# Patient Record
Sex: FEMALE | Race: BLACK OR AFRICAN AMERICAN | Employment: OTHER | URBAN - METROPOLITAN AREA
[De-identification: names, ages, dates, MRNs, and addresses within clinical notes are randomized per-mention and may not be internally consistent; named-entity substitution may affect disease eponyms.]

---

## 2017-08-12 ENCOUNTER — HOSPITAL ENCOUNTER (EMERGENCY)
Age: 57
Discharge: HOME | End: 2017-08-12
Attending: EMERGENCY MEDICINE | Admitting: EMERGENCY MEDICINE
Payer: MEDICARE

## 2017-08-12 VITALS
WEIGHT: 186.1 LBS | BODY MASS INDEX: 34.24 KG/M2 | HEART RATE: 56 BPM | OXYGEN SATURATION: 98 % | TEMPERATURE: 98 F | DIASTOLIC BLOOD PRESSURE: 93 MMHG | HEIGHT: 62 IN | SYSTOLIC BLOOD PRESSURE: 162 MMHG | RESPIRATION RATE: 16 BRPM

## 2017-08-12 DIAGNOSIS — T82.838A BLEEDING FROM DIALYSIS SHUNT, INITIAL ENCOUNTER (HCC): Primary | ICD-10-CM

## 2017-08-12 DIAGNOSIS — R03.0 ELEVATED BLOOD PRESSURE READING: ICD-10-CM

## 2017-08-12 PROCEDURE — 99283 EMERGENCY DEPT VISIT LOW MDM: CPT

## 2017-08-12 PROCEDURE — 77030032276

## 2017-08-12 PROCEDURE — 75810000293 HC SIMP/SUPERF WND  RPR

## 2017-08-12 RX ORDER — CLONIDINE HYDROCHLORIDE 0.3 MG/1
0.3 TABLET ORAL 3 TIMES DAILY
COMMUNITY
End: 2022-09-27

## 2017-08-12 RX ORDER — LIDOCAINE HYDROCHLORIDE AND EPINEPHRINE 10; 10 MG/ML; UG/ML
10 INJECTION, SOLUTION INFILTRATION; PERINEURAL ONCE
Status: DISCONTINUED | OUTPATIENT
Start: 2017-08-12 | End: 2017-08-12 | Stop reason: HOSPADM

## 2017-08-12 RX ORDER — LISINOPRIL 40 MG/1
40 TABLET ORAL DAILY
COMMUNITY
End: 2020-05-23

## 2017-08-12 NOTE — ED PROVIDER NOTES
HPI Comments: 12:15 PM Manan Keller is a 55 y.o. female with h/o HTN who presents to ED complaining of bleeding from dialysis catheter site. EMS states that the patient finished dialysis and was still bleeding two hours after. Patient states that this happened the last time she had dialysis but it didn't last this long. She does not get heparinized during dialysis anymore and is not on anticoagulants. She arrived in the ED with a clamp on her arm. Pt denies light-headedness, shortness of breath, CP or any other symptoms at this time. PCP: Juan Hernandez MD      The history is provided by the patient. No  was used. Past Medical History:   Diagnosis Date    Heart failure ([address]     Hypertension        Past Surgical History:   Procedure Laterality Date    HX GI      polyp removed 10/2016         History reviewed. No pertinent family history. Social History     Social History    Marital status:      Spouse name: N/A    Number of children: N/A    Years of education: N/A     Occupational History    Not on file. Social History Main Topics    Smoking status: Former Smoker    Smokeless tobacco: Never Used    Alcohol use No    Drug use: No    Sexual activity: Yes     Partners: Male     Other Topics Concern    Not on file     Social History Narrative         ALLERGIES: Review of patient's allergies indicates no known allergies. Review of Systems   Constitutional: Negative. Negative for appetite change, chills and fever. HENT: Negative. Negative for congestion, sore throat and trouble swallowing. Eyes: Negative. Negative for visual disturbance. Respiratory: Negative. Negative for cough, shortness of breath and wheezing. Cardiovascular: Negative. Negative for chest pain, palpitations and leg swelling. Gastrointestinal: Negative. Negative for abdominal pain, blood in stool, diarrhea and vomiting. Genitourinary: Negative.   Negative for difficulty urinating, dysuria, frequency and vaginal discharge. Musculoskeletal: Negative. Negative for back pain and myalgias. Skin: Positive for wound. Negative for rash. +bleeding from dialysis catheter on left arm. Neurological: Negative. Negative for dizziness, syncope, speech difficulty, weakness, light-headedness, numbness and headaches. Psychiatric/Behavioral: Negative. Negative for hallucinations, self-injury and suicidal ideas. All other systems reviewed and are negative. Vitals:    08/12/17 1227 08/12/17 1300   BP: (!) 169/101 (!) 162/93   Pulse: 62 (!) 56   Resp: 16    Temp: 98 °F (36.7 °C)    SpO2: 98%    Weight: 84.4 kg (186 lb 1.6 oz)    Height: 5' 2\" (1.575 m)             Physical Exam   Constitutional: She is oriented to person, place, and time. She appears well-developed and well-nourished. No distress. HENT:   Head: Normocephalic and atraumatic. Mouth/Throat: Oropharynx is clear and moist.   Eyes: EOM are normal. Pupils are equal, round, and reactive to light. Neck: Trachea normal and normal range of motion. Neck supple. Cardiovascular: Normal rate, regular rhythm, S1 normal and S2 normal.  Exam reveals no gallop and no friction rub. No murmur heard. Pulmonary/Chest: Effort normal and breath sounds normal. No accessory muscle usage. No respiratory distress. Abdominal: Soft. Normal appearance. She exhibits no distension. There is no tenderness. There is no rigidity, no rebound and no guarding. Musculoskeletal: Normal range of motion. She exhibits no edema or tenderness. AV fistula to the LUE with some dark red oozing from the venous puncture site. Neurological: She is alert and oriented to person, place, and time. She has normal strength. No cranial nerve deficit or sensory deficit. Coordination normal.   Skin: Skin is warm and intact. No rash noted. Psychiatric: She has a normal mood and affect. Her speech is normal and behavior is normal.   Vitals reviewed. MDM  Number of Diagnoses or Management Options  Bleeding from dialysis shunt, initial encounter [facility]):   Elevated blood pressure reading:   Diagnosis management comments: Charlene Nguyễn is a 55 y.o. female coming in with a bleeding AV fistula. Bleeding stopped with topical thrombin and subQ lido with epi. Had full dialysis run and no other complaints. No need for additional w/u at this time. Recommended follow up with vascular surgeon at [facility]. ED Course       Procedures    Vitals:  Patient Vitals for the past 12 hrs:   Temp Pulse Resp BP SpO2   08/12/17 1300 - (!) 56 - (!) 162/93 -   08/12/17 1227 98 °F (36.7 °C) 62 16 (!) 169/101 98 %       Medications ordered:   Medications   thrombin (recombinant) (RECOTHROM) 5,000 unit topical solution (not administered)   gelatin adsorbable (GELFOAM) 12-7 mm sponge 1 Each (not administered)   lidocaine-EPINEPHrine (XYLOCAINE) 1 %-1:100,000 injection 100 mg (not administered)   gelatin absorbable (GELFOAM) 100 sponge (not administered)         Progress notes, Consult notes, and Reevaluation of patient:   1:07 PM Injected subcutaneous lidocaine with epinephrine; patient had gel foam and topical thrombin placed and bleeding has stopped. Patient was observed and bandages replaced. Disposition:  Diagnosis:   1. Bleeding from dialysis shunt, initial encounter ([facility], [address]    2. Elevated blood pressure reading        Disposition: Discharged in stable condition      Follow-up Information     Follow up With Details Comments Contact Info    [facility] EMERGENCY DEPT Go to If symptoms worsen [address] [place] [id number]  [phone]           Patient's Medications   Start Taking    No medications on file   Continue Taking    ACETAMINOPHEN-CODEINE (TYLENOL #3) 300-30 MG PER TABLET    Take 1-2 Tabs by mouth every four (4) hours as needed for Pain. CLONIDINE HCL (CATAPRES) 0.3 MG TABLET    Take 0.3 mg by mouth three (3) times daily.  Indications: hypertension    FEXOFENADINE (ALLEGRA) 60 MG TABLET    Take 1 Tab by mouth daily. LISINOPRIL (PRINIVIL, ZESTRIL) 40 MG TABLET    Take 40 mg by mouth daily. Indications: Chronic Heart Failure   These Medications have changed    No medications on file   Stop Taking    No medications on file     Scribe Attestation  Jeaneth Casey acting as a scribe for and in the presence of Desmond Cho MD  August 12, 2017 at 12:14 PM       Provider Attestation:  I personally performed the services described in the documentation, reviewed the documentation, as recorded by the scribe in my presence, and it accurately and completely records my words and actions.   Desmond Cho MD      Signed by: Jeaneth Beltran, August 12, 2017 at 12:14 PM

## 2017-08-12 NOTE — DISCHARGE INSTRUCTIONS
Elevated Blood Pressure: Care Instructions  Your Care Instructions    Blood pressure is a measure of how hard the blood pushes against the walls of your arteries. It's normal for blood pressure to go up and down throughout the day. But if it stays up over time, you have high blood pressure. Two numbers tell you your blood pressure. The first number is the systolic pressure. It shows how hard the blood pushes when your heart is pumping. The second number is the diastolic pressure. It shows how hard the blood pushes between heartbeats, when your heart is relaxed and filling with blood. An ideal blood pressure in adults is less than 120/80 (say \"120 over 80\"). High blood pressure is 140/90 or higher. You have high blood pressure if your top number is 140 or higher or your bottom number is 90 or higher, or both. The main test for high blood pressure is simple, fast, and painless. To diagnose high blood pressure, your doctor will test your blood pressure at different times. After testing your blood pressure, your doctor may ask you to test it again when you are home. If you are diagnosed with high blood pressure, you can work with your doctor to make a long-term plan to manage it. Follow-up care is a key part of your treatment and safety. Be sure to make and go to all appointments, and call your doctor if you are having problems. It's also a good idea to know your test results and keep a list of the medicines you take. How can you care for yourself at home? · Do not smoke. Smoking increases your risk for heart attack and stroke. If you need help quitting, talk to your doctor about stop-smoking programs and medicines. These can increase your chances of quitting for good. · Stay at a healthy weight. · Try to limit how much sodium you eat to less than 2,300 milligrams (mg) a day. Your doctor may ask you to try to eat less than 1,500 mg a day. · Be physically active.  Get at least 30 minutes of exercise on most days of the week. Walking is a good choice. You also may want to do other activities, such as running, swimming, cycling, or playing tennis or team sports. · Avoid or limit alcohol. Talk to your doctor about whether you can drink any alcohol. · Eat plenty of fruits, vegetables, and low-fat dairy products. Eat less saturated and total fats. · Learn how to check your blood pressure at home. When should you call for help? Call your doctor now or seek immediate medical care if:  · Your blood pressure is much higher than normal (such as 180/110 or higher). · You think high blood pressure is causing symptoms such as:  ¨ Severe headache. ¨ Blurry vision. Watch closely for changes in your health, and be sure to contact your doctor if:  · You do not get better as expected. Where can you learn more? Go to http://jaylenClickingHousealley.info/. Enter Q034 in the search box to learn more about \"Elevated Blood Pressure: Care Instructions. \"  Current as of: April 3, 2017  Content Version: 11.3  © 5908-8042 [employer] (powering [employer]). Care instructions adapted under license by [employer] (which disclaims liability or warranty for this information). If you have questions about a medical condition or this instruction, always ask your healthcare professional. [address] any warranty or liability for your use of this information. Peritonitis: Care Instructions  Your Care Instructions    Peritonitis is an infection of the lining of the belly (peritoneum). It causes pain and swelling inside the belly. It may also cause a fever. The infection can be serious if it is not treated. Peritonitis can be caused by:  · Bacteria escaping into the lining of the belly from a hole in the intestine, such as a burst appendix. · Too much fluid in the belly due to scarring of the liver (cirrhosis). Bacteria can grow easily in this fluid. · Peritoneal dialysis (PD).  PD uses a dialysis fluid and the lining of the belly to filter toxins from the blood. The fluid enters and leaves the belly through a soft tube, or catheter. The place where the catheter comes out of your body is the dialysis access. You may get peritonitis if the catheter is not sterile. You can also get it if the area around the access is not clean. Your doctor will give you antibiotics to treat the infection. If the infection is from a hole in the intestine, you may have surgery to remove pus and infected tissue from the area. If you use PD and the infection is minor, the antibiotics may be added to the dialysis fluid. The doctor may give you a new access while the old one heals. Or you may start using hemodialysis instead of PD. Hemodialysis uses a machine instead of the lining of the belly to filter waste from the blood. Follow-up care is a key part of your treatment and safety. Be sure to make and go to all appointments, and call your doctor if you are having problems. It's also a good idea to know your test results and keep a list of the medicines you take. How can you care for yourself at home? · Take your antibiotics as directed. Do not stop taking them just because you feel better. You need to take the full course of antibiotics. · Drink plenty of fluids. If you have kidney, heart, or liver disease and have to limit fluids, talk with your doctor before you increase the amount of fluids you drink. · If you have a PD catheter, take steps to help prevent infections. ¨ Keep your access clean and dry. Check it every day for signs of infection. ¨ Keep the end of your catheter covered when it is not in use. ¨ Always wash your hands before you touch your catheter. ¨ Avoid swimming and bathing unless your dialysis team has told you it is okay. Always clean and dry your catheter and access right away after you get wet. ¨ Follow your doctor's instructions for showering. When should you call for help?   Call your doctor now or seek immediate medical care if:  · You have nausea and vomiting. · You have belly pain that gets worse when you move or cough. · Your belly is bloated or swollen. · You have symptoms of a new infection or one that's getting worse, such as:  ¨ Increased pain, swelling, warmth, or redness. ¨ Red streaks leading from the access site. ¨ Pus draining from the access site. ¨ A fever. Watch closely for changes in your health, and be sure to contact your doctor if:  · You do not get better as expected. · You have a PD catheter and:  ¨ The dialysis fluid looks cloudy or is a different color. ¨ Fluid does not flow through the catheter. Where can you learn more? Go to http://jaylen-alley.info/. Enter Z221 in the search box to learn more about \"Peritonitis: Care Instructions. \"  Current as of: March 20, 2017  Content Version: 11.3  © 0489-6448 [employer]. Care instructions adapted under license by [employer] (which disclaims liability or warranty for this information). If you have questions about a medical condition or this instruction, always ask your healthcare professional. [place] [address] any warranty or liability for your use of this information.

## 2017-08-12 NOTE — ED TRIAGE NOTES
Pt brought in by EMS for av fistula oozing after dialysis run, pt reported slight chest pain after dialysis, now pt denies CP respirations even and unlabored, pt aao*4, denies any trauma or anticoagulants, pt connected to cardiac monitor continue to monitor pt

## 2017-09-22 ENCOUNTER — HOSPITAL ENCOUNTER (OUTPATIENT)
Dept: PHYSICAL THERAPY | Age: 57
Discharge: HOME | End: 2017-09-22
Payer: MEDICARE

## 2017-09-22 PROCEDURE — 97162 PT EVAL MOD COMPLEX 30 MIN: CPT

## 2017-09-22 PROCEDURE — G8982 BODY POS GOAL STATUS: HCPCS

## 2017-09-22 PROCEDURE — G8981 BODY POS CURRENT STATUS: HCPCS

## 2017-09-22 PROCEDURE — 97110 THERAPEUTIC EXERCISES: CPT
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
## 2017-10-05 ENCOUNTER — HOSPITAL ENCOUNTER (OUTPATIENT)
Dept: PHYSICAL THERAPY | Age: 57
Discharge: HOME | End: 2017-10-05

## 2017-10-09 ENCOUNTER — HOSPITAL ENCOUNTER (OUTPATIENT)
Dept: PHYSICAL THERAPY | Age: 57
End: 2017-10-09

## 2017-10-11 ENCOUNTER — APPOINTMENT (OUTPATIENT)
Dept: PHYSICAL THERAPY | Age: 57
End: 2017-10-11

## 2017-10-12 ENCOUNTER — APPOINTMENT (OUTPATIENT)
Dept: GENERAL RADIOLOGY | Age: 57
End: 2017-10-12
Attending: NURSE PRACTITIONER
Payer: MEDICARE

## 2017-10-12 ENCOUNTER — HOSPITAL ENCOUNTER (EMERGENCY)
Age: 57
Discharge: HOME | End: 2017-10-13
Attending: EMERGENCY MEDICINE
Payer: MEDICARE

## 2017-10-12 DIAGNOSIS — I10 ESSENTIAL HYPERTENSION: ICD-10-CM

## 2017-10-12 DIAGNOSIS — S53.409A ELBOW SPRAIN, INITIAL ENCOUNTER: ICD-10-CM
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
## 2017-10-12 PROCEDURE — 74011250637 HC RX REV CODE- 250/637: Performed by: NURSE PRACTITIONER

## 2017-10-12 PROCEDURE — 99283 EMERGENCY DEPT VISIT LOW MDM: CPT

## 2017-10-12 PROCEDURE — L3670 SO ACRO/CLAV CAN WEB PRE OTS: HCPCS

## 2017-10-12 PROCEDURE — 73080 X-RAY EXAM OF ELBOW: CPT

## 2017-10-12 RX ORDER — CLONIDINE HYDROCHLORIDE 0.1 MG/1
0.3 TABLET ORAL
Status: COMPLETED | OUTPATIENT
Start: 2017-10-12 | End: 2017-10-12

## 2017-10-12 RX ORDER — OXYCODONE AND ACETAMINOPHEN 5; 325 MG/1; MG/1
2 TABLET ORAL
Status: COMPLETED | OUTPATIENT
Start: 2017-10-12 | End: 2017-10-12

## 2017-10-12 RX ADMIN — CLONIDINE HYDROCHLORIDE 0.3 MG: 0.1 TABLET ORAL at 23:48

## 2017-10-12 RX ADMIN — OXYCODONE HYDROCHLORIDE AND ACETAMINOPHEN 2 TABLET: 5; 325 TABLET ORAL at 23:48

## 2017-10-13 ENCOUNTER — HOSPITAL ENCOUNTER (OUTPATIENT)
Dept: PHYSICAL THERAPY | Age: 57
End: 2017-10-13

## 2017-10-13 VITALS
RESPIRATION RATE: 20 BRPM | SYSTOLIC BLOOD PRESSURE: 160 MMHG | OXYGEN SATURATION: 99 % | WEIGHT: 175 LBS | BODY MASS INDEX: 30.04 KG/M2 | TEMPERATURE: 99.2 F | HEART RATE: 76 BPM | DIASTOLIC BLOOD PRESSURE: 94 MMHG

## 2017-10-13 RX ORDER — OXYCODONE AND ACETAMINOPHEN 5; 325 MG/1; MG/1
1 TABLET ORAL
Qty: 15 TAB | Refills: 0 | Status: SHIPPED | OUTPATIENT
Start: 2017-10-13 | End: 2018-03-27

## 2017-10-13 RX ORDER — NAPROXEN 500 MG/1
500 TABLET ORAL 2 TIMES DAILY WITH MEALS
Qty: 20 TAB | Refills: 0 | Status: SHIPPED | OUTPATIENT
Start: 2017-10-13 | End: 2017-10-23
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
## 2017-10-13 NOTE — ED NOTES
I have reviewed discharge instructions with the patient. The patient verbalized understanding. Discharge medications reviewed with patient and appropriate educational materials and side effects teaching were provided. Discussed with the patient and all questioned fully answered.   Patient armband removed and shredded
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
## 2017-10-14 NOTE — PROGRESS NOTES
Called and spoke with pt, confirmed last 4 SSN and . Informed of over-read. Pt does not have any pain in elbow or ROM issues. Already established pt of Dr. Ana Griggs. Advised to call office on Monday to make f/u appointment.

## 2017-10-18 ENCOUNTER — APPOINTMENT (OUTPATIENT)
Dept: PHYSICAL THERAPY | Age: 57
End: 2017-10-18

## 2017-10-23 ENCOUNTER — APPOINTMENT (OUTPATIENT)
Dept: PHYSICAL THERAPY | Age: 57
End: 2017-10-23

## 2017-10-25 ENCOUNTER — APPOINTMENT (OUTPATIENT)
Dept: PHYSICAL THERAPY | Age: 57
End: 2017-10-25

## 2017-10-30 ENCOUNTER — APPOINTMENT (OUTPATIENT)
Dept: PHYSICAL THERAPY | Age: 57
End: 2017-10-30

## 2017-11-01 ENCOUNTER — APPOINTMENT (OUTPATIENT)
Dept: PHYSICAL THERAPY | Age: 57
End: 2017-11-01

## 2017-11-08 ENCOUNTER — HOSPITAL ENCOUNTER (OUTPATIENT)
Dept: PHYSICAL THERAPY | Age: 57
End: 2017-11-08

## 2017-11-17 ENCOUNTER — HOSPITAL ENCOUNTER (OUTPATIENT)
Dept: PHYSICAL THERAPY | Age: 57
Discharge: HOME | End: 2017-11-17
Payer: MEDICARE

## 2017-11-17 PROCEDURE — G8984 CARRY CURRENT STATUS: HCPCS

## 2017-11-17 PROCEDURE — G8985 CARRY GOAL STATUS: HCPCS

## 2017-11-17 PROCEDURE — 97110 THERAPEUTIC EXERCISES: CPT

## 2017-11-17 PROCEDURE — 97161 PT EVAL LOW COMPLEX 20 MIN: CPT

## 2017-11-17 PROCEDURE — 97140 MANUAL THERAPY 1/> REGIONS: CPT
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
## 2017-11-22 ENCOUNTER — HOSPITAL ENCOUNTER (OUTPATIENT)
Dept: PHYSICAL THERAPY | Age: 57
Discharge: HOME | End: 2017-11-22
Payer: MEDICARE

## 2017-11-22 PROCEDURE — 97140 MANUAL THERAPY 1/> REGIONS: CPT

## 2017-11-22 PROCEDURE — 97110 THERAPEUTIC EXERCISES: CPT

## 2017-11-22 NOTE — PROGRESS NOTES
PT DAILY TREATMENT NOTE     Patient Name: Walter Contreras  Date:2017  : 1960  [x]  Patient  Verified  Payor: Juanito BlakelyBillboard Jungle / Plan: [address] / Product Type: LEYLA /    In time: 4:36  Out time: 5:16  Total Treatment Time (min): 40  Visit #: 2 of 20    Treatment Area: Cervicalgia [M54.2]    SUBJECTIVE  Pain Level (0-10 scale): 7.5-8/10 c/s; 6/10 l/s  Any medication changes, allergies to medications, adverse drug reactions, diagnosis change, or new procedure performed?: [x] No    [] Yes (see summary sheet for update)  Subjective functional status/changes:   [] No changes reported  Pt reports increased tension and tightness causing pain in her neck region. She notes that she has difficulty turning her head to look back and forth when driving. She has been having discomfort when sleeping as well and is sleeping on the couch currently.      OBJECTIVE    Modality rationale: decrease pain and increase tissue extensibility to improve the patients ability to improve ease of sleeping   Min Type Additional Details    [] Estim:  []Unatt       []IFC  []Premod                        []Other:  []w/ice   []w/heat  Position:  Location:    [] Estim: []Att    []TENS instruct  []NMES                    []Other:  []w/US   []w/ice   []w/heat  Position:  Location:    []  Traction: [] Cervical       []Lumbar                       [] Prone          []Supine                       []Intermittent   []Continuous Lbs:  [] before manual  [] after manual    []  Ultrasound: []Continuous   [] Pulsed                           []1MHz   []3MHz W/cm2:  Location:    []  Iontophoresis with dexamethasone         Location: [] Take home patch   [] In clinic   10 []  Ice     [x]  heat  []  Ice massage  []  Laser   []  Anodyne Position:seated  Location: l/s and B UTs    []  Laser with stim  []  Other:  Position:  Location:    []  Vasopneumatic Device Pressure:       [] lo [] med [] hi   Temperature: [] lo [] med [] hi   [x] Skin assessment post-treatment:  [x]intact []redness- no adverse reaction    []redness - adverse reaction:       10 min Therapeutic Exercise:  [x] See flow sheet :   Rationale: increase ROM and increase strength to improve the patients ability to improve ease of sleeping and performing household chores    20 min Manual Therapy:  DTM to B UTs; TPR to B c/s paraspinals along C7   Rationale: decrease pain, increase ROM and increase tissue extensibility to improve c/s mobility for driving and decrease pain with sleeping            With   [] TE   [] TA   [] neuro   [] other: Patient Education: [x] Review HEP    [] Progressed/Changed HEP based on:   [] positioning   [] body mechanics   [] transfers   [] heat/ice application    [] other:      Other Objective/Functional Measures: B swelling along superior clavicles  Large knots in B UTs L >R  TTP along c/s paraspinals but improved mobility post manual  Educated on heat and stretching over the weekend     Pain Level (0-10 scale) post treatment: 6/10    ASSESSMENT/Changes in Function: Initiated exercise program per POC with compliance of initial HEP. Pt continues to be guarded by pain with decreased ability to sleep. She has hypertonicity of B UTs L>R. Will work on decreased muscle guarding to improve ease of turning her head for driving and performing household  Chores. Patient will continue to benefit from skilled PT services to modify and progress therapeutic interventions, address functional mobility deficits, address ROM deficits, address strength deficits, analyze and address soft tissue restrictions, analyze and cue movement patterns, analyze and modify body mechanics/ergonomics and instruct in home and community integration to attain remaining goals. Progress towards goals / Updated goals:  Short term goals: To be accomplished within 1 week  1. Pt will be independent with HEP to maintain progression. Long term goals: To be accomplished within 20 visit  1. Pt will improve FOTO score by 18 points to 55/100 to show improvement with functional mobility performance. 2. Pt will report no more than 6/10 pain to improve her QOL. 3. Pt will have full and pain free AROM of c/s and B UEs for ADLs performance. 4. Pt will have B UEs strength at least 4/5 so she can perform housechores with ease.     PLAN  [x]  Upgrade activities as tolerated     [x]  Continue plan of care  []  Update interventions per flow sheet       []  Discharge due to:_  []  Other:_      Oswald Stock PTA 11/22/2017  4:36 PM    Future Appointments  Date Time Provider Jessee Arthur   11/27/2017 11:30 AM Oswald Stock PTA [id number]   12/1/2017 11:30 AM Kellen Nichols PT [id number]

## 2017-12-01 ENCOUNTER — HOSPITAL ENCOUNTER (OUTPATIENT)
Dept: PHYSICAL THERAPY | Age: 57
Discharge: HOME | End: 2017-12-01
Payer: MEDICARE

## 2017-12-01 PROCEDURE — 97140 MANUAL THERAPY 1/> REGIONS: CPT

## 2017-12-01 PROCEDURE — 97110 THERAPEUTIC EXERCISES: CPT

## 2017-12-01 NOTE — PROGRESS NOTES
PT DAILY TREATMENT NOTE - [facility]     Patient Name: France Medina  Date:2017  : 1960  [x]  Patient  Verified  Payor[place] / Plan: [place] MED[place] PART [place] & B / Product Type: Medicare /    In time:11:32  Out time:12:00  Total Treatment Time (min): 28'  Total Timed Codes (min): 28'  1:1 Treatment Time ([address] only): 28'  Visit #: 3 of 20    Treatment Area: Cervicalgia [M54.2]    SUBJECTIVE  Pain Level (0-10 scale): 7.5/10  Any medication changes, allergies to medications, adverse drug reactions, diagnosis change, or new procedure performed?: [x] No    [] Yes (see summary sheet for update)  Subjective functional status/changes:   [] No changes reported  Pt reports pain increases during reaching and opening doors  Pt repots complete compliance with HEP   Pt asked about obtaining [employer] samples  Pt discribes manual therapy as \"good hurt\"       OBJECTIVE      8' min Therapeutic Exercise:  [x] See flow sheet :   Rationale: increase ROM to improve the patients ability to perform ADL's       20' min Manual Therapy:  DTM to B UTs; TPR to B c/s paraspinals along C7 and paraspinal strumming will elbow   Rationale: decrease pain to improve c/s mobility for driving and decrease pain with sleeping              With   [x] TE   [] TA   [] neuro   [] other: Patient Education: [x] Review HEP    [] Progressed/Changed HEP based on:   [] positioning   [] body mechanics   [] transfers   [] heat/ice application    [] other:      Other Objective/Functional Measures:   Pt required tactile cueing to properly execute rhomboid stretch   Pt had increased tone in R UT compared to left but had palpaple Trigger points in both. Pt was educated in use of tennis ball and pillow case for self massage. Pt muscle tone was improved following manual therapy and she verbalized her appreciation.       Pain Level (0-10 scale) post treatment: 7.5/10    ASSESSMENT/Changes in Function: Pt is progressing toward achievement of rehab potential as evidenced by decreased tone post therapy, but continues to present with stiff guard posture. Will continue with manual techniques to manage pain and stretches to increase AROM in order to improve ability to do house hold activities and driving. Patient will continue to benefit from skilled PT services to modify and progress therapeutic interventions, address functional mobility deficits, address ROM deficits, address strength deficits and assess and modify postural abnormalities to attain remaining goals. [x]  See Plan of Care  []  See progress note/recertification  []  See Discharge Summary         Progress towards goals / Updated goals:  Short term goals: To be accomplished within 1 week  1. Pt will be independent with HEP to maintain progression. met    Long term goals: To be accomplished within 20 visit  1. Pt will improve FOTO score by 18 points to 55/100 to show improvement with functional mobility performance. 2. Pt will report no more than 6/10 pain to improve her QOL. 3. Pt will have full and pain free AROM of c/s and B UEs for ADLs performance. 4. Pt will have B UEs strength at least 4/5 so she can perform housechores with ease.       PLAN  []  Upgrade activities as tolerated     [x]  Continue plan of care  []  Update interventions per flow sheet       []  Discharge due to:_  []  Other:_      Shawna Carroll 12/1/2017  11:30 AM    No future appointments.

## 2017-12-06 ENCOUNTER — APPOINTMENT (OUTPATIENT)
Dept: PHYSICAL THERAPY | Age: 57
End: 2017-12-06
Payer: MEDICARE

## 2017-12-08 ENCOUNTER — HOSPITAL ENCOUNTER (OUTPATIENT)
Dept: PHYSICAL THERAPY | Age: 57
Discharge: HOME | End: 2017-12-08
Payer: MEDICARE

## 2017-12-08 PROCEDURE — 97140 MANUAL THERAPY 1/> REGIONS: CPT

## 2017-12-08 PROCEDURE — 97110 THERAPEUTIC EXERCISES: CPT
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
## 2017-12-13 ENCOUNTER — APPOINTMENT (OUTPATIENT)
Dept: PHYSICAL THERAPY | Age: 57
End: 2017-12-13
Payer: MEDICARE

## 2017-12-14 ENCOUNTER — HOSPITAL ENCOUNTER (OUTPATIENT)
Dept: PHYSICAL THERAPY | Age: 57
Discharge: HOME | End: 2017-12-14
Payer: MEDICARE

## 2017-12-14 PROCEDURE — 97140 MANUAL THERAPY 1/> REGIONS: CPT

## 2017-12-14 PROCEDURE — 97110 THERAPEUTIC EXERCISES: CPT

## 2017-12-14 NOTE — PROGRESS NOTES
PT DAILY TREATMENT NOTE - [facility]     Patient Name: Mani Julio  Date:2017  : 1960  [x]  Patient  Verified  Payor: Breanne Ice / Plan: [place] MED[place] & B / Product Type: Medicare /    In time: 5:33  Out time: 6:15  Total Treatment Time (min): 42  Total Timed Codes (min): 32  1:1 Treatment Time ( only): 32   Visit #: 5 of 20    Treatment Area: Cervicalgia [M54.2]    SUBJECTIVE  Pain Level (0-10 scale): 6.5/10  Any medication changes, allergies to medications, adverse drug reactions, diagnosis change, or new procedure performed?: [x] No    [] Yes (see summary sheet for update)  Subjective functional status/changes:   [] No changes reported   pt. Reports it's still there but getting better.      OBJECTIVE    Modality rationale: decrease pain and increase tissue extensibility to improve the patients ability to increase ease of ADLs   Min Type Additional Details    [] Estim:  []Unatt       []IFC  []Premod                        []Other:  []w/ice   []w/heat  Position:  Location:    [] Estim: []Att    []TENS instruct  []NMES                    []Other:  []w/US   []w/ice   []w/heat  Position:  Location:    []  Traction: [] Cervical       []Lumbar                       [] Prone          []Supine                       []Intermittent   []Continuous Lbs:  [] before manual  [] after manual    []  Ultrasound: []Continuous   [] Pulsed                           []1MHz   []3MHz W/cm2:  Location:    []  Iontophoresis with dexamethasone         Location: [] Take home patch   [] In clinic   10 []  Ice     [x]  heat  []  Ice massage  []  Laser   []  Anodyne Position: seated  Location: B UT and low back    []  Laser with stim  []  Other:  Position:  Location:    []  Vasopneumatic Device Pressure:       [] lo [] med [] hi   Temperature: [] lo [] med [] hi   [x] Skin assessment post-treatment:  [x]intact []redness- no adverse reaction    []redness - adverse reaction:     22 min Therapeutic Exercise:  [x] See flow sheet :   Rationale: increase ROM and increase strength to improve the patients ability to increase ease of ADLs    10 min Manual Therapy:  Trigger point release to B UT   Rationale: decrease pain, increase ROM and increase tissue extensibility to increase ease of ADLs          With   [x] TE   [] TA   [] neuro   [] other: Patient Education: [x] Review HEP    [] Progressed/Changed HEP based on:   [] positioning   [] body mechanics   [] transfers   [] heat/ice application    [] other:      Other Objective/Functional Measures:   Cervical AROM rotation R: 60 L: 60 degrees  Pt. Was educated on band rows/extension for HEP  She was educated on supine nods for HEP  She tolerated PT well with no reports of increased pain or increased radicular symptoms. Pain Level (0-10 scale) post treatment: 5.5/10    ASSESSMENT/Changes in Function:  Pt. Is progressing slowly towards goals. She demonstrates improving cervical mobility and has some decrease in pain overall. She does continue to have intermittent radicular symptoms. Patient will continue to benefit from skilled PT services to modify and progress therapeutic interventions, address functional mobility deficits, address ROM deficits, address strength deficits, analyze and address soft tissue restrictions, analyze and cue movement patterns, analyze and modify body mechanics/ergonomics and assess and modify postural abnormalities to attain remaining goals. Progress towards goals / Updated goals:  Short term goals: To be accomplished within 1 week  1. Pt will be independent with HEP to maintain progression. met   Long term goals: To be accomplished within 20 visit  1. Pt will improve FOTO score by 18 points to 55/100 to show improvement with functional mobility performance. 2. Pt will report no more than 6/10 pain to improve her QOL. Continues at 7.5/10 (12/8/17)  3. Pt will have full and pain free AROM of c/s and B UEs for ADLs performance. Progressing: pt. Demonstrates improving cervical rotation AROM (12/14/17)  4. Pt will have B UEs strength at least 4/5 so she can perform housechores with ease.     PLAN  []  Upgrade activities as tolerated     [x]  Continue plan of care  []  Update interventions per flow sheet       []  Discharge due to:_  []  Other:_      Basilio Juan Carloson, PT 12/14/2017  5:37 PM    Future Appointments  Date Time Provider Jessee Arthur   12/19/2017 5:00 PM Arvel Bound, PTA [facility]   12/21/2017 5:00 PM Arvel Bound, PTA [facility]

## 2017-12-15 ENCOUNTER — APPOINTMENT (OUTPATIENT)
Dept: PHYSICAL THERAPY | Age: 57
End: 2017-12-15
Payer: MEDICARE

## 2017-12-20 ENCOUNTER — APPOINTMENT (OUTPATIENT)
Dept: PHYSICAL THERAPY | Age: 57
End: 2017-12-20
Payer: MEDICARE

## 2017-12-21 ENCOUNTER — HOSPITAL ENCOUNTER (OUTPATIENT)
Dept: PHYSICAL THERAPY | Age: 57
Discharge: HOME | End: 2017-12-21
Payer: MEDICARE

## 2017-12-21 PROCEDURE — 97140 MANUAL THERAPY 1/> REGIONS: CPT

## 2017-12-21 PROCEDURE — 97110 THERAPEUTIC EXERCISES: CPT

## 2017-12-21 NOTE — PROGRESS NOTES
PT DAILY TREATMENT NOTE - [facility]     Patient Name: Ruben Perry  Date:2017  : 1960  [x]  Patient  Verified  Payor: Concetta Cindy / Plan: [place] MEDICARE PART A & B / Product Type: Medicare /    In time:5:15  Out time: 6:00  Total Treatment Time (min): 45  Total Timed Codes (min): 35  1:1 Treatment Time ( only): 30  Visit #: 6 of 20    Treatment Area: Cervicalgia [M54.2]    SUBJECTIVE  Pain Level (0-10 scale): 7/10  Any medication changes, allergies to medications, adverse drug reactions, diagnosis change, or new procedure performed?: [x] No    [] Yes (see summary sheet for update)  Subjective functional status/changes:   [] No changes reported  Pt reports a lot of tension in her neck today and feels she needs a massage. She thought her appt was at 5:30.      OBJECTIVE    Modality rationale: decrease pain and increase tissue extensibility to improve the patients ability to improve ease of performing ADLs    Min Type Additional Details    [] Estim:  []Unatt       []IFC  []Premod                        []Other:  []w/ice   []w/heat  Position:  Location:    [] Estim: []Att    []TENS instruct  []NMES                    []Other:  []w/US   []w/ice   []w/heat  Position:  Location:    []  Traction: [] Cervical       []Lumbar                       [] Prone          []Supine                       []Intermittent   []Continuous Lbs:  [] before manual  [] after manual    []  Ultrasound: []Continuous   [] Pulsed                           []1MHz   []3MHz W/cm2:  Location:    []  Iontophoresis with dexamethasone         Location: [] Take home patch   [] In clinic   10 []  Ice     [x]  heat  []  Ice massage  []  Laser   []  Anodyne Position:seated  Location: B UTs    []  Laser with stim  []  Other:  Position:  Location:    []  Vasopneumatic Device Pressure:       [] lo [] med [] hi   Temperature: [] lo [] med [] hi   [x] Skin assessment post-treatment:  [x]intact []redness- no adverse reaction    []redness - adverse reaction:     25 min Therapeutic Exercise:  [x] See flow sheet :   Rationale: increase ROM and increase strength to improve the patients ability to improve ease of performing ADLs    10 min Manual Therapy:  DTM B UTs   Rationale: decrease pain, increase ROM and increase tissue extensibility to improve ease of performing ADLs          With   [] TE   [] TA   [] neuro   [] other: Patient Education: [x] Review HEP    [] Progressed/Changed HEP based on:   [] positioning   [] body mechanics   [] transfers   [] heat/ice application    [] other:      Other Objective/Functional Measures:   Continues with hypertonicity R UT more than L UT  No increased pain with stretches or exercises  Educated on use of cane at home for knots and heat to help relieve tension/tightness    Pain Level (0-10 scale) post treatment: 5/10    ASSESSMENT/Changes in Function: Pt making slow, steady progress towards goals with continued hypertonicity of R>L UTs. She has been doing exercises at home but remains tight likely due to overcompensation for weak shoulder musculature. Will continue working to decrease pain/tightness to return to PLOF. Patient will continue to benefit from skilled PT services to modify and progress therapeutic interventions, address functional mobility deficits, address ROM deficits, address strength deficits, analyze and address soft tissue restrictions, analyze and cue movement patterns, analyze and modify body mechanics/ergonomics, assess and modify postural abnormalities, address imbalance/dizziness and instruct in home and community integration to attain remaining goals. Progress towards goals / Updated goals:  Short term goals: To be accomplished within 1 week  1. Pt will be independent with HEP to maintain progression. met   Long term goals: To be accomplished within 20 visit  1. Pt will improve FOTO score by 18 points to 55/100 to show improvement with functional mobility performance.    2. Pt will report no more than 6/10 pain to improve her QOL. Continues at 7.5/10 (12/8/17)  3. Pt will have full and pain free AROM of c/s and B UEs for ADLs performance. Progressing: pt. Demonstrates improving cervical rotation AROM (12/14/17)  4. Pt will have B UEs strength at least 4/5 so she can perform housechores with ease.     PLAN  [x]  Upgrade activities as tolerated     [x]  Continue plan of care  []  Update interventions per flow sheet       []  Discharge due to:_  []  Other:_      Sisto Sham, PTA 12/21/2017  6:03 PM    Future Appointments  Date Time Provider Jessee Sandhya   12/28/2017 5:30 PM Sisto Sham, PTA [facility]   12/29/2017 5:30 PM Sisto Sham, PTA [facility]   1/4/2018 5:00 PM Sisto Sham, PTA [facility]   1/5/2018 5:30 PM Sisto Sham, PTA [facility]   1/11/2018 5:00 PM Kellen Hasten, PT [facility]   1/12/2018 5:00 PM [facility]asten, [facility]   1/18/2018 5:00 PM Kellen Hasten, PT [facility]   1/19/2018 5:00 PM Kellen Magdalena, PT [facility]

## 2017-12-22 ENCOUNTER — APPOINTMENT (OUTPATIENT)
Dept: PHYSICAL THERAPY | Age: 57
End: 2017-12-22
Payer: MEDICARE

## 2017-12-26 ENCOUNTER — APPOINTMENT (OUTPATIENT)
Dept: PHYSICAL THERAPY | Age: 57
End: 2017-12-26
Payer: MEDICARE

## 2017-12-27 ENCOUNTER — APPOINTMENT (OUTPATIENT)
Dept: PHYSICAL THERAPY | Age: 57
End: 2017-12-27
Payer: MEDICARE

## 2017-12-28 ENCOUNTER — HOSPITAL ENCOUNTER (OUTPATIENT)
Dept: PHYSICAL THERAPY | Age: 57
Discharge: HOME | End: 2017-12-28
Payer: MEDICARE

## 2017-12-28 PROCEDURE — 97110 THERAPEUTIC EXERCISES: CPT

## 2017-12-28 PROCEDURE — 97140 MANUAL THERAPY 1/> REGIONS: CPT
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
## 2017-12-29 ENCOUNTER — APPOINTMENT (OUTPATIENT)
Dept: PHYSICAL THERAPY | Age: 57
End: 2017-12-29
Payer: MEDICARE

## 2018-01-04 ENCOUNTER — APPOINTMENT (OUTPATIENT)
Dept: PHYSICAL THERAPY | Age: 58
End: 2018-01-04
Payer: MEDICARE

## 2018-01-05 ENCOUNTER — APPOINTMENT (OUTPATIENT)
Dept: PHYSICAL THERAPY | Age: 58
End: 2018-01-05
Payer: MEDICARE

## 2018-01-11 ENCOUNTER — HOSPITAL ENCOUNTER (OUTPATIENT)
Dept: PHYSICAL THERAPY | Age: 58
Discharge: HOME | End: 2018-01-11
Payer: MEDICARE

## 2018-01-11 PROCEDURE — G8984 CARRY CURRENT STATUS: HCPCS

## 2018-01-11 PROCEDURE — G8985 CARRY GOAL STATUS: HCPCS

## 2018-01-11 PROCEDURE — 97110 THERAPEUTIC EXERCISES: CPT

## 2018-01-11 NOTE — PROGRESS NOTES
[facility] [employer] [facility]  [address] [place] [address]  [phone] fax    Continued Plan of Care/ Re-certification for Physical Therapy Services    Patient name: Steve Denney Start of Care: 17   Referral source: Dr. Bk Parker : 1960   Medical/Treatment Diagnosis: Cervicalgia [M54.2] Onset Date: 17     Prior Hospitalization: see medical history Provider#: [id number]   Medications: Verified on Patient Summary List    Comorbidities:  HTN  Prior Level of Function: Ind with all mobility  Visits from Start of Care: 8    Missed Visits: 2    The Plan of Care and following information is based on the patient's current status:  Goal: Pt will improve FOTO score by 18 points to 55/100 to show improvement with functional mobility performance. Status at last note/certification:37  Current Status: not met    Goal: Pt will report no more than 6/10 pain to improve her QOL. Status at last note/certification:   Current Status: not met    Goal: Pt will have full and pain free AROM of c/s and B UEs for ADLs performance. Status at last note/certification: painful. Cervical ext: 5 degrees. Rotation R: 25 L: 25 degrees   Current Status: not met    Goal: Pt will have B UEs strength at least 4/5 so she can perform housechores with ease. Status at last note/certification: decreased strength   Current Status: met    Key functional changes: pt.  Is having some decrease in pain and demonstrates improving cervical AROM      Problems/ barriers to goal attainment: none     Problem List: pain affecting function, decrease ROM, decrease strength, impaired gait/ balance, decrease ADL/ functional abilitiies, decrease activity tolerance, decrease flexibility/ joint mobility and decrease transfer abilities    Treatment Plan: Therapeutic exercise, Therapeutic activities, Neuromuscular re-education, Physical agent/modality, Gait/balance training, Manual therapy, Patient education, Self Care training and Functional mobility training     Patient Goal (s) has been updated and includes: to have less pain     Goals for this certification period to be accomplished in 6 weeks:  1. Patient will improve FOTO score by 18 points in order to demonstrate a significant improvement in function. 2. Patient will report pain at 4/10 or less during ADLs in order to improve quality of life. 3. Patient will improve B cervical side bend AROM to 30 degrees in order to increase ease of ADLs. Frequency / Duration: Patient to be seen 2 times per week for 6 weeks:    Assessment / Recommendations:pt. Is progressing slowly towards goals. She demonstrates improving cervical AROM but continues to have pain at end ranges. She also continues to complain of moderate pain at 6.5/10. B shoulder strength has also improved with decreased pain during manual muscle testing. Pt. Continues to have decreased cervical strength. Skilled PT is medically necessary in order to continue to improve strength and mobility for decreased pain and improved quality of life. G-Codes (GP)  Carry   Current  CL= 60-79%   [id number] Goal  CK= 40-59%    The severity rating is based on clinical judgment and the FOTO score. Certification Period: 1/11/18-3/9/18    Nathan Hinson, PT 1/11/2018 5:13 PM    ________________________________________________________________________  I certify that the above Therapy Services are being furnished while the patient is under my care. I agree with the treatment plan and certify that this therapy is necessary. [] I have read the above and request that my patient continue as recommended.   [] I have read the above report and request that my patient continue therapy with the following changes/special instructions: _______________________________________  [] I have read the above report and request that my patient be discharged from therapy    Physician's Signature:________________________________Date:___________Time:__________    Please sign and return to [facility] - [facility]  [address] [place] [address]  [phone] fax

## 2018-01-11 NOTE — PROGRESS NOTES
PT DAILY TREATMENT NOTE - [facility]     Patient Name: Lila Hernández  Date:2018  : 1960  [x]  Patient  Verified  Payor: Yuliet Blend / Plan: [place] MED[place] & B / Product Type: Medicare /    In time: 5:05  Out time: 5:45  Total Treatment Time (min): 40  Total Timed Codes (min): 30  1:1 Treatment Time ( [address] only): 25   Visit #: 8 of 20    Treatment Area: Cervicalgia [M54.2]    SUBJECTIVE  Pain Level (0-10 scale): 6.5/10  Any medication changes, allergies to medications, adverse drug reactions, diagnosis change, or new procedure performed?: [x] No    [] Yes (see summary sheet for update)  Subjective functional status/changes:   [] No changes reported  Pt.  Reports she has been getting a little better    OBJECTIVE    Modality rationale: decrease pain and increase tissue extensibility to improve the patients ability to increase ease of ADLs   Min Type Additional Details    [] Estim:  []Unatt       []IFC  []Premod                        []Other:  []w/ice   []w/heat  Position:  Location:    [] Estim: []Att    []TENS instruct  []NMES                    []Other:  []w/US   []w/ice   []w/heat  Position:  Location:    []  Traction: [] Cervical       []Lumbar                       [] Prone          []Supine                       []Intermittent   []Continuous Lbs:  [] before manual  [] after manual    []  Ultrasound: []Continuous   [] Pulsed                           []1MHz   []3MHz W/cm2:  Location:    []  Iontophoresis with dexamethasone         Location: [] Take home patch   [] In clinic   10 []  Ice     [x]  heat  []  Ice massage  []  Laser   []  Anodyne Position: seated  Location: back and B shoulders    []  Laser with stim  []  Other:  Position:  Location:    []  Vasopneumatic Device Pressure:       [] lo [] med [] hi   Temperature: [] lo [] med [] hi   [x] Skin assessment post-treatment:  [x]intact []redness- no adverse reaction    []redness - adverse reaction:     30 min Therapeutic Exercise:  [x] See flow sheet :   Rationale: increase ROM and increase strength to improve the patients ability to increase ease of ADLs          With   [x] TE   [] TA   [] neuro   [] other: Patient Education: [x] Review HEP    [] Progressed/Changed HEP based on:   [] positioning   [] body mechanics   [] transfers   [] heat/ice application    [] other:      Other Objective/Functional Measures:   Cervical AROM flex: 40 degrees ext: 30 degrees rot R: 60 L: 65  MMT shoulder flex: R: 4/5 L: 4/5 abd R: 4/5 L: 4/5   Pt.  Continues to have relief with heat  She had poor form with yeni bar push out    Pain Level (0-10 scale) post treatment: 3/10    ASSESSMENT/Changes in Function:      []  See Plan of Care  [x]  See progress note/recertification  []  See Discharge Summary         Progress towards goals / Updated goals:  See progress note    PLAN  []  Upgrade activities as tolerated     [x]  Continue plan of care  []  Update interventions per flow sheet       []  Discharge due to:_  []  Other:_      Lamont Orozco PT 1/11/2018  5:02 PM    Future Appointments  Date Time Provider Jessee Arthur   1/12/2018 5:00 PM Lamont Orozco PT [facility]   1/18/2018 5:00 PM Lamont Orozco PT [facility]   1/19/2018 5:00 PM Lamont Orozco PT [facility]

## 2018-01-12 ENCOUNTER — HOSPITAL ENCOUNTER (OUTPATIENT)
Dept: PHYSICAL THERAPY | Age: 58
Discharge: HOME | End: 2018-01-12
Payer: MEDICARE

## 2018-01-12 PROCEDURE — 97110 THERAPEUTIC EXERCISES: CPT

## 2018-01-12 NOTE — PROGRESS NOTES
PT DAILY TREATMENT NOTE - [facility]     Patient Name: Nereida Lehman  Date:2018  : 1960  [x]  Patient  Verified  Payor: Pippa Nair / Plan: [place] MEDICARE PART A & B / Product Type: Medicare /    In time: 5:00  Out time: 5:45  Total Treatment Time (min): 45  Total Timed Codes (min): 35  1:1 Treatment Time ( [address] only): 30   Visit #: 9 of 20    Treatment Area: Cervicalgia [M54.2]    SUBJECTIVE  Pain Level (0-10 scale): 6/10  Any medication changes, allergies to medications, adverse drug reactions, diagnosis change, or new procedure performed?: [x] No    [] Yes (see summary sheet for update)  Subjective functional status/changes:   [] No changes reported  Pt. Reports she is a little sore from yesterday but doing good.      OBJECTIVE    Modality rationale: decrease pain and increase tissue extensibility to improve the patients ability to increase ease of ADLs   Min Type Additional Details    [] Estim:  []Unatt       []IFC  []Premod                        []Other:  []w/ice   []w/heat  Position:  Location:    [] Estim: []Att    []TENS instruct  []NMES                    []Other:  []w/US   []w/ice   []w/heat  Position:  Location:    []  Traction: [] Cervical       []Lumbar                       [] Prone          []Supine                       []Intermittent   []Continuous Lbs:  [] before manual  [] after manual    []  Ultrasound: []Continuous   [] Pulsed                           []1MHz   []3MHz W/cm2:  Location:    []  Iontophoresis with dexamethasone         Location: [] Take home patch   [] In clinic   10 []  Ice     [x]  heat  []  Ice massage  []  Laser   []  Anodyne Position: seated  Location: back and R neck    []  Laser with stim  []  Other:  Position:  Location:    []  Vasopneumatic Device Pressure:       [] lo [] med [] hi   Temperature: [] lo [] med [] hi   [x] Skin assessment post-treatment:  [x]intact []redness- no adverse reaction    []redness - adverse reaction:     30 min Therapeutic Exercise:  [x] See flow sheet :   Rationale: increase ROM and increase strength to improve the patients ability to increase ease of ADls    5 min Manual Therapy:  Trigger point release to R UT   Rationale: decrease pain, increase ROM and increase tissue extensibility to increase ease of ADls          With   [x] TE   [] TA   [] neuro   [] other: Patient Education: [x] Review HEP    [] Progressed/Changed HEP based on:   [] positioning   [] body mechanics   [] transfers   [] heat/ice application    [] other:      Other Objective/Functional Measures:   Pt. Tolerated PT well with no reports of increased pain  She continues to be challenged with cervical retractions   She continues to require cues for counting reps    Pain Level (0-10 scale) post treatment: 6-6.5/10    ASSESSMENT/Changes in Function:  Pt. Is progressing slowly towards goals. She continues to have moderate pain and pain with end ranges of motion. She continues to have trigger points along R UT. Patient will continue to benefit from skilled PT services to modify and progress therapeutic interventions, address functional mobility deficits, address ROM deficits, address strength deficits, analyze and address soft tissue restrictions, analyze and cue movement patterns, analyze and modify body mechanics/ergonomics and assess and modify postural abnormalities to attain remaining goals. Progress towards goals / Updated goals:  1. Patient will improve FOTO score by 18 points in order to demonstrate a significant improvement in function. 2. Patient will report pain at 4/10 or less during ADLs in order to improve quality of life. Not met: 6.5/10 (1/12/18)  3. Patient will improve B cervical side bend AROM to 30 degrees in order to increase ease of ADLs.         PLAN  []  Upgrade activities as tolerated     [x]  Continue plan of care  []  Update interventions per flow sheet       []  Discharge due to:_  []  Other:_      Zaira Garcia, PT 1/12/2018  5:04 PM    Future Appointments  Date Time Provider Jessee Sandhya   1/18/2018 5:00 PM Aaliyah Liz, PT [facility]   1/19/2018 5:00 PM Aaliyah Liz PT [facility]

## 2018-01-19 ENCOUNTER — HOSPITAL ENCOUNTER (OUTPATIENT)
Dept: PHYSICAL THERAPY | Age: 58
Discharge: HOME | End: 2018-01-19
Payer: MEDICARE

## 2018-01-19 PROCEDURE — G8986 CARRY D/C STATUS: HCPCS

## 2018-01-19 PROCEDURE — G8985 CARRY GOAL STATUS: HCPCS

## 2018-01-19 PROCEDURE — 97110 THERAPEUTIC EXERCISES: CPT

## 2018-01-19 NOTE — PROGRESS NOTES
PT DAILY TREATMENT NOTE - [facility]     Patient Name: Eve Oar  Date:2018  : 1960  [x]  Patient  Verified  Payor: Ela Artis / Plan: [place] MEDICARE PART A & B / Product Type: Medicare /    In time: 5:04  Out time:5:49  Total Treatment Time (min): 45  Total Timed Codes (min): 30  1:1 Treatment Time ( only): 30   Visit #: 10 of 20    Treatment Area: Cervicalgia [M54.2]    SUBJECTIVE  Pain Level (0-10 scale): 7/10 c/s and 6.5/10 l/s  Any medication changes, allergies to medications, adverse drug reactions, diagnosis change, or new procedure performed?: [x] No    [] Yes (see summary sheet for update)  Subjective functional status/changes:   [] No changes reported  Pt reports increased pain but isn't sure if it is from the cold or stress. She notes she has been under a lot of stress having to find transportation. She hasn't made a follow up appt with the MD yet. She is helping her daughter with a catering for a birthday party this weekend but doesn't have to do any heavy lifting.      OBJECTIVE    Modality rationale: decrease pain and increase tissue extensibility to improve the patients ability to improve ease of performing ADLs   Min Type Additional Details    [] Estim:  []Unatt       []IFC  []Premod                        []Other:  []w/ice   []w/heat  Position:  Location:    [] Estim: []Att    []TENS instruct  []NMES                    []Other:  []w/US   []w/ice   []w/heat  Position:  Location:    []  Traction: [] Cervical       []Lumbar                       [] Prone          []Supine                       []Intermittent   []Continuous Lbs:  [] before manual  [] after manual    []  Ultrasound: []Continuous   [] Pulsed                           []1MHz   []3MHz W/cm2:  Location:    []  Iontophoresis with dexamethasone         Location: [] Take home patch   [] In clinic   15 []  Ice     [x]  heat  []  Ice massage  []  Laser   []  Anodyne Position:seated  Location: l/s and B UTs    []  Laser with stim  []  Other:  Position:  Location:    []  Vasopneumatic Device Pressure:       [] lo [] med [] hi   Temperature: [] lo [] med [] hi   [x] Skin assessment post-treatment:  [x]intact []redness- no adverse reaction    []redness - adverse reaction:     30 min Therapeutic Exercise:  [x] See flow sheet :   Rationale: increase ROM to improve the patients ability to improve ease of performing ADLs with less pain          With   [] TE   [] TA   [] neuro   [] other: Patient Education: [x] Review HEP    [] Progressed/Changed HEP based on:   [] positioning   [] body mechanics   [] transfers   [] heat/ice application    [] other:      Other Objective/Functional Measures:   Improving mobility with exercises with less guarded movements  Educated we were D/C with manual due to no carryover of pain relief  Pt to work on [employer] activities and no shrugging with ADLs     Pain Level (0-10 scale) post treatment: 5/10    ASSESSMENT/Changes in Function: Pt is making slow progress towards goals. While she overall has improvement in mobility with less guarding she continues to have elevated pain levels in the l/s and c/s. She has tightness of B UTs mostly due to stress and habitual hiking with ADLs. Will continue for a few more sessions to work on independent management of pain and likely refer back to MD if no further progress is made. Patient will continue to benefit from skilled PT services to modify and progress therapeutic interventions, address functional mobility deficits, address ROM deficits, address strength deficits, analyze and address soft tissue restrictions, analyze and cue movement patterns, analyze and modify body mechanics/ergonomics, assess and modify postural abnormalities, address imbalance/dizziness and instruct in home and community integration to attain remaining goals. Progress towards goals / Updated goals:  1.  Patient will improve FOTO score by 18 points in order to demonstrate a significant improvement in function. 2. Patient will report pain at 4/10 or less during ADLs in order to improve quality of life. Not met: 6.5/10 (1/12/18)  3. Patient will improve B cervical side bend AROM to 30 degrees in order to increase ease of ADLs.      PLAN  [x]  Upgrade activities as tolerated     [x]  Continue plan of care  []  Update interventions per flow sheet       []  Discharge due to:_  []  Other:_      Stephen Joy, HOWIE 1/19/2018  6:20 PM    No future appointments.

## 2018-02-26 ENCOUNTER — HOSPITAL ENCOUNTER (OUTPATIENT)
Dept: PHYSICAL THERAPY | Age: 58
End: 2018-02-26

## 2018-03-02 ENCOUNTER — HOSPITAL ENCOUNTER (OUTPATIENT)
Dept: PHYSICAL THERAPY | Age: 58
Discharge: HOME | End: 2018-03-02
Payer: MEDICARE

## 2018-03-02 PROCEDURE — 97110 THERAPEUTIC EXERCISES: CPT

## 2018-03-02 PROCEDURE — G8985 CARRY GOAL STATUS: HCPCS

## 2018-03-02 PROCEDURE — 97162 PT EVAL MOD COMPLEX 30 MIN: CPT

## 2018-03-02 PROCEDURE — G8984 CARRY CURRENT STATUS: HCPCS
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
## 2018-03-07 ENCOUNTER — HOSPITAL ENCOUNTER (OUTPATIENT)
Dept: PHYSICAL THERAPY | Age: 58
Discharge: HOME | End: 2018-03-07
Payer: MEDICARE

## 2018-03-07 PROCEDURE — 97110 THERAPEUTIC EXERCISES: CPT
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
## 2018-03-09 ENCOUNTER — HOSPITAL ENCOUNTER (OUTPATIENT)
Dept: PHYSICAL THERAPY | Age: 58
Discharge: HOME | End: 2018-03-09
Payer: MEDICARE

## 2018-03-09 PROCEDURE — 97110 THERAPEUTIC EXERCISES: CPT

## 2018-03-09 PROCEDURE — 97140 MANUAL THERAPY 1/> REGIONS: CPT

## 2018-03-09 NOTE — PROGRESS NOTES
PT DAILY TREATMENT NOTE - [facility]     Patient Name: Anastasiya Pruitt  Date:3/9/2018  : 1960  [x]  Patient  Verified  Payor: Jose Antonio Funez / Plan: [place] MEDICARE PART A & B / Product Type: Medicare /    In time: 8:30  Out time: 9:10  Total Treatment Time (min): 40  Total Timed Codes (min): 30  1:1 Treatment Time ( [address] only): 30   Visit #: 3 of 6    Treatment Area: Neck pain [M54.2]  Neck sprain [S13. 9XXA]    SUBJECTIVE  Pain Level (0-10 scale):  5.5/10  Any medication changes, allergies to medications, adverse drug reactions, diagnosis change, or new procedure performed?: [x] No    [] Yes (see summary sheet for update)  Subjective functional status/changes:   [] No changes reported  Pt. Reports she continues to have pain. She reports not sure if KT was helpful or not.      OBJECTIVE    Modality rationale: decrease pain and increase tissue extensibility to improve the patients ability to increase ease of ADLs   Min Type Additional Details    [] Estim:  []Unatt       []IFC  []Premod                        []Other:  []w/ice   []w/heat  Position:  Location:    [] Estim: []Att    []TENS instruct  []NMES                    []Other:  []w/US   []w/ice   []w/heat  Position:  Location:    []  Traction: [] Cervical       []Lumbar                       [] Prone          []Supine                       []Intermittent   []Continuous Lbs:  [] before manual  [] after manual    []  Ultrasound: []Continuous   [] Pulsed                           []1MHz   []3MHz W/cm2:  Location:    []  Iontophoresis with dexamethasone         Location: [] Take home patch   [] In clinic   10 []  Ice     [x]  heat  []  Ice massage  []  Laser   []  Anodyne Position: seated  Location: R UT    []  Laser with stim  []  Other:  Position:  Location:    []  Vasopneumatic Device Pressure:       [] lo [] med [] hi   Temperature: [] lo [] med [] hi   [x] Skin assessment post-treatment:  [x]intact []redness- no adverse reaction    []redness - adverse reaction: 20 min Therapeutic Exercise:  [x] See flow sheet :   Rationale: increase ROM and increase strength to improve the patients ability to increase ease of ADLs    10 min Manual Therapy:   right 1st rib MET, Right UT trigger point release   Rationale: decrease pain, increase ROM and increase tissue extensibility to increase ease of ADLs          With   [x] TE   [] TA   [] neuro   [] other: Patient Education: [x] Review HEP    [] Progressed/Changed HEP based on:   [] positioning   [] body mechanics   [] transfers   [] heat/ice application    [] other:      Other Objective/Functional Measures:   Pt. Has significant difficulty with taking tape off so this took increased time  She continues to have right 1st rib hypomobility   Pt. Continues to have pain with cervical rotation to right    Pain Level (0-10 scale) post treatment:  5/10    ASSESSMENT/Changes in Function:  Pt. Is making limited progress towards goals. She continues to have moderate pain and trigger points along right UT. She continues to have pain with cervical rotation. Patient will continue to benefit from skilled PT services to modify and progress therapeutic interventions, address functional mobility deficits, address ROM deficits, address strength deficits, analyze and address soft tissue restrictions, analyze and cue movement patterns, analyze and modify body mechanics/ergonomics and assess and modify postural abnormalities to attain remaining goals. Progress towards goals / Updated goals:  Short Term Goals: To be accomplished in 1 weeks:  1. Patient will demonstrate compliance with HEP in order to improve cervical mobility  Met (3/7/18)    Long Term Goals: To be accomplished in 3 weeks:  1. Patient will improve FOTO score by 15 points in order to demonstrate a significant improvement in function. 2. Patient will improve cervical rotation AROM to right to 60 degrees in order to increase ease of ADLs. Not met (3/9/18)  3.  Patient will improve deep cervical flexion endurance to 20 seconds in order to increase ease of vacuuming.      PLAN  []  Upgrade activities as tolerated     [x]  Continue plan of care  []  Update interventions per flow sheet       []  Discharge due to:_  []  Other:_      Nathan Hinson, PT 3/9/2018  8:35 AM    Future Appointments  Date Time Provider Jessee Sandhya   3/14/2018 12:30 PM Arnoldo Mahajan PTA [facility]   3/16/2018 11:30 AM Arnoldo Mahajan PTA [facility]   3/21/2018 10:00 AM Nathan Hinson, PT [id number]   3/23/2018 10:30 AM Nathan Hinson PT [id number]

## 2018-03-14 ENCOUNTER — HOSPITAL ENCOUNTER (OUTPATIENT)
Dept: PHYSICAL THERAPY | Age: 58
Discharge: HOME | End: 2018-03-14
Payer: MEDICARE

## 2018-03-14 PROCEDURE — 97110 THERAPEUTIC EXERCISES: CPT

## 2018-03-14 PROCEDURE — 97032 APPL MODALITY 1+ESTIM EA 15: CPT

## 2018-03-14 NOTE — PROGRESS NOTES
PT DAILY TREATMENT NOTE - [facility]     Patient Name: Jose Rafael Pickens  Date:3/14/2018  : 1960  [x]  Patient  Verified  Payor: Bishop Lal / Plan: [place] MEDICARE PART A & B / Product Type: Medicare /    In time:12:30  Out time:1:19  Total Treatment Time (min): 49  Total Timed Codes (min): 39  1:1 Treatment Time ( [address] only): 30   Visit #: 4 of 6    Treatment Area: Neck pain [M54.2]  Neck sprain [S13. 9XXA]    SUBJECTIVE  Pain Level (0-10 scale): 5/10  Any medication changes, allergies to medications, adverse drug reactions, diagnosis change, or new procedure performed?: [x] No    [] Yes (see summary sheet for update)  Subjective functional status/changes:   [] No changes reported  Pt reports her pain is calming down and she feels like she is moving better. She notes still some tightness in the R side of her neck.      OBJECTIVE    Modality rationale: decrease pain and increase tissue extensibility to improve the patients ability to improve ease of driving and sleeping   Min Type Additional Details    [] Estim:  []Unatt       []IFC  []Premod                        []Other:  []w/ice   []w/heat  Position:  Location:   8 [x] Estim: [x]Att    []TENS instruct  []NMES                    [x]Other: combo [x]w/US   []w/ice   []w/heat  Position:seated  Location: R UT/levator    []  Traction: [] Cervical       []Lumbar                       [] Prone          []Supine                       []Intermittent   []Continuous Lbs:  [] before manual  [] after manual    []  Ultrasound: []Continuous   [] Pulsed                           []1MHz   []3MHz W/cm2:  Location:    []  Iontophoresis with dexamethasone         Location: [] Take home patch   [] In clinic   10 []  Ice     [x]  heat  []  Ice massage  []  Laser   []  Anodyne Position:seated  Location: B UTs    []  Laser with stim  []  Other:  Position:  Location:    []  Vasopneumatic Device Pressure:       [] lo [] med [] hi   Temperature: [] lo [] med [] hi   [x] Skin assessment post-treatment:  [x]intact []redness- no adverse reaction    []redness - adverse reaction:     31 min Therapeutic Exercise:  [x] See flow sheet :   Rationale: increase ROM and increase strength to improve the patients ability to improve ease of turning her head to drive and sleeping          With   [] TE   [] TA   [] neuro   [] other: Patient Education: [x] Review HEP    [] Progressed/Changed HEP based on:   [] positioning   [] body mechanics   [] transfers   [] heat/ice application    [] other:      Other Objective/Functional Measures:   TTP levator scap at insertion along border of scapula  Educated on levator stretching for home and use of tennis ball or cane for TPR  Improved mobility after SNAGs and combo with decreased pain     Pain Level (0-10 scale) post treatment: 4/10    ASSESSMENT/Changes in Function: pt making slow, steady progress with decrease in pain since starting therapy and improving c/s mobility. She continues to have tightness along the R>L UT and levator scap. Will continue working to decrease muscle guarding for return to PLOF with ease of driving and sleeping. Patient will continue to benefit from skilled PT services to modify and progress therapeutic interventions, address functional mobility deficits, address ROM deficits, address strength deficits, analyze and address soft tissue restrictions, analyze and cue movement patterns, analyze and modify body mechanics/ergonomics, assess and modify postural abnormalities, address imbalance/dizziness and instruct in home and community integration to attain remaining goals. Progress towards goals / Updated goals:  Short Term Goals: To be accomplished in 1 weeks:  1. Patient will demonstrate compliance with HEP in order to improve cervical mobility  Met (3/7/18)   Long Term Goals: To be accomplished in 3 weeks:  1. Patient will improve FOTO score by 15 points in order to demonstrate a significant improvement in function.    2. Patient will improve cervical rotation AROM to right to 60 degrees in order to increase ease of ADLs. Not met (3/9/18)  3. Patient will improve deep cervical flexion endurance to 20 seconds in order to increase ease of vacuuming.     PLAN  [x]  Upgrade activities as tolerated     [x]  Continue plan of care  []  Update interventions per flow sheet       []  Discharge due to:_  []  Other:_      Stephen Joy PTA 3/14/2018  12:03 PM    Future Appointments  Date Time Provider Jessee Arthur   3/14/2018 12:30 PM Stephen Joy PTA [facility]   3/16/2018 11:30 AM Stephen Joy PTA [facility]   3/21/2018 10:00 AM Izabela Heaton, PT [facility]   3/23/2018 10:30 AM Izabela Heaton, PT [id number] [facility]

## 2018-03-16 ENCOUNTER — HOSPITAL ENCOUNTER (OUTPATIENT)
Dept: PHYSICAL THERAPY | Age: 58
Discharge: HOME | End: 2018-03-16
Payer: MEDICARE

## 2018-03-16 PROCEDURE — 97110 THERAPEUTIC EXERCISES: CPT

## 2018-03-16 PROCEDURE — 97140 MANUAL THERAPY 1/> REGIONS: CPT

## 2018-03-16 NOTE — PROGRESS NOTES
PT DAILY TREATMENT NOTE - [facility]     Patient Name: Uzma Vega  Date:3/16/2018  : 1960  [x]  Patient  Verified  Payor: Victorino Kwon / Plan: [place] MEDICARE PART A & B / Product Type: Medicare /    In time:8:30  Out time: 9:15  Total Treatment Time (min): 45  Total Timed Codes (min): 35  1:1 Treatment Time ( [address] only): 30  Visit #: 5 of 6    Treatment Area: Neck pain [M54.2]  Neck sprain [S13. 9XXA]    SUBJECTIVE  Pain Level (0-10 scale): *5/10  Any medication changes, allergies to medications, adverse drug reactions, diagnosis change, or new procedure performed?: [x] No    [] Yes (see summary sheet for update)  Subjective functional status/changes:   [] No changes reported  Pt reports that 65% improvement with therapy. She is still having some pain with the right side of her neck, especially with turning. Her central l/s will still cramp at times, and the last time was yesterday when she bent over to  a piece of paper off of the floor. She prefers the manual interventions instead of the combo.       OBJECTIVE    Modality rationale: decrease pain and increase tissue extensibility to improve the patients ability to tolerate daily tasks    Min Type Additional Details    [] Estim:  []Unatt       []IFC  []Premod                        []Other:  []w/ice   []w/heat  Position:  Location:    [] Estim: []Att    []TENS instruct  []NMES                    []Other:  []w/US   []w/ice   []w/heat  Position:  Location:    []  Traction: [] Cervical       []Lumbar                       [] Prone          []Supine                       []Intermittent   []Continuous Lbs:  [] before manual  [] after manual    []  Ultrasound: []Continuous   [] Pulsed                           []1MHz   []3MHz W/cm2:  Location:    []  Iontophoresis with dexamethasone         Location: [] Take home patch   [] In clinic   10 []  Ice     [x]  heat  []  Ice massage  []  Laser   []  Anodyne Position: seated  Location: Right UT    []  Laser with stim  []  Other:  Position:  Location:    []  Vasopneumatic Device Pressure:       [] lo [] med [] hi   Temperature: [] lo [] med [] hi   [x] Skin assessment post-treatment:  [x]intact []redness- no adverse reaction    []redness - adverse reaction:       27 min Therapeutic Exercise:  [x] See flow sheet :   Rationale: increase ROM and increase strength to improve the patients ability to improve ease of head movements with ADLs      8 min Manual Therapy:  Right UT DTM/MFR/TPR right UT, TPR right MT   Rationale: decrease pain, increase ROM, increase tissue extensibility and decrease trigger points to improve ease of head movements with ADLs            With   [] TE   [] TA   [] neuro   [] other: Patient Education: [x] Review HEP    [] Progressed/Changed HEP based on:   [] positioning   [] body mechanics   [] transfers   [] heat/ice application    [] other:      Other Objective/Functional Measures:     Shoulder shrug with TB rows, limited improvement despite verbal/visual/tactile cuing  Cues to relax shoulders with UT stretch, correct form with cuing    Challenged with DNF Tuck + Lift, reduced retraction and increased global cervical flexion with final rep  Pt requested to only have heat on right UT    Cervical rotation AROM:  Left 54 dgrs, Right 45 dgrs     No increased pain following session    FOTO 57    Pain Level (0-10 scale) post treatment: 4/10    ASSESSMENT/Changes in Function:     Pt is making slow progress towards initial goals in therapy. She reports 65% improvement with therapy, reporting reduced pain levels and improved ease of head movements. She continues to present with hypertonicity/trigger points, most evident in right UT and likely with postural deficits contributing. Significant improvement in FOTO score is indicative of functional improvement. Will continue to address strength, ROM, and postural deficits for reduced pain and improved ease with daily activities.  Transition to final HEP with DC next session for pt to independently manage sx. Patient will continue to benefit from skilled PT services to modify and progress therapeutic interventions, address ROM deficits, address strength deficits, analyze and address soft tissue restrictions, analyze and cue movement patterns, assess and modify postural abnormalities and instruct in home and community integration to attain remaining goals. Progress towards goals / Updated goals:  Short Term Goals: To be accomplished in 1 weeks:  1. Patient will demonstrate compliance with HEP in order to improve cervical mobility  Met (3/7/18)   Long Term Goals: To be accomplished in 3 weeks:  1. Patient will improve FOTO score by 15 points in order to demonstrate a significant improvement in function. Goal met. Improved 15 points 3/16/18  2. Patient will improve cervical rotation AROM to right to 60 degrees in order to increase ease of ADLs. Not met. Left 54 dgrs, Right 45 dgrs 3/16/18  3. Patient will improve deep cervical flexion endurance to 20 seconds in order to increase ease of vacuuming. Progressing. Initiated DNF Tuck + Lift this visit with 5 second holds.   Unable to maintain form by 10th rep 3/16/18    PLAN  []  Upgrade activities as tolerated     [x]  Continue plan of care  []  Update interventions per flow sheet       []  Discharge due to:_  []  Other:_      Rocio Campbell, PT 3/16/2018  8:40 AM    Future Appointments  Date Time Provider Jessee Arthur   3/21/2018 10:00 AM Vikas Rowell, PT [id number]   3/23/2018 10:30 AM Vikas Rowell, PT [id number]

## 2018-03-21 ENCOUNTER — HOSPITAL ENCOUNTER (OUTPATIENT)
Dept: PHYSICAL THERAPY | Age: 58
Discharge: HOME | End: 2018-03-21
Payer: MEDICARE

## 2018-03-21 PROCEDURE — G8982 BODY POS GOAL STATUS: HCPCS

## 2018-03-21 PROCEDURE — 97140 MANUAL THERAPY 1/> REGIONS: CPT

## 2018-03-21 PROCEDURE — G8981 BODY POS CURRENT STATUS: HCPCS

## 2018-03-21 NOTE — PROGRESS NOTES
[facility] [employer]  [address] [facility]  [phone] fax    Continued Plan of Care/ Re-certification for Physical Therapy Services    Patient name: Nelly Reid Start of Care:  3/2/18   Referral source: Clover Phan, * : 1960   Medical/Treatment Diagnosis: Neck pain [M54.2]  Neck sprain [S13. 9XXA] Onset Date: 17     Prior Hospitalization: see medical history Provider#: [id number]   Medications: Verified on Patient Summary List    Comorbidities:  HTN  Prior Level of Function: Ind with ADLs  Visits from Start of Care:  6    Missed Visits: 0    The Plan of Care and following information is based on the patient's current status:  Goal:  Patient will improve FOTO score by 15 points in order to demonstrate a significant improvement in function. Status at last note/certification: 42  Current Status: met    Goal: Patient will improve cervical rotation AROM to right to 60 degrees in order to increase ease of ADLs. Status at last note/certification: cervical rotation AROM right: 50 left: 55 degrees  Current Status: not met    Goal: Patient will improve deep cervical flexion endurance to 20 seconds in order to increase ease of vacuuming. Status at last note/certification: 2 seconds  Current Status: not met    Key functional changes: pt. Has improved FOTO score by continues to have moderate pain.        Problems/ barriers to goal attainment:  none     Problem List: pain affecting function, decrease ROM, decrease strength, decrease ADL/ functional abilitiies, decrease activity tolerance, decrease flexibility/ joint mobility and decrease transfer abilities    Treatment Plan: Therapeutic exercise, Therapeutic activities, Neuromuscular re-education, Physical agent/modality, Gait/balance training, Manual therapy, Patient education, Self Care training and Functional mobility training     Patient Goal (s) has been updated and includes: to have less pain     Goals for this certification period to be accomplished in 2 weeks:  1. Patient will improve cervical rotation AROM to right to 60 degrees in order to increase ease of ADLs. 2. Patient will demonstrate understanding of updated HEP in order to continue to improve following D/C. Frequency / Duration: Patient to be seen 1-2 times per week for 2 weeks:    Assessment / Recommendations: pt. Is making limited progress towards goals. She reports having a 65% improvement in her symptoms but continues to complain of moderate pian. She has relief with manual treatment to right upper trap but continues to increase again by next appointment. She also continues to have decreased cervical rotation AROM right: 45 left: 54 degrees. Deep cervical flexion endurance continues to be limited to 5 seconds. Skilled PT is medically necessary in order to continue to improve cervical mobility and decrease pain in order to improve quality of life. G-Codes (GP)  Position  [id number] Current  CK= 40-59%  [id number] Goal  CK= 40-59%    The severity rating is based on clinical judgment and the FOTO score. Certification Period: 3/21/18-5/20/18    Lary Yang, PT 3/21/2018 1:30 PM    ________________________________________________________________________  I certify that the above Therapy Services are being furnished while the patient is under my care. I agree with the treatment plan and certify that this therapy is necessary. [] I have read the above and request that my patient continue as recommended.   [] I have read the above report and request that my patient continue therapy with the following changes/special instructions: _______________________________________  [] I have read the above report and request that my patient be discharged from therapy    Physician's Signature:________________________________Date:___________Time:__________    Please sign and return to [facility] [employer] [facility] [employer]  [address] [facility]  [phone] fax

## 2018-03-21 NOTE — PROGRESS NOTES
PT DAILY TREATMENT NOTE - [facility]     Patient Name: Camelia Llamas  Date:3/21/2018  : 1960  [x]  Patient  Verified  Payor: [employer] / Plan: [place] & [place] / Product Type: Medicare /    In time:1030  Out time:1111  Total Treatment Time (min): 41  Total Timed Codes (min): 31  1:1 Treatment Time ( [address] only): 31   Visit #: 6 of 6    Treatment Area: Neck pain [M54.2]  Neck sprain [S13. 9XXA]    SUBJECTIVE  Pain Level (0-10 scale): 4.5/10  Any medication changes, allergies to medications, adverse drug reactions, diagnosis change, or new procedure performed?: [x] No    [] Yes (see summary sheet for update)  Subjective functional status/changes:   [] No changes reported  Pt stated that she has the same neck pain today    OBJECTIVE    Modality rationale: decrease pain and increase tissue extensibility to improve the patients ability to increase ease with ADLs   Min Type Additional Details    [] Estim:  []Unatt       []IFC  []Premod                        []Other:  []w/ice   []w/heat  Position:  Location:    [] Estim: []Att    []TENS instruct  []NMES                    []Other:  []w/US   []w/ice   []w/heat  Position:  Location:    []  Traction: [] Cervical       []Lumbar                       [] Prone          []Supine                       []Intermittent   []Continuous Lbs:  [] before manual  [] after manual    []  Ultrasound: []Continuous   [] Pulsed                           []1MHz   []3MHz W/cm2:  Location:    []  Iontophoresis with dexamethasone         Location: [] Take home patch   [] In clinic   10 []  Ice     [x]  heat  []  Ice massage  []  Laser   []  Anodyne Position: seated  Location:right sh    []  Laser with stim  []  Other:  Position:  Location:    []  Vasopneumatic Device Pressure:       [] lo [] med [] hi   Temperature: [] lo [] med [] hi   [x] Skin assessment post-treatment:  [x]intact []redness- no adverse reaction    []redness - adverse reaction:     23 min Therapeutic Exercise:  [x] See flow sheet :   Rationale: increase ROM and increase strength to improve the patients ability to increase ease with ADLs    8 min Manual Therapy:  DTM and MFR to right UT and Lev   Rationale: decrease pain, increase ROM and increase tissue extensibility to increase ease with ADls    With   [x] TE   [] TA   [] neuro   [] other: Patient Education: [x] Review HEP    [] Progressed/Changed HEP based on:   [] positioning   [] body mechanics   [] transfers   [] heat/ice application    [] other:      Other Objective/Functional Measures:   Pt had no complaint of increased pain with exercises  Had significant trp's in right UT and Lev, which resolved with manual therapy     Pain Level (0-10 scale) post treatment: 0/10    ASSESSMENT/Changes in Function:   See progress note    Patient will continue to benefit from skilled PT services to modify and progress therapeutic interventions, address functional mobility deficits, address ROM deficits and address strength deficits to attain remaining goals. []  See Plan of Care  [x]  See progress note/recertification  []  See Discharge Summary         Progress towards goals / Updated goals:  Short Term Goals: To be accomplished in 1 weeks:  1. Patient will demonstrate compliance with HEP in order to improve cervical mobility    Met (3/7/18)   Long Term Goals: To be accomplished in 3 weeks:  1. Patient will improve FOTO score by 15 points in order to demonstrate a significant improvement in function. Goal met. Improved 15 points 3/16/18  2. Patient will improve cervical rotation AROM to right to 60 degrees in order to increase ease of ADLs. Not met. Left 54 dgrs, Right 45 dgrs 3/16/18  3. Patient will improve deep cervical flexion endurance to 20 seconds in order to increase ease of vacuuming. Progressing. Initiated DNF Tuck + Lift this visit with 5 second holds.   Unable to maintain form by 10th rep 3/16/18    PLAN  []  Upgrade activities as tolerated     [x]  Continue plan of care  []  Update interventions per flow sheet       []  Discharge due to:_  []  Other:_      Alesha Zuñiga PTA 3/21/2018  9:57 AM    Future Appointments  Date Time Provider Jessee Arthur   3/21/2018 10:00 AM Alesha Zuñiga PTA [id number]   3/23/2018 10:30 AM Yung Bansal PT [id number] [facility]

## 2018-03-23 ENCOUNTER — HOSPITAL ENCOUNTER (OUTPATIENT)
Dept: PHYSICAL THERAPY | Age: 58
Discharge: HOME | End: 2018-03-23
Payer: MEDICARE

## 2018-03-23 PROCEDURE — G8983 BODY POS D/C STATUS: HCPCS

## 2018-03-23 PROCEDURE — 97110 THERAPEUTIC EXERCISES: CPT

## 2018-03-23 PROCEDURE — 97140 MANUAL THERAPY 1/> REGIONS: CPT

## 2018-03-23 PROCEDURE — G8982 BODY POS GOAL STATUS: HCPCS

## 2018-03-23 NOTE — PROGRESS NOTES
PT DAILY TREATMENT NOTE - [facility]     Patient Name: Mina Hamilton  Date:3/23/2018  : 1960  [x]  Patient  Verified  Payor: Yazan Arroyo / Plan: [place] MEDICARE PART A & B / Product Type: Medicare /    In time:8:29  Out time:9:15  Total Treatment Time (min): 46  Total Timed Codes (min): 36  1:1 Treatment Time ( [address] only): 23   Visit #: 1 of 2-4    Treatment Area: Neck pain [M54.2]  Neck sprain [S13. 9XXA]    SUBJECTIVE  Pain Level (0-10 scale): 4/10  Any medication changes, allergies to medications, adverse drug reactions, diagnosis change, or new procedure performed?: [x] No    [] Yes (see summary sheet for update)  Subjective functional status/changes:   [] No changes reported  Pt reported that she's doing good so far, just some pain but she feels that she can manage it at home.  She firmly requested to be discharge today and reported that she would follow up with her MD as needed    OBJECTIVE             Modality rationale: decrease pain and increase tissue extensibility to improve the patients ability to increase ease with ADLs   Min Type Additional Details     [] Estim:  []Unatt       []IFC  []Premod                        []Other:  []w/ice   []w/heat  Position:  Location:     [] Estim: []Att    []TENS instruct  []NMES                    []Other:  []w/US   []w/ice   []w/heat  Position:  Location:     []  Traction: [] Cervical       []Lumbar                       [] Prone          []Supine                       []Intermittent   []Continuous Lbs:  [] before manual  [] after manual     []  Ultrasound: []Continuous   [] Pulsed                           []1MHz   []3MHz W/cm2:  Location:     []  Iontophoresis with dexamethasone         Location: [] Take home patch   [] In clinic   10 []  Ice     [x]  heat  []  Ice massage  []  Laser   []  Anodyne Position: seated  Location:right sh     []  Laser with stim  []  Other:  Position:  Location:     []  Vasopneumatic Device Pressure:       [] lo [] med [] hi Temperature: [] lo [] med [] hi   [x] Skin assessment post-treatment:  [x]intact []redness- no adverse reaction    []redness - adverse reaction:      26 min Therapeutic Exercise:  [x] See flow sheet :   Rationale: increase ROM and increase strength to improve the patients ability to increase ease with ADLs     10 min Manual Therapy:  DTM and MFR to right UT and Lev and med border of Right scap   Rationale: decrease pain, increase ROM and increase tissue extensibility to increase ease with ADls            With   [] TE   [] TA   [] neuro   [] other: Patient Education: [x] Review HEP    [] Progressed/Changed HEP based on:   [] positioning   [] body mechanics   [] transfers   [] heat/ice application    [] other:      Other Objective/Functional Measures: Mod challenged with shoulder hiking with TB Row and Ext   Positive trigger point Right UT and Rhomboid muscles   Pleased with manual and verbalized good understanding on self DTM/TPR using tennis ball/theracane/massage therapy    Pain Level (0-10 scale) post treatment: 3/10    ASSESSMENT/Changes in Function: seed/C summary please     []  See Plan of Care  []  See progress note/recertification  [x]  See Discharge Summary      Progress towards goals / Updated goals:  Goals for this certification period to be accomplished in 2 weeks:   1. Patient will improve cervical rotation AROM to right to 60 degrees in order to increase ease of ADLs. Not met. Left 54 dgrs, Right 45 dgrs 3/16/18 progressing well ~50 degrees 3-23-18   2. Patient will demonstrate understanding of updated HEP in order to continue to improve following D/C.  Met 3-23-18        PLAN  []  Upgrade activities as tolerated     [x]  Continue plan of care  []  Update interventions per flow sheet       [x]  Discharge due to:_ progress/met goals and pt's request  []  Other:_      Sera Avendaño PT 3/23/2018  7:50 AM    Future Appointments  Date Time Provider Jessee Arthur   3/23/2018 8:30 AM Kim Abarca [facility]

## 2018-03-23 NOTE — PROGRESS NOTES
[facility] Jeanine Taylor  [address] [facility]  [phone] fax    Physical Therapy Discharge Summary    Patient name: Leno Vyas Start of Care:  3/2/18   Referral source: [place], * : 1960   Medical/Treatment Diagnosis: Neck pain [M54.2]  Neck sprain [S13. 9XXA] Onset Date: 17   Prior Hospitalization: see medical history Provider#: [id number]   Medications: Verified on Patient Summary List     Comorbidities:  HTN  Prior Level of Function: Ind with ADLs  Visits from Start of Care:  7                                                                                   Missed Visits: 0      Reporting Period : 3-3-2018 to 3-    Summary of Care:  Goal: Patient will improve cervical rotation AROM to right to 60 degrees in order to increase ease of ADLs. Status at last note/certification: progressing well ~50 degrees 3-23-18  Status at discharge: not met    Goal:  Patient will demonstrate understanding of updated HEP in order to continue to improve following D/C. Status at last note/certification: Met -96  Status at discharge: met        G-Codes (GP)  Position   Goal  CK= 40-59%   D/C  CJ= 20-39%      The severity rating is based on clinical judgment and the FOTO score. ASSESSMENT/RECOMMENDATIONS: pt cont to present with mod pain but good progress with AROM of c/s. She also please with TRP for UT, Lev and parascapular muscles; edu pt on massage therapy or use of tennis ball/theracane at home for self STM/DTM/TPR; verbalized good understanding. PT firmly requested to be discharge; stating that she feels much better already and she will cont to work on the tightness/nodes with home exercises; will follow up with MD soon for further instruction.      [x]Discontinue therapy: [x]Patient has reached or is progressing toward set goals      [x]Patient is non-compliant or has abdicated      []Due to lack of appreciable progress towards set goals    Chris Mckeon, PT 3/23/2018 10:43 AM

## 2018-03-27 ENCOUNTER — APPOINTMENT (OUTPATIENT)
Dept: GENERAL RADIOLOGY | Age: 58
End: 2018-03-27
Attending: PHYSICIAN ASSISTANT
Payer: MEDICARE

## 2018-03-27 ENCOUNTER — HOSPITAL ENCOUNTER (EMERGENCY)
Age: 58
Discharge: HOME | End: 2018-03-27
Attending: EMERGENCY MEDICINE
Payer: MEDICARE

## 2018-03-27 VITALS
DIASTOLIC BLOOD PRESSURE: 95 MMHG | TEMPERATURE: 98.5 F | BODY MASS INDEX: 33.04 KG/M2 | SYSTOLIC BLOOD PRESSURE: 155 MMHG | OXYGEN SATURATION: 97 % | HEIGHT: 61 IN | RESPIRATION RATE: 20 BRPM | WEIGHT: 175 LBS | HEART RATE: 72 BPM

## 2018-03-27 DIAGNOSIS — S16.1XXA STRAIN OF NECK MUSCLE, INITIAL ENCOUNTER: ICD-10-CM

## 2018-03-27 DIAGNOSIS — J06.9 URI WITH COUGH AND CONGESTION: Primary | ICD-10-CM

## 2018-03-27 DIAGNOSIS — S39.012A BACK STRAIN, INITIAL ENCOUNTER: ICD-10-CM
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
## 2018-03-27 PROCEDURE — 71046 X-RAY EXAM CHEST 2 VIEWS: CPT

## 2018-03-27 PROCEDURE — 72070 X-RAY EXAM THORAC SPINE 2VWS: CPT

## 2018-03-27 PROCEDURE — 99282 EMERGENCY DEPT VISIT SF MDM: CPT

## 2018-03-27 RX ORDER — LIDOCAINE HYDROCHLORIDE 20 MG/ML
SOLUTION OROPHARYNGEAL
Qty: 200 ML | Refills: 0 | Status: SHIPPED | OUTPATIENT
Start: 2018-03-27 | End: 2018-08-10

## 2018-03-27 RX ORDER — NIFEDIPINE 90 MG/1
90 TABLET, EXTENDED RELEASE ORAL DAILY
COMMUNITY
End: 2022-09-28

## 2018-03-27 RX ORDER — CYCLOBENZAPRINE HCL 10 MG
10 TABLET ORAL
COMMUNITY
End: 2018-08-10

## 2018-03-27 RX ORDER — BENZONATATE 100 MG/1
100 CAPSULE ORAL
Qty: 30 CAP | Refills: 0 | Status: SHIPPED | OUTPATIENT
Start: 2018-03-27 | End: 2018-04-03
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
## 2018-04-03 ENCOUNTER — HOSPITAL ENCOUNTER (OUTPATIENT)
Dept: PHYSICAL THERAPY | Age: 58
Discharge: HOME | End: 2018-04-03
Payer: MEDICARE

## 2018-04-03 PROCEDURE — 97032 APPL MODALITY 1+ESTIM EA 15: CPT

## 2018-04-03 PROCEDURE — G8986 CARRY D/C STATUS: HCPCS

## 2018-04-03 PROCEDURE — 97140 MANUAL THERAPY 1/> REGIONS: CPT

## 2018-04-03 PROCEDURE — G8985 CARRY GOAL STATUS: HCPCS
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
## 2018-04-23 ENCOUNTER — HOSPITAL ENCOUNTER (OUTPATIENT)
Dept: MAMMOGRAPHY | Age: 58
Discharge: HOME | End: 2018-04-23
Attending: NURSE PRACTITIONER
Payer: MEDICARE

## 2018-04-23 DIAGNOSIS — Z12.31 VISIT FOR SCREENING MAMMOGRAM: ICD-10-CM

## 2018-04-23 PROCEDURE — 77063 BREAST TOMOSYNTHESIS BI: CPT
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
## 2018-08-10 ENCOUNTER — HOSPITAL ENCOUNTER (EMERGENCY)
Age: 58
Discharge: HOME | End: 2018-08-10
Attending: EMERGENCY MEDICINE | Admitting: EMERGENCY MEDICINE
Payer: MEDICARE

## 2018-08-10 VITALS
TEMPERATURE: 98.7 F | HEART RATE: 59 BPM | WEIGHT: 170 LBS | SYSTOLIC BLOOD PRESSURE: 170 MMHG | RESPIRATION RATE: 16 BRPM | OXYGEN SATURATION: 100 % | BODY MASS INDEX: 31.28 KG/M2 | HEIGHT: 62 IN | DIASTOLIC BLOOD PRESSURE: 101 MMHG

## 2018-08-10 DIAGNOSIS — H57.13 EYE PAIN, BILATERAL: Primary | ICD-10-CM

## 2018-08-10 DIAGNOSIS — H57.89 EYE REDNESS: ICD-10-CM

## 2018-08-10 PROCEDURE — 74011000250 HC RX REV CODE- 250: Performed by: PHYSICIAN ASSISTANT

## 2018-08-10 PROCEDURE — 99283 EMERGENCY DEPT VISIT LOW MDM: CPT

## 2018-08-10 RX ORDER — PROPARACAINE HYDROCHLORIDE 5 MG/ML
1 SOLUTION/ DROPS OPHTHALMIC
Status: COMPLETED | OUTPATIENT
Start: 2018-08-10 | End: 2018-08-10

## 2018-08-10 RX ORDER — IBUPROFEN 600 MG/1
600 TABLET ORAL
Qty: 20 TAB | Refills: 0 | Status: SHIPPED | OUTPATIENT
Start: 2018-08-10 | End: 2018-09-21

## 2018-08-10 RX ORDER — POLYMYXIN B SULFATE AND TRIMETHOPRIM 1; 10000 MG/ML; [USP'U]/ML
1 SOLUTION OPHTHALMIC EVERY 4 HOURS
Qty: 10 ML | Refills: 0 | Status: SHIPPED | OUTPATIENT
Start: 2018-08-10 | End: 2018-09-29

## 2018-08-10 RX ORDER — IBUPROFEN 600 MG/1
600 TABLET ORAL
Qty: 20 TAB | Refills: 0 | Status: SHIPPED | OUTPATIENT
Start: 2018-08-10 | End: 2018-08-10 | Stop reason: CLARIF

## 2018-08-10 RX ORDER — POLYMYXIN B SULFATE AND TRIMETHOPRIM 1; 10000 MG/ML; [USP'U]/ML
1 SOLUTION OPHTHALMIC EVERY 4 HOURS
Qty: 10 ML | Refills: 0 | Status: SHIPPED | OUTPATIENT
Start: 2018-08-10 | End: 2018-08-10 | Stop reason: CLARIF

## 2018-08-10 RX ADMIN — PROPARACAINE HYDROCHLORIDE 1 DROP: 5 SOLUTION/ DROPS OPHTHALMIC at 16:29

## 2018-08-10 NOTE — ED NOTES
Discharge instructions reviewed with patient. Patient voices understanding. Patient advised to follow up as directed on discharge instructions. Patient denies questions, needs or concerns at discharge regarding discharge instructions. Patient voiced understanding. No s/sx of distress noted. Armband removed and placed in shredder box. Patient encouraged to follow up with ophthalmology as directed in discharge instructions. She voiced understanding.

## 2018-08-10 NOTE — DISCHARGE INSTRUCTIONS
Corneal Scratches: Care Instructions  Your Care Instructions    The cornea is the clear surface that covers the front of the eye. When a speck of dirt, a wood chip, an insect, or another object flies into your eye, it can cause a painful scratch on the cornea. Wearing contact lenses too long or rubbing your eyes can also scratch the cornea. Small scratches usually heal in a day or two. Deeper scratches may take longer. If you have had a foreign object removed from your eye or you have a corneal scratch, you will need to watch for infection and vision problems while your eye heals. Follow-up care is a key part of your treatment and safety. Be sure to make and go to all appointments, and call your doctor if you are having problems. It's also a good idea to know your test results and keep a list of the medicines you take. How can you care for yourself at home? · The doctor probably used a medicine during your exam to numb your eye. When it wears off in 30 to 60 minutes, your eye pain may come back. Take pain medicines exactly as directed. ¨ If the doctor gave you a prescription medicine for pain, take it as prescribed. ¨ If you are not taking a prescription pain medicine, ask your doctor if you can take an over-the-counter medicine. ¨ Do not take two or more pain medicines at the same time unless the doctor told you to. Many pain medicines have acetaminophen, which is Tylenol. Too much acetaminophen (Tylenol) can be harmful. · Do not rub your injured eye. Rubbing can make it worse. · Use the prescribed eyedrops or ointment as directed. Be sure the dropper or bottle tip is clean. To put in eyedrops or ointment:  ¨ Tilt your head back, and pull your lower eyelid down with one finger. ¨ Drop or squirt the medicine inside the lower lid. ¨ Close your eye for 30 to 60 seconds to let the drops or ointment move around. ¨ Do not touch the ointment or dropper tip to your eyelashes or any other surface.   · Do not use your contact lens in your hurt eye until your doctor says you can. Also, do not wear eye makeup until your eye has healed. · Do not drive if you have blurred vision. · Bright light may hurt. Sunglasses can help. · To prevent eye injuries in the future, wear safety glasses or goggles when you work with machines or tools, mow the lawn, or ride a bike or motorcycle. When should you call for help? Call your doctor now or seek immediate medical care if:    · You have signs of an eye infection, such as:  ¨ Pus or thick discharge coming from the eye. ¨ Redness or swelling around the eye. ¨ A fever.     · You have new or worse eye pain.     · You have vision changes.     · It feels like there is something in your eye.     · Light hurts your eye.    Watch closely for changes in your health, and be sure to contact your doctor if:    · You do not get better as expected. Where can you learn more? Go to http://jaylen-alley.info/. Enter H087 in the search box to learn more about \"Corneal Scratches: Care Instructions. \"  Current as of: December 3, 2017  Content Version: 11.7  © 1858-8038 [employer]. Care instructions adapted under license by [employer] (which disclaims liability or warranty for this information). If you have questions about a medical condition or this instruction, always ask your healthcare professional. [place] [address] any warranty or liability for your use of this information.

## 2018-08-10 NOTE — ED PROVIDER NOTES
EMERGENCY DEPARTMENT HISTORY AND PHYSICAL EXAM    Date: 8/10/2018  Patient Name: Tawana Kirby    History of Presenting Illness     Chief Complaint   Patient presents with    Eye Pain         History Provided By: Patient    Chief Complaint: bilateral eye pain   Duration: 2 days  Timing:  Acute  Location: bilateral eye pain   Quality: Burning  Severity: Moderate  Modifying Factors: worse when blinking   Associated Symptoms: none       Additional History (Context): Tawana Kirby is a 65 y.o. female with a history of HTN, HF, and kidney failure who presents with a 2 day history of right and then left eye pain, reports she was cleaning an old building and reports there was a lot of dust and dirt in the air. Reports \"I think something flew in there\", reports rubbing the right eye and then the left. Denies contact lens wearing or discharge, denies chemical exposure. Denies decrease in vision. Pt denies any fevers or chills, headache, dizziness or light headedness, CP or discomfort, SOB, cough, n/v/d/c, abd pain, back pain, diaphoresis, melena/hematochezia, dysuria, hematuria, frequency, focal weakness/numbness/tingling, or rash. Patient has no other complaints at this time. PCP: Juan Hernandez MD    Current Outpatient Prescriptions   Medication Sig Dispense Refill    trimethoprim-polymyxin b (POLYTRIM) ophthalmic solution Administer 1 Drop to both eyes every four (4) hours. 10 mL 0    ibuprofen (MOTRIN) 600 mg tablet Take 1 Tab by mouth every six (6) hours as needed for Pain. 20 Tab 0    NIFEdipine ER (PROCARDIA XL) 90 mg ER tablet Take 90 mg by mouth daily.  lisinopril (PRINIVIL, ZESTRIL) 40 mg tablet Take 40 mg by mouth daily. Indications: Chronic Heart Failure      cloNIDine HCl (CATAPRES) 0.3 mg tablet Take 0.3 mg by mouth three (3) times daily.  Indications: hypertension         Past History     Past Medical History:  Past Medical History:   Diagnosis Date    Dialysis patient ([facility]     tues, thurs, sat    [facility] failure ([facility]     Hypertension        Past Surgical History:  Past Surgical History:   Procedure Laterality Date    HX GI      polyp removed 10/2016       Family History:  History reviewed. No pertinent family history. Social History:  Social History   Substance Use Topics    Smoking status: Former Smoker    Smokeless tobacco: Never Used    Alcohol use No       Allergies:  No Known Allergies      Review of Systems   Review of Systems   Constitutional: Negative for chills and fever. HENT: Negative for congestion, rhinorrhea and sore throat. Eyes: Positive for pain, redness and itching. Negative for photophobia, discharge and visual disturbance. Respiratory: Negative for cough and shortness of breath. Cardiovascular: Negative for chest pain. Gastrointestinal: Negative for abdominal pain, blood in stool, constipation, diarrhea, nausea and vomiting. Genitourinary: Negative for dysuria, frequency and hematuria. Musculoskeletal: Negative for back pain and myalgias. Skin: Negative for rash and wound. Neurological: Negative for dizziness and headaches. All other systems reviewed and are negative. All Other Systems Negative  Physical Exam     Vitals:    08/10/18 1605   BP: (!) 170/101   Pulse: (!) 59   Resp: 16   Temp: 98.7 °F (37.1 °C)   SpO2: 100%   Weight: 77.1 kg (170 lb)   Height: 5' 2\" (1.575 m)     Physical Exam   Constitutional: She is oriented to person, place, and time. She appears well-developed and well-nourished. No distress. HENT:   Head: Normocephalic and atraumatic. Eyes: EOM and lids are normal. Pupils are equal, round, and reactive to light. Lids are everted and swept, no foreign bodies found. Right eye exhibits no chemosis, no discharge, no exudate and no hordeolum. No foreign body present in the right eye. Left eye exhibits no chemosis, no discharge, no exudate and no hordeolum. No foreign body present in the left eye. Right conjunctiva is injected.  Right conjunctiva has no hemorrhage. Left conjunctiva is injected. Left conjunctiva has no hemorrhage. Scleral icterus is present. Neck: Normal range of motion. Neck supple. Cardiovascular: Normal rate, regular rhythm and normal heart sounds. Pulmonary/Chest: Effort normal and breath sounds normal. No respiratory distress. She exhibits no tenderness. Abdominal: Soft. Bowel sounds are normal. She exhibits no distension. There is no tenderness. There is no rebound and no guarding. Musculoskeletal: She exhibits no edema or deformity. Neurological: She is alert and oriented to person, place, and time. She has normal reflexes. Skin: Skin is warm and dry. She is not diaphoretic. Psychiatric: She has a normal mood and affect. Nursing note and vitals reviewed. Diagnostic Study Results     Labs -   No results found for this or any previous visit (from the past 12 hour(s)). Radiologic Studies -   No orders to display     CT Results  (Last 48 hours)    None        CXR Results  (Last 48 hours)    None            Medical Decision Making   I am the first provider for this patient. I reviewed the vital signs, available nursing notes, past medical history, past surgical history, family history and social history. Vital Signs-Reviewed the patient's vital signs. Pulse Oximetry Analysis -  100 % RA    Records Reviewed: Nursing Notes and Old Medical Records     Procedures: None   Procedures    Provider Notes (Medical Decision Making):     Differential Diagnosis: Viral/bacterial/allergic conjunctivitis, infectious keratitis, blepharitis, hordeolum, chalazion, periorbital cellulitis, orbital cellulitis, corneal ulcer/lesion, corneal abrasion      Plan:  Will stain both eye and do visual acuity check     4:51 PM  Unable to stain bilateral eyes, I have discussed the cause for inability to stain eyes and the inability to fully evaluate eye pain, pain resolved on exam with proparacaine, expressed concern for corneal abrasion and stressed the need to follow up with opthalmology tomorrow and will give a follow up with weekend hours. Will cover with abx drops. Patient agrees with the plan and management and states all questions have been thoroughly answered and there are no more remaining questions. MED RECONCILIATION:  No current facility-administered medications for this encounter. Current Outpatient Prescriptions   Medication Sig    trimethoprim-polymyxin b (POLYTRIM) ophthalmic solution Administer 1 Drop to both eyes every four (4) hours.  ibuprofen (MOTRIN) 600 mg tablet Take 1 Tab by mouth every six (6) hours as needed for Pain.  NIFEdipine ER (PROCARDIA XL) 90 mg ER tablet Take 90 mg by mouth daily.  lisinopril (PRINIVIL, ZESTRIL) 40 mg tablet Take 40 mg by mouth daily. Indications: Chronic Heart Failure    cloNIDine HCl (CATAPRES) 0.3 mg tablet Take 0.3 mg by mouth three (3) times daily. Indications: hypertension       Disposition:  Home     DISCHARGE NOTE:   Pt has been reexamined. Patient has no new complaints, changes, or physical findings. Care plan outlined and precautions discussed. Results of workup were reviewed with the patient. All medications were reviewed with the patient. All of pt's questions and concerns were addressed. Patient was instructed and agrees to follow up with Opthalmology, as well as to return to the ED upon further deterioration. Patient is ready to go home.     Follow-up Information     Follow up With Details Comments Contact Info    [facility] EMERGENCY DEPT  As needed Erica Burr [id number]  [address] Schedule an appointment as soon as possible for a visit  [address] [facility]  [phone]          Current Discharge Medication List      START taking these medications    Details   trimethoprim-polymyxin b (POLYTRIM) ophthalmic solution Administer 1 Drop to both eyes every four (4) hours. Qty: 10 mL, Refills: 0      ibuprofen (MOTRIN) 600 mg tablet Take 1 Tab by mouth every six (6) hours as needed for Pain. Qty: 20 Tab, Refills: 0                 Diagnosis     Clinical Impression:   1. Eye pain, bilateral    2.  Eye redness

## 2018-09-21 ENCOUNTER — HOSPITAL ENCOUNTER (EMERGENCY)
Age: 58
Discharge: HOME | End: 2018-09-21
Attending: EMERGENCY MEDICINE
Payer: MEDICARE

## 2018-09-21 ENCOUNTER — APPOINTMENT (OUTPATIENT)
Dept: VASCULAR SURGERY | Age: 58
End: 2018-09-21
Attending: PHYSICIAN ASSISTANT
Payer: MEDICARE

## 2018-09-21 VITALS
BODY MASS INDEX: 29.81 KG/M2 | HEART RATE: 70 BPM | HEIGHT: 62 IN | WEIGHT: 162 LBS | SYSTOLIC BLOOD PRESSURE: 144 MMHG | OXYGEN SATURATION: 98 % | RESPIRATION RATE: 18 BRPM | TEMPERATURE: 98.7 F | DIASTOLIC BLOOD PRESSURE: 92 MMHG

## 2018-09-21 DIAGNOSIS — M79.604 RIGHT LEG PAIN: Primary | ICD-10-CM

## 2018-09-21 DIAGNOSIS — M54.31 SCIATICA OF RIGHT SIDE: ICD-10-CM

## 2018-09-21 PROCEDURE — 93971 EXTREMITY STUDY: CPT

## 2018-09-21 PROCEDURE — 99282 EMERGENCY DEPT VISIT SF MDM: CPT

## 2018-09-21 RX ORDER — TRAMADOL HYDROCHLORIDE 50 MG/1
50 TABLET ORAL
Qty: 8 TAB | Refills: 0 | Status: SHIPPED | OUTPATIENT
Start: 2018-09-21 | End: 2019-09-15

## 2018-09-21 RX ORDER — IBUPROFEN 600 MG/1
600 TABLET ORAL
Qty: 20 TAB | Refills: 0 | Status: SHIPPED | OUTPATIENT
Start: 2018-09-21 | End: 2018-09-29

## 2018-09-21 RX ORDER — PREDNISONE 5 MG/1
TABLET ORAL
Qty: 21 TAB | Refills: 0 | Status: SHIPPED | OUTPATIENT
Start: 2018-09-21 | End: 2018-09-29

## 2018-09-21 RX ORDER — IBUPROFEN 600 MG/1
600 TABLET ORAL
Qty: 20 TAB | Refills: 0 | Status: SHIPPED | OUTPATIENT
Start: 2018-09-21 | End: 2018-09-21

## 2018-09-21 RX ORDER — PREDNISONE 5 MG/1
TABLET ORAL
Qty: 21 TAB | Refills: 0 | Status: SHIPPED | OUTPATIENT
Start: 2018-09-21 | End: 2018-09-21

## 2018-09-21 NOTE — ED PROVIDER NOTES
EMERGENCY DEPARTMENT HISTORY AND PHYSICAL EXAM    3:27 PM      Date: 9/21/2018  Patient Name: Serafin Parekh    History of Presenting Illness     Chief Complaint   Patient presents with    Leg Pain         History Provided By: Patient    Chief Complaint: right thigh pain   Duration: 3 Weeks  Timing:  Intermittent   Location: right thigh   Quality: Aching  Severity: 10 out of 10  Modifying Factors:  Walking and getting up   Associated Symptoms: denies any other associated signs or symptoms      Additional History (Context): Serafin Parekh is a 65 y.o. female with hypertension and CHF, dialysis patient, who presents with right leg pain. It radiates from the right inner groin down thr front of the right thigh into the lower leg. It is intermittent and sometimes assocaited with numbness and tingling. She denies back pain. Denies trauma. No recent travel, no recent surgery, no recent immobilization, no unilateral swelling in the lower extremity, no history of cancer. PCP: Juan Hernandez MD    Current Outpatient Prescriptions   Medication Sig Dispense Refill    predniSONE (STERAPRED) 5 mg dose pack See administration instruction per 5mg dose pack 21 Tab 0    ibuprofen (MOTRIN) 600 mg tablet Take 1 Tab by mouth every six (6) hours as needed for Pain. 20 Tab 0    traMADol (ULTRAM) 50 mg tablet Take 1 Tab by mouth every six (6) hours as needed for Pain. Max Daily Amount: 200 mg. 8 Tab 0    trimethoprim-polymyxin b (POLYTRIM) ophthalmic solution Administer 1 Drop to both eyes every four (4) hours. 10 mL 0    NIFEdipine ER (PROCARDIA XL) 90 mg ER tablet Take 90 mg by mouth daily.  lisinopril (PRINIVIL, ZESTRIL) 40 mg tablet Take 40 mg by mouth daily. Indications: Chronic Heart Failure      cloNIDine HCl (CATAPRES) 0.3 mg tablet Take 0.3 mg by mouth three (3) times daily.  Indications: hypertension         Past History     Past Medical History:  Past Medical History:   Diagnosis Date    Dialysis patient ([facility]     tues, thurs, sat    [facility]     Hypertension        Past Surgical History:  Past Surgical History:   Procedure Laterality Date    HX GI      polyp removed 10/2016       Family History:  History reviewed. No pertinent family history. Social History:  Social History   Substance Use Topics    Smoking status: Former Smoker    Smokeless tobacco: Never Used    Alcohol use No       Allergies:  No Known Allergies      Review of Systems       Review of Systems   Constitutional: Negative for fever. HENT: Negative for facial swelling. Eyes: Negative for visual disturbance. Respiratory: Negative for shortness of breath. Cardiovascular: Negative for chest pain. Gastrointestinal: Negative for abdominal pain. Genitourinary: Negative for dysuria. Musculoskeletal: Positive for myalgias. Negative for neck pain. Skin: Negative for rash. Neurological: Positive for numbness. Negative for dizziness. Psychiatric/Behavioral: Negative for confusion. All other systems reviewed and are negative. Physical Exam     Visit Vitals    BP (!) 144/92 (BP 1 Location: Left arm, BP Patient Position: At rest)    Pulse 70    Temp 98.7 °F (37.1 °C)    Resp 18    Ht 5' 2\" (1.575 m)    Wt 73.5 kg (162 lb)    SpO2 98%    BMI 29.63 kg/m2         Physical Exam   Constitutional: She is oriented to person, place, and time. She appears well-developed and well-nourished. No distress. HENT:   Head: Normocephalic and atraumatic. Eyes: Conjunctivae are normal.   Neck: Normal range of motion. Cardiovascular: Normal rate and regular rhythm. Pulmonary/Chest: Effort normal.   Abdominal: She exhibits no distension. Musculoskeletal: Normal range of motion. No tenderness in right leg or lower back. Full ROM. Neurological: She is alert and oriented to person, place, and time. No sensory or motor deficits. Strength and sensation equal to bilateral upper and lower extremities.        Skin: Skin is warm and dry. She is not diaphoretic. Psychiatric: She has a normal mood and affect. Nursing note and vitals reviewed. Diagnostic Study Results     Labs -  No results found for this or any previous visit (from the past 12 hour(s)). Radiologic Studies -   No orders to display         Medical Decision Making   I am the first provider for this patient. I reviewed the vital signs, available nursing notes, past medical history, past surgical history, family history and social history. Vital Signs-Reviewed the patient's vital signs. Records Reviewed: Nursing Notes (Time of Review: 3:27 PM)    ED Course: Progress Notes, Reevaluation, and Consults:    4:29 PM  Duplex negative. Provider Notes (Medical Decision Making): MDM  Number of Diagnoses or Management Options  Diagnosis management comments: Right leg pain. No tenderness or abnormalities on exam.  No back pain. Symptoms may be due to sciatica, but will get duplex to r/o DVT. Diagnosis     Clinical Impression:   1. Right leg pain    2. Sciatica of right side        Disposition:     Follow-up Information     Follow up With Details Comments [address] Ninoska Beavers MD In 1 week If symptoms do not improve [address] [place] [address] 140 [address] [facility] EMERGENCY DEPT  Immediately if symptoms worsen [address] [facility]  [phone]           Patient's Medications   Start Taking    IBUPROFEN (MOTRIN) 600 MG TABLET    Take 1 Tab by mouth every six (6) hours as needed for Pain. PREDNISONE (STERAPRED) 5 MG DOSE PACK    See administration instruction per 5mg dose pack    TRAMADOL (ULTRAM) 50 MG TABLET    Take 1 Tab by mouth every six (6) hours as needed for Pain. Max Daily Amount: 200 mg. Continue Taking    CLONIDINE HCL (CATAPRES) 0.3 MG TABLET    Take 0.3 mg by mouth three (3) times daily.  Indications: hypertension    LISINOPRIL (PRINIVIL, ZESTRIL) 40 MG TABLET Take 40 mg by mouth daily. Indications: Chronic Heart Failure    NIFEDIPINE ER (PROCARDIA XL) 90 MG ER TABLET    Take 90 mg by mouth daily. TRIMETHOPRIM-POLYMYXIN B (POLYTRIM) OPHTHALMIC SOLUTION    Administer 1 Drop to both eyes every four (4) hours. These Medications have changed    No medications on file   Stop Taking    IBUPROFEN (MOTRIN) 600 MG TABLET    Take 1 Tab by mouth every six (6) hours as needed for Pain.     _______________________________    Attestations:  Scribe Attestation     Dipesh QUINN August PA-C acting as a scribe for and in the presence of NAN Vargas      September 21, 2018 at 4:29 PM       Provider Attestation:      I personally performed the services described in the documentation, reviewed the documentation, as recorded by the scribe in my presence, and it accurately and completely records my words and actions.  September 21, 2018 at 4:29 PM - NAN Vargas  _______________________________

## 2018-09-21 NOTE — DISCHARGE INSTRUCTIONS
Take anti-inflammatories such as tylenol or motrin with food for pain relief. Do not drive while taking narcotics or muscle relaxants. Massage the muscles that are tight and apply heating pads. Ice can help reduce swelling and inflammation. Apply in 20 minute intervals throughout the day. Return to ED for any significant worsening of pain, or neurologic changes such as loss of bowel or bladder control, weakness or numbness in the extremities. If this is a chronic issue, you may want to consider seeing a specialist or pain management physician. Sciatica: Care Instructions  Your Care Instructions    Sciatica (say \"jfc-WM-db-kuh\") is an irritation of one of the sciatic nerves, which come from the spinal cord in the lower back. The sciatic nerves and their branches extend down through the buttock to the foot. Sciatica can develop when an injured disc in the back presses against a spinal nerve root. Its main symptom is pain, numbness, or weakness that is often worse in the leg or foot than in the back. Sciatica often will improve and go away with time. Early treatment usually includes medicines and exercises to relieve pain. Follow-up care is a key part of your treatment and safety. Be sure to make and go to all appointments, and call your doctor if you are having problems. It's also a good idea to know your test results and keep a list of the medicines you take. How can you care for yourself at home? · Take pain medicines exactly as directed. ¨ If the doctor gave you a prescription medicine for pain, take it as prescribed. ¨ If you are not taking a prescription pain medicine, ask your doctor if you can take an over-the-counter medicine. · Use heat or ice to relieve pain. ¨ To apply heat, put a warm water bottle, heating pad set on low, or warm cloth on your back. Do not go to sleep with a heating pad on your skin. ¨ To use ice, put ice or a cold pack on the area for 10 to 20 minutes at a time. Put a thin cloth between the ice and your skin. · Avoid sitting if possible, unless it feels better than standing. · Alternate lying down with short walks. Increase your walking distance as you are able to without making your symptoms worse. · Do not do anything that makes your symptoms worse. When should you call for help? Call 911 anytime you think you may need emergency care. For example, call if:    · You are unable to move a leg at all.   [facility] your doctor now or seek immediate medical care if:    · You have new or worse symptoms in your legs or buttocks. Symptoms may include:  ¨ Numbness or tingling. ¨ Weakness. ¨ Pain.     · You lose bladder or bowel control.    Watch closely for changes in your health, and be sure to contact your doctor if:    · You are not getting better as expected. Where can you learn more? Go to http://jaylen-alley.info/. Enter 768-896-4789 in the search box to learn more about \"Sciatica: Care Instructions. \"  Current as of: November 29, 2017  Content Version: 11.7  © 4436-7358 [employer]. Care instructions adapted under license by [employer] (which disclaims liability or warranty for this information). If you have questions about a medical condition or this instruction, always ask your healthcare professional. [address] any warranty or liability for your use of this information.

## 2018-09-29 ENCOUNTER — HOSPITAL ENCOUNTER (EMERGENCY)
Age: 58
Discharge: HOME | End: 2018-09-29
Attending: EMERGENCY MEDICINE
Payer: MEDICARE

## 2018-09-29 ENCOUNTER — APPOINTMENT (OUTPATIENT)
Dept: GENERAL RADIOLOGY | Age: 58
End: 2018-09-29
Attending: EMERGENCY MEDICINE
Payer: MEDICARE

## 2018-09-29 VITALS
HEIGHT: 62 IN | RESPIRATION RATE: 17 BRPM | BODY MASS INDEX: 31.28 KG/M2 | HEART RATE: 76 BPM | DIASTOLIC BLOOD PRESSURE: 89 MMHG | OXYGEN SATURATION: 97 % | WEIGHT: 170 LBS | SYSTOLIC BLOOD PRESSURE: 129 MMHG | TEMPERATURE: 97.9 F

## 2018-09-29 DIAGNOSIS — R05.9 COUGH: Primary | ICD-10-CM

## 2018-09-29 PROCEDURE — 99282 EMERGENCY DEPT VISIT SF MDM: CPT

## 2018-09-29 PROCEDURE — 71046 X-RAY EXAM CHEST 2 VIEWS: CPT

## 2018-09-29 RX ORDER — BENZONATATE 100 MG/1
100 CAPSULE ORAL
Qty: 30 CAP | Refills: 0 | Status: SHIPPED | OUTPATIENT
Start: 2018-09-29 | End: 2018-10-06

## 2018-09-29 NOTE — ED PROVIDER NOTES
EMERGENCY DEPARTMENT HISTORY AND PHYSICAL EXAM    Date: 9/29/2018  Patient Name: Manuela Gutierrez    History of Presenting Illness     Chief Complaint   Patient presents with    Cough         History Provided By: Patient    Chief Complaint: cough  Duration: 1 Months  Timing:  Acute and Constant  Location:   Quality:   Severity: N/A  Modifying Factors: na  Associated Symptoms: cough      Additional History (Context): Manuela Gutierrez is a 65 y.o. female with hypertension who presents with cough x1 month. PT states she was recently diagnosed with pneumonia which has cleared up but the cough has not resolved. Cough is productive with yellow sputum. NO other complaints at this time    PCP: Juan Hrenandez MD    Current Outpatient Prescriptions   Medication Sig Dispense Refill    benzonatate (TESSALON PERLES) 100 mg capsule Take 1 Cap by mouth three (3) times daily as needed for Cough for up to 7 days. 30 Cap 0    traMADol (ULTRAM) 50 mg tablet Take 1 Tab by mouth every six (6) hours as needed for Pain. Max Daily Amount: 200 mg. 8 Tab 0    NIFEdipine ER (PROCARDIA XL) 90 mg ER tablet Take 90 mg by mouth daily.  lisinopril (PRINIVIL, ZESTRIL) 40 mg tablet Take 40 mg by mouth daily. Indications: Chronic Heart Failure      cloNIDine HCl (CATAPRES) 0.3 mg tablet Take 0.3 mg by mouth three (3) times daily. Indications: hypertension         Past History     Past Medical History:  Past Medical History:   Diagnosis Date    Dialysis patient ([facility] [address]     tues, thurs, sat    Heart failure ([facility] [address]     Hypertension        Past Surgical History:  Past Surgical History:   Procedure Laterality Date    HX GI      polyp removed 10/2016       Family History:  History reviewed. No pertinent family history.     Social History:  Social History   Substance Use Topics    Smoking status: Former Smoker    Smokeless tobacco: Never Used    Alcohol use No       Allergies:  No Known Allergies      Review of Systems   Review of Systems Constitutional: Negative for fatigue and fever. HENT: Negative for congestion. Eyes: Negative for pain. Respiratory: Positive for cough. Negative for shortness of breath. Cardiovascular: Negative for chest pain. Gastrointestinal: Negative for abdominal pain, diarrhea, nausea and vomiting. Genitourinary: Negative for dysuria. Musculoskeletal: Negative for back pain. Skin: Negative for wound. Neurological: Negative for dizziness and headaches. All other systems reviewed and are negative. All Other Systems Negative  Physical Exam     Vitals:    09/29/18 1145   BP: 129/89   Pulse: 76   Resp: 17   Temp: 97.9 °F (36.6 °C)   SpO2: 97%   Weight: 77.1 kg (170 lb)   Height: 5' 2\" (1.575 m)     Physical Exam   Constitutional: She is oriented to person, place, and time. She appears well-developed and well-nourished. No distress. HENT:   Head: Normocephalic and atraumatic. Nose: Nose normal.   Eyes: Pupils are equal, round, and reactive to light. Neck: Normal range of motion. Cardiovascular: Normal rate, regular rhythm and normal heart sounds. Pulmonary/Chest: Effort normal and breath sounds normal. No respiratory distress. She has no wheezes. Musculoskeletal: Normal range of motion. Neurological: She is alert and oriented to person, place, and time. Skin: Skin is warm and dry. Psychiatric: She has a normal mood and affect. Her behavior is normal.   Vitals reviewed. Diagnostic Study Results     Labs -   No results found for this or any previous visit (from the past 12 hour(s)). Radiologic Studies -   XR CHEST PA LAT   Final Result   Impression:   1. No acute cardiopulmonary process. CT Results  (Last 48 hours)    None        CXR Results  (Last 48 hours)               09/29/18 1200  XR CHEST PA LAT Final result    Impression:  Impression:   1. No acute cardiopulmonary process.        Narrative:  XR CHEST PA LAT       Indication: cough       Comparison: Chest x-ray from 3/27/2018       Findings: The lungs are clear. No effusion or pneumothorax. Cardiomediastinal silhouette   is normal in size. Medical Decision Making   I am the first provider for this patient. I reviewed the vital signs, available nursing notes, past medical history, past surgical history, family history and social history. Vital Signs-Reviewed the patient's vital signs. Pulse Oximetry Analysis - 97% on RA      Records Reviewed: Old Medical Records    Procedures:  Procedures    Provider Notes (Medical Decision Making):     XR Results (most recent):    Results from Hospital Encounter encounter on 09/29/18   XR CHEST PA LAT   Narrative XR CHEST PA LAT    Indication: cough    Comparison: Chest x-ray from 3/27/2018    Findings: The lungs are clear. No effusion or pneumothorax. Cardiomediastinal silhouette  is normal in size. Impression Impression:  1. No acute cardiopulmonary process. Will discharge home with tessalon perles for cough    MED RECONCILIATION:  No current facility-administered medications for this encounter. Current Outpatient Prescriptions   Medication Sig    benzonatate (TESSALON PERLES) 100 mg capsule Take 1 Cap by mouth three (3) times daily as needed for Cough for up to 7 days.  traMADol (ULTRAM) 50 mg tablet Take 1 Tab by mouth every six (6) hours as needed for Pain. Max Daily Amount: 200 mg.    NIFEdipine ER (PROCARDIA XL) 90 mg ER tablet Take 90 mg by mouth daily.  lisinopril (PRINIVIL, ZESTRIL) 40 mg tablet Take 40 mg by mouth daily. Indications: Chronic Heart Failure    cloNIDine HCl (CATAPRES) 0.3 mg tablet Take 0.3 mg by mouth three (3) times daily. Indications: hypertension       Disposition:  discharge    DISCHARGE NOTE:     Pt has been reexamined. Patient has no new complaints, changes, or physical findings. Care plan outlined and precautions discussed. Results of visit were reviewed with the patient.  All medications were reviewed with the patient; will d/c home with antitussive. All of pt's questions and concerns were addressed. Patient was instructed and agrees to follow up with PCP, as well as to return to the ED upon further deterioration. Patient is ready to go home. Follow-up Information     Follow up With Details Comments Contact Info    Phys Other, MD Call As needed, follow up Patient can only remember the practice name and not the physician      [facility]  If symptoms worsen [id number] [facility]  [phone]          Current Discharge Medication List      START taking these medications    Details   benzonatate (TESSALON PERLES) 100 mg capsule Take 1 Cap by mouth three (3) times daily as needed for Cough for up to 7 days. Qty: 30 Cap, Refills: 0               Core Measures:        Diagnosis     Clinical Impression:   1.  Cough

## 2018-09-29 NOTE — DISCHARGE INSTRUCTIONS
Cough: Care Instructions  Your Care Instructions    A cough is your body's response to something that bothers your throat or airways. Many things can cause a cough. You might cough because of a cold or the flu, bronchitis, or asthma. Smoking, postnasal drip, allergies, and stomach acid that backs up into your throat also can cause coughs. A cough is a symptom, not a disease. Most coughs stop when the cause, such as a cold, goes away. You can take a few steps at home to cough less and feel better. Follow-up care is a key part of your treatment and safety. Be sure to make and go to all appointments, and call your doctor if you are having problems. It's also a good idea to know your test results and keep a list of the medicines you take. How can you care for yourself at home? · Drink lots of water and other fluids. This helps thin the mucus and soothes a dry or sore throat. Honey or lemon juice in hot water or tea may ease a dry cough. · Take cough medicine as directed by your doctor. · Prop up your head on pillows to help you breathe and ease a dry cough. · Try cough drops to soothe a dry or sore throat. Cough drops don't stop a cough. Medicine-flavored cough drops are no better than candy-flavored drops or hard candy. · Do not smoke. Avoid secondhand smoke. If you need help quitting, talk to your doctor about stop-smoking programs and medicines. These can increase your chances of quitting for good. When should you call for help? Call 911 anytime you think you may need emergency care.  For example, call if:    · You have severe trouble breathing.    Call your doctor now or seek immediate medical care if:    · You cough up blood.     · You have new or worse trouble breathing.     · You have a new or higher fever.     · You have a new rash.    Watch closely for changes in your health, and be sure to contact your doctor if:    · You cough more deeply or more often, especially if you notice more mucus or a change in the color of your mucus.     · You have new symptoms, such as a sore throat, an earache, or sinus pain.     · You do not get better as expected. Where can you learn more? Go to http://jaylen-alley.info/. Enter D279 in the search box to learn more about \"Cough: Care Instructions. \"  Current as of: December 6, 2017  Content Version: 11.7  © 9639-0392 [employer]. Care instructions adapted under license by [employer] (which disclaims liability or warranty for this information). If you have questions about a medical condition or this instruction, always ask your healthcare professional. [address] any warranty or liability for your use of this information.

## 2018-09-29 NOTE — ED TRIAGE NOTES
Patient reports received flu vaccination last Friday and developed cough following day. States productive yellow phlegm. States hx of pneumonia. Patient denies any other concerns other than cough. Patient presents in NAD.

## 2018-09-29 NOTE — ED NOTES
I have reviewed discharge instructions with the patient. The patient verbalized understanding. Current Discharge Medication List      START taking these medications    Details   benzonatate (TESSALON PERLES) 100 mg capsule Take 1 Cap by mouth three (3) times daily as needed for Cough for up to 7 days.   Qty: 30 Cap, Refills: 0         Patient armband removed and shredded

## 2018-11-09 ENCOUNTER — APPOINTMENT (OUTPATIENT)
Dept: GENERAL RADIOLOGY | Age: 58
End: 2018-11-09
Attending: PHYSICIAN ASSISTANT
Payer: MEDICARE

## 2018-11-09 ENCOUNTER — HOSPITAL ENCOUNTER (EMERGENCY)
Age: 58
Discharge: HOME | End: 2018-11-09
Attending: EMERGENCY MEDICINE
Payer: MEDICARE

## 2018-11-09 VITALS
HEART RATE: 89 BPM | DIASTOLIC BLOOD PRESSURE: 95 MMHG | SYSTOLIC BLOOD PRESSURE: 169 MMHG | RESPIRATION RATE: 18 BRPM | WEIGHT: 175 LBS | HEIGHT: 62 IN | OXYGEN SATURATION: 96 % | BODY MASS INDEX: 32.2 KG/M2 | TEMPERATURE: 99 F

## 2018-11-09 DIAGNOSIS — J42 CHRONIC BRONCHITIS, UNSPECIFIED CHRONIC BRONCHITIS TYPE (HCC): Primary | ICD-10-CM

## 2018-11-09 PROCEDURE — 99281 EMR DPT VST MAYX REQ PHY/QHP: CPT

## 2018-11-09 PROCEDURE — 71046 X-RAY EXAM CHEST 2 VIEWS: CPT

## 2018-11-09 RX ORDER — PREDNISONE 10 MG/1
TABLET ORAL
Qty: 21 TAB | Refills: 0 | Status: SHIPPED | OUTPATIENT
Start: 2018-11-09 | End: 2019-09-15

## 2018-11-09 RX ORDER — BENZONATATE 100 MG/1
100 CAPSULE ORAL
Qty: 21 CAP | Refills: 0 | Status: SHIPPED | OUTPATIENT
Start: 2018-11-09 | End: 2018-11-16

## 2018-11-10 NOTE — ED NOTES
I have reviewed discharge instructions with the patient. The patient verbalized understanding. Patient armband removed and shredded  Current Discharge Medication List      START taking these medications    Details   benzonatate (TESSALON PERLES) 100 mg capsule Take 1 Cap by mouth three (3) times daily as needed for Cough for up to 7 days. Qty: 21 Cap, Refills: 0      predniSONE (STERAPRED DS) 10 mg dose pack Take as prescribed until finished.   Qty: 21 Tab, Refills: 0

## 2018-11-10 NOTE — DISCHARGE INSTRUCTIONS
Learning About Chronic Bronchitis  What is chronic bronchitis? Chronic bronchitis is long-term swelling and the buildup of mucus in the airways of your lungs. The airways (bronchial tubes) get inflamed and make a lot of mucus. This can narrow or block the airways, making it hard for you to breathe. It is a form of COPD (chronic obstructive pulmonary disease). Chronic bronchitis is usually caused by smoking. But chemical fumes, dust, or air pollution also can cause it over time. What can you expect when you have chronic bronchitis? Chronic bronchitis gets worse over time. You cannot undo the damage to your lungs. Over time, you may find that:  · You get short of breath even when you do simple things like get dressed or fix a meal.  · It is hard to eat or exercise. · You lose weight and feel weaker. Over many years, the swelling and mucus from chronic bronchitis make it more likely that you will get lung infections. But there are things you can do to prevent more damage and feel better. What are the symptoms? The main symptoms of chronic bronchitis are:  · A cough that will not go away. · Mucus that comes up when you cough. · Shortness of breath that gets worse when you exercise. At times, your symptoms may suddenly flare up and get much worse. This is a called an exacerbation (say \"egg-ZAPATRIZIA-er-BAY-caro\"). When this happens, your usual symptoms quickly get worse and stay bad. This can be dangerous. You may have to go to the hospital.  How can you keep chronic bronchitis from getting worse? Don't smoke. That is the best way to keep chronic bronchitis from getting worse. If you already smoke, it is never too late to stop. If you need help quitting, talk to your doctor about stop-smoking programs and medicines. These can increase your chances of quitting for good. You can do other things to keep chronic bronchitis from getting worse:  · Avoid bad air.  Air pollution, chemical fumes, and dust also can make chronic bronchitis worse. · Get a flu shot every year. A shot may keep the flu from turning into something more serious, like pneumonia. A flu shot also may lower your chances of having a flare-up. · Get a pneumococcal shot. A shot can prevent some of the serious complications of pneumonia. Ask your doctor how often you should get this shot. How is chronic bronchitis treated? Chronic bronchitis is treated with medicines and oxygen. You also can take steps at home to stay healthy and keep your condition from getting worse. Medicines and oxygen therapy  · You may be taking medicines such as:  ? Bronchodilators. These help open your airways and make breathing easier. Bronchodilators are either short-acting (work for 6 to 9 hours) or long-acting (work for 24 hours). You inhale most bronchodilators, so they start to act quickly. Always carry your quick-relief inhaler with you in case you need it while you are away from home. ? Corticosteroids. These reduce airway inflammation. They come in pill or inhaled form. You must take these medicines every day for them to work well. ? Antibiotics. These medicines are used when you have a bacterial lung infection. · Take your medicines exactly as prescribed. Call your doctor if you think you are having a problem with your medicine. · Oxygen therapy boosts the amount of oxygen in your blood and helps you breathe easier. Use the flow rate your doctor has recommended, and do not change it without talking to your doctor first.  Other care at home  · If your doctor recommends it, get more exercise. Walking is a good choice. Bit by bit, increase the amount you walk every day. Try for at least 30 minutes on most days of the week. · Learn breathing methods--such as breathing through pursed lips--to help you become less short of breath. · If your doctor has not set you up with a pulmonary rehabilitation program, talk to him or her about whether rehab is right for you.  Rehab includes exercise programs, education about your disease and how to manage it, help with diet and other changes, and emotional support. · Eat regular, healthy meals. Use bronchodilators about 1 hour before you eat to make it easier to eat. Eat several small meals instead of three large ones. Drink beverages at the end of the meal. Avoid foods that are hard to chew. Follow-up care is a key part of your treatment and safety. Be sure to make and go to all appointments, and call your doctor if you are having problems. It's also a good idea to know your test results and keep a list of the medicines you take. Where can you learn more? Go to http://jaylen-alley.info/. Enter U370 in the search box to learn more about \"Learning About Chronic Bronchitis. \"  Current as of: December 6, 2017  Content Version: 11.8  © 4850-7240 [employer]. Care instructions adapted under license by [employer] (which disclaims liability or warranty for this information). If you have questions about a medical condition or this instruction, always ask your healthcare professional. [address] any warranty or liability for your use of this information.

## 2018-11-10 NOTE — ED PROVIDER NOTES
The history is provided by the patient. Cough   This is a new problem. Episode onset: 3 months ago. The problem occurs every few minutes (Daily). The problem has not changed since onset. The cough is productive of sputum (At times dry, at times productive of clear or white sputum). There has been no fever. Pertinent negatives include no chest pain, no chills, no sweats, no weight loss, no eye redness, no ear congestion, no ear pain, no headaches, no rhinorrhea, no sore throat, no myalgias, no shortness of breath, no wheezing, no nausea, no vomiting and no confusion. Treatments tried: Tessalon perles, had an antibx 2 months ago without relief, OTC products, Allegra. The treatment provided no relief. She is not a smoker (Former smoker of 20 years, stopped 3 years ago). Her past medical history is significant for pneumonia and CHF. Her past medical history does not include COPD, emphysema, asthma or cancer. Pt reports coming to this ED with a neg CXR and has been to PCP 2 times, without any specific dx. Pt denies any other complaints at this time. Past Medical History:   Diagnosis Date    Dialysis patient ([facility] [address]     tues, thurs, sat    Heart failure ([facility] [address]     Hypertension        Past Surgical History:   Procedure Laterality Date    HX GI      polyp removed 10/2016         No family history on file.     Social History     Socioeconomic History    Marital status:      Spouse name: Not on file    Number of children: Not on file    Years of education: Not on file    Highest education level: Not on file   Social Needs    Financial resource strain: Not on file    Food insecurity - worry: Not on file    Food insecurity - inability: Not on file    Transportation needs - medical: Not on file   Pearescope needs - non-medical: Not on file   Occupational History    Not on file   Tobacco Use    Smoking status: Former Smoker    Smokeless tobacco: Never Used   Substance and Sexual Activity    Alcohol use: No    Drug use: No    Sexual activity: Yes     Partners: Male   Other Topics Concern    Not on file   Social History Narrative    Not on file         ALLERGIES: Patient has no known allergies. Review of Systems   Constitutional: Negative for chills, fever and weight loss. HENT: Positive for congestion. Negative for ear pain, rhinorrhea, sneezing and sore throat. Eyes: Negative for pain and redness. Respiratory: Positive for cough. Negative for choking, chest tightness, shortness of breath, wheezing and stridor. Cardiovascular: Negative for chest pain, palpitations and leg swelling. Gastrointestinal: Negative for abdominal pain, constipation, diarrhea, nausea and vomiting. Genitourinary: Negative for dysuria. Musculoskeletal: Negative for back pain and myalgias. Skin: Negative. Neurological: Negative for dizziness, syncope, weakness, light-headedness, numbness and headaches. Psychiatric/Behavioral: Negative. Negative for confusion. All other systems reviewed and are negative. Vitals:    11/09/18 2013   BP: (!) 169/95   Pulse: 89   Resp: 18   Temp: 99 °F (37.2 °C)   SpO2: 96%   Weight: 79.4 kg (175 lb)   Height: 5' 2\" (1.575 m)            Physical Exam   Constitutional: She is oriented to person, place, and time. She appears well-developed and well-nourished. No distress. HENT:   Head: Normocephalic and atraumatic. Right Ear: Tympanic membrane, external ear and ear canal normal.   Left Ear: Tympanic membrane, external ear and ear canal normal.   Nose: Nose normal.   Mouth/Throat: Oropharynx is clear and moist and mucous membranes are normal.   Eyes: Conjunctivae and EOM are normal. Pupils are equal, round, and reactive to light. Neck: Normal range of motion. Neck supple. Cardiovascular: Normal rate, regular rhythm, normal heart sounds and intact distal pulses. Exam reveals no gallop and no friction rub. No murmur heard.   Pulmonary/Chest: Effort normal and breath sounds normal. No accessory muscle usage or stridor. No tachypnea and no bradypnea. No respiratory distress. She has no decreased breath sounds. She has no wheezes. She has no rhonchi. She has no rales. Abdominal: Soft. Bowel sounds are normal. There is no tenderness. Musculoskeletal: Normal range of motion. She exhibits no edema or tenderness. No bilateral leg swelling   Lymphadenopathy:     She has no cervical adenopathy. Neurological: She is alert and oriented to person, place, and time. Skin: Skin is warm and dry. She is not diaphoretic. Psychiatric: She has a normal mood and affect. Her behavior is normal. Judgment and thought content normal.   Nursing note and vitals reviewed. MDM  Number of Diagnoses or Management Options  Chronic bronchitis, unspecified chronic bronchitis type [facility]): new and requires workup  Diagnosis management comments: DDX: URI, bronchitis, asthma, COPD, bronchospasm    Pt is a 62 yo female former long time smoker with c/o cough and congestion x 3 months, without resolution with antibx or symptomatic tx or allergy tx. Afebrile, lungs CTAB, O2 96% RR 18. Will plan to recheck xr, if neg, do not feel antibx indicated at this time, will give prednisone and refer to pulmonology. D/w Dr. Donnie Lesch who agrees with this plan. CXR neg for acute cardiopulmonary process, however some mild worsening of cardiomegaly. Again pt has h/o heart failure but no complaints of CP, SOB, wheezing, or leg swelling or weight gain. Again d/w ED attending Dr. Donnie Lesch who also reviewed CXR, and she agrees pt ok to dc to home at this time, and will also have pt f/u with cardiology. Pt reports she sees Dr. Ye Do. Pt agrees with the plan. Pt stable for dc to home and given strict ED return precautions. Pt results have been reviewed with the patient and any family present. They have been counseled regarding diagnosis, treatment, and plan.  They verbally convey understanding and agreement of the signs, symptoms, diagnosis, treatment and prognosis and additionally agrees to follow up as discussed. They also agree with the care-plan and convey that all of their questions have been answered. I have also provided discharge instructions for them that include: educational information regarding their diagnosis and treatment, and list of reasons why they would want to return to the ED prior to their follow-up appointment, should their condition change. Kamila Angel PA-C         Amount and/or Complexity of Data Reviewed  Tests in the radiology section of CPT®: ordered and reviewed  Review and summarize past medical records: yes  Discuss the patient with other providers: yes  Independent visualization of images, tracings, or specimens: yes    Risk of Complications, Morbidity, and/or Mortality  Presenting problems: low  Diagnostic procedures: low  Management options: low    Patient Progress  Patient progress: stable         Procedures      Diagnosis:   1. Chronic bronchitis, unspecified chronic bronchitis type ([facility] [address]          Disposition: Discharge to home. Follow-up Information     Follow up With Specialties Details Why [place] [address] EMERGENCY DEPT Emergency Medicine  As needed, If symptoms worsen [address] [facility]  [phone]    Juan Hernandez MD  Go in 2 days  Patient can only remember the practice name and not the physician      Rosa Hayes MD Cardiology Go in 2 days  [facility] [address]  [facility] [phone] [facility] in 2 days  [address] [place] [address]  [phone]             Medication List      START taking these medications    benzonatate 100 mg capsule  Commonly known as:  TESSALON PERLES  Take 1 Cap by mouth three (3) times daily as needed for Cough for up to 7 days.      predniSONE 10 mg dose pack  Commonly known as:  STERAPRED DS  Take as prescribed until finished. ASK your doctor about these medications    cloNIDine HCl 0.3 mg tablet  Commonly known as:  CATAPRES     lisinopril 40 mg tablet  Commonly known as:  PRINIVIL, ZESTRIL     NIFEdipine ER 90 mg ER tablet  Commonly known as:  PROCARDIA XL     traMADol 50 mg tablet  Commonly known as:  ULTRAM  Take 1 Tab by mouth every six (6) hours as needed for Pain. Max Daily Amount: 200 mg.            Where to Get Your Medications      Information about where to get these medications is not yet available    Ask your nurse or doctor about these medications  · benzonatate 100 mg capsule  · predniSONE 10 mg dose pack

## 2019-06-13 ENCOUNTER — HOSPITAL ENCOUNTER (OUTPATIENT)
Dept: MAMMOGRAPHY | Age: 59
Discharge: HOME | End: 2019-06-13
Attending: FAMILY MEDICINE
Payer: MEDICARE

## 2019-06-13 DIAGNOSIS — Z12.39 SCREENING FOR BREAST CANCER: ICD-10-CM

## 2019-06-13 PROCEDURE — 77063 BREAST TOMOSYNTHESIS BI: CPT

## 2019-07-05 ENCOUNTER — HOSPITAL ENCOUNTER (OUTPATIENT)
Dept: ULTRASOUND IMAGING | Age: 59
Discharge: HOME | End: 2019-07-05
Attending: FAMILY MEDICINE
Payer: MEDICARE

## 2019-07-05 ENCOUNTER — HOSPITAL ENCOUNTER (OUTPATIENT)
Dept: MAMMOGRAPHY | Age: 59
Discharge: HOME | End: 2019-07-05
Attending: FAMILY MEDICINE
Payer: MEDICARE

## 2019-07-05 DIAGNOSIS — R92.8 ABNORMAL MAMMOGRAM: ICD-10-CM

## 2019-07-05 DIAGNOSIS — R59.9 SWOLLEN LYMPH NODES: ICD-10-CM

## 2019-07-05 PROCEDURE — 77065 DX MAMMO INCL CAD UNI: CPT

## 2019-07-05 PROCEDURE — 76642 ULTRASOUND BREAST LIMITED: CPT

## 2019-09-10 ENCOUNTER — OFFICE VISIT (OUTPATIENT)
Dept: SURGERY | Age: 59
End: 2019-09-10

## 2019-09-10 VITALS
DIASTOLIC BLOOD PRESSURE: 82 MMHG | RESPIRATION RATE: 16 BRPM | BODY MASS INDEX: 32.2 KG/M2 | WEIGHT: 175 LBS | TEMPERATURE: 98.3 F | HEIGHT: 62 IN | HEART RATE: 59 BPM | SYSTOLIC BLOOD PRESSURE: 121 MMHG | OXYGEN SATURATION: 97 %

## 2019-09-10 DIAGNOSIS — Z12.11 COLON CANCER SCREENING: Primary | ICD-10-CM

## 2019-09-10 RX ORDER — CARVEDILOL 25 MG/1
TABLET ORAL
Refills: 0 | COMMUNITY
Start: 2019-08-22 | End: 2019-09-15

## 2019-09-10 NOTE — PROGRESS NOTES
Review of Systems   Constitutional: Negative. HENT: Positive for sore throat. Negative for congestion, ear discharge, ear pain, hearing loss, nosebleeds, sinus pain and tinnitus. Eyes: Negative. Respiratory: Negative for stridor. Cardiovascular: Positive for palpitations. Negative for chest pain, orthopnea, claudication, leg swelling and PND. Gastrointestinal: Positive for constipation and nausea. Negative for abdominal pain, blood in stool, diarrhea, heartburn, melena and vomiting. Genitourinary: Negative. Musculoskeletal: Negative. Skin: Negative. Neurological: Negative. Endo/Heme/Allergies: Negative. Psychiatric/Behavioral: Negative for depression, hallucinations, memory loss, substance abuse and suicidal ideas. The patient has insomnia. The patient is not nervous/anxious. Colon Screen    Patient: Caleb Page MRN: [id number]  SSN: [id number]    YOB: 1960  Age: 63 y.o. Sex: female        Subjective:   Caleb Page was referred by her PCP, Lynda Nicole MD.  Patient referred for colonoscopy for   Personal history of colon polyps (screening only). Patient denies rectal pain or bleeding. Abdominal surgeries as described below, specifically hysterectomy,  section, tubal ligation, appendectomy and partial right colectomy for cecal polyp. Family history as described below, specifically mother with colon cancer and colon polyps. Last colonoscopy was 3 years ago a polyp at the appendiceal orifice was found. Operative report and colonoscopy scanned into chart. Patient will be scheduled at [facility] due to ESRD. Patient has noticed some rectal bleeding here and there and states she has a rectal polyp or hemorrhoid that she feels right at her rectum. Told patient she should be seen by Dr. Abner Paulson in the interim before her colonoscopy to have this evaluated. She understood and will think about it.     No Known Allergies    Past Medical History:   Diagnosis Date    Adenomatous polyp     [facility]est[facility]) [facility] [address]     [facility]     tues, th, sat    ESRD (end stage renal disease) ([facility] [address]     Family hx of colon cancer     Mother    [facility]     Hypertension     LUIS (obstructive sleep apnea)      Past Surgical History:   Procedure Laterality Date    HX APPENDECTOMY      HX  SECTION      HX COLECTOMY  10/11/2016    LAPAROSCOPIC COLECTOMY PARTIAL RIGHT FOR CECAL POLYP, DR. PAEZ, [facility]    HX COLONOSCOPY  2016    colonoscopy, Dr. Hemalatha Canada, [facility] OTHER SURGICAL       ARTERIOVENOUS FISTULA INSERTION LEFT ARM     HX TOTAL ABDOMINAL HYSTERECTOMY      HX TUBAL LIGATION        Family History   Problem Relation Age of Onset    Colon Cancer Mother     Colon Polyps Mother      Social History     Tobacco Use    Smoking status: Former Smoker    Smokeless tobacco: Never Used   Substance Use Topics    Alcohol use: No      Prior to Admission medications    Medication Sig Start Date End Date Taking? Authorizing Provider   carvedilol (COREG) 25 mg tablet TAKE 1 TABLET BY MOUTH TWICE DAILY 19  Yes Provider, Historical   NIFEdipine ER (PROCARDIA XL) 90 mg ER tablet Take 90 mg by mouth daily. Yes Other, MD Juan   cloNIDine HCl (CATAPRES) 0.3 mg tablet Take 0.3 mg by mouth three (3) times daily. Indications: hypertension   Yes Mary, MD Juan   predniSONE (STERAPRED DS) 10 mg dose pack Take as prescribed until finished. 18   So Gtz PA-C   traMADol Marlyce Carlene) 50 mg tablet Take 1 Tab by mouth every six (6) hours as needed for Pain. Max Daily Amount: 200 mg. 18   Dipesh August PA-C   lisinopril (PRINIVIL, ZESTRIL) 40 mg tablet Take 40 mg by mouth daily. Indications: Chronic Heart Failure    Other, MD Juan          Review of Systems:      Risks colonoscopy described- colon injury, missed lesion, anesthesia problems, bleeding       Rozina Leigh, NABIL  September 10, 2019  4:27 PM

## 2019-09-15 ENCOUNTER — HOSPITAL ENCOUNTER (EMERGENCY)
Age: 59
Discharge: HOME | End: 2019-09-15
Attending: EMERGENCY MEDICINE
Payer: MEDICARE

## 2019-09-15 VITALS
TEMPERATURE: 98.3 F | RESPIRATION RATE: 18 BRPM | SYSTOLIC BLOOD PRESSURE: 157 MMHG | HEART RATE: 62 BPM | BODY MASS INDEX: 32.2 KG/M2 | HEIGHT: 62 IN | WEIGHT: 175 LBS | OXYGEN SATURATION: 96 % | DIASTOLIC BLOOD PRESSURE: 99 MMHG

## 2019-09-15 DIAGNOSIS — J06.9 ACUTE UPPER RESPIRATORY INFECTION: Primary | ICD-10-CM

## 2019-09-15 PROCEDURE — 87081 CULTURE SCREEN ONLY: CPT

## 2019-09-15 PROCEDURE — 99282 EMERGENCY DEPT VISIT SF MDM: CPT

## 2019-09-15 RX ORDER — AMOXICILLIN AND CLAVULANATE POTASSIUM 875; 125 MG/1; MG/1
1 TABLET, FILM COATED ORAL 2 TIMES DAILY
Qty: 20 TAB | Refills: 0 | Status: SHIPPED | OUTPATIENT
Start: 2019-09-15 | End: 2019-09-25

## 2019-09-15 RX ORDER — GUAIFENESIN 600 MG/1
600 TABLET, EXTENDED RELEASE ORAL 2 TIMES DAILY
Qty: 20 TAB | Refills: 0 | Status: SHIPPED | OUTPATIENT
Start: 2019-09-15 | End: 2020-05-23

## 2019-09-15 RX ORDER — CARVEDILOL 25 MG/1
25 TABLET ORAL 2 TIMES DAILY WITH MEALS
COMMUNITY
End: 2022-10-21

## 2019-09-15 NOTE — ED PROVIDER NOTES
EMERGENCY DEPARTMENT HISTORY AND PHYSICAL EXAM    Date: 9/15/2019  Patient Name: Momo Juarez    History of Presenting Illness     Chief Complaint   Patient presents with    Sore Throat    Cough         History Provided By: Patient        Additional History (Context): Momo Juarez is a 63 y.o. female with hypertension who presents with sore throat and cough now for a week. Denies fever. Is bringing up yellow sputum and wonders if she has an infection. PCP: Pauly Cooper MD    Current Outpatient Medications   Medication Sig Dispense Refill    carvedilol (COREG) 25 mg tablet Take 25 mg by mouth two (2) times daily (with meals).  dextromethorphan-guaiFENesin (CORICIDIN HBP)  mg cap Take 1 Cap by mouth two (2) times daily as needed for Cough or Other (congestion). 20 Cap 0    guaiFENesin ER (MUCINEX) 600 mg ER tablet Take 1 Tab by mouth two (2) times a day. 20 Tab 0    amoxicillin-clavulanate (AUGMENTIN) 875-125 mg per tablet Take 1 Tab by mouth two (2) times a day for 10 days. 20 Tab 0    NIFEdipine ER (PROCARDIA XL) 90 mg ER tablet Take 90 mg by mouth daily.  lisinopril (PRINIVIL, ZESTRIL) 40 mg tablet Take 40 mg by mouth daily. Indications: Chronic Heart Failure      cloNIDine HCl (CATAPRES) 0.3 mg tablet Take 0.3 mg by mouth three (3) times daily. Indications: hypertension         Past History     Past Medical History:  Past Medical History:   Diagnosis Date    Adenomatous polyp     [facility]) [facility] [address]     Dialysis patient ([facility] [address]     tues, thurs, sat    ESRD (end stage renal disease) ([facility] [address]     Family hx of colon cancer     Mother    Heart failure ([facility] [address]     Hypertension     LUIS (obstructive sleep apnea)        Past Surgical History:  Past Surgical History:   Procedure Laterality Date    HX APPENDECTOMY      HX  SECTION      HX COLECTOMY  10/11/2016    LAPAROSCOPIC COLECTOMY PARTIAL RIGHT FOR CECAL POLYP, DR. PAEZ, [facility]    HX COLONOSCOPY 09/21/2016    colonoscopy, [facility] Jomar Benedict[facility]    HX OTHER SURGICAL       ARTERIOVENOUS FISTULA INSERTION LEFT ARM     HX TOTAL ABDOMINAL HYSTERECTOMY      HX TUBAL LIGATION         Family History:  Family History   Problem Relation Age of Onset    Colon Cancer Mother     Colon Polyps Mother        Social History:  Social History     Tobacco Use    Smoking status: Former Smoker    Smokeless tobacco: Never Used   Substance Use Topics    Alcohol use: No    Drug use: No       Allergies:  No Known Allergies      Review of Systems   Review of Systems   Constitutional: Negative for fever. HENT: Positive for congestion and sore throat. Respiratory: Positive for cough. All Other Systems Negative  Physical Exam     Vitals:    09/15/19 1356   BP: (!) 157/99   Pulse: 62   Resp: 18   Temp: 98.3 °F (36.8 °C)   SpO2: 96%   Weight: 79.4 kg (175 lb)   Height: 5' 2\" (1.575 m)     Physical Exam   Constitutional: She is oriented to person, place, and time. She appears well-developed. HENT:   Head: Normocephalic and atraumatic. Mouth/Throat: No oropharyngeal exudate. No exudates; mildly injected post pharynx. Eyes: Pupils are equal, round, and reactive to light. Neck: No JVD present. No tracheal deviation present. No thyromegaly present. Cardiovascular: Normal rate, regular rhythm and normal heart sounds. Exam reveals no gallop and no friction rub. No murmur heard. Pulmonary/Chest: Effort normal and breath sounds normal. No stridor. No respiratory distress. She has no wheezes. She has no rales. She exhibits no tenderness. Abdominal: Soft. She exhibits no distension and no mass. There is no tenderness. There is no rebound and no guarding. Musculoskeletal: She exhibits no edema or tenderness. Lymphadenopathy:     She has no cervical adenopathy. Neurological: She is alert and oriented to person, place, and time. Skin: Skin is warm and dry. No rash noted. No erythema. No pallor. Psychiatric: She has a normal mood and affect. Her behavior is normal. Thought content normal.   Nursing note and vitals reviewed. Diagnostic Study Results     Labs -     Recent Results (from the past 12 hour(s))   STREP THROAT SCREEN    Collection Time: 09/15/19  1:59 PM   Result Value Ref Range    Special Requests: NO SPECIAL REQUESTS      Strep Screen NEGATIVE       Culture result: PENDING        Radiologic Studies -   No orders to display     CT Results  (Last 48 hours)    None        CXR Results  (Last 48 hours)    None            Medical Decision Making   I am the first provider for this patient. I reviewed the vital signs, available nursing notes, past medical history, past surgical history, family history and social history. Vital Signs-Reviewed the patient's vital signs. Records Reviewed: Nursing Notes    Procedures:  Procedures    Provider Notes (Medical Decision Making):   Treat URI. MED RECONCILIATION:  No current facility-administered medications for this encounter. Current Outpatient Medications   Medication Sig    carvedilol (COREG) 25 mg tablet Take 25 mg by mouth two (2) times daily (with meals).  dextromethorphan-guaiFENesin (CORICIDIN HBP)  mg cap Take 1 Cap by mouth two (2) times daily as needed for Cough or Other (congestion).  guaiFENesin ER (MUCINEX) 600 mg ER tablet Take 1 Tab by mouth two (2) times a day.  amoxicillin-clavulanate (AUGMENTIN) 875-125 mg per tablet Take 1 Tab by mouth two (2) times a day for 10 days.  NIFEdipine ER (PROCARDIA XL) 90 mg ER tablet Take 90 mg by mouth daily.  lisinopril (PRINIVIL, ZESTRIL) 40 mg tablet Take 40 mg by mouth daily. Indications: Chronic Heart Failure    cloNIDine HCl (CATAPRES) 0.3 mg tablet Take 0.3 mg by mouth three (3) times daily. Indications: hypertension       Disposition:  home    DISCHARGE NOTE:   2:35 PM    Pt has been reexamined. Patient has no new complaints, changes, or physical findings. Care plan outlined and precautions discussed. Results of labs were reviewed with the patient. All medications were reviewed with the patient; will d/c home with augmentin, coricidin, mucinex. All of pt's questions and concerns were addressed. Patient was instructed and agrees to follow up with PCP, as well as to return to the ED upon further deterioration. Patient is ready to go home. Follow-up Information     Follow up With Specialties Details Why Contact Info    Anny Beckwith MD Family Practice Schedule an appointment as soon as possible for a visit in 3 days  [address] [facility]      [address] [facility] EMERGENCY DEPT Emergency Medicine  If symptoms worsen return immediately [address] [facility]  [phone]          Current Discharge Medication List      START taking these medications    Details   dextromethorphan-guaiFENesin (CORICIDIN HBP)  mg cap Take 1 Cap by mouth two (2) times daily as needed for Cough or Other (congestion). Qty: 20 Cap, Refills: 0      guaiFENesin ER (MUCINEX) 600 mg ER tablet Take 1 Tab by mouth two (2) times a day. Qty: 20 Tab, Refills: 0      amoxicillin-clavulanate (AUGMENTIN) 875-125 mg per tablet Take 1 Tab by mouth two (2) times a day for 10 days. Qty: 20 Tab, Refills: 0               Clinical Impression:   1.  Acute upper respiratory infection

## 2019-09-15 NOTE — DISCHARGE INSTRUCTIONS
Patient Education        Upper Respiratory Infection (Cold): Care Instructions  Your Care Instructions    An upper respiratory infection, or URI, is an infection of the nose, sinuses, or throat. URIs are spread by coughs, sneezes, and direct contact. The common cold is the most frequent kind of URI. The flu and sinus infections are other kinds of URIs. Almost all URIs are caused by viruses. Antibiotics won't cure them. But you can treat most infections with home care. This may include drinking lots of fluids and taking over-the-counter pain medicine. You will probably feel better in 4 to 10 days. The doctor has checked you carefully, but problems can develop later. If you notice any problems or new symptoms, get medical treatment right away. Follow-up care is a key part of your treatment and safety. Be sure to make and go to all appointments, and call your doctor if you are having problems. It's also a good idea to know your test results and keep a list of the medicines you take. How can you care for yourself at home? · To prevent dehydration, drink plenty of fluids, enough so that your urine is light yellow or clear like water. Choose water and other caffeine-free clear liquids until you feel better. If you have kidney, heart, or liver disease and have to limit fluids, talk with your doctor before you increase the amount of fluids you drink. · Take an over-the-counter pain medicine, such as acetaminophen (Tylenol), ibuprofen (Advil, Motrin), or naproxen (Aleve). Read and follow all instructions on the label. · Before you use cough and cold medicines, check the label. These medicines may not be safe for young children or for people with certain health problems. · Be careful when taking over-the-counter cold or flu medicines and Tylenol at the same time. Many of these medicines have acetaminophen, which is Tylenol. Read the labels to make sure that you are not taking more than the recommended dose.  Too much acetaminophen (Tylenol) can be harmful. · Get plenty of rest.  · Do not smoke or allow others to smoke around you. If you need help quitting, talk to your doctor about stop-smoking programs and medicines. These can increase your chances of quitting for good. When should you call for help? Call 911 anytime you think you may need emergency care. For example, call if:    · You have severe trouble breathing.    Call your doctor now or seek immediate medical care if:    · You seem to be getting much sicker.     · You have new or worse trouble breathing.     · You have a new or higher fever.     · You have a new rash.    Watch closely for changes in your health, and be sure to contact your doctor if:    · You have a new symptom, such as a sore throat, an earache, or sinus pain.     · You cough more deeply or more often, especially if you notice more mucus or a change in the color of your mucus.     · You do not get better as expected. Where can you learn more? Go to http://jaylen-alley.info/. Enter H088 in the search box to learn more about \"Upper Respiratory Infection (Cold): Care Instructions. \"  Current as of: September 5, 2018  Content Version: 12.1  © 4696-9303 [employer]. Care instructions adapted under license by [employer] (which disclaims liability or warranty for this information). If you have questions about a medical condition or this instruction, always ask your healthcare professional. [place] [address] any warranty or liability for your use of this information.

## 2019-09-15 NOTE — ED TRIAGE NOTES
Pt reports having sore throat, post nasal drainage and cough productive of yellow phlegm x 5 days. States began, but became worse after doing yard work. Pt requesting throat culture. Pt conversing and handling oral secretions while drinking hot tea without any difficulty and now scrolling through social media during triage.

## 2019-09-17 LAB
B-HEM STREP THROAT QL CULT: NEGATIVE
BACTERIA SPEC CULT: NORMAL
SERVICE CMNT-IMP: NORMAL

## 2019-09-25 RX ORDER — HYDROXYZINE PAMOATE 25 MG/1
25 CAPSULE ORAL
COMMUNITY
End: 2022-01-08

## 2019-09-26 ENCOUNTER — ANESTHESIA EVENT (OUTPATIENT)
Dept: ENDOSCOPY | Age: 59
End: 2019-09-26
Payer: MEDICARE

## 2019-09-26 RX ORDER — BISACODYL 5 MG
TABLET, DELAYED RELEASE (ENTERIC COATED) ORAL
Qty: 4 TAB | Refills: 0 | Status: SHIPPED | OUTPATIENT
Start: 2019-09-26 | End: 2022-01-08

## 2019-09-26 RX ORDER — POLYETHYLENE GLYCOL 3350 17 G/17G
POWDER, FOR SOLUTION ORAL
Qty: 255 G | Refills: 0 | Status: SHIPPED | OUTPATIENT
Start: 2019-09-26 | End: 2021-06-07

## 2019-09-27 ENCOUNTER — HOSPITAL ENCOUNTER (OUTPATIENT)
Age: 59
Setting detail: OUTPATIENT SURGERY
Discharge: HOME | End: 2019-09-27
Attending: COLON & RECTAL SURGERY | Admitting: COLON & RECTAL SURGERY
Payer: MEDICARE

## 2019-09-27 ENCOUNTER — ANESTHESIA (OUTPATIENT)
Dept: ENDOSCOPY | Age: 59
End: 2019-09-27
Payer: MEDICARE

## 2019-09-27 VITALS
SYSTOLIC BLOOD PRESSURE: 147 MMHG | BODY MASS INDEX: 30 KG/M2 | HEIGHT: 62 IN | OXYGEN SATURATION: 100 % | RESPIRATION RATE: 21 BRPM | HEART RATE: 64 BPM | TEMPERATURE: 98.1 F | WEIGHT: 163 LBS | DIASTOLIC BLOOD PRESSURE: 91 MMHG

## 2019-09-27 LAB
BUN BLD-MCNC: 42 MG/DL (ref 7–18)
CHLORIDE BLD-SCNC: 102 MMOL/L (ref 100–108)
GLUCOSE BLD STRIP.AUTO-MCNC: 91 MG/DL (ref 74–106)
HCT VFR BLD CALC: 37 % (ref 36–49)
HGB BLD-MCNC: 12.6 G/DL (ref 12–16)
POTASSIUM BLD-SCNC: 4.7 MMOL/L (ref 3.5–5.5)
SODIUM BLD-SCNC: 138 MMOL/L (ref 136–145)

## 2019-09-27 PROCEDURE — 74011250637 HC RX REV CODE- 250/637: Performed by: NURSE ANESTHETIST, CERTIFIED REGISTERED

## 2019-09-27 PROCEDURE — 77030021593 HC FCPS BIOP ENDOSC BSC -A: Performed by: COLON & RECTAL SURGERY

## 2019-09-27 PROCEDURE — 74011250636 HC RX REV CODE- 250/636

## 2019-09-27 PROCEDURE — 76060000031 HC ANESTHESIA FIRST 0.5 HR: Performed by: COLON & RECTAL SURGERY

## 2019-09-27 PROCEDURE — 74011000250 HC RX REV CODE- 250

## 2019-09-27 PROCEDURE — 77030018846 HC SOL IRR STRL H20 ICUM -A: Performed by: COLON & RECTAL SURGERY

## 2019-09-27 PROCEDURE — C1729 CATH, DRAINAGE: HCPCS | Performed by: COLON & RECTAL SURGERY

## 2019-09-27 PROCEDURE — 85014 HEMATOCRIT: CPT

## 2019-09-27 PROCEDURE — 76040000019: Performed by: COLON & RECTAL SURGERY

## 2019-09-27 PROCEDURE — 77030008565 HC TBNG SUC IRR ERBE -B: Performed by: COLON & RECTAL SURGERY

## 2019-09-27 PROCEDURE — 74011250636 HC RX REV CODE- 250/636: Performed by: NURSE ANESTHETIST, CERTIFIED REGISTERED

## 2019-09-27 PROCEDURE — 88305 TISSUE EXAM BY PATHOLOGIST: CPT

## 2019-09-27 PROCEDURE — 74011000258 HC RX REV CODE- 258

## 2019-09-27 RX ORDER — LIDOCAINE HYDROCHLORIDE 10 MG/ML
0.1 INJECTION, SOLUTION EPIDURAL; INFILTRATION; INTRACAUDAL; PERINEURAL AS NEEDED
Status: DISCONTINUED | OUTPATIENT
Start: 2019-09-27 | End: 2019-09-27 | Stop reason: HOSPADM

## 2019-09-27 RX ORDER — DEXTROSE 50 % IN WATER (D50W) INTRAVENOUS SYRINGE
25-50 AS NEEDED
Status: DISCONTINUED | OUTPATIENT
Start: 2019-09-27 | End: 2019-09-27 | Stop reason: HOSPADM

## 2019-09-27 RX ORDER — MAGNESIUM SULFATE 100 %
4 CRYSTALS MISCELLANEOUS AS NEEDED
Status: DISCONTINUED | OUTPATIENT
Start: 2019-09-27 | End: 2019-09-27 | Stop reason: HOSPADM

## 2019-09-27 RX ORDER — SODIUM CHLORIDE 0.9 % (FLUSH) 0.9 %
5-40 SYRINGE (ML) INJECTION EVERY 8 HOURS
Status: DISCONTINUED | OUTPATIENT
Start: 2019-09-27 | End: 2019-09-27 | Stop reason: HOSPADM

## 2019-09-27 RX ORDER — FAMOTIDINE 20 MG/1
20 TABLET, FILM COATED ORAL ONCE
Status: COMPLETED | OUTPATIENT
Start: 2019-09-27 | End: 2019-09-27

## 2019-09-27 RX ORDER — ONDANSETRON 2 MG/ML
4 INJECTION INTRAMUSCULAR; INTRAVENOUS ONCE
Status: DISCONTINUED | OUTPATIENT
Start: 2019-09-27 | End: 2019-09-27 | Stop reason: HOSPADM

## 2019-09-27 RX ORDER — SODIUM CHLORIDE 0.9 % (FLUSH) 0.9 %
5-40 SYRINGE (ML) INJECTION AS NEEDED
Status: DISCONTINUED | OUTPATIENT
Start: 2019-09-27 | End: 2019-09-27 | Stop reason: HOSPADM

## 2019-09-27 RX ORDER — PROPOFOL 10 MG/ML
INJECTION, EMULSION INTRAVENOUS AS NEEDED
Status: DISCONTINUED | OUTPATIENT
Start: 2019-09-27 | End: 2019-09-27 | Stop reason: HOSPADM

## 2019-09-27 RX ORDER — LABETALOL HCL 20 MG/4 ML
5 SYRINGE (ML) INTRAVENOUS AS NEEDED
Status: DISCONTINUED | OUTPATIENT
Start: 2019-09-27 | End: 2019-09-27 | Stop reason: HOSPADM

## 2019-09-27 RX ORDER — SODIUM CHLORIDE 9 MG/ML
25 INJECTION, SOLUTION INTRAVENOUS CONTINUOUS
Status: DISCONTINUED | OUTPATIENT
Start: 2019-09-27 | End: 2019-09-27 | Stop reason: HOSPADM

## 2019-09-27 RX ORDER — LIDOCAINE HYDROCHLORIDE 20 MG/ML
INJECTION, SOLUTION EPIDURAL; INFILTRATION; INTRACAUDAL; PERINEURAL AS NEEDED
Status: DISCONTINUED | OUTPATIENT
Start: 2019-09-27 | End: 2019-09-27 | Stop reason: HOSPADM

## 2019-09-27 RX ORDER — FENTANYL CITRATE 50 UG/ML
50 INJECTION, SOLUTION INTRAMUSCULAR; INTRAVENOUS AS NEEDED
Status: DISCONTINUED | OUTPATIENT
Start: 2019-09-27 | End: 2019-09-27 | Stop reason: HOSPADM

## 2019-09-27 RX ADMIN — LIDOCAINE HYDROCHLORIDE 40 MG: 20 INJECTION, SOLUTION EPIDURAL; INFILTRATION; INTRACAUDAL; PERINEURAL at 09:06

## 2019-09-27 RX ADMIN — PROPOFOL 50 MG: 10 INJECTION, EMULSION INTRAVENOUS at 09:16

## 2019-09-27 RX ADMIN — FAMOTIDINE 20 MG: 20 TABLET ORAL at 07:28

## 2019-09-27 RX ADMIN — PROPOFOL 50 MG: 10 INJECTION, EMULSION INTRAVENOUS at 09:11

## 2019-09-27 RX ADMIN — PROPOFOL 60 MG: 10 INJECTION, EMULSION INTRAVENOUS at 09:06

## 2019-09-27 RX ADMIN — PROPOFOL 30 MG: 10 INJECTION, EMULSION INTRAVENOUS at 09:26

## 2019-09-27 RX ADMIN — Medication 10 ML: at 09:51

## 2019-09-27 RX ADMIN — LABETALOL 20 MG/4 ML (5 MG/ML) INTRAVENOUS SYRINGE 5 MG: at 09:47

## 2019-09-27 RX ADMIN — SODIUM CHLORIDE 25 ML/HR: 900 INJECTION, SOLUTION INTRAVENOUS at 07:28

## 2019-09-27 NOTE — ANESTHESIA PREPROCEDURE EVALUATION
Relevant Problems   No relevant active problems       Anesthetic History               Review of Systems / Medical History  Patient summary reviewed and pertinent labs reviewed    Pulmonary        Sleep apnea: CPAP           Neuro/Psych              Cardiovascular    Hypertension: poorly controlled      CHF        Exercise tolerance: >4 METS     GI/Hepatic/Renal         Renal disease: ESRD and dialysis       Endo/Other  Within defined limits           Other Findings              Physical Exam    Airway  Mallampati: III  TM Distance: 4 - 6 cm  Neck ROM: decreased range of motion   Mouth opening: Normal     Cardiovascular    Rhythm: regular  Rate: normal         Dental    Dentition: Poor dentition     Pulmonary  Breath sounds clear to auscultation               Abdominal  GI exam deferred       Other Findings            Anesthetic Plan    ASA: 3  Anesthesia type: MAC            Anesthetic plan and risks discussed with: Patient

## 2019-09-27 NOTE — DISCHARGE INSTRUCTIONS
Patient Education        Colonoscopy: What to Expect at Home  Your Recovery  After you have a colonoscopy, you will stay at the clinic for 1 to 2 hours until the medicines wear off. Then you can go home. But you will need to arrange for a ride. Your doctor will tell you when you can eat and do your other usual activities. Your doctor will talk to you about when you will need your next colonoscopy. Your doctor can help you decide how often you need to be checked. This will depend on the results of your test and your risk for colorectal cancer. After the test, you may be bloated or have gas pains. You may need to pass gas. If a biopsy was done or a polyp was removed, you may have streaks of blood in your stool (feces) for a few days. Problems such as heavy rectal bleeding may not occur until several weeks after the test. This isn't common. But it can happen after polyps are removed. This care sheet gives you a general idea about how long it will take for you to recover. But each person recovers at a different pace. Follow the steps below to get better as quickly as possible. How can you care for yourself at home? Activity    · Rest when you feel tired.     · You can do your normal activities when it feels okay to do so. Diet    · Follow your doctor's directions for eating.     · Unless your doctor has told you not to, drink plenty of fluids. This helps to replace the fluids that were lost during the colon prep.     · Do not drink alcohol. Medicines    · Your doctor will tell you if and when you can restart your medicines. He or she will also give you instructions about taking any new medicines.     · If you take blood thinners, such as warfarin (Coumadin), clopidogrel (Plavix), or aspirin, be sure to talk to your doctor. He or she will tell you if and when to start taking those medicines again.  Make sure that you understand exactly what your doctor wants you to do.     · If polyps were removed or a biopsy was done during the test, your doctor may tell you not to take aspirin or other anti-inflammatory medicines for a few days. These include ibuprofen (Advil, Motrin) and naproxen (Aleve). Other instructions    · For your safety, do not drive or operate machinery until the medicine wears off and you can think clearly. Your doctor may tell you not to drive or operate machinery until the day after your test.     · Do not sign legal documents or make major decisions until the medicine wears off and you can think clearly. The anesthesia can make it hard for you to fully understand what you are agreeing to. Follow-up care is a key part of your treatment and safety. Be sure to make and go to all appointments, and call your doctor if you are having problems. It's also a good idea to know your test results and keep a list of the medicines you take. When should you call for help? Call 911 anytime you think you may need emergency care. For example, call if:    · You passed out (lost consciousness).     · You pass maroon or bloody stools.     · You have trouble breathing.    Call your doctor now or seek immediate medical care if:    · You have pain that does not get better after you take pain medicine.     · You are sick to your stomach or cannot drink fluids.     · You have new or worse belly pain.     · You have blood in your stools.     · You have a fever.     · You cannot pass stools or gas.    Watch closely for changes in your health, and be sure to contact your doctor if you have any problems. Where can you learn more? Go to http://jaylen-alley.info/. Enter E264 in the search box to learn more about \"Colonoscopy: What to Expect at Home. \"  Current as of: December 19, 2018  Content Version: 12.2  © 1594-7338 [employer]. Care instructions adapted under license by [employer] (which disclaims liability or warranty for this information).  If you have questions about a medical condition or this instruction, always ask your healthcare professional. [address] any warranty or liability for your use of this information. DISCHARGE SUMMARY from Nurse    PATIENT INSTRUCTIONS:    After general anesthesia or intravenous sedation, for 24 hours or while taking prescription Narcotics:  · Limit your activities  · Do not drive and operate hazardous machinery  · Do not make important personal or business decisions  · Do  not drink alcoholic beverages  · If you have not urinated within 8 hours after discharge, please contact your surgeon on call. Report the following to your surgeon:  · Excessive pain, swelling, redness or odor of or around the surgical area  · Temperature over 100.5  · Nausea and vomiting lasting longer than 4 hours or if unable to take medications  · Any signs of decreased circulation or nerve impairment to extremity: change in color, persistent  numbness, tingling, coldness or increase pain  · Any questions    What to do at Home:  Recommended activity: Activity as tolerated and no driving for today. *  Please give a list of your current medications to your Primary Care Provider. *  Please update this list whenever your medications are discontinued, doses are      changed, or new medications (including over-the-counter products) are added. *  Please carry medication information at all times in case of emergency situations. These are general instructions for a healthy lifestyle:    No smoking/ No tobacco products/ Avoid exposure to second hand smoke  Surgeon General's Warning:  Quitting smoking now greatly reduces serious risk to your health.     Obesity, smoking, and sedentary lifestyle greatly increases your risk for illness    A healthy diet, regular physical exercise & weight monitoring are important for maintaining a healthy lifestyle    You may be retaining fluid if you have a history of heart failure or if you experience any of the following symptoms:  Weight gain of 3 pounds or more overnight or 5 pounds in a week, increased swelling in our hands or feet or shortness of breath while lying flat in bed. Please call your doctor as soon as you notice any of these symptoms; do not wait until your next office visit. The discharge information has been reviewed with the patient. The patient verbalized understanding.    ___________________________________________________________________________________________________________________________________    Patient Education        Colon Polyps: Care Instructions  Your Care Instructions    Colon polyps are growths in the colon or the rectum. The cause of most colon polyps is not known, and most people who get them do not have any problems. But a certain kind can turn into cancer. For this reason, regular testing for colon polyps is important for people as they get older. It is also important for anyone who has an increased risk for colon cancer. Polyps are usually found through routine colon cancer screening tests. Although most colon polyps are not cancerous, they are usually removed and then tested for cancer. Screening for colon cancer saves lives because the cancer can usually be cured if it is caught early. If you have a polyp that is the type that can turn into cancer, you may need more tests to examine your entire colon. The doctor will remove any other polyps that he or she finds, and you will be tested more often. Follow-up care is a key part of your treatment and safety. Be sure to make and go to all appointments, and call your doctor if you are having problems. It's also a good idea to know your test results and keep a list of the medicines you take. How can you care for yourself at home? Regular exams to look for colon polyps are the best way to prevent polyps from turning into colon cancer. These can include stool tests, sigmoidoscopy, colonoscopy, and CT colonography.  Talk with your doctor about a testing schedule that is right for you. To prevent polyps  There is no home treatment that can prevent colon polyps. But these steps may help lower your risk for cancer. · Stay active. Being active can help you get to and stay at a healthy weight. Try to exercise on most days of the week. Walking is a good choice. · Eat well. Choose a variety of vegetables, fruits, legumes (such as peas and beans), fish, poultry, and whole grains. · Do not smoke. If you need help quitting, talk to your doctor about stop-smoking programs and medicines. These can increase your chances of quitting for good. · If you drink alcohol, limit how much you drink. Limit alcohol to 2 drinks a day for men and 1 drink a day for women. When should you call for help? Call your doctor now or seek immediate medical care if:    · You have severe belly pain.     · Your stools are maroon or very bloody.    Watch closely for changes in your health, and be sure to contact your doctor if:    · You have a fever.     · You have nausea or vomiting.     · You have a change in bowel habits (new constipation or diarrhea).     · Your symptoms get worse or are not improving as expected. Where can you learn more? Go to http://jaylen-alley.info/. Enter 95 [id number] in the search box to learn more about \"Colon Polyps: Care Instructions. \"  Current as of: December 19, 2018  Content Version: 12.2  © 7931-5620 [employer]. Care instructions adapted under license by [id number] (which disclaims liability or warranty for this information). If you have questions about a medical condition or this instruction, always ask your healthcare professional. [address] any warranty or liability for your use of this information.

## 2019-09-27 NOTE — H&P
HPI: Faraz Nurse is a 63 y.o. female presenting with chief complain of history of polyps, [facility] of colorectal cancer. Past Medical History:   Diagnosis Date    Adenomatous polyp     CHF (congestive heart failure) (HCC)     Chronic kidney disease     TT    Dialysis patient ([facility]     tues, th, sat    ESRD (end stage renal disease) ([facility] [address] [facility]     Family hx of colon cancer     Mother    Heart failure ([facility] [address] [facility]     Hypertension     LUIS (obstructive sleep apnea)     no cpap       Past Surgical History:   Procedure Laterality Date    HX APPENDECTOMY      HX  SECTION      HX COLECTOMY  10/11/2016    LAPAROSCOPIC COLECTOMY PARTIAL RIGHT FOR CECAL POLYP, DR. PAEZ, [facility]    HX COLONOSCOPY  2016    colonoscopy, Dr. Lexie Timmons, [address] [place] [address] HX OTHER SURGICAL       ARTERIOVENOUS FISTULA INSERTION LEFT ARM     HX TOTAL ABDOMINAL HYSTERECTOMY      HX TUBAL LIGATION      VASCULAR SURGERY PROCEDURE UNLIST      left arm graft       Family History   Problem Relation Age of Onset    Colon Cancer Mother     Colon Polyps Mother        Social History     Socioeconomic History    Marital status:      Spouse name: Not on file    Number of children: Not on file    Years of education: Not on file    Highest education level: Not on file   Tobacco Use    Smoking status: Former Smoker    Smokeless tobacco: Never Used   Substance and Sexual Activity    Alcohol use: No    Drug use: No    Sexual activity: Yes     Partners: Male       Review of Systems - neg    Outpatient Medications Marked as Taking for the 19 encounter [facility] Encounter)   Medication Sig Dispense Refill    hydrOXYzine pamoate (VISTARIL) 25 mg capsule 25 mg nightly.  carvedilol (COREG) 25 mg tablet Take 25 mg by mouth two (2) times daily (with meals).  NIFEdipine ER (PROCARDIA XL) 90 mg ER tablet Take 90 mg by mouth daily.  lisinopril (PRINIVIL, ZESTRIL) 40 mg tablet Take 40 mg by mouth daily. Indications: Chronic Heart Failure      cloNIDine HCl (CATAPRES) 0.3 mg tablet Take 0.3 mg by mouth three (3) times daily. Indications: hypertension         No Known Allergies    There were no vitals filed for this visit. Physical Exam   Constitutional: She appears well-developed and well-nourished. HENT:   Head: Normocephalic and atraumatic. Eyes: Conjunctivae and EOM are normal.   Abdominal: Soft. She exhibits no distension. There is no tenderness. Musculoskeletal: Normal range of motion. Lymphadenopathy:     She has no cervical adenopathy. Right: No inguinal adenopathy present. Left: No inguinal adenopathy present. Neurological: No sensory deficit. Skin: Skin is warm and dry. No rash noted. Psychiatric: She has a normal mood and affect. Her speech is normal.       Assessment / Plan    colonoscopy    The diagnoses and plan were discussed with the patient. All questions answered. Plan of care agreed to by all concerned.

## 2019-09-27 NOTE — PROCEDURES
[facility]  [address] [facility], [address]   [place], [address]  [phone]                    Colonoscopy Procedure Note      Maria Elena Nettles  1960  [id number]                Date of Procedure: 9/27/2019    Preoperative diagnosis: [facility] of colorectal cancer; history of polyps;    Postoperative diagnosis: sigmoid polyp x 2    :  Jocelynn Whitt MD    Assistant(s): Endoscopy Technician-1: Jeromy Denis  Endoscopy RN-1: Chandan Rosales, NEGRITA; Nina Sidhu, NEGRITA; Nidia [place] C    Sedation: MAC    Complications: None    Implants: None    Procedure Details:  Prior to the procedure, a history and physical were performed. The patients medications, allergies and sensitivities were reviewed and all questions were answered. After informed consent was obtained for the procedure, with all risks and benefits of procedure explained. The patient was taken to the endoscopy suite and placed in the left lateral decubitus position. Patient identification and proposed procedure were verified prior to the procedure by the nurse and I. After sequential anesthesia administered by anesthesiologist, a digital rectal exam was performed and was normal.  The Olympus video colonoscope was introduced through the anus and advanced to surgical anastomosis . The quality of preparation was good. The colonoscope was slowly withdrawn and the mucosa examined for any abnormalities. Cecal withdrawal time was greater than 6 minutes. The patient tolerated the procedure well. There were no complications. Findings/Interventions:   Polyps - #1-2, 5 mm in size, located in the sigmoid, removed by cold biopsy and sent for pathology    EBL: none    Recommendations: -Repeat colonoscopy in 5 years.    NO aspirin for 5 days     Discharge Disposition:  Home  Jocelynn Whitt MD  9/27/2019  9:29 AM

## 2019-09-28 NOTE — ANESTHESIA POSTPROCEDURE EVALUATION
Procedure(s):  COLONOSCOPY with polyp bx. MAC    Anesthesia Post Evaluation      Multimodal analgesia: multimodal analgesia used between 6 hours prior to anesthesia start to PACU discharge  Patient location during evaluation: bedside  Patient participation: complete - patient participated  Level of consciousness: awake  Pain management: adequate  Airway patency: patent  Anesthetic complications: no  Cardiovascular status: stable  Respiratory status: acceptable  Hydration status: acceptable  Post anesthesia nausea and vomiting:  controlled      Vitals Value Taken Time   /91 9/27/2019  9:54 AM   Temp 36.7 °C (98.1 °F) 9/27/2019  9:35 AM   Pulse 64 9/27/2019  9:56 AM   Resp 21 9/27/2019  9:56 AM   SpO2 100 % 9/27/2019  9:56 AM   Vitals shown include unvalidated device data.

## 2019-10-04 ENCOUNTER — TELEPHONE (OUTPATIENT)
Dept: SURGERY | Age: 59
End: 2019-10-04

## 2019-10-04 NOTE — TELEPHONE ENCOUNTER
----- Message from Thor Lakhani MD sent at 9/30/2019  3:47 PM EDT -----  Benign polyp(s). Repeat colonoscopy in 5 years as planned. Notified patient of pathology results. [facility] in [place] for 5 years. Patient understands.

## 2019-11-29 ENCOUNTER — HOSPITAL ENCOUNTER (EMERGENCY)
Age: 59
Discharge: HOME | End: 2019-11-29
Attending: EMERGENCY MEDICINE
Payer: MEDICARE

## 2019-11-29 VITALS
SYSTOLIC BLOOD PRESSURE: 166 MMHG | RESPIRATION RATE: 16 BRPM | HEIGHT: 62 IN | DIASTOLIC BLOOD PRESSURE: 105 MMHG | OXYGEN SATURATION: 97 % | WEIGHT: 170 LBS | TEMPERATURE: 99.1 F | BODY MASS INDEX: 31.28 KG/M2 | HEART RATE: 58 BPM

## 2019-11-29 DIAGNOSIS — S60.562A INSECT BITE OF LEFT HAND WITH INFECTION, INITIAL ENCOUNTER: Primary | ICD-10-CM

## 2019-11-29 DIAGNOSIS — L03.114 CELLULITIS OF LEFT HAND: ICD-10-CM

## 2019-11-29 DIAGNOSIS — L08.9 INSECT BITE OF LEFT HAND WITH INFECTION, INITIAL ENCOUNTER: Primary | ICD-10-CM
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
## 2019-11-29 DIAGNOSIS — I10 ELEVATED BLOOD PRESSURE READING WITH DIAGNOSIS OF HYPERTENSION: ICD-10-CM

## 2019-11-29 PROCEDURE — 99282 EMERGENCY DEPT VISIT SF MDM: CPT

## 2019-11-29 RX ORDER — HYDROCORTISONE 0.5 %
OINTMENT (GRAM) TOPICAL 2 TIMES DAILY
Qty: 1 TUBE | Refills: 0 | Status: SHIPPED | OUTPATIENT
Start: 2019-11-29 | End: 2019-12-06

## 2019-11-29 RX ORDER — CEPHALEXIN 500 MG/1
500 CAPSULE ORAL 3 TIMES DAILY
Qty: 21 CAP | Refills: 0 | Status: SHIPPED | OUTPATIENT
Start: 2019-11-29 | End: 2019-12-06

## 2019-11-29 NOTE — ED PROVIDER NOTES
EMERGENCY DEPARTMENT HISTORY AND PHYSICAL EXAM    5:20 PM      Date: 11/29/2019  Patient Name: Dilia Grossman    History of Presenting Illness     Chief Complaint   Patient presents with    Insect Bite         History Provided By: Patient    Additional History (Context): Dilia Grossman is a 63 y.o. female with Past medical history of CHF, hypertension who presents with chief complaint of multiple insect bites or she was in the shower about 2 weeks ago. She could not tell what bit her, but felt the bites on her left hand. She states that the hand is slightly painful and itches. She reports applying some over-the-counter witch hazel and Neosporin to her hand but there is no improvement. She denies any fever, weakness, abdominal pain, nausea, vomiting, numbness of her hand, no other complaint. PCP: Marjan Whaley MD        Past History     Past Medical History:  Past Medical History:   Diagnosis Date    Adenomatous polyp     [facility]     Chronic kidney disease     [facility]    Dialysis patient ([facility]     tues, thurs, sat    ESRD (end stage renal disease) ([facility]     Family hx of colon cancer     Mother    Heart failure ([facility]     Hypertension     LUIS (obstructive sleep apnea)     no cpap       Past Surgical History:  Past Surgical History:   Procedure Laterality Date    COLONOSCOPY N/A 9/27/2019    COLONOSCOPY with polyp bx performed by Rula Stewart MD at [address] HX APPENDECTOMY       [facility]      HX COLECTOMY  10/11/2016    LAPAROSCOPIC COLECTOMY PARTIAL RIGHT FOR Silvana Pickering, DR. PAEZ, [facility]    HX COLONOSCOPY  09/21/2016    colonoscopy, Dr. Eli Golden, [address] [place] [address] HX OTHER SURGICAL       ARTERIOVENOUS FISTULA INSERTION LEFT ARM     HX TOTAL ABDOMINAL HYSTERECTOMY      HX TUBAL LIGATION      VASCULAR SURGERY PROCEDURE UNLIST      left arm graft       Family History:  Family History   Problem Relation Age of Onset    Colon Cancer Mother     Colon Polyps Mother        Social History:  Social History     Tobacco Use    Smoking status: Former Smoker    Smokeless tobacco: Never Used   Substance Use Topics    Alcohol use: No    Drug use: No       Allergies:  No Known Allergies      Review of Systems       Review of Systems   Constitutional: Negative for chills and fever. HENT: Negative for rhinorrhea, sore throat and trouble swallowing. Eyes: Negative for visual disturbance. Respiratory: Negative for cough, shortness of breath and wheezing. Cardiovascular: Negative for chest pain. Gastrointestinal: Negative for abdominal pain, nausea and vomiting. Endocrine: Negative for polyuria. Genitourinary: Negative for dysuria. Musculoskeletal: Negative for arthralgias, joint swelling and neck stiffness. Skin: Negative for pallor and rash. Pain, itching and insect bite on the left hand   Neurological: Negative for dizziness, weakness, numbness and headaches. Hematological: Does not bruise/bleed easily. Psychiatric/Behavioral: Negative for confusion and dysphoric mood. All other systems reviewed and are negative. Physical Exam     Visit Vitals  BP (!) 166/105 (BP 1 Location: Right arm, BP Patient Position: At rest)   Pulse (!) 58   Temp 99.1 °F (37.3 °C)   Resp 16   Ht 5' 2\" (1.575 m)   Wt 77.1 kg (170 lb)   SpO2 97%   BMI 31.09 kg/m²         Physical Exam  Vitals signs and nursing note reviewed. Constitutional:       General: She is not in acute distress. Appearance: Normal appearance. She is well-developed. She is not ill-appearing, toxic-appearing or diaphoretic. HENT:      Head: Normocephalic and atraumatic. Eyes:      General: No scleral icterus. Conjunctiva/sclera: Conjunctivae normal.      Pupils: Pupils are equal, round, and reactive to light. Neck:      Musculoskeletal: Normal range of motion and neck supple. Cardiovascular:      Rate and Rhythm: Normal rate.       Comments: Capillary refill < 3 seconds  Pulmonary:      Effort: Pulmonary effort is normal. No respiratory distress. Breath sounds: Normal breath sounds. No wheezing. Musculoskeletal: Normal range of motion. Comments: No insect bites noted on her legs. She pointed to the area where she thought may have been a insect bite on lower legs but no such wounds noted. Lymphadenopathy:      Cervical: No cervical adenopathy. Skin:     General: Skin is warm and dry. Capillary Refill: Capillary refill takes less than 2 seconds. Comments: 3 insect like bites on the dorsum of her left hand  Some mild erythema at the site  No other discoloration or necrosis noted  Good radial pulses     Neurological:      Mental Status: She is alert and oriented to person, place, and time. Cranial Nerves: No cranial nerve deficit. Sensory: No sensory deficit. Motor: No weakness. Coordination: Coordination normal.           Diagnostic Study Results     Labs -  No results found for this or any previous visit (from the past 12 hour(s)). Radiologic Studies -   No orders to display         Medical Decision Making   I am the first provider for this patient. I reviewed the vital signs, available nursing notes, past medical history, past surgical history, family history and social history. Vital Signs-Reviewed the patient's vital signs. Records Reviewed: Nursing Notes and Old Medical Records (Time of Review: 5:20 PM)    Provider Notes (Medical Decision Making): DDX: Insect bite, possible spider bite      MDM    Medications - No data to display        ED Course: Progress Notes, Reevaluation, and Consults:  I have reassessed the patient. I have discussed the workup, results and plan with the patient and patient is in agreement. Patient will be prescribed hydrocortisone ointment, Keflex. Patient was discharge in stable condition. Patient was given outpatient follow up.   Patient is to return to emergency department if any new or worsening condition. Diagnosis     Clinical Impression:   1. Insect bite of left hand with infection, initial encounter    2. Cellulitis of left hand    3. Elevated blood pressure reading with diagnosis of hypertension        Disposition: Discharged    Follow-up Information     Follow up With Specialties Details Why Contact Info    Earnest Soni MD Family Practice Schedule an appointment as soon as possible for a visit in 3 days And get repeat blood pressure check [address] [facility]      [address] [facility] EMERGENCY DEPT Emergency Medicine  As needed, If symptoms worsen [address] [facility]  [phone]           Patient's Medications   Start Taking    CEPHALEXIN (KEFLEX) 500 MG CAPSULE    Take 1 Cap by mouth three (3) times daily for 7 days. Indications: an infection of the skin and the tissue below the skin    HYDROCORTISONE (CORTIZONE) 0.5 % OINTMENT    Apply  to affected area two (2) times a day for 7 days. use thin layer   Continue Taking    BISACODYL (DULCOLAX, BISACODYL,) 5 MG EC TABLET    Take as directed by prep instructions    CARVEDILOL (COREG) 25 MG TABLET    Take 25 mg by mouth two (2) times daily (with meals). CLONIDINE HCL (CATAPRES) 0.3 MG TABLET    Take 0.3 mg by mouth three (3) times daily. Indications: hypertension    DEXTROMETHORPHAN-GUAIFENESIN (CORICIDIN HBP)  MG CAP    Take 1 Cap by mouth two (2) times daily as needed for Cough or Other (congestion). GUAIFENESIN ER (MUCINEX) 600 MG ER TABLET    Take 1 Tab by mouth two (2) times a day. HYDROXYZINE PAMOATE (VISTARIL) 25 MG CAPSULE    25 mg nightly. LISINOPRIL (PRINIVIL, ZESTRIL) 40 MG TABLET    Take 40 mg by mouth daily. Indications: Chronic Heart Failure    NIFEDIPINE ER (PROCARDIA XL) 90 MG ER TABLET    Take 90 mg by mouth daily.     POLYETHYLENE GLYCOL (MIRALAX) 17 GRAM/DOSE POWDER    Take as directed by office   These Medications have changed    No medications on file   Stop Taking    No medications on file         DO Chaipto Goode medical dictation software was used for portions of this report. Unintended transcription errors may occur. My signature above authenticates this document and my orders, the final    diagnosis (es), discharge prescription (s), and instructions in the Epic    record.

## 2019-11-29 NOTE — DISCHARGE INSTRUCTIONS
If you were prescribed any medication take as directed. Do not drive or use heavy equipment if prescribed narcotics. Follow up with your primary care physician or with specialist as directed. Return to the emergency room with any new or worsening conditions. Cellulitis: Care Instructions  Your Care Instructions    Cellulitis is a skin infection caused by bacteria, most often strep or staph. It often occurs after a break in the skin from a scrape, cut, bite, or puncture, or after a rash. Cellulitis may be treated without doing tests to find out what caused it. But your doctor may do tests, if needed, to look for a specific bacteria, like methicillin-resistant Staphylococcus aureus (MRSA). The doctor has checked you carefully, but problems can develop later. If you notice any problems or new symptoms, get medical treatment right away. Follow-up care is a key part of your treatment and safety. Be sure to make and go to all appointments, and call your doctor if you are having problems. It's also a good idea to know your test results and keep a list of the medicines you take. How can you care for yourself at home? · Take your antibiotics as directed. Do not stop taking them just because you feel better. You need to take the full course of antibiotics. · Prop up the infected area on pillows to reduce pain and swelling. Try to keep the area above the level of your heart as often as you can. · If your doctor told you how to care for your wound, follow your doctor's instructions. If you did not get instructions, follow this general advice:  ? Wash the wound with clean water 2 times a day. Don't use hydrogen peroxide or alcohol, which can slow healing. ? You may cover the wound with a thin layer of petroleum jelly, such as Vaseline, and a nonstick bandage. ? Apply more petroleum jelly and replace the bandage as needed. · Be safe with medicines. Take pain medicines exactly as directed.   ? If the doctor gave you a prescription medicine for pain, take it as prescribed. ? If you are not taking a prescription pain medicine, ask your doctor if you can take an over-the-counter medicine. To prevent cellulitis in the future  · Try to prevent cuts, scrapes, or other injuries to your skin. Cellulitis most often occurs where there is a break in the skin. · If you get a scrape, cut, mild burn, or bite, wash the wound with clean water as soon as you can to help avoid infection. Don't use hydrogen peroxide or alcohol, which can slow healing. · If you have swelling in your legs (edema), support stockings and good skin care may help prevent leg sores and cellulitis. · Take care of your feet, especially if you have diabetes or other conditions that increase the risk of infection. Wear shoes and socks. Do not go barefoot. If you have athlete's foot or other skin problems on your feet, talk to your doctor about how to treat them. When should you call for help? Call your doctor now or seek immediate medical care if:    · You have signs that your infection is getting worse, such as:  ? Increased pain, swelling, warmth, or redness. ? Red streaks leading from the area. ? Pus draining from the area. ? A fever.     · You get a rash.    Watch closely for changes in your health, and be sure to contact your doctor if:    · You do not get better as expected. Where can you learn more? Go to http://jaylen-alley.info/. Romelia Vidales in the search box to learn more about \"Cellulitis: Care Instructions. \"  Current as of: April 1, 2019  Content Version: 12.2  © 2522-9445 [email or web address]. Care instructions adapted under license by [employer] (which disclaims liability or warranty for this information).  If you have questions about a medical condition or this instruction, always ask your healthcare professional. [address] any warranty or liability for your use of this information. Patient Education        Insect Stings and Bites: Care Instructions  Your Care Instructions  Stings and bites from bees, wasps, ants, and other insects often cause pain, swelling, redness, and itching. In some people, especially children, the redness and swelling may be worse. It may extend several inches beyond the affected area. But in most cases, stings and bites don't cause reactions all over the body. If you have had a reaction to an insect sting or bite, you are at risk for a reaction if you get stung or bitten again. Follow-up care is a key part of your treatment and safety. Be sure to make and go to all appointments, and call your doctor if you are having problems. It's also a good idea to know your test results and keep a list of the medicines you take. How can you care for yourself at home? · Do not scratch or rub the skin where the sting or bite occurred. · Put a cold pack or ice cube on the area. Put a thin cloth between the ice and your skin. For some people, a paste of baking soda mixed with a little water helps relieve pain and decrease the reaction. · Take an over-the-counter antihistamine, such as diphenhydramine (Benadryl) or loratadine (Claritin), to relieve swelling, redness, and itching. Calamine lotion or hydrocortisone cream may also help. Do not give antihistamines to your child unless you have checked with the doctor first.  · Be safe with medicines. If your doctor prescribed medicine for your allergy, take it exactly as prescribed. Call your doctor if you think you are having a problem with your medicine. You will get more details on the specific medicines your doctor prescribes. · Your doctor may prescribe a shot of epinephrine to carry with you in case you have a severe reaction. Learn how and when to give yourself the shot, and keep it with you at all times. Make sure it has not . · Go to the emergency room anytime you have a severe reaction.  Go even if you have given yourself epinephrine and are feeling better. Symptoms can come back. When should you call for help? Call 911 anytime you think you may need emergency care. For example, call if:    · You have symptoms of a severe allergic reaction. These may include:  ? Sudden raised, red areas (hives) all over your body. ? Swelling of the throat, mouth, lips, or tongue. ? Trouble breathing. ? Passing out (losing consciousness). Or you may feel very lightheaded or suddenly feel weak, confused, or restless.    Call your doctor now or seek immediate medical care if:    · You have symptoms of an allergic reaction not right at the sting or bite, such as:  ? A rash or small area of hives (raised, red areas on the skin). ? Itching. ? Swelling. ? Belly pain, nausea, or vomiting.     · You have a lot of swelling around the site (such as your entire arm or leg is swollen).     · You have signs of infection, such as:  ? Increased pain, swelling, redness, or warmth around the sting. ? Red streaks leading from the area. ? Pus draining from the sting. ? A fever.    Watch closely for changes in your health, and be sure to contact your doctor if:    · You do not get better as expected. Where can you learn more? Go to http://jaylen-alley.info/. Enter P390 in the search box to learn more about \"Insect Stings and Bites: Care Instructions. \"  Current as of: June 26, 2019  Content Version: 12.2  © 4187-3918 [employer]. Care instructions adapted under license by [employer] (which disclaims liability or warranty for this information). If you have questions about a medical condition or this instruction, always ask your healthcare professional. [place] [address] any warranty or liability for your use of this information.

## 2019-11-29 NOTE — ED TRIAGE NOTES
Patient states possible insect bite to left hand two weeks ago. States bites to hand not resolving with OTC neosporin. States similar bites to right lower leg.

## 2020-01-13 ENCOUNTER — HOSPITAL ENCOUNTER (EMERGENCY)
Age: 60
Discharge: HOME | End: 2020-01-13
Attending: EMERGENCY MEDICINE
Payer: MEDICARE

## 2020-01-13 VITALS
RESPIRATION RATE: 20 BRPM | SYSTOLIC BLOOD PRESSURE: 152 MMHG | BODY MASS INDEX: 30.36 KG/M2 | DIASTOLIC BLOOD PRESSURE: 93 MMHG | HEIGHT: 62 IN | TEMPERATURE: 98.7 F | HEART RATE: 61 BPM | OXYGEN SATURATION: 97 % | WEIGHT: 165 LBS

## 2020-01-13 DIAGNOSIS — K62.5 RECTAL BLEEDING: Primary | ICD-10-CM

## 2020-01-13 PROCEDURE — 99282 EMERGENCY DEPT VISIT SF MDM: CPT

## 2020-01-13 RX ORDER — HYDROCORTISONE ACETATE 25 MG/1
25 SUPPOSITORY RECTAL EVERY 12 HOURS
Qty: 24 EACH | Refills: 0 | Status: SHIPPED | OUTPATIENT
Start: 2020-01-13 | End: 2021-03-13

## 2020-01-13 RX ORDER — DOCUSATE SODIUM 100 MG/1
100 CAPSULE, LIQUID FILLED ORAL 2 TIMES DAILY
Qty: 60 CAP | Refills: 2 | Status: SHIPPED | OUTPATIENT
Start: 2020-01-13 | End: 2020-04-12

## 2020-01-13 NOTE — DISCHARGE INSTRUCTIONS
Patient Education        Rectal Bleeding: Care Instructions  Your Care Instructions    Rectal bleeding in small amounts is common. You may see red spotting on toilet paper or drops of blood in the toilet. Rectal bleeding has many possible causes, from something as minor as hemorrhoids to something as serious as colon cancer. You may need more tests to find the cause of your bleeding. Follow-up care is a key part of your treatment and safety. Be sure to make and go to all appointments, and call your doctor if you are having problems. It's also a good idea to know your test results and keep a list of the medicines you take. How can you care for yourself at home? · Avoid aspirin and other nonsteroidal anti-inflammatory drugs (NSAIDs), such as ibuprofen (Advil, Motrin) and naproxen (Aleve). They can cause you to bleed more. Ask your doctor if you can take acetaminophen (Tylenol). Read and follow all instructions on the label. · Use a stool softener that contains bran or psyllium. You can save money by buying bran or psyllium (available in bulk at most health food stores) and sprinkling it on foods or stirring it into fruit juice. You can also use a product such as Metamucil or Citrucel. · Take your medicines exactly as directed. Call your doctor if you think you are having a problem with your medicine. When should you call for help? Call 911 anytime you think you may need emergency care. For example, call if:    · You passed out (lost consciousness).    Call your doctor now or seek immediate medical care if:    · You have new or worse pain.     · You have new or worse bleeding from the rectum.     · You are dizzy or light-headed, or you feel like you may faint.    Watch closely for changes in your health, and be sure to contact your doctor if:    · You cannot pass stools or gas.     · You do not get better as expected. Where can you learn more? Go to http://jaylen-alley.info/.   Enter F045 in the search box to learn more about \"Rectal Bleeding: Care Instructions. \"  Current as of: November 7, 2018  Content Version: 12.2  © 5050-7810 [employer]. Care instructions adapted under license by [employer] (which disclaims liability or warranty for this information). If you have questions about a medical condition or this instruction, always ask your healthcare professional. [place] [address] any warranty or liability for your use of this information.

## 2020-01-13 NOTE — ED PROVIDER NOTES
EMERGENCY DEPARTMENT HISTORY AND PHYSICAL EXAM    Date: 1/13/2020  Patient Name: Carola Martinez    History of Presenting Illness     Chief Complaint   Patient presents with    Rectal Bleeding         History Provided By: Patient    Chief Complaint: Bright red blood per rectum  Duration: 2 days  Timing: Acute  Location: N/A  Quality: Bright red  Severity: 4 out of 10  Modifying Factors: Worse after having a large stool  Associated Symptoms: none       Additional History (Context): Carola Martinez is a 63 y.o. female with a history of chronic kidney disease, CHF, hypertension who presents today for history as listed above. Patient states she has a known history of hemorrhoids. Patient denies being on any sort of stool softener at this time. Patient has not tried anything for this at home. Patient reports \"it feels like something ripped\". Denies any abdominal pain, nausea or vomiting. PCP: Charisse Hill MD    Current Outpatient Medications   Medication Sig Dispense Refill    phenylephrine-witch hazel (PREPARATION H,PE, WITCH HAZEL,) 0.25-50 % topical gel Apply to rectum as needed for discomfort 51 g 0    docusate sodium (COLACE) 100 mg capsule Take 1 Cap by mouth two (2) times a day for 90 days. 60 Cap 2    hydrocortisone (ANUSOL-HC) 25 mg supp Insert 1 Suppository into rectum every twelve (12) hours. 24 Each 0    bisacodyl (DULCOLAX, BISACODYL,) 5 mg EC tablet Take as directed by prep instructions 4 Tab 0    polyethylene glycol (MIRALAX) 17 gram/dose powder Take as directed by office 255 g 0    hydrOXYzine pamoate (VISTARIL) 25 mg capsule 25 mg nightly.  carvedilol (COREG) 25 mg tablet Take 25 mg by mouth two (2) times daily (with meals).  dextromethorphan-guaiFENesin (CORICIDIN HBP)  mg cap Take 1 Cap by mouth two (2) times daily as needed for Cough or Other (congestion). 20 Cap 0    guaiFENesin ER (MUCINEX) 600 mg ER tablet Take 1 Tab by mouth two (2) times a day.  20 Tab 0    NIFEdipine ER (PROCARDIA XL) 90 mg ER tablet Take 90 mg by mouth daily.  lisinopril (PRINIVIL, ZESTRIL) 40 mg tablet Take 40 mg by mouth daily. Indications: Chronic Heart Failure      cloNIDine HCl (CATAPRES) 0.3 mg tablet Take 0.3 mg by mouth three (3) times daily. Indications: hypertension         Past History     Past Medical History:  Past Medical History:   Diagnosis Date    Adenomatous polyp     [facility]     [facility] kidney [facility]    [facility]     tues, thurs, sat    ESRD (end stage renal disease) ([facility]     Family hx of colon cancer     Mother    [facility]     Hypertension     LUIS (obstructive sleep apnea)     no cpap       Past Surgical History:  Past Surgical History:   Procedure Laterality Date    COLONOSCOPY N/A 9/27/2019    COLONOSCOPY with polyp bx performed by Ronda Mcmillan MD at [address] HX APPENDECTOMY       [facility]      HX COLECTOMY  10/11/2016    LAPAROSCOPIC COLECTOMY PARTIAL RIGHT FOR CECAL POLYP, DR. PAEZ, [facility]    HX COLONOSCOPY  09/21/2016    colonoscopy, Dr. Mia Cuevas, [address] [place] [address] HX OTHER SURGICAL       ARTERIOVENOUS FISTULA INSERTION LEFT ARM     HX TOTAL ABDOMINAL HYSTERECTOMY      HX TUBAL LIGATION      VASCULAR SURGERY PROCEDURE UNLIST      left arm graft       Family History:  Family History   Problem Relation Age of Onset    Colon Cancer Mother     Colon Polyps Mother        Social History:  Social History     Tobacco Use    Smoking status: Former Smoker    Smokeless tobacco: Never Used   Substance Use Topics    Alcohol use: No    Drug use: No       Allergies:  No Known Allergies      Review of Systems   Review of Systems   Constitutional: Negative for chills and fever. HENT: Negative for congestion, rhinorrhea and sore throat. Respiratory: Negative for cough and shortness of breath. Cardiovascular: Negative for chest pain.    Gastrointestinal: Positive for blood in stool. Negative for abdominal pain, constipation, diarrhea, nausea and vomiting. Genitourinary: Negative for dysuria, frequency and hematuria. Musculoskeletal: Negative for back pain and myalgias. Skin: Negative for rash and wound. Neurological: Negative for dizziness and headaches. All other systems reviewed and are negative. All Other Systems Negative  Physical Exam     Vitals:    01/13/20 1728   BP: (!) 152/93   Pulse: 61   Resp: 20   Temp: 98.7 °F (37.1 °C)   SpO2: 97%   Weight: 74.8 kg (165 lb)   Height: 5' 2\" (1.575 m)     Physical Exam  Vitals signs and nursing note reviewed. Constitutional:       General: She is not in acute distress. Appearance: She is well-developed. She is not diaphoretic. HENT:      Head: Normocephalic and atraumatic. Eyes:      Conjunctiva/sclera: Conjunctivae normal.   Neck:      Musculoskeletal: Normal range of motion and neck supple. Cardiovascular:      Rate and Rhythm: Normal rate and regular rhythm. Heart sounds: Normal heart sounds. Pulmonary:      Effort: Pulmonary effort is normal. No respiratory distress. Breath sounds: Normal breath sounds. Chest:      Chest wall: No tenderness. Abdominal:      General: Bowel sounds are normal. There is no distension. Palpations: Abdomen is soft. Tenderness: There is no tenderness. There is no guarding or rebound. Genitourinary:     Rectum: External hemorrhoid present. No tenderness. Normal anal tone. Comments: External non thrombosed or tender hemorrhoid noted at 6 and 7:00, no obvious bleeding on exam  Musculoskeletal:         General: No deformity. Skin:     General: Skin is warm and dry. Neurological:      Mental Status: She is alert and oriented to person, place, and time. Deep Tendon Reflexes: Reflexes are normal and symmetric.            Diagnostic Study Results     Labs -   No results found for this or any previous visit (from the past 12 hour(s)). Radiologic Studies -   No orders to display     CT Results  (Last 48 hours)    None        CXR Results  (Last 48 hours)    None            Medical Decision Making   I am the first provider for this patient. I reviewed the vital signs, available nursing notes, past medical history, past surgical history, family history and social history. Vital Signs-Reviewed the patient's vital signs. Records Reviewed: Nursing Notes and Old Medical Records     Procedures: None   Procedures    Provider Notes (Medical Decision Making):     Differential Diagnosis:hemorrhoids, anal fissures, colon polyps, rectal ulcers, IBD, infectious diarrhea, coagulopathy, malignancy    Plan: History and physical exam consistent with hemorrhoids. Will discharge home with Preparation H and Anusol. Have stressed the importance of follow-up with PCP. We will also discharge home with trial of stool softeners and have stressed the importance of good hydration and fiber rich foods. Patient agrees with the plan and management and states all questions have been thoroughly answered and there are no more remaining questions. MED RECONCILIATION:  No current facility-administered medications for this encounter. Current Outpatient Medications   Medication Sig    phenylephrine-witch hazel (PREPARATION H,PE, WITCH HAZEL,) 0.25-50 % topical gel Apply to rectum as needed for discomfort    docusate sodium (COLACE) 100 mg capsule Take 1 Cap by mouth two (2) times a day for 90 days.  hydrocortisone (ANUSOL-HC) 25 mg supp Insert 1 Suppository into rectum every twelve (12) hours.  bisacodyl (DULCOLAX, BISACODYL,) 5 mg EC tablet Take as directed by prep instructions    polyethylene glycol (MIRALAX) 17 gram/dose powder Take as directed by office    hydrOXYzine pamoate (VISTARIL) 25 mg capsule 25 mg nightly.  carvedilol (COREG) 25 mg tablet Take 25 mg by mouth two (2) times daily (with meals).     dextromethorphan-guaiFENesin (CORICIDIN HBP)  mg cap Take 1 Cap by mouth two (2) times daily as needed for Cough or Other (congestion).  guaiFENesin ER (MUCINEX) 600 mg ER tablet Take 1 Tab by mouth two (2) times a day.  NIFEdipine ER (PROCARDIA XL) 90 mg ER tablet Take 90 mg by mouth daily.  lisinopril (PRINIVIL, ZESTRIL) 40 mg tablet Take 40 mg by mouth daily. Indications: Chronic Heart Failure    cloNIDine HCl (CATAPRES) 0.3 mg tablet Take 0.3 mg by mouth three (3) times daily. Indications: hypertension       Disposition:  Home     DISCHARGE NOTE:   Pt has been reexamined. Patient has no new complaints, changes, or physical findings. Care plan outlined and precautions discussed. Results of workup were reviewed with the patient. All medications were reviewed with the patient. All of pt's questions and concerns were addressed. Patient was instructed and agrees to follow up with PCP as well as to return to the ED upon further deterioration. Patient is ready to go home. Follow-up Information     Follow up With Specialties Details Why Contact Info    Brenda Tejada MD [facility] In 1 week  [address] [facility] [id number] [phone] [address] EMERGENCY DEPT Emergency Medicine  As needed [address] [facility]  [phone]          Current Discharge Medication List      START taking these medications    Details   phenylephrine-witch hazel (PREPARATION H,PE, WITCH HAZEL,) 0.25-50 % topical gel Apply to rectum as needed for discomfort  Qty: 51 g, Refills: 0      docusate sodium (COLACE) 100 mg capsule Take 1 Cap by mouth two (2) times a day for 90 days. Qty: 60 Cap, Refills: 2      hydrocortisone (ANUSOL-HC) 25 mg supp Insert 1 Suppository into rectum every twelve (12) hours. Qty: 24 Each, Refills: 0                 Diagnosis     Clinical Impression:   1.  Rectal bleeding          \"Please note that this dictation was completed with "[employer] the computer voice recognition software. Quite often unanticipated grammatical, syntax, homophones, and other interpretive errors are inadvertently transcribed by the computer software. Please disregard these errors. Please excuse any errors that have escaped final proofreading. \"

## 2020-01-20 ENCOUNTER — HOSPITAL ENCOUNTER (OUTPATIENT)
Dept: ULTRASOUND IMAGING | Age: 60
Discharge: HOME | End: 2020-01-20
Payer: MEDICARE

## 2020-01-20 DIAGNOSIS — R19.00 PELVIC MASS IN FEMALE: ICD-10-CM

## 2020-01-20 PROCEDURE — 76856 US EXAM PELVIC COMPLETE: CPT

## 2020-03-06 ENCOUNTER — HOSPITAL ENCOUNTER (OUTPATIENT)
Dept: CT IMAGING | Age: 60
Discharge: HOME | End: 2020-03-06
Payer: MEDICARE

## 2020-03-06 DIAGNOSIS — R19.00 PELVIC MASS IN FEMALE: ICD-10-CM

## 2020-03-06 PROCEDURE — 74176 CT ABD & PELVIS W/O CONTRAST: CPT

## 2020-05-23 ENCOUNTER — HOSPITAL ENCOUNTER (EMERGENCY)
Age: 60
Discharge: HOME | End: 2020-05-23
Attending: EMERGENCY MEDICINE
Payer: MEDICARE

## 2020-05-23 VITALS
BODY MASS INDEX: 32.2 KG/M2 | OXYGEN SATURATION: 98 % | WEIGHT: 175 LBS | DIASTOLIC BLOOD PRESSURE: 83 MMHG | RESPIRATION RATE: 18 BRPM | HEART RATE: 61 BPM | HEIGHT: 62 IN | SYSTOLIC BLOOD PRESSURE: 124 MMHG | TEMPERATURE: 98.7 F

## 2020-05-23 DIAGNOSIS — Z87.19 STATUS POST HERNIA REPAIR: ICD-10-CM

## 2020-05-23 DIAGNOSIS — N18.6 ESRD (END STAGE RENAL DISEASE) (HCC): ICD-10-CM

## 2020-05-23 DIAGNOSIS — J02.9 ACUTE VIRAL PHARYNGITIS: ICD-10-CM

## 2020-05-23 DIAGNOSIS — G89.18 POST-OP PAIN: Primary | ICD-10-CM

## 2020-05-23 DIAGNOSIS — Z98.890 STATUS POST HERNIA REPAIR: ICD-10-CM

## 2020-05-23 LAB — DEPRECATED S PYO AG THROAT QL EIA: NEGATIVE

## 2020-05-23 PROCEDURE — 99282 EMERGENCY DEPT VISIT SF MDM: CPT

## 2020-05-23 PROCEDURE — 87070 CULTURE OTHR SPECIMN AEROBIC: CPT

## 2020-05-23 PROCEDURE — 87880 STREP A ASSAY W/OPTIC: CPT

## 2020-05-23 RX ORDER — IBUPROFEN 800 MG/1
800 TABLET ORAL
Qty: 30 TAB | Refills: 0 | Status: SHIPPED | OUTPATIENT
Start: 2020-05-23 | End: 2021-03-13

## 2020-05-23 NOTE — DISCHARGE INSTRUCTIONS
1.  Strep screen was negative. Sore throat is likely due to a virus. This does not require antibiotics. 2.  Salt water gargles for symptomatic relief. May try Cepacol available over-the-counter for same. 3.  Ibuprofen 800 mg 3 times a day as needed for pain. 4.  Follow-up with your general surgeon for ongoing pain management issues and recheck. 5.  Return to the emergency department for high fever, severe pain, repetitive vomiting, or other acutely worsening symptoms. 6.  Follow-up with your primary care physician for recheck of lingering symptoms in 5 to 7 days.

## 2020-05-23 NOTE — ED PROVIDER NOTES
69-year-old female history of hypertension, congestive heart failure, end-stage renal disease on dialysis, status post recent ventral hernia repair presents for evaluation of several complaints. She notes sore throat for the last 3 days without congestion, cough, or fever. She also notes ongoing postop pain. She received prescription for Percocet on 5/14 and subsequent prescriptions for tramadol on 5/15 and 5/18. Currently out of those medications but still having ongoing issues of discomfort. Past Medical History:   Diagnosis Date    Adenomatous polyp     [facility]     Chronic kidney disease     [facility]    D[facility] ([facility]     tues, thurs, sat    ESRD (end stage renal disease) ([facility]     Family hx of colon cancer     Mother    [facility] failure ([facility]     Hypertension     LUIS (obstructive sleep apnea)     no cpap       Past Surgical History:   Procedure Laterality Date    COLONOSCOPY N/A 9/27/2019    COLONOSCOPY with polyp bx performed by Teresa Higgins MD at [address] HX APPENDECTOMY       [facility]      HX COLECTOMY  10/11/2016    LAPAROSCOPIC COLECTOMY PARTIAL RIGHT FOR Delmer Bond, DR. PAEZ, [facility]    HX COLONOSCOPY  09/21/2016    colonoscopy, Dr. Noble Cerna, [address] [facility] [address]  05/2020    HX OTHER SURGICAL       ARTERIOVENOUS FISTULA INSERTION LEFT ARM     HX TOTAL ABDOMINAL HYSTERECTOMY      HX TUBAL LIGATION      VASCULAR SURGERY PROCEDURE UNLIST      left arm graft         Family History:   Problem Relation Age of Onset    Colon Cancer Mother     Colon Polyps Mother        Social History     Socioeconomic History    Marital status:      Spouse name: Not on file    Number of children: Not on file    Years of education: Not on file    Highest education level: Not on file   Occupational History    Not on file   Social Needs    Financial resource strain: Not on file    Food insecurity     Worry: Not on file     Inability: Not on file    Transportation needs     Medical: Not on file     Non-medical: Not on file   Tobacco Use    Smoking status: Former Smoker    Smokeless tobacco: Never Used   Substance and Sexual Activity    Alcohol use: No    Drug use: No    Sexual activity: Yes     Partners: Male   Lifestyle    Physical activity     Days per week: Not on file     Minutes per session: Not on file    Stress: Not on file   Relationships    Social connections     Talks on phone: Not on file     Gets together: Not on file     Attends Protestant service: Not on file     Active member of club or organization: Not on file     Attends meetings of clubs or organizations: Not on file     Relationship status: Not on file    Intimate partner violence     Fear of current or ex partner: Not on file     Emotionally abused: Not on file     Physically abused: Not on file     Forced sexual activity: Not on file   Other Topics Concern    Not on file   Social History Narrative    Not on file         ALLERGIES: Patient has no known allergies. Review of Systems   Constitutional: Negative for fever. HENT: Positive for sore throat. Negative for congestion and sneezing. Respiratory: Negative for cough. Gastrointestinal: Positive for abdominal pain (post op). All other systems reviewed and are negative. Vitals:    05/23/20 1147   BP: 124/83   Pulse: 61   Resp: 18   Temp: 98.7 °F (37.1 °C)   SpO2: 98%   Weight: 79.4 kg (175 lb)   Height: 5' 2\" (1.575 m)            Physical Exam  Vitals signs and nursing note reviewed. Constitutional:       General: She is not in acute distress. Appearance: She is well-developed. HENT:      Head: Normocephalic and atraumatic. Mouth/Throat:      Comments: Mild posterior pharyngeal injection without exudate. Uvula symmetric and midline. Normal phonation  Eyes:      General: No scleral icterus. Cardiovascular:      Rate and Rhythm: Normal rate.    Pulmonary: Effort: Pulmonary effort is normal.   Abdominal:      Comments: Healing infraumbilical incision. Small nodule palpable just above the umbilicus. Nontender. Location of pain is in the left lower region of the abdomen without palpable mass. No erythema of the skin. Skin:     General: Skin is warm and dry. Neurological:      Mental Status: She is alert and oriented to person, place, and time. Recent Results (from the past 12 hour(s))   STREP AG SCREEN, GROUP A    Collection Time: 05/23/20 12:00 PM   Result Value Ref Range    Group A Strep Ag ID Negative         MDM  Number of Diagnoses or Management Options  Acute viral pharyngitis:   ESRD (end stage renal disease) ([facility]):   Post-op pain:   Status post hernia repair:   Diagnosis management comments: Impression: 1. Sore throat consistent with viral source. Patient is afebrile, has no exudate, and has negative strep screen. No indication for antibiotics currently. 2.  Postoperative pain. At this point no indication for ongoing opioid use. Patient states that ibuprofen is often effective for her pain. She is issued a prescription for same. No findings to suggest abscess or other significant complication related to prior surgery. Procedures      Diagnosis:   1. Post-op pain    2. Status post hernia repair    3. Acute viral pharyngitis    4. ESRD (end stage renal disease) ([facility])      1.  Strep screen was negative. Sore throat is likely due to a virus. This does not require antibiotics. 2.  Salt water gargles for symptomatic relief. May try Cepacol available over-the-counter for same. 3.  Ibuprofen 800 mg 3 times a day as needed for pain. 4.  Follow-up with your general surgeon for ongoing pain management issues and recheck. 5.  Return to the emergency department for high fever, severe pain, repetitive vomiting, or other acutely worsening symptoms.   6.  Follow-up with your primary care physician for recheck of lingering symptoms in 5 to 7 days. Disposition: home    Follow-up Information     Follow up With Specialties Details Why Contact Info    Lori Marquez MD Family Practice  As needed, for recheck of ongoing symptoms [address] [facility]      Leon Frye MD Surgery  for hernia repair recheck Cleopatra Joseph [address] [facility]  [phone]            Patient's Medications   Start Taking    IBUPROFEN (MOTRIN) 800 MG TABLET    Take 1 Tab by mouth every eight (8) hours as needed for Pain (with food). Continue Taking    BISACODYL (DULCOLAX, BISACODYL,) 5 MG EC TABLET    Take as directed by prep instructions    CARVEDILOL (COREG) 25 MG TABLET    Take 25 mg by mouth two (2) times daily (with meals). CLONIDINE HCL (CATAPRES) 0.3 MG TABLET    Take 0.3 mg by mouth three (3) times daily. Indications: hypertension    HYDROCORTISONE (ANUSOL-HC) 25 MG SUPP    Insert 1 Suppository into rectum every twelve (12) hours. HYDROXYZINE PAMOATE (VISTARIL) 25 MG CAPSULE    25 mg nightly. NIFEDIPINE ER (PROCARDIA XL) 90 MG ER TABLET    Take 90 mg by mouth daily. PHENYLEPHRINE-WITCH HAZEL (PREPARATION H,PE, WITCH HAZEL,) 0.25-50 % TOPICAL GEL    Apply to rectum as needed for discomfort    POLYETHYLENE GLYCOL (MIRALAX) 17 GRAM/DOSE POWDER    Take as directed by office   These Medications have changed    No medications on file   Stop Taking    DEXTROMETHORPHAN-GUAIFENESIN (CORICIDIN HBP)  MG CAP    Take 1 Cap by mouth two (2) times daily as needed for Cough or Other (congestion). GUAIFENESIN ER (MUCINEX) 600 MG ER TABLET    Take 1 Tab by mouth two (2) times a day. LISINOPRIL (PRINIVIL, ZESTRIL) 40 MG TABLET    Take 40 mg by mouth daily.  Indications: Chronic Heart Failure

## 2020-05-23 NOTE — ED TRIAGE NOTES
Patient presents with request for pain medication related to hernia surgery that she had 1.5 weeks ago. Patient states trying motrin without relief. Patient c/o sore throat x 3 days.

## 2020-05-25 LAB
BACTERIA SPEC CULT: NORMAL
SERVICE CMNT-IMP: NORMAL

## 2020-06-28 ENCOUNTER — HOSPITAL ENCOUNTER (EMERGENCY)
Age: 60
Discharge: HOME | End: 2020-06-28
Attending: EMERGENCY MEDICINE
Payer: MEDICARE

## 2020-06-28 ENCOUNTER — APPOINTMENT (OUTPATIENT)
Dept: GENERAL RADIOLOGY | Age: 60
End: 2020-06-28
Attending: EMERGENCY MEDICINE
Payer: MEDICARE

## 2020-06-28 VITALS
HEART RATE: 65 BPM | BODY MASS INDEX: 30.28 KG/M2 | WEIGHT: 165.57 LBS | OXYGEN SATURATION: 98 % | TEMPERATURE: 98.7 F | DIASTOLIC BLOOD PRESSURE: 100 MMHG | RESPIRATION RATE: 16 BRPM | SYSTOLIC BLOOD PRESSURE: 146 MMHG

## 2020-06-28 DIAGNOSIS — R07.9 CHEST PAIN, UNSPECIFIED TYPE: Primary | ICD-10-CM

## 2020-06-28 LAB
ALBUMIN SERPL-MCNC: 2.8 G/DL (ref 3.4–5)
ALBUMIN/GLOB SERPL: 0.6 {RATIO} (ref 0.8–1.7)
ALP SERPL-CCNC: 67 U/L (ref 45–117)
ALT SERPL-CCNC: 12 U/L (ref 13–56)
ANION GAP SERPL CALC-SCNC: 9 MMOL/L (ref 3–18)
AST SERPL-CCNC: 31 U/L (ref 10–38)
BASOPHILS # BLD: 0.1 K/UL (ref 0–0.1)
BASOPHILS NFR BLD: 1 % (ref 0–2)
BILIRUB SERPL-MCNC: 0.3 MG/DL (ref 0.2–1)
BUN SERPL-MCNC: 41 MG/DL (ref 7–18)
BUN/CREAT SERPL: 5 (ref 12–20)
CALCIUM SERPL-MCNC: 8.6 MG/DL (ref 8.5–10.1)
CHLORIDE SERPL-SCNC: 102 MMOL/L (ref 100–111)
CK MB CFR SERPL CALC: NORMAL % (ref 0–4)
CK MB SERPL-MCNC: <1 NG/ML (ref 5–25)
CK SERPL-CCNC: 74 U/L (ref 26–192)
CO2 SERPL-SCNC: 24 MMOL/L (ref 21–32)
CREAT SERPL-MCNC: 8.13 MG/DL (ref 0.6–1.3)
DIFFERENTIAL METHOD BLD: ABNORMAL
EOSINOPHIL # BLD: 0.3 K/UL (ref 0–0.4)
EOSINOPHIL NFR BLD: 4 % (ref 0–5)
ERYTHROCYTE [DISTWIDTH] IN BLOOD BY AUTOMATED COUNT: 16.1 % (ref 11.6–14.5)
GLOBULIN SER CALC-MCNC: 4.8 G/DL (ref 2–4)
GLUCOSE SERPL-MCNC: 116 MG/DL (ref 74–99)
HCT VFR BLD AUTO: 34.4 % (ref 35–45)
HGB BLD-MCNC: 10.4 G/DL (ref 12–16)
LYMPHOCYTES # BLD: 1.6 K/UL (ref 0.9–3.6)
LYMPHOCYTES NFR BLD: 23 % (ref 21–52)
MCH RBC QN AUTO: 30.6 PG (ref 24–34)
MCHC RBC AUTO-ENTMCNC: 30.2 G/DL (ref 31–37)
MCV RBC AUTO: 101.2 FL (ref 74–97)
MONOCYTES # BLD: 0.9 K/UL (ref 0.05–1.2)
MONOCYTES NFR BLD: 12 % (ref 3–10)
NEUTS SEG # BLD: 4.4 K/UL (ref 1.8–8)
NEUTS SEG NFR BLD: 60 % (ref 40–73)
PLATELET # BLD AUTO: 179 K/UL (ref 135–420)
PMV BLD AUTO: 12.5 FL (ref 9.2–11.8)
POTASSIUM SERPL-SCNC: 4.8 MMOL/L (ref 3.5–5.5)
PROT SERPL-MCNC: 7.6 G/DL (ref 6.4–8.2)
RBC # BLD AUTO: 3.4 M/UL (ref 4.2–5.3)
SODIUM SERPL-SCNC: 135 MMOL/L (ref 136–145)
TROPONIN I SERPL-MCNC: <0.02 NG/ML (ref 0–0.04)
WBC # BLD AUTO: 7.2 K/UL (ref 4.6–13.2)

## 2020-06-28 PROCEDURE — 85025 COMPLETE CBC W/AUTO DIFF WBC: CPT

## 2020-06-28 PROCEDURE — 80053 COMPREHEN METABOLIC PANEL: CPT

## 2020-06-28 PROCEDURE — 93005 ELECTROCARDIOGRAM TRACING: CPT

## 2020-06-28 PROCEDURE — 82550 ASSAY OF CK (CPK): CPT

## 2020-06-28 PROCEDURE — 71045 X-RAY EXAM CHEST 1 VIEW: CPT

## 2020-06-28 PROCEDURE — 99284 EMERGENCY DEPT VISIT MOD MDM: CPT

## 2020-06-28 RX ORDER — CYCLOBENZAPRINE HCL 5 MG
10 TABLET ORAL
Qty: 9 TAB | Refills: 0 | Status: SHIPPED | OUTPATIENT
Start: 2020-06-28 | End: 2021-03-13

## 2020-06-28 NOTE — ED PROVIDER NOTES
EMERGENCY DEPARTMENT HISTORY AND PHYSICAL EXAM    2:37 PM      Date: 6/28/2020  Patient Name: Ann Corral    History of Presenting Illness     Chief Complaint   Patient presents with    Chest Pain         History Provided By: Patient    Additional History (Context): Ann Corral is a 63 y.o. female with hypertension, hyperlipidemia, renal insufficiency and CHF who presents with chest pain for 3 days. Patient is a dialysis patient and she states that during her dialysis on Saturday she felt a sharp burning pain on her chest radiating. Injury. States the pain is 10 out of 10, intermittent, nothing makes better or worse. Patient has taken Motrin with no improvement. Patient denies smoking, or recreational drug use. PCP: Javi Slater MD        Current Outpatient Medications   Medication Sig Dispense Refill    cyclobenzaprine (FLEXERIL) 5 mg tablet Take 2 Tabs by mouth three (3) times daily (with meals). 9 Tab 0    ibuprofen (MOTRIN) 800 mg tablet Take 1 Tab by mouth every eight (8) hours as needed for Pain (with food). 30 Tab 0    phenylephrine-witch hazel (PREPARATION H,PE, WITCH HAZEL,) 0.25-50 % topical gel Apply to rectum as needed for discomfort 51 g 0    hydrocortisone (ANUSOL-HC) 25 mg supp Insert 1 Suppository into rectum every twelve (12) hours. 24 Each 0    bisacodyl (DULCOLAX, BISACODYL,) 5 mg EC tablet Take as directed by prep instructions 4 Tab 0    polyethylene glycol (MIRALAX) 17 gram/dose powder Take as directed by office 255 g 0    hydrOXYzine pamoate (VISTARIL) 25 mg capsule 25 mg nightly.  carvedilol (COREG) 25 mg tablet Take 25 mg by mouth two (2) times daily (with meals).  NIFEdipine ER (PROCARDIA XL) 90 mg ER tablet Take 90 mg by mouth daily.  cloNIDine HCl (CATAPRES) 0.3 mg tablet Take 0.3 mg by mouth three (3) times daily.  Indications: hypertension         Past History     Past Medical History:  Past Medical History:   Diagnosis Date    Adenomatous polyp  CHF (congestive heart failure) (HCC)     Chronic kidney disease     [facility]    Dialysis patient ([facility]     tues, thurs, sat    ESRD (end stage renal disease) ([facility]     Family hx of colon cancer     Mother    Heart failure ([facility]     Hypertension     LUIS (obstructive sleep apnea)     no cpap       Past Surgical History:  Past Surgical History:   Procedure Laterality Date    COLONOSCOPY N/A 9/27/2019    COLONOSCOPY with polyp bx performed by Kaylee Herrera MD at [address] HX APPENDECTOMY       [facility]      HX COLECTOMY  10/11/2016    LAPAROSCOPIC COLECTOM[facility] Nayla Darting[facility]    HX COLONOSCOPY  09/21/2016    colonoscopy, Dr. Royal Taylor, [address] [facility] [address]  05/2020    HX OTHER SURGICAL       ARTERIOVENOUS FISTULA INSERTION LEFT ARM     HX TOTAL ABDOMINAL HYSTERECTOMY      HX TUBAL LIGATION      VASCULAR SURGERY PROCEDURE UNLIST      left arm graft       Family History:  Family History   Problem Relation Age of Onset    Colon Cancer Mother     Colon Polyps Mother        Social History:  Social History     Tobacco Use    Smoking status: Former Smoker    Smokeless tobacco: Never Used   Substance Use Topics    Alcohol use: No    Drug use: No       Allergies:  No Known Allergies      Review of Systems       Review of Systems   Constitutional: Negative. Negative for chills, diaphoresis and fever. HENT: Negative. Negative for congestion, rhinorrhea and sore throat. Eyes: Negative. Negative for pain, discharge and redness. Respiratory: Negative. Negative for cough, chest tightness, shortness of breath and wheezing. Cardiovascular: Negative. Negative for chest pain. Gastrointestinal: Negative. Negative for abdominal pain, constipation, diarrhea, nausea and vomiting. Genitourinary: Negative. Negative for dysuria, flank pain, frequency, hematuria and urgency. Musculoskeletal: Negative.   Negative for back pain and neck pain. Skin: Negative. Negative for rash. Neurological: Negative. Negative for syncope, weakness, numbness and headaches. Psychiatric/Behavioral: Negative. All other systems reviewed and are negative. Physical Exam     Visit Vitals  BP (!) 146/100   Pulse 65   Temp 98.7 °F (37.1 °C)   Resp 16   Wt 75.1 kg (165 lb 9.1 oz)   SpO2 98%   BMI 30.28 kg/m²         Physical Exam  Vitals signs and nursing note reviewed. Constitutional:       General: She is not in acute distress. Appearance: Normal appearance. She is well-developed. She is not ill-appearing, toxic-appearing or diaphoretic. HENT:      Head: Normocephalic and atraumatic. Mouth/Throat:      Pharynx: No oropharyngeal exudate. Eyes:      General: No scleral icterus. Conjunctiva/sclera: Conjunctivae normal.      Pupils: Pupils are equal, round, and reactive to light. Neck:      Musculoskeletal: Normal range of motion and neck supple. Thyroid: No thyromegaly. Vascular: No hepatojugular reflux or JVD. Trachea: No tracheal deviation. Cardiovascular:      Rate and Rhythm: Normal rate and regular rhythm. Pulses: Normal pulses. Radial pulses are 2+ on the right side and 2+ on the left side. Dorsalis pedis pulses are 2+ on the right side and 2+ on the left side. Heart sounds: Normal heart sounds, S1 normal and S2 normal. No murmur. No gallop. No S3 or S4 sounds. Pulmonary:      Effort: Pulmonary effort is normal. No respiratory distress. Breath sounds: Normal breath sounds. No decreased breath sounds, wheezing, rhonchi or rales. Abdominal:      General: Bowel sounds are normal. There is no distension. Palpations: Abdomen is soft. Abdomen is not rigid. There is no mass. Tenderness: There is no abdominal tenderness. There is no guarding or rebound. Negative signs include Gilbert's sign and McBurney's sign. Musculoskeletal: Normal range of motion. Lymphadenopathy:      Head:      Right side of head: No submental, submandibular, preauricular or occipital adenopathy. Left side of head: No submental, submandibular, preauricular or occipital adenopathy. Cervical: No cervical adenopathy. Upper Body:      Right upper body: No supraclavicular adenopathy. Left upper body: No supraclavicular adenopathy. Skin:     General: Skin is warm and dry. Findings: No rash. Neurological:      Mental Status: She is alert. She is not disoriented. GCS: GCS eye subscore is 4. GCS verbal subscore is 5. GCS motor subscore is 6. Cranial Nerves: No cranial nerve deficit. Sensory: No sensory deficit. Coordination: Coordination normal.      Gait: Gait normal.      Deep Tendon Reflexes: Reflexes are normal and symmetric. Psychiatric:         Speech: Speech normal.         Behavior: Behavior normal.         Thought Content:  Thought content normal.         Judgment: Judgment normal.           Diagnostic Study Results     Labs -  Recent Results (from the past 12 hour(s))   EKG, 12 LEAD, INITIAL    Collection Time: 06/28/20  2:34 PM   Result Value Ref Range    Ventricular Rate 62 BPM    Atrial Rate 62 BPM    P-R Interval 166 ms    QRS Duration 108 ms    Q-T Interval 462 ms    QTC Calculation (Bezet) 468 ms    Calculated P Axis 40 degrees    Calculated R Axis -48 degrees    Calculated T Axis 41 degrees    Diagnosis       Normal sinus rhythm  Left anterior fascicular block  Minimal voltage criteria for LVH, may be normal variant  Abnormal ECG  When compared with ECG of 06-FEB-2011 23:38,  T wave inversion no longer evident in Lateral leads     CBC WITH AUTOMATED DIFF    Collection Time: 06/28/20  3:14 PM   Result Value Ref Range    WBC 7.2 4.6 - 13.2 K/uL    RBC 3.40 (L) 4.20 - 5.30 M/uL    HGB 10.4 (L) 12.0 - 16.0 g/dL    HCT 34.4 (L) 35.0 - 45.0 %    .2 (H) 74.0 - 97.0 FL    MCH 30.6 24.0 - 34.0 PG    MCHC 30.2 (L) 31.0 - 37.0 g/dL RDW 16.1 (H) 11.6 - 14.5 %    PLATELET 753 850 - 496 K/uL    MPV 12.5 (H) 9.2 - 11.8 FL    NEUTROPHILS 60 40 - 73 %    LYMPHOCYTES 23 21 - 52 %    MONOCYTES 12 (H) 3 - 10 %    EOSINOPHILS 4 0 - 5 %    BASOPHILS 1 0 - 2 %    ABS. NEUTROPHILS 4.4 1.8 - 8.0 K/UL    ABS. LYMPHOCYTES 1.6 0.9 - 3.6 K/UL    ABS. MONOCYTES 0.9 0.05 - 1.2 K/UL    ABS. EOSINOPHILS 0.3 0.0 - 0.4 K/UL    ABS. BASOPHILS 0.1 0.0 - 0.1 K/UL    DF AUTOMATED     METABOLIC PANEL, COMPREHENSIVE    Collection Time: 06/28/20  3:14 PM   Result Value Ref Range    Sodium 135 (L) 136 - 145 mmol/L    Potassium 4.8 3.5 - 5.5 mmol/L    Chloride 102 100 - 111 mmol/L    CO2 24 21 - 32 mmol/L    Anion gap 9 3.0 - 18 mmol/L    Glucose 116 (H) 74 - 99 mg/dL    BUN 41 (H) 7.0 - 18 MG/DL    Creatinine 8.13 (H) 0.6 - 1.3 MG/DL    BUN/Creatinine ratio 5 (L) 12 - 20      GFR est AA 6 (L) >60 ml/min/1.73m2    GFR est non-AA 5 (L) >60 ml/min/1.73m2    Calcium 8.6 8.5 - 10.1 MG/DL    Bilirubin, total 0.3 0.2 - 1.0 MG/DL    ALT (SGPT) 12 (L) 13 - 56 U/L    AST (SGOT) 31 10 - 38 U/L    Alk. phosphatase 67 45 - 117 U/L    Protein, total 7.6 6.4 - 8.2 g/dL    Albumin 2.8 (L) 3.4 - 5.0 g/dL    Globulin 4.8 (H) 2.0 - 4.0 g/dL    A-G Ratio 0.6 (L) 0.8 - 1.7     CARDIAC PANEL,(CK, CKMB & TROPONIN)    Collection Time: 06/28/20  3:14 PM   Result Value Ref Range    CK - MB <1.0 <3.6 ng/ml    CK-MB Index  0.0 - 4.0 %     CALCULATION NOT PERFORMED WHEN RESULT IS BELOW LINEAR LIMIT    CK 74 26 - 192 U/L    Troponin-I, QT <0.02 0.0 - 0.045 NG/ML       Radiologic Studies -   XR CHEST PORT   Final Result   IMPRESSION:      No definite acute findings. Minimal basilar streaky densities, likely   atelectasis. Mildly enlarged cardiac silhouette.             Medical Decision Making   Provider Notes (Medical Decision Making):  MDM  Number of Diagnoses or Management Options  Diagnosis management comments: DIFFERENTIAL DIAGNOSES/ MEDICAL DECISION MAKING:  Chest pain etiologies include acute cardiac events to include possible acute myocardial infarction, acute coronary syndrome, pneumonia, chest wall pain (myofascial/ musculoskeletal etiology), chronic obstructive pulmonary disease (copd), acute asthma exacerbation, congestive heart failure, acute bronchitis, pulmonary embolism, upper respiratory infection, referred abdominal pain, other etiologies, versus combination of the above. I am the first provider for this patient. I reviewed the vital signs, available nursing notes, past medical history, past surgical history, family history and social history. Vital Signs-Reviewed the patient's vital signs. Records Reviewed: Nursing Notes (Time of Review: 2:37 PM)    ED Course: Progress Notes, Reevaluation, and Consults:    Labs essentially normal with the exception of creatinine of 8.13. This is chronic. Chest X-Ray showed No acute process. EKG showed NSR with rate of 62 bpm. With no ST elevations or depression and non specific T wave changes. 3:47 PM 6/28/2020        Diagnosis       I have reassessed the patient. Patient is feeling well. Patient will be prescribed Flexeril. Patient was discharged in stable condition. Patient is to return to emergency department if any new or worsening condition. Clinical Impression:   1. Chest pain, unspecified type        Disposition: Discharged home     Follow-up Information     Follow up With Specialties Details Why Contact Info    Oliver Cook MD Family Practice In 2 days  [address]  [phone]               Attestation        Provider Attestation:     I personally performed the services described in the documentation, reviewed the documentation and it accurately and completely records my words and actions utilizing the [address] June 28, 2020 at 4:26 PM - Nathan, [address]. It is dictated using utilizing voice recognition software.   Unfortunately this leads to occasional typographical errors. I apologize in advance if the situation occurs. If questions arise please do not hesitate to contact me or call our department.

## 2020-06-28 NOTE — DISCHARGE INSTRUCTIONS
Patient Education        Chest Pain: Care Instructions  Your Care Instructions     There are many things that can cause chest pain. Some are not serious and will get better on their own in a few days. But some kinds of chest pain need more testing and treatment. Your doctor may have recommended a follow-up visit in the next 8 to 12 hours. If you are not getting better, you may need more tests or treatment. Even though your doctor has released you, you still need to watch for any problems. The doctor carefully checked you, but sometimes problems can develop later. If you have new symptoms or if your symptoms do not get better, get medical care right away. If you have worse or different chest pain or pressure that lasts more than 5 minutes or you passed out (lost consciousness), [id number] or seek other emergency help right away. A medical visit is only one step in your treatment. Even if you feel better, you still need to do what your doctor recommends, such as going to all suggested follow-up appointments and taking medicines exactly as directed. This will help you recover and help prevent future problems. How can you care for yourself at home? · Rest until you feel better. · Take your medicine exactly as prescribed. Call your doctor if you think you are having a problem with your medicine. · Do not drive after taking a prescription pain medicine. When should you call for help? [id number]:   · You passed out (lost consciousness). · You have severe difficulty breathing. · You have symptoms of a heart attack. These may include:  ? Chest pain or pressure, or a strange feeling in your chest.  ? Sweating. ? Shortness of breath. ? Nausea or vomiting. ? Pain, pressure, or a strange feeling in your back, neck, jaw, or upper belly or in one or both shoulders or arms. ? Lightheadedness or sudden weakness. ? A fast or irregular heartbeat.   After you call 911, the  may tell you to chew 1 adult-strength or 2 to 4 low-dose aspirin. Wait for an ambulance. Do not try to drive yourself. Call your doctor today if:   · You have any trouble breathing. · Your chest pain gets worse. · You are dizzy or lightheaded, or you feel like you may faint. · You are not getting better as expected. · You are having new or different chest pain. Where can you learn more? Go to http://jaylen-alley.info/  Enter A120 in the search box to learn more about \"Chest Pain: Care Instructions. \"  Current as of: June 26, 2019               Content Version: 12.5  © 2042-8673 [employer]. Care instructions adapted under license by [employer] (which disclaims liability or warranty for this information). If you have questions about a medical condition or this instruction, always ask your healthcare professional. [address] any warranty or liability for your use of this information.

## 2020-06-29 ENCOUNTER — PATIENT OUTREACH (OUTPATIENT)
Dept: CASE MANAGEMENT | Age: 60
End: 2020-06-29

## 2020-06-29 LAB
ATRIAL RATE: 62 BPM
CALCULATED P AXIS, ECG09: 40 DEGREES
CALCULATED R AXIS, ECG10: -48 DEGREES
CALCULATED T AXIS, ECG11: 41 DEGREES
DIAGNOSIS, 93000: NORMAL
P-R INTERVAL, ECG05: 166 MS
Q-T INTERVAL, ECG07: 462 MS
QRS DURATION, ECG06: 108 MS
QTC CALCULATION (BEZET), ECG08: 468 MS
VENTRICULAR RATE, ECG03: 62 BPM

## 2020-06-29 NOTE — PROGRESS NOTES
Date/Time:  6/29/2020 9:31 AM  Attempted to reach patient by telephone. Left HIPPA compliant message requesting a return call. Will attempt to reach patient again.

## 2020-06-30 ENCOUNTER — PATIENT OUTREACH (OUTPATIENT)
Dept: CASE MANAGEMENT | Age: 60
End: 2020-06-30

## 2020-08-01 ENCOUNTER — HOSPITAL ENCOUNTER (EMERGENCY)
Age: 60
Discharge: HOME | End: 2020-08-01
Attending: EMERGENCY MEDICINE
Payer: MEDICARE

## 2020-08-01 VITALS
SYSTOLIC BLOOD PRESSURE: 125 MMHG | OXYGEN SATURATION: 99 % | RESPIRATION RATE: 17 BRPM | HEART RATE: 57 BPM | TEMPERATURE: 98.2 F | DIASTOLIC BLOOD PRESSURE: 73 MMHG

## 2020-08-01 DIAGNOSIS — T82.838A BLEEDING FROM DIALYSIS SHUNT, INITIAL ENCOUNTER (HCC): Primary | ICD-10-CM

## 2020-08-01 PROCEDURE — 99285 EMERGENCY DEPT VISIT HI MDM: CPT

## 2020-08-01 NOTE — ED PROVIDER NOTES
EMERGENCY DEPARTMENT HISTORY AND PHYSICAL EXAM    1:27 PM      Date: 8/1/2020  Patient Name: Tanya Garcia    History of Presenting Illness     Chief Complaint   Patient presents with    Vascular Access Problem         History Provided By: patient    Additional History (Context): Tanya Garcia is a 63 y.o. female presents with no disease on dialysis had routine dialysis today but thinks they stuck her in a recently access location so cannot stop the bleeding. She comes in with dialysis clamps and 2 separate places on her fistula. No other complaint chest pain fever shortness of breath fluid overload etc.  No distress. Mildred Carrera PCP: Pancho Jones MD    Chief Complaint:   Duration:    Timing:    Location:   Quality:   Severity:   Modifying Factors:   Associated Symptoms:       Current Outpatient Medications   Medication Sig Dispense Refill    cyclobenzaprine (FLEXERIL) 5 mg tablet Take 2 Tabs by mouth three (3) times daily (with meals). 9 Tab 0    ibuprofen (MOTRIN) 800 mg tablet Take 1 Tab by mouth every eight (8) hours as needed for Pain (with food). 30 Tab 0    phenylephrine-witch hazel (PREPARATION H,PE, WITCH HAZEL,) 0.25-50 % topical gel Apply to rectum as needed for discomfort 51 g 0    hydrocortisone (ANUSOL-HC) 25 mg supp Insert 1 Suppository into rectum every twelve (12) hours. 24 Each 0    bisacodyl (DULCOLAX, BISACODYL,) 5 mg EC tablet Take as directed by prep instructions 4 Tab 0    polyethylene glycol (MIRALAX) 17 gram/dose powder Take as directed by office 255 g 0    hydrOXYzine pamoate (VISTARIL) 25 mg capsule 25 mg nightly.  carvedilol (COREG) 25 mg tablet Take 25 mg by mouth two (2) times daily (with meals).  NIFEdipine ER (PROCARDIA XL) 90 mg ER tablet Take 90 mg by mouth daily.  cloNIDine HCl (CATAPRES) 0.3 mg tablet Take 0.3 mg by mouth three (3) times daily.  Indications: hypertension         Past History     Past Medical History:  Past Medical History:   Diagnosis Date    Adenomatous polyp     CHF (congestive heart failure) (HCC)     Chronic kidney disease     [facility]    Dialysis patient ([facility]     tues, thurs, sat    ESRD (end stage renal disease) ([facility]     Family hx of colon cancer     Mother    Heart failure ([facility]     Hypertension     LUIS (obstructive sleep apnea)     no cpap       Past Surgical History:  Past Surgical History:   Procedure Laterality Date    COLONOSCOPY N/A 9/27/2019    COLONOSCOPY with polyp bx performed by Destin Hwang MD at [address] HX APPENDECTOMY       [facility]      HX COLECTOMY  10/11/2016    LAPAROSCOPIC COLECTOMY PARTIAL RIGHT FOR Jean-Pierre Fletcher, DR. [facility]EZ, [facility]    HX COLONOSCOPY  09/21/2016    colonoscopy, Dr. Sri Milton, [address] [facility] HX HERNIA REPAIR  05/2020    HX OTHER SURGICAL       ARTERIOVENOUS FISTULA INSERTION LEFT ARM     HX TOTAL ABDOMINAL HYSTERECTOMY      HX TUBAL LIGATION      VASCULAR SURGERY PROCEDURE UNLIST      left arm graft       Family History:  Family History   Problem Relation Age of Onset    Colon Cancer Mother     Colon Polyps Mother        Social History:  Social History     Tobacco Use    Smoking status: Former Smoker    Smokeless tobacco: Never Used   Substance Use Topics    Alcohol use: No    Drug use: No       Allergies:  No Known Allergies      Review of Systems     Review of Systems   Constitutional: Negative for diaphoresis and fever. HENT: Negative for congestion and sore throat. Eyes: Negative for pain and itching. Respiratory: Negative for cough and shortness of breath. Cardiovascular: Negative for chest pain and palpitations. Gastrointestinal: Negative for abdominal pain and diarrhea. Endocrine: Negative for polydipsia and polyuria. Genitourinary: Negative for dysuria and hematuria. Musculoskeletal: Negative for arthralgias and myalgias. Skin: Negative for rash and wound. Neurological: Negative for seizures and syncope.    Hematological: Does not bruise/bleed easily. Psychiatric/Behavioral: Negative for agitation and hallucinations. Physical Exam       Patient Vitals for the past 12 hrs:   Temp Pulse Resp BP SpO2   08/01/20 1530 -- (!) 55 17 137/74 99 %   08/01/20 1500 -- (!) 57 18 134/75 97 %   08/01/20 1430 -- 60 19 129/75 97 %   08/01/20 1400 -- 60 20 124/83 97 %   08/01/20 1330 -- 60 20 118/72 96 %   08/01/20 1300 -- (!) 57 16 119/74 91 %   08/01/20 1230 -- -- -- 135/84 --   08/01/20 1229 98.2 °F (36.8 °C) (!) 58 14 132/81 100 %       Physical Exam  Vitals signs and nursing note reviewed. Constitutional:       General: She is not in acute distress. Appearance: She is well-developed. She is not toxic-appearing. HENT:      Head: Normocephalic and atraumatic. Eyes:      General: No scleral icterus. Conjunctiva/sclera: Conjunctivae normal.   Neck:      Musculoskeletal: Normal range of motion and neck supple. Vascular: No JVD. Cardiovascular:      Rate and Rhythm: Normal rate and regular rhythm. Heart sounds: Normal heart sounds. Comments: 4 intact extremity pulses  Pulmonary:      Effort: Pulmonary effort is normal.      Breath sounds: Normal breath sounds. Abdominal:      Palpations: Abdomen is soft. There is no mass. Tenderness: There is no abdominal tenderness. Musculoskeletal: Normal range of motion. Comments: Left arm fistula with good thrill to dialysis clamps applied with paper tape. No bleeding. Lymphadenopathy:      Cervical: No cervical adenopathy. Skin:     General: Skin is warm and dry. Neurological:      Mental Status: She is alert. Diagnostic Study Results   Labs -  No results found for this or any previous visit (from the past 12 hour(s)). Radiologic Studies -   No orders to display     No results found.     Medications ordered:   Medications - No data to display      Medical Decision Making   Initial Medical Decision Making and DDx:  Will evaluate the bleeding site.    ED Course: Progress Notes, Reevaluation, and Consults:     4:24 PM very slowly removed the dialysis shunt clamps and the 2 x 2's below.,  No further bleeding. Will monitor for period of time and then discharge. Good thrill, no other visible problems of the shunt. I am the first provider for this patient. I reviewed the vital signs, available nursing notes, past medical history, past surgical history, family history and social history. Patient Vitals for the past 12 hrs:   Temp Pulse Resp BP SpO2   08/01/20 1530 -- (!) 55 17 137/74 99 %   08/01/20 1500 -- (!) 57 18 134/75 97 %   08/01/20 1430 -- 60 19 129/75 97 %   08/01/20 1400 -- 60 20 124/83 97 %   08/01/20 1330 -- 60 20 118/72 96 %   08/01/20 1300 -- (!) 57 16 119/74 91 %   08/01/20 1230 -- -- -- 135/84 --   08/01/20 1229 98.2 °F (36.8 °C) (!) 58 14 132/81 100 %       Vital Signs-Reviewed the patient's vital signs. Pulse Oximetry Analysis, Cardiac Monitor, 12 lead ekg:       Interpreted by the EP. Records Reviewed: Nursing notes reviewed (Time of Review: 1:27 PM)    Procedures:   Critical Care Time:   Aspirin: (was aspirin given for stroke?)    Diagnosis     Clinical Impression:   1. Bleeding from dialysis shunt, initial encounter [facility])        Disposition: Discharged      Follow-up Information     Follow up With Specialties Details Why Contact Info    Meagan Dobbins MD Family Medicine In 2 days  [address] [facility] [phone]             Patient's Medications   Start Taking    No medications on file   Continue Taking    BISACODYL (DULCOLAX, BISACODYL,) 5 MG EC TABLET    Take as directed by prep instructions    CARVEDILOL (COREG) 25 MG TABLET    Take 25 mg by mouth two (2) times daily (with meals). CLONIDINE HCL (CATAPRES) 0.3 MG TABLET    Take 0.3 mg by mouth three (3) times daily. Indications: hypertension    CYCLOBENZAPRINE (FLEXERIL) 5 MG TABLET    Take 2 Tabs by mouth three (3) times daily (with meals). HYDROCORTISONE (ANUSOL-HC) 25 MG SUPP    Insert 1 Suppository into rectum every twelve (12) hours. HYDROXYZINE PAMOATE (VISTARIL) 25 MG CAPSULE    25 mg nightly. IBUPROFEN (MOTRIN) 800 MG TABLET    Take 1 Tab by mouth every eight (8) hours as needed for Pain (with food). NIFEDIPINE ER (PROCARDIA XL) 90 MG ER TABLET    Take 90 mg by mouth daily.     PHENYLEPHRINE-WITCH HAZEL (PREPARATION H,PE, WITCH HAZEL,) 0.25-50 % TOPICAL GEL    Apply to rectum as needed for discomfort    POLYETHYLENE GLYCOL (MIRALAX) 17 GRAM/DOSE POWDER    Take as directed by office   These Medications have changed    No medications on file   Stop Taking    No medications on file     _______________________________    Notes:    Kylah Hernandez MD using Dragon dictation      _______________________________

## 2020-08-01 NOTE — DISCHARGE INSTRUCTIONS
If bleeding recurs, apply pressure with a single finger to the source of bleeding. Call 911 if necessary.

## 2020-08-01 NOTE — ED NOTES
Pt resting in stretcher with no signs of distress. Fistula remains clamped, no active bleeding around site. VS stable. Plan of care reviewed and pt denies needs or questions at this time. Will continue to monitor.

## 2020-08-01 NOTE — ED NOTES
Pt discharge instructions reviewed with patient, patient denies questions and verbalizes understanding. All belongings with patient. Pt alert and oriented, no signs of distress.

## 2020-08-01 NOTE — ED TRIAGE NOTES
Pt BIB EMS from dialysis due to fistula sight bleeding. Completed dialysis session and 3.1 L taken off. Clamp on L upper arm fistula at this time, not actively bleeding. Pt endorsing mild dizziness. Alert, oriented, color appropriate.     Past Medical History:   Diagnosis Date    Adenomatous polyp     CHF (congestive heart failure) (HCC)     Chronic kidney disease     TT    Dialysis patient ([facility]     tues, thurs, sat    ESRD (end stage renal disease) ([facility]     Family hx of colon cancer     Mother    Heart failure ([facility]     Hypertension     LUIS (obstructive sleep apnea)     no cpap

## 2020-10-03 ENCOUNTER — HOSPITAL ENCOUNTER (EMERGENCY)
Age: 60
Discharge: HOME | End: 2020-10-03
Attending: EMERGENCY MEDICINE
Payer: MEDICARE

## 2020-10-03 VITALS
HEART RATE: 72 BPM | OXYGEN SATURATION: 98 % | DIASTOLIC BLOOD PRESSURE: 91 MMHG | BODY MASS INDEX: 32.2 KG/M2 | TEMPERATURE: 98.2 F | SYSTOLIC BLOOD PRESSURE: 146 MMHG | WEIGHT: 175 LBS | HEIGHT: 62 IN | RESPIRATION RATE: 16 BRPM

## 2020-10-03 DIAGNOSIS — S02.5XXB OPEN FRACTURE OF TOOTH, INITIAL ENCOUNTER: Primary | ICD-10-CM

## 2020-10-03 PROCEDURE — 99282 EMERGENCY DEPT VISIT SF MDM: CPT

## 2020-10-03 RX ORDER — HYDROCODONE BITARTRATE AND ACETAMINOPHEN 5; 325 MG/1; MG/1
1 TABLET ORAL ONCE
Status: DISCONTINUED | OUTPATIENT
Start: 2020-10-03 | End: 2020-10-03

## 2020-10-03 RX ORDER — HYDROCODONE BITARTRATE AND ACETAMINOPHEN 5; 325 MG/1; MG/1
1 TABLET ORAL
Qty: 8 TAB | Refills: 0 | Status: SHIPPED | OUTPATIENT
Start: 2020-10-03 | End: 2020-10-06

## 2020-10-03 RX ORDER — AMOXICILLIN 500 MG/1
500 TABLET, FILM COATED ORAL 3 TIMES DAILY
Qty: 21 TAB | Refills: 0 | Status: SHIPPED | OUTPATIENT
Start: 2020-10-03 | End: 2020-10-10

## 2020-10-03 NOTE — ED TRIAGE NOTES
Patient c/o broken tooth to right upper gumline. Patient states that she has dental appointment on Monday.

## 2020-10-03 NOTE — ED PROVIDER NOTES
EMERGENCY DEPARTMENT HISTORY AND PHYSICAL EXAM    2:18 PM  Date: 10/3/2020  Patient Name: Krunal Jung    History of Presenting Illness     Chief Complaint   Patient presents with    Dental Pain        History Provided By: Patient    HPI: Krunal Jung is a 63 y.o. female with multiple medical problems as below. Patient is presenting with chest pain after she broke it. She was eating some chicken and while chewing she broke her back molar on the upper side. Complaining of pain. Denies fever or other injuries. Location:  Severity:  Timing/course: Onset/Duration:     PCP: Wsiam Martin MD    Past History     Past Medical History:  Past Medical History:   Diagnosis Date    Adenomatous polyp     [facility]     Chronic kidney disease     TT    Dialysis patient ([facility]     tues, jose, sat    ESRD (end stage renal disease) ([facility]     Family hx of colon cancer     Mother    [facility]     Hypertension     LUIS (obstructive sleep apnea)     no cpap       Past Surgical History:  Past Surgical History:   Procedure Laterality Date    COLONOSCOPY N/A 2019    COLONOSCOPY with polyp bx performed by Judeth Kocher, MD at [address] HX APPENDECTOMY      HX  SECTION      HX COLECTOMY  10/11/2016    LAPAROSCOPIC COLECTOMY PARTIAL RIGHT FOR CECAL POLYP, DR. PAEZ, [facility]    HX COLONOSCOPY  2016    colonoscopy, Dr. Cari Montoya, [address] [place] [address] [address]  2020    HX OTHER SURGICAL       ARTERIOVENOUS FISTULA INSERTION LEFT ARM     HX TOTAL ABDOMINAL HYSTERECTOMY      HX TUBAL LIGATION      VASCULAR SURGERY PROCEDURE UNLIST      left arm graft       Family History:  Family History   Problem Relation Age of Onset    Colon Cancer Mother     Colon Polyps Mother        Social History:  Social History     Tobacco Use    Smoking status: Former Smoker    Smokeless tobacco: Never Used   Substance Use Topics    Alcohol use:  No  Drug use: No       Allergies:  No Known Allergies    Review of Systems   Review of Systems   HENT: Positive for dental problem. All other systems reviewed and are negative. Physical Exam     Patient Vitals for the past 12 hrs:   Temp Pulse Resp BP SpO2   10/03/20 1348 98.2 °F (36.8 °C) 72 16 (!) 146/91 98 %       Physical Exam  Vitals signs and nursing note reviewed. Constitutional:       Appearance: Normal appearance. HENT:      Head: Normocephalic and atraumatic. Mouth/Throat:     Eyes:      Extraocular Movements: Extraocular movements intact. Neck:      Musculoskeletal: Normal range of motion and neck supple. Cardiovascular:      Rate and Rhythm: Normal rate. Pulmonary:      Effort: Pulmonary effort is normal. No respiratory distress. Musculoskeletal: Normal range of motion. General: No deformity. Skin:     General: Skin is warm and dry. Neurological:      General: No focal deficit present. Mental Status: She is alert and oriented to person, place, and time. Psychiatric:         Mood and Affect: Mood normal.         Behavior: Behavior normal.         Diagnostic Study Results     Labs -  No results found for this or any previous visit (from the past 12 hour(s)). Radiologic Studies -   No results found. Medical Decision Making     ED Course: Progress Notes, Reevaluation, and Consults:    2:18 PM Initial assessment performed. The patients presenting problems have been discussed, and they/their family are in agreement with the care plan formulated and outlined with them. I have encouraged them to ask questions as they arise throughout their visit. Provider Notes (Medical Decision Making): 27-year-old female presenting with fractured tooth, part is missing. Pulp is exposed. I applied temporary cement filling which immediately relieved the pain. Will DC on some antibiotics and pain medicine.   Patient instructed to follow-up closely with her dentist.    Procedures:     Critical Care Time:     Vital Signs-Reviewed the patient's vital signs. Reviewed pt's pulse ox reading. EKG: Interpreted by the EP. Time Interpreted:    Rate:    Rhythm:    Interpretation:   Comparison:     Records Reviewed: Nursing Notes (Time of Review: 2:18 PM)  -I am the first provider for this patient.  -I reviewed the vital signs, available nursing notes, past medical history, past surgical history, family history and social history. Current Facility-Administered Medications   Medication Dose Route Frequency Provider Last Rate Last Dose    HYDROcodone-acetaminophen (NORCO) 5-325 mg per tablet 1 Tab  1 Tab Oral ONCE Roland Malcolm MD         Current Outpatient Medications   Medication Sig Dispense Refill    cyclobenzaprine (FLEXERIL) 5 mg tablet Take 2 Tabs by mouth three (3) times daily (with meals). 9 Tab 0    ibuprofen (MOTRIN) 800 mg tablet Take 1 Tab by mouth every eight (8) hours as needed for Pain (with food). 30 Tab 0    phenylephrine-witch hazel (PREPARATION H,PE, WITCH HAZEL,) 0.25-50 % topical gel Apply to rectum as needed for discomfort 51 g 0    hydrocortisone (ANUSOL-HC) 25 mg supp Insert 1 Suppository into rectum every twelve (12) hours. 24 Each 0    bisacodyl (DULCOLAX, BISACODYL,) 5 mg EC tablet Take as directed by prep instructions 4 Tab 0    polyethylene glycol (MIRALAX) 17 gram/dose powder Take as directed by office 255 g 0    hydrOXYzine pamoate (VISTARIL) 25 mg capsule 25 mg nightly.  carvedilol (COREG) 25 mg tablet Take 25 mg by mouth two (2) times daily (with meals).  NIFEdipine ER (PROCARDIA XL) 90 mg ER tablet Take 90 mg by mouth daily.  cloNIDine HCl (CATAPRES) 0.3 mg tablet Take 0.3 mg by mouth three (3) times daily. Indications: hypertension          Clinical Impression     Clinical Impression: No diagnosis found.     Disposition: DC        This note was dictated utilizing voice recognition software which may lead to typographical errors. I apologize in advance if the situation occurs. If questions arise please do not hesitate to contact me or call our department.     Roland Cobb MD  2:18 PM

## 2020-10-03 NOTE — ED NOTES
I have reviewed discharge instructions with the patient. The patient verbalized understanding. Patient armband removed and shredded. Current Discharge Medication List      START taking these medications    Details   amoxicillin 500 mg tab Take 500 mg by mouth three (3) times daily for 7 days. Qty: 21 Tab, Refills: 0      HYDROcodone-acetaminophen (Norco) 5-325 mg per tablet Take 1 Tab by mouth every four (4) hours as needed for Pain for up to 3 days. Max Daily Amount: 6 Tabs.   Qty: 8 Tab, Refills: 0    Associated Diagnoses: Open fracture of tooth, initial encounter         List of dental clinics given to pt

## 2020-10-03 NOTE — DISCHARGE INSTRUCTIONS
Patient Education        Broken Tooth: Care Instructions  Your Care Instructions  A tooth can be chipped, broken, or knocked out during sports, an accident, or a bad fall. Your doctor may have fixed your tooth temporarily. You also may have been given pain medicine. If you had signs of infection, you may need to take antibiotics. You will need to see a dentist. If you have chipped a tooth, it may be jagged, which can irritate your mouth and tongue. The dentist may smooth the edges and fill in the part that chipped off. A permanent tooth that has been knocked out can be put back in (reimplanted) if it is done quickly. The dentist may need to put a crown on a broken tooth to cover the tooth and hold it together. Prompt dental treatment can often prevent infection in the tooth. Follow-up care is a key part of your treatment and safety. Be sure to make and go to all appointments, and call your doctor if you are having problems. It's also a good idea to know your test results and keep a list of the medicines you take. How can you care for yourself at home? · If your tooth pulp is exposed, you can protect it by putting temporary filling material over the broken area. You can buy temporary filling mixes in drugstores. Follow the directions on the label. · To relieve pain and swelling, put ice or a cold cloth on the tooth's gum or cheek area, or suck on a piece of ice. But if the tooth's nerve or pulp is exposed, avoid putting anything too hot or cold near the tooth until you see your dentist.  · Ask your doctor if you can take an over-the-counter pain medicine, such as acetaminophen (Tylenol), ibuprofen (Advil, Motrin), or naproxen (Aleve). Be safe with medicines. Read and follow all instructions on the label. · If your doctor prescribed antibiotics, take them as directed. Do not stop taking them just because you feel better. You need to take the full course of antibiotics.   · To help healing, rinse your mouth with warm salt water right after meals. To make a saltwater solution, mix 1 teaspoon of salt in 1 cup of warm water. · Eat soft foods that are easy to chew. · Avoid foods that might sting, such as salty or spicy foods, citrus fruits, and tomatoes. · Do not smoke or use spit tobacco. Tobacco can slow healing in your mouth. If you need help quitting, talk to your doctor about stop-smoking programs and medicines. These can increase your chances of quitting for good. · If your tooth is loose, be gentle when you brush or floss. But be sure to brush your teeth at least two times a day, and floss at least once a day. When should you call for help? Call your doctor now or seek immediate medical care if:    · You have signs of infection, such as:  ? Increased pain, swelling, warmth, or redness. ? Red streaks leading from the area. ? Pus draining from the area. ? A fever. Watch closely for changes in your health, and be sure to contact your doctor if:    · You do not get better as expected. Where can you learn more? Go to http://www.gray.com/  Enter W162 in the search box to learn more about \"Broken Tooth: Care Instructions. \"  Current as of: March 25, 2020               Content Version: 12.6  © 1715-7455 [employer]. Care instructions adapted under license by [employer] (which disclaims liability or warranty for this information). If you have questions about a medical condition or this instruction, always ask your healthcare professional. [place] [address] any warranty or liability for your use of this information.

## 2020-10-15 ENCOUNTER — TRANSCRIBE ORDER (OUTPATIENT)
Dept: SCHEDULING | Age: 60
End: 2020-10-15

## 2020-10-15 DIAGNOSIS — Z12.31 VISIT FOR SCREENING MAMMOGRAM: Primary | ICD-10-CM

## 2020-12-17 ENCOUNTER — HOSPITAL ENCOUNTER (OUTPATIENT)
Dept: MAMMOGRAPHY | Age: 60
Discharge: HOME | End: 2020-12-17
Payer: MEDICARE

## 2020-12-17 DIAGNOSIS — Z12.31 ENCOUNTER FOR SCREENING MAMMOGRAM FOR MALIGNANT NEOPLASM OF BREAST: ICD-10-CM

## 2020-12-17 PROCEDURE — 77063 BREAST TOMOSYNTHESIS BI: CPT

## 2021-01-25 PROCEDURE — 99283 EMERGENCY DEPT VISIT LOW MDM: CPT

## 2021-01-26 ENCOUNTER — HOSPITAL ENCOUNTER (EMERGENCY)
Age: 61
Discharge: HOME | End: 2021-01-26
Attending: EMERGENCY MEDICINE
Payer: MEDICARE

## 2021-01-26 ENCOUNTER — PATIENT OUTREACH (OUTPATIENT)
Dept: CASE MANAGEMENT | Age: 61
End: 2021-01-26

## 2021-01-26 VITALS
SYSTOLIC BLOOD PRESSURE: 144 MMHG | RESPIRATION RATE: 18 BRPM | WEIGHT: 165 LBS | HEIGHT: 62 IN | DIASTOLIC BLOOD PRESSURE: 84 MMHG | TEMPERATURE: 98.4 F | HEART RATE: 76 BPM | OXYGEN SATURATION: 94 % | BODY MASS INDEX: 30.36 KG/M2

## 2021-01-26 DIAGNOSIS — J06.9 ACUTE UPPER RESPIRATORY INFECTION: Primary | ICD-10-CM

## 2021-01-26 PROCEDURE — U0003 INFECTIOUS AGENT DETECTION BY NUCLEIC ACID (DNA OR RNA); SEVERE ACUTE RESPIRATORY SYNDROME CORONAVIRUS 2 (SARS-COV-2) (CORONAVIRUS DISEASE [COVID-19]), AMPLIFIED PROBE TECHNIQUE, MAKING USE OF HIGH THROUGHPUT TECHNOLOGIES AS DESCRIBED BY CMS-2020-01-R: HCPCS

## 2021-01-26 RX ORDER — BENZONATATE 100 MG/1
100 CAPSULE ORAL
Qty: 20 CAP | Refills: 0 | Status: SHIPPED | OUTPATIENT
Start: 2021-01-26 | End: 2021-02-02

## 2021-01-26 NOTE — DISCHARGE INSTRUCTIONS
Self quarantine at home as discussed. Return for pain, fever not resolving with motrin or tylenol, shortness of breath, vomiting, decreased fluid intake, weakness, numbness, dizziness, or any change or concerns.

## 2021-01-26 NOTE — ED PROVIDER NOTES
Pt c/o cough/congestion, non productive. X 2 days. Mild sinus congestion. No headache or facial pain. No fever. No chest pain. No sob. No swelling. No abd pain or nausea. H/o cf, due for hd later today. No treatment for sx's pta. Past Medical History:   Diagnosis Date    Adenomatous polyp     CHF (congestive heart failure) (HCC)     Chronic kidney disease     TTHS    Dialysis patient ([facility]     tues, thurs, sat    ESRD (end stage renal disease) ([facility]     Family hx of colon cancer     Mother    [facility]     Hypertension     LUIS (obstructive sleep apnea)     no cpap       Past Surgical History:   Procedure Laterality Date    COLONOSCOPY N/A 9/27/2019    COLONOSCOPY with polyp bx performed by Lubna Flowers MD at [address] HX APPENDECTOMY       [facility]      HX COLECTOMY  10/11/2016    LAPAROSCOPIC COLECTOMY PARTIAL RIGHT FOR Amy Duff, DR. PAEZ, [facility]    HX COLONOSCOPY  09/21/2016    colonoscopy, Dr. Johann Delacruz, [address] [place] [address] [address]  05/2020    HX OTHER SURGICAL       ARTERIOVENOUS FISTULA INSERTION LEFT ARM     HX TOTAL ABDOMINAL HYSTERECTOMY      HX TUBAL LIGATION      VASCULAR SURGERY PROCEDURE UNLIST      left arm graft         Family History:   Problem Relation Age of Onset    Colon Cancer Mother     Colon Polyps Mother        Social History     Socioeconomic History    Marital status:      Spouse name: Not on file    Number of children: Not on file    Years of education: Not on file    Highest education level: Not on file   Occupational History    Not on file   Social Needs    Financial resource strain: Not on file    Food insecurity     Worry: Not on file     Inability: Not on file    Transportation needs     Medical: Not on file     Non-medical: Not on file   Tobacco Use    Smoking status: Former Smoker    Smokeless tobacco: Never Used   Substance and Sexual Activity    Alcohol use: No    Drug use: No    Sexual activity: Yes     Partners: Male   Lifestyle    Physical activity     Days per week: Not on file     Minutes per session: Not on file    Stress: Not on file   Relationships    Social connections     Talks on phone: Not on file     Gets together: Not on file     Attends [employer] service: Not on file     Active member of club or organization: Not on file     Attends meetings of clubs or organizations: Not on file     Relationship status: Not on file    Intimate partner violence     Fear of current or ex partner: Not on file     Emotionally abused: Not on file     Physically abused: Not on file     Forced sexual activity: Not on file   Other Topics Concern    Not on file   Social History Narrative    Not on file         ALLERGIES: Patient has no known allergies. Review of Systems   Constitutional: Negative for fever. HENT: Positive for congestion. Eyes: Negative for pain. Respiratory: Positive for cough. Negative for chest tightness and shortness of breath. Cardiovascular: Negative for chest pain. Gastrointestinal: Negative for abdominal pain and vomiting. Musculoskeletal: Negative for back pain. Skin: Negative for rash. Neurological: Negative for light-headedness and headaches. All other systems reviewed and are negative. Vitals:    01/26/21 0054   BP: (!) 144/84   Pulse: 76   Resp: 18   Temp: 98.4 °F (36.9 °C)   SpO2: 94%   Weight: 74.8 kg (165 lb)   Height: 5' 2\" (1.575 m)            Physical Exam  Vitals signs and nursing note reviewed. Constitutional:       Appearance: She is well-developed. She is not diaphoretic. HENT:      Head: Normocephalic and atraumatic. Nose: Congestion present. Mouth/Throat:      Mouth: Mucous membranes are moist.      Pharynx: No oropharyngeal exudate or posterior oropharyngeal erythema. Eyes:      Pupils: Pupils are equal, round, and reactive to light. Neck:      Musculoskeletal: Normal range of motion.    Cardiovascular: Rate and Rhythm: Normal rate and regular rhythm. Heart sounds: No murmur. Pulmonary:      Effort: Pulmonary effort is normal. No respiratory distress. Breath sounds: No wheezing. Abdominal:      Palpations: Abdomen is soft. Tenderness: There is no abdominal tenderness. Musculoskeletal:         General: No tenderness. Skin:     General: Skin is dry. Capillary Refill: Capillary refill takes less than 2 seconds. Findings: No rash. Neurological:      Mental Status: She is alert and oriented to person, place, and time. MDM       Procedures    Vitals:  Patient Vitals for the past 12 hrs:   Temp Pulse Resp BP SpO2   01/26/21 0054 98.4 °F (36.9 °C) 76 18 (!) 144/84 94 %         Medications ordered:   Medications - No data to display      Lab findings:  No results found for this or any previous visit (from the past 12 hour(s)). X-Ray, CT or other radiology findings or impressions:  No orders to display       Progress notes, Consult notes or additional Procedure notes:   Af, mild htn otherwise nl vitals. No emc. Not c/w bact infection/pe/sepsis/cad/pulm edema. Most c/w viral uri. covid swab taken. No emc. Stable for dc and close f/u      Diagnosis:   1. Acute upper respiratory infection        Disposition: home    Follow-up Information     Follow up With Specialties Details [place] [address] EMERGENCY DEPT Emergency Medicine Go to  As needed, If symptoms worsen [address] [facility]    Lorna Gaviria MD Family Medicine   [address] [place] [address] [phone]             Patient's Medications   Start Taking    BENZONATATE (TESSALON PERLES) 100 MG CAPSULE    Take 1 Cap by mouth three (3) times daily as needed for Cough for up to 7 days.    Continue Taking    BISACODYL (DULCOLAX, BISACODYL,) 5 MG EC TABLET    Take as directed by prep instructions    CARVEDILOL (COREG) 25 MG TABLET    Take 25 mg by mouth two (2) times daily (with meals). CLONIDINE HCL (CATAPRES) 0.3 MG TABLET    Take 0.3 mg by mouth three (3) times daily. Indications: hypertension    CYCLOBENZAPRINE (FLEXERIL) 5 MG TABLET    Take 2 Tabs by mouth three (3) times daily (with meals). HYDROCORTISONE (ANUSOL-HC) 25 MG SUPP    Insert 1 Suppository into rectum every twelve (12) hours. HYDROXYZINE PAMOATE (VISTARIL) 25 MG CAPSULE    25 mg nightly. IBUPROFEN (MOTRIN) 800 MG TABLET    Take 1 Tab by mouth every eight (8) hours as needed for Pain (with food). NIFEDIPINE ER (PROCARDIA XL) 90 MG ER TABLET    Take 90 mg by mouth daily.     PHENYLEPHRINE-WITCH HAZEL (PREPARATION H,PE, WITCH HAZEL,) 0.25-50 % TOPICAL GEL    Apply to rectum as needed for discomfort    POLYETHYLENE GLYCOL (MIRALAX) 17 GRAM/DOSE POWDER    Take as directed by office   These Medications have changed    No medications on file   Stop Taking    No medications on file

## 2021-01-26 NOTE — PROGRESS NOTES
Date/Time:  1/26/2021 1:13 PM   Call within 2 business days of discharge: Yes   Attempted to reach patient by telephone. Left HIPPA compliant message requesting a return call. Will attempt to reach patient again.

## 2021-01-27 ENCOUNTER — PATIENT OUTREACH (OUTPATIENT)
Dept: CASE MANAGEMENT | Age: 61
End: 2021-01-27

## 2021-01-27 LAB — SARS-COV-2, COV2NT: DETECTED

## 2021-01-27 NOTE — PROGRESS NOTES
Date/Time:  1/27/2021 4:41 PM   Call within 2 business days of discharge: Yes   Attempted to reach patient by telephone. Left HIPPA compliant message requesting a return call. This is second attempt to contact patient. Episode resolved.

## 2021-03-13 ENCOUNTER — HOSPITAL ENCOUNTER (EMERGENCY)
Age: 61
Discharge: HOME | End: 2021-03-13
Attending: EMERGENCY MEDICINE
Payer: MEDICARE

## 2021-03-13 VITALS
OXYGEN SATURATION: 100 % | DIASTOLIC BLOOD PRESSURE: 77 MMHG | HEART RATE: 64 BPM | RESPIRATION RATE: 20 BRPM | BODY MASS INDEX: 30.18 KG/M2 | WEIGHT: 165 LBS | SYSTOLIC BLOOD PRESSURE: 148 MMHG | TEMPERATURE: 98.3 F

## 2021-03-13 DIAGNOSIS — K08.89 TOOTHACHE: Primary | ICD-10-CM

## 2021-03-13 PROCEDURE — 99282 EMERGENCY DEPT VISIT SF MDM: CPT

## 2021-03-13 RX ORDER — AMOXICILLIN 500 MG/1
500 TABLET, FILM COATED ORAL 3 TIMES DAILY
Qty: 30 TAB | Refills: 0 | Status: SHIPPED | OUTPATIENT
Start: 2021-03-13 | End: 2021-06-08 | Stop reason: CLARIF

## 2021-03-13 RX ORDER — OXYCODONE AND ACETAMINOPHEN 5; 325 MG/1; MG/1
1 TABLET ORAL
Qty: 12 TAB | Refills: 0 | Status: SHIPPED | OUTPATIENT
Start: 2021-03-13 | End: 2021-03-18

## 2021-03-13 NOTE — LETTER
NOTIFICATION RETURN TO WORK / SCHOOL 
 
3/13/2021 8:12 PM 
 
Ms. Madison Berry [address] [address] To Whom It May Concern: 
 
Madison Berry is currently under the care of [id number] [place] [address] EMERGENCY DEPT. She will return to work/school on: March 16, 2021 If there are questions or concerns please have the patient contact our office. Sincerely, Nel Encinas RN

## 2021-03-14 NOTE — ED PROVIDER NOTES
HPI patient is a 69-year-old female who broke a tooth 2 days ago. Patient C/O  having severe left lower rear tooth pain. No other injury. Past Medical History:   Diagnosis Date    Adenomatous polyp     CHF (congestive heart failure) (HCC)     Chronic kidney disease     TT    Dialysis [facility] ([facility]     tues, thurs, sat    ESRD (end stage renal disease) ([facility]     Family hx of colon cancer     Mother    Heart failure ([facility]     Hypertension     LUIS (obstructive sleep apnea)     no cpap       Past Surgical History:   Procedure Laterality Date    COLONOSCOPY N/A 9/27/2019    COLONOSCOPY with polyp bx performed by Scott Brody MD at [facility] HX APPENDECTOMY       [address] HX COLONOSCOPY  09/21/2016    colonoscopy, Dr. Aurora Malone, [address] [place] [address]  05/2020    HX OTHER SURGICAL       ARTERIOVENOUS FISTULA INSERTION LEFT ARM     HX TOTAL ABDOMINAL HYSTERECTOMY      HX TOTAL COLECTOMY  10/11/2016    LAPAROSCOPIC COLECTOMY PARTIAL RIGHT FOR CECAL POLYP, DR. PAEZ, [facility]    HX TUBAL LIGATION      VASCULAR SURGERY PROCEDURE UNLIST      left arm graft         Family History:   Problem Relation Age of Onset    Colon Cancer Mother     Colon Polyps Mother        Social History     Socioeconomic History    Marital status:      Spouse name: Not on file    Number of children: Not on file    Years of education: Not on file    Highest education level: Not on file   Occupational History    Not on file   Social Needs    Financial resource strain: Not on file    Food insecurity     Worry: Not on file     Inability: Not on file    Transportation needs     Medical: Not on file     Non-medical: Not on file   Tobacco Use    Smoking status: Former Smoker    Smokeless tobacco: Never Used   Substance and Sexual Activity    Alcohol use: No    Drug use: No    Sexual activity: Yes     Partners: Male   Lifestyle    Physical activity     Days per week: Not on file     Minutes per session: Not on file    Stress: Not on file   Relationships    Social connections     Talks on phone: Not on file     Gets together: Not on file     Attends Gnosticism service: Not on file     Active member of club or organization: Not on file     Attends meetings of clubs or organizations: Not on file     Relationship status: Not on file    Intimate partner violence     Fear of current or ex partner: Not on file     Emotionally abused: Not on file     Physically abused: Not on file     Forced sexual activity: Not on file   Other Topics Concern    Not on file   Social History Narrative    Not on file         ALLERGIES: Patient has no known allergies. Review of Systems   Constitutional: Negative. HENT: Positive for dental problem. Eyes: Negative. Respiratory: Negative. Cardiovascular: Negative. Gastrointestinal: Negative. Endocrine: Negative. Genitourinary: Negative. Musculoskeletal: Negative. Skin: Negative. Allergic/Immunologic: Negative. Neurological: Negative. Hematological: Negative. Psychiatric/Behavioral: Negative. All other systems reviewed and are negative. There were no vitals filed for this visit. Physical Exam  Vitals signs and nursing note reviewed. Constitutional:       General: She is not in acute distress. Appearance: She is well-developed. She is not diaphoretic. HENT:      Head: Normocephalic. Right Ear: External ear normal.      Left Ear: External ear normal.      Mouth/Throat:      Pharynx: No oropharyngeal exudate. Comments: Tooth# 17 (+) fx with tenderness with palpation. Normal mastication and speech. Eyes:      General: No scleral icterus. Right eye: No discharge. Left eye: No discharge. Conjunctiva/sclera: Conjunctivae normal.      Pupils: Pupils are equal, round, and reactive to light. Neck:      Musculoskeletal: Normal range of motion and neck supple. Thyroid: No thyromegaly. Vascular: No JVD. Trachea: No tracheal deviation. Cardiovascular:      Rate and Rhythm: Normal rate and regular rhythm. Heart sounds: Normal heart sounds. No murmur. No friction rub. No gallop. Pulmonary:      Effort: Pulmonary effort is normal. No respiratory distress. Breath sounds: Normal breath sounds. No stridor. No wheezing or rales. Chest:      Chest wall: No tenderness. Abdominal:      General: Bowel sounds are normal. There is no distension. Palpations: Abdomen is soft. There is no mass. Tenderness: There is no abdominal tenderness. There is no guarding or rebound. Musculoskeletal: Normal range of motion. General: No tenderness. Lymphadenopathy:      Cervical: No cervical adenopathy. Skin:     General: Skin is warm and dry. Coloration: Skin is not pale. Findings: No erythema or rash. Neurological:      Mental Status: She is alert and oriented to person, place, and time. Cranial Nerves: No cranial nerve deficit. Motor: No abnormal muscle tone. Coordination: Coordination normal.      Deep Tendon Reflexes: Reflexes normal.          MDM  Number of Diagnoses or Management Options  Risk of Complications, Morbidity, and/or Mortality  Presenting problems: moderate  Diagnostic procedures: moderate  Management options: moderate              Procedures    Dx: fx tooth, acute toothache    Disp: D/C home. F/U dentist in 2 days. Rx: amoxicillin, percocet  Return to ER prn. Dictation disclaimer:  Please note that this dictation was completed with [email or web address], the [employer] voice recognition software. Quite often unanticipated grammatical, syntax, homophones, and other interpretive errors are inadvertently transcribed by the computer software. Please disregard these errors. Please excuse any errors that have escaped final proofreading.

## 2021-03-14 NOTE — ED NOTES
Written and verbal discharge instructions given. Patient verbalizes understanding of same. Patient denies  further questions about treatment and discharge instructions. Left ED with patent airway and steady gait. Arm band removed shredded.  Patient left ED with RX to obtain    [facility], [address]

## 2021-04-06 ENCOUNTER — HOSPITAL ENCOUNTER (EMERGENCY)
Age: 61
Discharge: HOME | End: 2021-04-07
Attending: EMERGENCY MEDICINE
Payer: MEDICARE

## 2021-04-06 DIAGNOSIS — M79.604 PAIN OF RIGHT LOWER EXTREMITY: Primary | ICD-10-CM

## 2021-04-06 DIAGNOSIS — K08.89 PAIN, DENTAL: ICD-10-CM

## 2021-04-06 DIAGNOSIS — M54.31 SCIATICA OF RIGHT SIDE: ICD-10-CM

## 2021-04-06 PROCEDURE — 99282 EMERGENCY DEPT VISIT SF MDM: CPT

## 2021-04-07 VITALS
RESPIRATION RATE: 16 BRPM | OXYGEN SATURATION: 100 % | DIASTOLIC BLOOD PRESSURE: 92 MMHG | HEART RATE: 80 BPM | TEMPERATURE: 98.5 F | SYSTOLIC BLOOD PRESSURE: 180 MMHG

## 2021-04-07 RX ORDER — CYCLOBENZAPRINE HCL 10 MG
10 TABLET ORAL
Qty: 14 TAB | Refills: 0 | Status: SHIPPED | OUTPATIENT
Start: 2021-04-07

## 2021-04-07 RX ORDER — CYCLOBENZAPRINE HCL 10 MG
10 TABLET ORAL
Qty: 14 TAB | Refills: 0 | Status: SHIPPED | OUTPATIENT
Start: 2021-04-07 | End: 2021-04-07 | Stop reason: SDUPTHER

## 2021-04-07 RX ORDER — PENICILLIN V POTASSIUM 500 MG/1
500 TABLET, FILM COATED ORAL 3 TIMES DAILY
Qty: 21 TAB | Refills: 0 | Status: SHIPPED | OUTPATIENT
Start: 2021-04-07 | End: 2021-04-14

## 2021-04-07 RX ORDER — PENICILLIN V POTASSIUM 500 MG/1
500 TABLET, FILM COATED ORAL 3 TIMES DAILY
Qty: 21 TAB | Refills: 0 | Status: SHIPPED | OUTPATIENT
Start: 2021-04-07 | End: 2021-04-07 | Stop reason: SDUPTHER

## 2021-04-07 NOTE — ED TRIAGE NOTES
Awake, alert and oriented pt c/o right hip pain for the last 7 days. Pt states she was recently hospitalized and that is when the pain began. Denies injury.   Did not take any medications pta

## 2021-04-07 NOTE — DISCHARGE INSTRUCTIONS
Return for pain, swelling, bleeding, fever not resolving with motrin or tylenol, shortness of breath, vomiting, decreased fluid intake, weakness, numbness, dizziness, or any change or concerns or for lab work as discussed.

## 2021-04-07 NOTE — ED PROVIDER NOTES
Pt c/o rt lower buttocks pain, radiating down rt upper leg w movement only. No pain at rest.  No weakness or numbness. No swelling. H/o crf, on hd, last today. No sob, no chest pain. No fever or ocugh. No prior sim leg pain. Says admitted at [facility] for gi bleed 2-3 weeks ago, pain started there. Given flexeril, was doing better and pain resolved until ran out 4 days ago. No further bleeding. Past Medical History:   Diagnosis Date    Adenomatous polyp     CHF (congestive heart failure) (HCC)     Chronic kidney disease     [facility]    Dialysis patient ([facility] [address]     tues, thurs, sat    ESRD (end stage renal disease) ([facility] [address] [facility]     Family hx of colon cancer     Mother    Heart failure ([facility] [address]     Hypertension     LUIS (obstructive sleep apnea)     no cpap       Past Surgical History:   Procedure Laterality Date    COLONOSCOPY N/A 9/27/2019    COLONOSCOPY with polyp bx performed by India Franco MD at [address] HX APPENDECTOMY       [address] HX COLONOSCOPY  09/21/2016    colonoscopy, Dr. Bill Eduardo, [address] [place] [address]  05/2020    HX OTHER SURGICAL       ARTERIOVENOUS FISTULA INSERTION LEFT ARM     HX TOTAL ABDOMINAL HYSTERECTOMY      HX TOTAL COLECTOMY  10/11/2016    LAPAROSCOPIC COLECTOMY PARTIAL RIGHT FOR CECAL POLYP, DR[facility] PAE[facility], [facility]    HX TUBAL LIGATION      VASCULAR SURGERY PROCEDURE UNLIST      left arm graft         Family History:   Problem Relation Age of Onset    Colon Cancer Mother     Colon Polyps Mother        Social History     Socioeconomic History    Marital status:      Spouse name: Not on file    Number of children: Not on file    Years of education: Not on file    Highest education level: Not on file   Occupational History    Not on file   Social Needs    Financial resource strain: Not on file    Food insecurity     Worry: Not on file     Inability: Not on file    Transportation needs     Medical: Not on file Non-medical: Not on file   Tobacco Use    Smoking status: Former Smoker    Smokeless tobacco: Never Used   Substance and Sexual Activity    Alcohol use: No    Drug use: No    Sexual activity: Yes     Partners: Male   Lifestyle    Physical activity     Days per week: Not on file     Minutes per session: Not on file    Stress: Not on file   Relationships    Social connections     Talks on phone: Not on file     Gets together: Not on file     Attends Roman Catholic service: Not on file     Active member of club or organization: Not on file     Attends meetings of clubs or organizations: Not on file     Relationship status: Not on file    Intimate partner violence     Fear of current or ex partner: Not on file     Emotionally abused: Not on file     Physically abused: Not on file     Forced sexual activity: Not on file   Other Topics Concern    Not on file   Social History Narrative    Not on file         ALLERGIES: Patient has no known allergies. Review of Systems   Constitutional: Negative for fever. HENT: Negative for congestion. Respiratory: Negative for cough and shortness of breath. Cardiovascular: Negative for chest pain. Gastrointestinal: Negative for abdominal pain and vomiting. Musculoskeletal: Positive for myalgias. Skin: Negative for rash. Neurological: Negative for light-headedness. All other systems reviewed and are negative. Vitals:    04/07/21 0010   BP: (!) 180/92   Pulse: 80   Resp: 16   Temp: 98.5 °F (36.9 °C)   SpO2: 100%            Physical Exam  Vitals signs and nursing note reviewed. Constitutional:       Appearance: She is well-developed. She is not diaphoretic. HENT:      Head: Normocephalic and atraumatic. Eyes:      Pupils: Pupils are equal, round, and reactive to light. Neck:      Musculoskeletal: Normal range of motion. Cardiovascular:      Rate and Rhythm: Normal rate and regular rhythm. Heart sounds: No murmur.    Pulmonary:      Effort: Pulmonary effort is normal.      Breath sounds: No wheezing. Abdominal:      Palpations: Abdomen is soft. Tenderness: There is no abdominal tenderness. Musculoskeletal:         General: Tenderness (+ post hip/thigh ttp.  no swelling. from. nvi. from) present. Skin:     General: Skin is dry. Capillary Refill: Capillary refill takes less than 2 seconds. Findings: No rash. Neurological:      Mental Status: She is alert and oriented to person, place, and time. Psychiatric:         Mood and Affect: Mood normal.          MDM       Procedures      Vitals:  No data found. Medications ordered:   Medications - No data to display      Lab findings:  No results found for this or any previous visit (from the past 12 hour(s)). X-Ray, CT or other radiology findings or impressions:  No orders to display       Progress notes, Consult notes or additional Procedure notes:   12:51 AM pt declines lab work to r/u dvt as recommended. req flexeril refill and dc. Urged pt to ret for cont pain or worsening or if she changes mind. Nvi. No emc. strealbe for dc and close f/u  Det ret inst given. Diagnosis:   1. Pain of right lower extremity    2. Sciatica of right side    3.  Pain, dental        Disposition: home    Follow-up Information     Follow up With Specialties Details [place] [address] EMERGENCY DEPT Emergency Medicine Go to  As needed [address] [facility] [facility]  Schedule an appointment as soon as possible for a visit in 2 days  Tiana Silva [phone]    [facility]  Schedule an appointment as soon as possible for a visit in 2 days or your family physician Karen Lee 3  [address] [facility] [address] [facility] [address] [id number]  [phone]             Discharge Medication List as of 4/7/2021 12:49 AM      START taking these medications    Details   penicillin v potassium (VEETID) 500 mg tablet Take 1 Tab by mouth three (3) times daily for 7 days. , Normal, Disp-21 Tab, R-0      cyclobenzaprine (FLEXERIL) 10 mg tablet Take 1 Tab by mouth three (3) times daily as needed for Muscle Spasm(s). , Normal, Disp-14 Tab, R-0         CONTINUE these medications which have NOT CHANGED    Details   amoxicillin 500 mg tab Take 500 mg by mouth three (3) times daily for 10 days. , Normal, Disp-30 Tab, R-0      bisacodyl (DULCOLAX, BISACODYL,) 5 mg EC tablet Take as directed by prep instructions, Normal, Disp-4 Tab, R-0      polyethylene glycol (MIRALAX) 17 gram/dose powder Take as directed by office, Normal, Disp-255 g, R-0      hydrOXYzine pamoate (VISTARIL) 25 mg capsule 25 mg nightly., Historical Med      carvedilol (COREG) 25 mg tablet Take 25 mg by mouth two (2) times daily (with meals). , Historical Med      NIFEdipine ER (PROCARDIA XL) 90 mg ER tablet Take 90 mg by mouth daily. , Historical Med      cloNIDine HCl (CATAPRES) 0.3 mg tablet Take 0.3 mg by mouth three (3) times daily.  Indications: hypertension, Historical Med

## 2021-06-07 ENCOUNTER — HOSPITAL ENCOUNTER (EMERGENCY)
Age: 61
Discharge: HOME | End: 2021-06-08
Attending: EMERGENCY MEDICINE
Payer: MEDICARE

## 2021-06-07 DIAGNOSIS — L03.119 CELLULITIS AND ABSCESS OF LEG, EXCEPT FOOT: Primary | ICD-10-CM

## 2021-06-07 DIAGNOSIS — L02.419 CELLULITIS AND ABSCESS OF LEG, EXCEPT FOOT: Primary | ICD-10-CM

## 2021-06-07 PROCEDURE — 90471 IMMUNIZATION ADMIN: CPT

## 2021-06-07 PROCEDURE — 99283 EMERGENCY DEPT VISIT LOW MDM: CPT

## 2021-06-07 NOTE — LETTER
NOTIFICATION RETURN TO WORK / SCHOOL 
 
6/8/2021 2:14 AM 
 
[facility] [address] To Whom It May Concern: 
 
Benita Funez is currently under the care of [id number] [place] [address] EMERGENCY DEPT. She will return to work/school on 6/10/2021. If there are questions or concerns please have the patient contact our office. Sincerely, Татьяна Walsh RN

## 2021-06-08 VITALS
HEART RATE: 70 BPM | TEMPERATURE: 98 F | RESPIRATION RATE: 16 BRPM | OXYGEN SATURATION: 97 % | SYSTOLIC BLOOD PRESSURE: 176 MMHG | WEIGHT: 165 LBS | DIASTOLIC BLOOD PRESSURE: 85 MMHG | BODY MASS INDEX: 30.18 KG/M2

## 2021-06-08 PROCEDURE — 74011250636 HC RX REV CODE- 250/636: Performed by: EMERGENCY MEDICINE

## 2021-06-08 PROCEDURE — 90715 TDAP VACCINE 7 YRS/> IM: CPT | Performed by: EMERGENCY MEDICINE

## 2021-06-08 PROCEDURE — 74011250637 HC RX REV CODE- 250/637: Performed by: EMERGENCY MEDICINE

## 2021-06-08 PROCEDURE — 90471 IMMUNIZATION ADMIN: CPT

## 2021-06-08 RX ORDER — CLINDAMYCIN HYDROCHLORIDE 300 MG/1
300 CAPSULE ORAL 4 TIMES DAILY
Qty: 40 CAPSULE | Refills: 0 | Status: SHIPPED | OUTPATIENT
Start: 2021-06-08 | End: 2021-06-18

## 2021-06-08 RX ORDER — CLINDAMYCIN HYDROCHLORIDE 150 MG/1
300 CAPSULE ORAL
Status: COMPLETED | OUTPATIENT
Start: 2021-06-08 | End: 2021-06-08

## 2021-06-08 RX ADMIN — TETANUS TOXOID, REDUCED DIPHTHERIA TOXOID AND ACELLULAR PERTUSSIS VACCINE, ADSORBED 0.5 ML: 5; 2.5; 8; 8; 2.5 SUSPENSION INTRAMUSCULAR at 02:02

## 2021-06-08 RX ADMIN — CLINDAMYCIN HYDROCHLORIDE 300 MG: 150 CAPSULE ORAL at 01:54

## 2021-06-08 NOTE — ED PROVIDER NOTES
HPI patient a 64-year-old female who presents to the ER with having an insect bite that is painful on her right lower leg 3 to 4 days. Patient is a dialysis patient. Tetanus[de-identified] Not up-to-date. Past Medical History:   Diagnosis Date    Adenomatous polyp     CHF (congestive heart failure) (HCC)     Chronic kidney disease     [facility]    D[facility] patient ([facility] [address]     tues, thurs, sat    ESRD (end stage renal disease) ([facility]     Family hx of colon cancer     Mother    GI bleed due to NSAIDs     Heart failure ([facility] [address] [facility]     History of blood transfusion     Hypertension     LUIS (obstructive sleep apnea)     no cpap       Past Surgical History:   Procedure Laterality Date    COLONOSCOPY N/A 9/27/2019    COLONOSCOPY with polyp bx performed by Eric López MD at [address] HX APPENDECTOMY       [address] HX COLONOSCOPY  09/21/2016    colonoscopy, Dr. Catrachita Moreno, [address] [place] [address]  05/2020    HX OTHER SURGICAL       ARTERIOVENOUS FISTULA INSERTION LEFT ARM     HX TOTAL ABDOMINAL HYSTERECTOMY      HX TOTAL COLECTOMY  10/11/2016    LAPAROSCOPIC [facility]    HX TUBAL LIGATION      VASCULAR SURGERY PROCEDURE UNLIST      left arm graft         Family History:   Problem Relation Age of Onset    Colon Cancer Mother     Colon Polyps Mother        Social History     Socioeconomic History    Marital status:      Spouse name: Not on file    Number of children: Not on file    Years of education: Not on file    Highest education level: Not on file   Occupational History    Not on file   Tobacco Use    Smoking status: Former Smoker    Smokeless tobacco: Never Used   Substance and Sexual Activity    Alcohol use: No    Drug use: No    Sexual activity: Yes     Partners: Male   Other Topics Concern    Not on file   Social History Narrative    Not on file     Social Determinants of Health     Financial Resource Strain:     Difficulty of Paying Living Expenses:    Food Insecurity:     Worried About Running Out of Food in the Last Year:     [address] in the Last Year:    Transportation Needs:     Lack of Transportation (Medical):  Lack of Transportation (Non-Medical):    Physical Activity:     Days of Exercise per Week:     Minutes of Exercise per Session:    Stress:     Feeling of Stress :    Social Connections:     Frequency of Communication with Friends and Family:     Frequency of Social Gatherings with Friends and Family:     Attends Baptism Services:     Active Member of Clubs or Organizations:     Attends Club or Organization Meetings:     Marital Status:    Intimate Partner Violence:     Fear of Current or Ex-Partner:     Emotionally Abused:     Physically Abused:     Sexually Abused: ALLERGIES: Patient has no known allergies. Review of Systems   Constitutional: Negative. HENT: Negative. Eyes: Negative. Respiratory: Negative. Cardiovascular: Negative. Gastrointestinal: Negative. Endocrine: Negative. Genitourinary: Negative. Musculoskeletal: Negative. RIGHT LOWER LEG: (+) insect bite   Skin: Negative. Allergic/Immunologic: Negative. Neurological: Negative. Hematological: Negative. Psychiatric/Behavioral: Negative. All other systems reviewed and are negative. Vitals:    06/07/21 2308   BP: (!) 177/114   Pulse: 70   Resp: 16   Temp: 98.8 °F (37.1 °C)   SpO2: 99%   Weight: 74.8 kg (165 lb)            Physical Exam  Musculoskeletal:      Comments: RIGHT LOWER LEG: (+) 1 cm area of erythema with tenderness over mid lower calf. Normal pulses and sensory. MDM  Number of Diagnoses or Management Options  Risk of Complications, Morbidity, and/or Mortality  Presenting problems: low  Diagnostic procedures: low  Management options: low         Procedures    Dx; Insect bite, cellulitis    Disp: D/C  Home. Rx: clindamycin.   Return to ER prn.  F/U PCP in 2 ot 3 days. Dictation disclaimer:  Please note that this dictation was completed with [employer], the computer voice recognition software. Quite often unanticipated grammatical, syntax, homophones, and other interpretive errors are inadvertently transcribed by the computer software. Please disregard these errors. Please excuse any errors that have escaped final proofreading.

## 2021-06-08 NOTE — ED NOTES
Pt discharged/ambulatory to home. Instructed to follow up with her PCP today, to clean wound BID with soap and water (extra occlusive dressings provided), and to return to ED for worsening pain/redness/swelling/fever/other concerns. No distress noted. S/sx of adverse reactions to Tet Dipth shot discussed, as well as pt education form for tetanus diptheria provided.

## 2021-06-14 ENCOUNTER — HOSPITAL ENCOUNTER (EMERGENCY)
Age: 61
Discharge: HOME | End: 2021-06-15
Attending: EMERGENCY MEDICINE
Payer: MEDICARE

## 2021-06-14 VITALS
WEIGHT: 165 LBS | RESPIRATION RATE: 18 BRPM | HEIGHT: 62 IN | SYSTOLIC BLOOD PRESSURE: 185 MMHG | BODY MASS INDEX: 30.36 KG/M2 | TEMPERATURE: 98.6 F | DIASTOLIC BLOOD PRESSURE: 104 MMHG | OXYGEN SATURATION: 96 % | HEART RATE: 72 BPM

## 2021-06-14 DIAGNOSIS — M79.2 PERIPHERAL NEUROPATHIC PAIN: Primary | ICD-10-CM
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
## 2021-06-14 DIAGNOSIS — S80.861D INSECT BITE OF RIGHT LOWER LEG, SUBSEQUENT ENCOUNTER: ICD-10-CM

## 2021-06-14 LAB
ALBUMIN SERPL-MCNC: 3.4 G/DL (ref 3.4–5)
ALBUMIN/GLOB SERPL: 0.8 {RATIO} (ref 0.8–1.7)
ALP SERPL-CCNC: 189 U/L (ref 45–117)
ALT SERPL-CCNC: 16 U/L (ref 13–56)
ANION GAP SERPL CALC-SCNC: 8 MMOL/L (ref 3–18)
AST SERPL-CCNC: 13 U/L (ref 10–38)
BASOPHILS # BLD: 0.1 K/UL (ref 0–0.1)
BASOPHILS NFR BLD: 1 % (ref 0–2)
BILIRUB SERPL-MCNC: 0.3 MG/DL (ref 0.2–1)
BUN SERPL-MCNC: 88 MG/DL (ref 7–18)
BUN/CREAT SERPL: 7 (ref 12–20)
CALCIUM SERPL-MCNC: 7.9 MG/DL (ref 8.5–10.1)
CHLORIDE SERPL-SCNC: 103 MMOL/L (ref 100–111)
CO2 SERPL-SCNC: 27 MMOL/L (ref 21–32)
CREAT SERPL-MCNC: 11.9 MG/DL (ref 0.6–1.3)
DIFFERENTIAL METHOD BLD: ABNORMAL
EOSINOPHIL # BLD: 0.4 K/UL (ref 0–0.4)
EOSINOPHIL NFR BLD: 6 % (ref 0–5)
ERYTHROCYTE [DISTWIDTH] IN BLOOD BY AUTOMATED COUNT: 17.6 % (ref 11.6–14.5)
GLOBULIN SER CALC-MCNC: 4.1 G/DL (ref 2–4)
GLUCOSE SERPL-MCNC: 107 MG/DL (ref 74–99)
HCT VFR BLD AUTO: 36.9 % (ref 35–45)
HGB BLD-MCNC: 11.4 G/DL (ref 12–16)
LYMPHOCYTES # BLD: 1.5 K/UL (ref 0.9–3.6)
LYMPHOCYTES NFR BLD: 28 % (ref 21–52)
MCH RBC QN AUTO: 28.6 PG (ref 24–34)
MCHC RBC AUTO-ENTMCNC: 30.9 G/DL (ref 31–37)
MCV RBC AUTO: 92.7 FL (ref 74–97)
MONOCYTES # BLD: 0.6 K/UL (ref 0.05–1.2)
MONOCYTES NFR BLD: 11 % (ref 3–10)
NEUTS SEG # BLD: 2.9 K/UL (ref 1.8–8)
NEUTS SEG NFR BLD: 53 % (ref 40–73)
PLATELET # BLD AUTO: 248 K/UL (ref 135–420)
PMV BLD AUTO: 11.3 FL (ref 9.2–11.8)
POTASSIUM SERPL-SCNC: 5.4 MMOL/L (ref 3.5–5.5)
PROT SERPL-MCNC: 7.5 G/DL (ref 6.4–8.2)
RBC # BLD AUTO: 3.98 M/UL (ref 4.2–5.3)
SODIUM SERPL-SCNC: 138 MMOL/L (ref 136–145)
WBC # BLD AUTO: 5.5 K/UL (ref 4.6–13.2)

## 2021-06-14 PROCEDURE — 99283 EMERGENCY DEPT VISIT LOW MDM: CPT

## 2021-06-14 PROCEDURE — 85025 COMPLETE CBC W/AUTO DIFF WBC: CPT

## 2021-06-14 PROCEDURE — 80053 COMPREHEN METABOLIC PANEL: CPT

## 2021-06-14 PROCEDURE — 74011250637 HC RX REV CODE- 250/637: Performed by: EMERGENCY MEDICINE

## 2021-06-14 RX ORDER — GABAPENTIN 300 MG/1
300 CAPSULE ORAL
Status: COMPLETED | OUTPATIENT
Start: 2021-06-14 | End: 2021-06-14

## 2021-06-14 RX ADMIN — GABAPENTIN 300 MG: 300 CAPSULE ORAL at 23:37

## 2021-06-15 RX ORDER — GABAPENTIN 400 MG/1
400 CAPSULE ORAL 2 TIMES DAILY
Qty: 20 CAPSULE | Refills: 0 | Status: SHIPPED | OUTPATIENT
Start: 2021-06-15 | End: 2022-05-28

## 2021-06-15 RX ORDER — LIDOCAINE 50 MG/G
PATCH TOPICAL
Qty: 10 EACH | Refills: 0 | Status: SHIPPED | OUTPATIENT
Start: 2021-06-15 | End: 2022-01-08

## 2021-06-15 NOTE — ED PROVIDER NOTES
HPI patient is a 52-year-old female who was seen in emergency 1 week ago for a cellulitis of her right leg. She states the cellulitis have improved however she has pain in the old anterior surface that radiate up and down. No recent trauma to the leg. Past Medical History:   Diagnosis Date    Adenomatous polyp     CHF (congestive heart failure) (HCC)     Chronic kidney disease     TT    Dialysis patient ([facility]     tues, thurs, sat    ESRD (end stage renal disease) ([facility]     Family hx of colon cancer     Mother    GI bleed due to NSAIDs     Heart failure ([facility]     History of blood transfusion     Hypertension     LUIS (obstructive sleep apnea)     no cpap       Past Surgical History:   Procedure Laterality Date    COLONOSCOPY N/A 9/27/2019    COLONOSCOPY with polyp bx performed by Ankit Schrader MD at [address] HX APPENDECTOMY       [address] HX COLONOSCOPY  09/21/2016    colonoscopy, Dr. Rahul Marquis, [address] [place] [address]  05/2020    HX OTHER SURGICAL       ARTERIOVENOUS FISTULA INSERTION LEFT ARM     HX TOTAL ABDOMINAL HYSTERECTOMY      HX TOTAL COLECTOMY  10/11/2016    LAPAROSCOPIC COLECTOMY PARTIAL RIGHT FOR CECAL POLYP, DR. PAEZ, [facility]    HX TUBAL LIGATION      VASCULAR SURGERY PROCEDURE UNLIST      left arm graft         Family History:   Problem Relation Age of Onset    Colon Cancer Mother     Colon Polyps Mother        Social History     Socioeconomic History    Marital status:      Spouse name: Not on file    Number of children: Not on file    Years of education: Not on file    Highest education level: Not on file   Occupational History    Not on file   Tobacco Use    Smoking status: Former Smoker    Smokeless tobacco: Never Used   Substance and Sexual Activity    Alcohol use: No    Drug use: No    Sexual activity: Yes     Partners: Male   Other Topics Concern    Not on file   Social History Narrative    Not on file Social Determinants of Health     Financial Resource Strain:     Difficulty of Paying Living Expenses:    Food Insecurity:     Worried About Running Out of Food in the Last Year:     [address] in the Last Year:    Transportation Needs:     Lack of Transportation (Medical):  Lack of Transportation (Non-Medical):    Physical Activity:     Days of Exercise per Week:     Minutes of Exercise per Session:    Stress:     Feeling of Stress :    Social Connections:     Frequency of Communication with Friends and Family:     Frequency of Social Gatherings with Friends and Family:     Attends Shinto Services:     Active Member of Clubs or Organizations:     Attends Club or Organization Meetings:     Marital Status:    Intimate Partner Violence:     Fear of Current or Ex-Partner:     Emotionally Abused:     Physically Abused:     Sexually Abused: ALLERGIES: Patient has no known allergies. Review of Systems   Constitutional: Negative. HENT: Negative. Eyes: Negative. Respiratory: Negative. Cardiovascular: Negative. Gastrointestinal: Negative. Endocrine: Negative. Genitourinary: Negative. Musculoskeletal: Positive for arthralgias. Joint swelling: right leg. Skin: Negative. Allergic/Immunologic: Negative. Neurological: Negative. Hematological: Negative. Psychiatric/Behavioral: Negative. All other systems reviewed and are negative. Vitals:    06/14/21 2246   BP: (!) 185/104   Pulse: 72   Resp: 18   Temp: 98.6 °F (37 °C)   SpO2: 96%   Weight: 74.8 kg (165 lb)   Height: 5' 2\" (1.575 m)            Physical Exam  Vitals and nursing note reviewed. Constitutional:       General: She is not in acute distress. Appearance: She is well-developed. She is not diaphoretic. HENT:      Head: Normocephalic. Right Ear: External ear normal.      Left Ear: External ear normal.      Mouth/Throat:      Pharynx: No oropharyngeal exudate.    Eyes: General: No scleral icterus. Right eye: No discharge. Left eye: No discharge. Conjunctiva/sclera: Conjunctivae normal.      Pupils: Pupils are equal, round, and reactive to light. Neck:      Thyroid: No thyromegaly. Vascular: No JVD. Trachea: No tracheal deviation. Cardiovascular:      Rate and Rhythm: Normal rate and regular rhythm. Heart sounds: Normal heart sounds. No murmur heard. No friction rub. No gallop. Pulmonary:      Effort: Pulmonary effort is normal. No respiratory distress. Breath sounds: Normal breath sounds. No stridor. No wheezing or rales. Chest:      Chest wall: No tenderness. Abdominal:      General: Bowel sounds are normal. There is no distension. Palpations: Abdomen is soft. There is no mass. Tenderness: There is no abdominal tenderness. There is no guarding or rebound. Musculoskeletal:         General: No tenderness. Normal range of motion. Cervical back: Normal range of motion and neck supple. Comments: Right lower leg: Moderate pain with palpation over the anterior lateral surface. No edema. Normal pulse and sensory. Positive for a 1 cm wound ecchymosis area over the distal lateral leg improve from previous ER visit 1 week ago. Lymphadenopathy:      Cervical: No cervical adenopathy. Skin:     General: Skin is warm and dry. Coloration: Skin is not pale. Findings: No erythema or rash. Neurological:      Mental Status: She is alert and oriented to person, place, and time. Cranial Nerves: No cranial nerve deficit. Motor: No abnormal muscle tone.       Coordination: Coordination normal.      Deep Tendon Reflexes: Reflexes normal.          MDM  Number of Diagnoses or Management Options     Amount and/or Complexity of Data Reviewed  Clinical lab tests: ordered and reviewed       Lab Tests: Interpreted by me    Procedures      Diagnosis: Peripheral neuropathy: Resolving cellulitis of the leg    Disposition: Discharge home, follow-up PCP in 3 to 4 days. Continue current medication (antibiotics. Gabapentin (use bid prn leg pain). Return ER as needed    Dictation disclaimer:  Please note that this dictation was completed with [employer], the computer voice recognition software. Quite often unanticipated grammatical, syntax, homophones, and other interpretive errors are inadvertently transcribed by the computer software. Please disregard these errors. Please excuse any errors that have escaped final proofreading.

## 2021-06-15 NOTE — ED TRIAGE NOTES
Patient c/o insect bite on the right lower leg that is getting worse, was seen in the ED 10 days ago and still taking  her antibiotic as prescribed

## 2021-06-15 NOTE — ED NOTES
Pt feeling much better. I have reviewed discharge instructions with the patient. The patient verbalized understanding. Patient armband removed and given to patient to take home. Patient was informed of the privacy risks if armband lost or stolen  Current Discharge Medication List      START taking these medications    Details   gabapentin (NEURONTIN) 400 mg capsule Take 1 Capsule by mouth two (2) times a day. Max Daily Amount: 800 mg. Indications: neuropathic pain  Qty: 20 Capsule, Refills: 0  Start date: 6/15/2021    Associated Diagnoses: Peripheral neuropathic pain; Insect bite of right lower leg, subsequent encounter      lidocaine (Lidoderm) 5 % Apply patch to the affected area for 12 hours a day and remove for 12 hours a day.   Qty: 10 Each, Refills: 0  Start date: 6/15/2021

## 2021-07-05 ENCOUNTER — APPOINTMENT (OUTPATIENT)
Dept: GENERAL RADIOLOGY | Age: 61
End: 2021-07-05
Attending: PHYSICIAN ASSISTANT
Payer: COMMERCIAL

## 2021-07-05 ENCOUNTER — HOSPITAL ENCOUNTER (EMERGENCY)
Age: 61
Discharge: HOME | End: 2021-07-06
Attending: EMERGENCY MEDICINE | Admitting: EMERGENCY MEDICINE
Payer: COMMERCIAL

## 2021-07-05 VITALS
HEIGHT: 62 IN | RESPIRATION RATE: 18 BRPM | BODY MASS INDEX: 31.1 KG/M2 | HEART RATE: 111 BPM | SYSTOLIC BLOOD PRESSURE: 194 MMHG | OXYGEN SATURATION: 97 % | TEMPERATURE: 99 F | WEIGHT: 169 LBS | DIASTOLIC BLOOD PRESSURE: 108 MMHG

## 2021-07-05 DIAGNOSIS — E87.5 ACUTE HYPERKALEMIA: ICD-10-CM

## 2021-07-05 DIAGNOSIS — E87.79 VOLUME OVERLOAD STATE OF HEART: Primary | ICD-10-CM

## 2021-07-05 DIAGNOSIS — Z99.2 ENCOUNTER FOR DIALYSIS (HCC): ICD-10-CM

## 2021-07-05 LAB
ALBUMIN SERPL-MCNC: 3.2 G/DL (ref 3.4–5)
ALBUMIN/GLOB SERPL: 0.7 {RATIO} (ref 0.8–1.7)
ALP SERPL-CCNC: 168 U/L (ref 45–117)
ALT SERPL-CCNC: 55 U/L (ref 13–56)
ANION GAP SERPL CALC-SCNC: 11 MMOL/L (ref 3–18)
AST SERPL-CCNC: 70 U/L (ref 10–38)
BASOPHILS # BLD: 0.1 K/UL (ref 0–0.1)
BASOPHILS NFR BLD: 1 % (ref 0–2)
BILIRUB SERPL-MCNC: 0.4 MG/DL (ref 0.2–1)
BUN SERPL-MCNC: 128 MG/DL (ref 7–18)
BUN/CREAT SERPL: 8 (ref 12–20)
CALCIUM SERPL-MCNC: 7.6 MG/DL (ref 8.5–10.1)
CHLORIDE SERPL-SCNC: 103 MMOL/L (ref 100–111)
CK MB CFR SERPL CALC: NORMAL % (ref 0–4)
CK MB SERPL-MCNC: <1 NG/ML (ref 5–25)
CK SERPL-CCNC: 80 U/L (ref 26–192)
CO2 SERPL-SCNC: 23 MMOL/L (ref 21–32)
CREAT SERPL-MCNC: 15.8 MG/DL (ref 0.6–1.3)
DIFFERENTIAL METHOD BLD: ABNORMAL
EOSINOPHIL # BLD: 0 K/UL (ref 0–0.4)
EOSINOPHIL NFR BLD: 1 % (ref 0–5)
ERYTHROCYTE [DISTWIDTH] IN BLOOD BY AUTOMATED COUNT: 17.5 % (ref 11.6–14.5)
GLOBULIN SER CALC-MCNC: 4.7 G/DL (ref 2–4)
GLUCOSE SERPL-MCNC: 128 MG/DL (ref 74–99)
HBV SURFACE AG SER QL: <0.1 INDEX
HBV SURFACE AG SER QL: NEGATIVE
HCT VFR BLD AUTO: 35 % (ref 35–45)
HGB BLD-MCNC: 10.8 G/DL (ref 12–16)
LYMPHOCYTES # BLD: 1.2 K/UL (ref 0.9–3.6)
LYMPHOCYTES NFR BLD: 15 % (ref 21–52)
MCH RBC QN AUTO: 28.1 PG (ref 24–34)
MCHC RBC AUTO-ENTMCNC: 30.9 G/DL (ref 31–37)
MCV RBC AUTO: 91.1 FL (ref 74–97)
MONOCYTES # BLD: 0.4 K/UL (ref 0.05–1.2)
MONOCYTES NFR BLD: 5 % (ref 3–10)
NEUTS SEG # BLD: 6 K/UL (ref 1.8–8)
NEUTS SEG NFR BLD: 79 % (ref 40–73)
PLATELET # BLD AUTO: 168 K/UL (ref 135–420)
PMV BLD AUTO: 12.5 FL (ref 9.2–11.8)
POTASSIUM SERPL-SCNC: 7.2 MMOL/L (ref 3.5–5.5)
PROT SERPL-MCNC: 7.9 G/DL (ref 6.4–8.2)
RBC # BLD AUTO: 3.84 M/UL (ref 4.2–5.3)
SODIUM SERPL-SCNC: 137 MMOL/L (ref 136–145)
TROPONIN I SERPL-MCNC: 0.02 NG/ML (ref 0–0.04)
WBC # BLD AUTO: 7.6 K/UL (ref 4.6–13.2)

## 2021-07-05 PROCEDURE — G0257 UNSCHED DIALYSIS ESRD PT HOS: HCPCS

## 2021-07-05 PROCEDURE — 86706 HEP B SURFACE ANTIBODY: CPT

## 2021-07-05 PROCEDURE — 90935 HEMODIALYSIS ONE EVALUATION: CPT

## 2021-07-05 PROCEDURE — 82553 CREATINE MB FRACTION: CPT

## 2021-07-05 PROCEDURE — 74011250636 HC RX REV CODE- 250/636: Performed by: EMERGENCY MEDICINE

## 2021-07-05 PROCEDURE — 74011636637 HC RX REV CODE- 636/637: Performed by: EMERGENCY MEDICINE

## 2021-07-05 PROCEDURE — 85025 COMPLETE CBC W/AUTO DIFF WBC: CPT

## 2021-07-05 PROCEDURE — 74011000250 HC RX REV CODE- 250: Performed by: EMERGENCY MEDICINE

## 2021-07-05 PROCEDURE — 87340 HEPATITIS B SURFACE AG IA: CPT

## 2021-07-05 PROCEDURE — 93005 ELECTROCARDIOGRAM TRACING: CPT

## 2021-07-05 PROCEDURE — 71046 X-RAY EXAM CHEST 2 VIEWS: CPT

## 2021-07-05 PROCEDURE — 96374 THER/PROPH/DIAG INJ IV PUSH: CPT

## 2021-07-05 PROCEDURE — 99284 EMERGENCY DEPT VISIT MOD MDM: CPT

## 2021-07-05 PROCEDURE — 96375 TX/PRO/DX INJ NEW DRUG ADDON: CPT

## 2021-07-05 PROCEDURE — 80053 COMPREHEN METABOLIC PANEL: CPT

## 2021-07-05 RX ORDER — DIPHENHYDRAMINE HYDROCHLORIDE 50 MG/ML
INJECTION, SOLUTION INTRAMUSCULAR; INTRAVENOUS
Status: DISCONTINUED
Start: 2021-07-05 | End: 2021-07-06 | Stop reason: HOSPADM

## 2021-07-05 RX ORDER — DEXTROSE 50 % IN WATER (D50W) INTRAVENOUS SYRINGE
25
Status: COMPLETED | OUTPATIENT
Start: 2021-07-05 | End: 2021-07-05

## 2021-07-05 RX ORDER — CALCIUM GLUCONATE 94 MG/ML
1 INJECTION, SOLUTION INTRAVENOUS ONCE
Status: COMPLETED | OUTPATIENT
Start: 2021-07-05 | End: 2021-07-05

## 2021-07-05 RX ORDER — DIPHENHYDRAMINE HCL 12.5MG/5ML
12.5 ELIXIR ORAL ONCE
Status: DISCONTINUED | OUTPATIENT
Start: 2021-07-05 | End: 2021-07-06 | Stop reason: HOSPADM

## 2021-07-05 RX ORDER — SODIUM BICARBONATE 1 MEQ/ML
50 SYRINGE (ML) INTRAVENOUS
Status: COMPLETED | OUTPATIENT
Start: 2021-07-05 | End: 2021-07-05

## 2021-07-05 RX ADMIN — CALCIUM GLUCONATE 1 G: 98 INJECTION, SOLUTION INTRAVENOUS at 15:37

## 2021-07-05 RX ADMIN — Medication 50 MEQ: at 15:37

## 2021-07-05 RX ADMIN — DEXTROSE MONOHYDRATE 25 G: 25 INJECTION, SOLUTION INTRAVENOUS at 15:37

## 2021-07-05 RX ADMIN — HUMAN INSULIN 10 UNITS: 100 INJECTION, SOLUTION SUBCUTANEOUS at 15:37

## 2021-07-05 NOTE — PROGRESS NOTES
Called by ED for emergency dialysis as k is 7.2 and patient is sob  Orders placed  Dialysis nurse informed

## 2021-07-05 NOTE — ED PROVIDER NOTES
EMERGENCY DEPARTMENT HISTORY AND PHYSICAL EXAM    3:02 PM      Date: 7/5/2021  Patient Name: Claudia Osullivan    History of Presenting Illness     No chief complaint on file. Nephrologist: Angi Lopez    History Provided By: patient    Additional History (Context): Claudia Osullivan is a 62 y.o. female presents with missed dialysis on Saturday over the holiday weekend, today which is Monday, call through outpatient center her unable to get her in, she is having dyspnea with any exertion. Minimal at rest but with walking or rolling over in bed become severe. No chest pain. No syncopal episodes or cough. No fevers. Miriam Cold PCP: Anrdew García MD    Chief Complaint:   Duration:    Timing:    Location:   Quality:   Severity:   Modifying Factors:   Associated Symptoms:       Current Outpatient Medications   Medication Sig Dispense Refill    gabapentin (NEURONTIN) 400 mg capsule Take 1 Capsule by mouth two (2) times a day. Max Daily Amount: 800 mg. Indications: neuropathic pain 20 Capsule 0    lidocaine (Lidoderm) 5 % Apply patch to the affected area for 12 hours a day and remove for 12 hours a day. 10 Each 0    psyllium husk (METAMUCIL PO) Take  by mouth.  cyclobenzaprine (FLEXERIL) 10 mg tablet Take 1 Tab by mouth three (3) times daily as needed for Muscle Spasm(s). 14 Tab 0    bisacodyl (DULCOLAX, BISACODYL,) 5 mg EC tablet Take as directed by prep instructions 4 Tab 0    hydrOXYzine pamoate (VISTARIL) 25 mg capsule 25 mg nightly.  carvedilol (COREG) 25 mg tablet Take 25 mg by mouth two (2) times daily (with meals).  NIFEdipine ER (PROCARDIA XL) 90 mg ER tablet Take 90 mg by mouth daily.  cloNIDine HCl (CATAPRES) 0.3 mg tablet Take 0.3 mg by mouth three (3) times daily.  Indications: hypertension         Past History     Past Medical History:  Past Medical History:   Diagnosis Date    Adenomatous polyp     [facility]     Chronic kidney disease     [facility]    Dialysis patient ([facility]     lewises, thping, sat    ESRD (end stage renal disease) [facility]     Family hx of colon cancer     Mother    GI bleed due to NSAIDs     Heart failure ([facility]     History of blood transfusion     Hypertension     LUIS (obstructive sleep apnea)     no cpap       Past Surgical History:  Past Surgical History:   Procedure Laterality Date    COLONOSCOPY N/A 9/27/2019    COLONOSCOPY with polyp bx performed by Alonzo Workman MD at [address] HX APPENDECTOMY       [address] HX COLONOSCOPY  09/21/2016    colonoscopy, Dr. Yani Briceño, [address] [place] [address]  05/2020    HX OTHER SURGICAL       ARTERIOVENOUS FISTULA INSERTION LEFT ARM     HX TOTAL ABDOMINAL HYSTERECTOMY      HX TOTAL COLECTOMY  10/11/2016    LAPAROSCOPIC COLECTOMY PARTIAL RIGHT FOR CECAL POLYP, DR. PAEZ, [facility]    HX TUBAL LIGATION      VASCULAR SURGERY PROCEDURE UNLIST      left arm graft       Family History:  Family History   Problem Relation Age of Onset    Colon Cancer Mother     Colon Polyps Mother        Social History:  Social History     Tobacco Use    Smoking status: Former Smoker    Smokeless tobacco: Never Used   Substance Use Topics    Alcohol use: No    Drug use: No       Allergies:  No Known Allergies      Review of Systems     Review of Systems   Constitutional: Negative for diaphoresis and fever. HENT: Negative for congestion and sore throat. Eyes: Negative for pain and itching. Respiratory: Positive for shortness of breath. Negative for cough. Cardiovascular: Negative for chest pain and palpitations. Gastrointestinal: Negative for abdominal pain and diarrhea. Endocrine: Negative for polydipsia and polyuria. Genitourinary: Negative for dysuria and hematuria. Musculoskeletal: Negative for arthralgias and myalgias. Skin: Negative for rash and wound. Neurological: Negative for seizures and syncope. Hematological: Does not bruise/bleed easily. Psychiatric/Behavioral: Negative for agitation and hallucinations. Physical Exam       Patient Vitals for the past 12 hrs:   Temp Pulse Resp BP SpO2   07/05/21 1440 98.4 °F (36.9 °C) 81 18 (!) 159/98 97 %       Physical Exam  Vitals and nursing note reviewed. Constitutional:       Appearance: She is well-developed. HENT:      Head: Normocephalic and atraumatic. Eyes:      General: No scleral icterus. Conjunctiva/sclera: Conjunctivae normal.   Neck:      Vascular: No JVD. Cardiovascular:      Rate and Rhythm: Normal rate and regular rhythm. Heart sounds: Normal heart sounds. Comments: 4 intact extremity pulses  Pulmonary:      Effort: Pulmonary effort is normal.      Breath sounds: Normal breath sounds. Abdominal:      Palpations: Abdomen is soft. There is no mass. Tenderness: There is no abdominal tenderness. Musculoskeletal:         General: Normal range of motion. Cervical back: Normal range of motion and neck supple. Lymphadenopathy:      Cervical: No cervical adenopathy. Skin:     General: Skin is warm and dry. Neurological:      Mental Status: She is alert. Diagnostic Study Results   Labs -  No results found for this or any previous visit (from the past 12 hour(s)). Radiologic Studies -   XR CHEST PA LAT    (Results Pending)     No results found. Medications ordered:   Medications - No data to display      Medical Decision Making   Initial Medical Decision Making and DDx: We will contact nephrology for run of dialysis    Do not suspect concomitant alarming illness, alarming electrolyte abnormality anemia ACS pneumonia    I personally reviewed chest x-ray, possible early edema versus no significant change. ED Course: Progress Notes, Reevaluation, and Consults:  ED Course as of Jul 14 1500   Mon Jul 05, 2021   1445 At this time went to see patient found her going to x-ray will not interrupt her care will assess her afterwards.   Blood work is already been on in triage    [CB]   1513 Twelve-lead EKG sinus rhythm at 79 no acute process no signs to suggest hyperkalemia    [CB]   1536 Potassium 7.2. Calling nephrology this is an indication for emergent dialysis. Bicarb insulin dextrose calcium ordered. 1 hour critical care time management of fluid overload and concomitant hyperkalemia    [CB]   1540 Discussed with Dr. Sonia Bradshaw on-call for nephrology discussed potassium, no oxygen requirement medications to treat potassium. He will arrange dialysis    [CB]      ED Course User Index  [CB] Magalie Buitrago MD         I am the first provider for this patient. I reviewed the vital signs, available nursing notes, past medical history, past surgical history, family history and social history. Patient Vitals for the past 12 hrs:   Temp Pulse Resp BP SpO2   07/05/21 1440 98.4 °F (36.9 °C) 81 18 (!) 159/98 97 %       Vital Signs-Reviewed the patient's vital signs. Pulse Oximetry Analysis, Cardiac Monitor, 12 lead ekg:       Interpreted by the EP. Records Reviewed: Nursing notes reviewed (Time of Review: 3:02 PM)    Procedures:   Critical Care Time:   Aspirin: (was aspirin given for stroke?)    Diagnosis     Clinical Impression: No diagnosis found. Disposition:       Follow-up Information    None          Patient's Medications   Start Taking    No medications on file   Continue Taking    BISACODYL (DULCOLAX, BISACODYL,) 5 MG EC TABLET    Take as directed by prep instructions    CARVEDILOL (COREG) 25 MG TABLET    Take 25 mg by mouth two (2) times daily (with meals). CLONIDINE HCL (CATAPRES) 0.3 MG TABLET    Take 0.3 mg by mouth three (3) times daily. Indications: hypertension    CYCLOBENZAPRINE (FLEXERIL) 10 MG TABLET    Take 1 Tab by mouth three (3) times daily as needed for Muscle Spasm(s). GABAPENTIN (NEURONTIN) 400 MG CAPSULE    Take 1 Capsule by mouth two (2) times a day. Max Daily Amount: 800 mg.  Indications: neuropathic pain HYDROXYZINE PAMOATE (VISTARIL) 25 MG CAPSULE    25 mg nightly. LIDOCAINE (LIDODERM) 5 %    Apply patch to the affected area for 12 hours a day and remove for 12 hours a day. NIFEDIPINE ER (PROCARDIA XL) 90 MG ER TABLET    Take 90 mg by mouth daily. PSYLLIUM HUSK (METAMUCIL PO)    Take  by mouth.    These Medications have changed    No medications on file   Stop Taking    No medications on file     _______________________________    Notes:    Violetta Lemus MD using Dragon dictation      _______________________________

## 2021-07-05 NOTE — ED NOTES
Missed dialysis Saturday. Notes dyspnea. I performed a brief evaluation, including history and physical, of the patient here in triage and I have determined that pt will need further treatment and evaluation from the main side ER physician. I have placed initial orders to help in expediting patients care.      July 05, 2021 at 2:37 PM - GINNY Oleary

## 2021-07-06 LAB
ATRIAL RATE: 79 BPM
CALCULATED P AXIS, ECG09: 47 DEGREES
CALCULATED R AXIS, ECG10: -56 DEGREES
CALCULATED T AXIS, ECG11: 76 DEGREES
DIAGNOSIS, 93000: NORMAL
HBV SURFACE AB SER QL IA: POSITIVE
HBV SURFACE AB SERPL IA-ACNC: 272.55 MIU/ML
HEP BS AB COMMENT,HBSAC: NORMAL
P-R INTERVAL, ECG05: 162 MS
Q-T INTERVAL, ECG07: 424 MS
QRS DURATION, ECG06: 98 MS
QTC CALCULATION (BEZET), ECG08: 486 MS
VENTRICULAR RATE, ECG03: 79 BPM

## 2021-07-06 NOTE — ED NOTES
I have received the patient in turnover from Dr. Marlen Adams. The patient presented to the ED this evening with volume overload and hyperkalemia. She was treated for her hyperkalemia and then has been sent to dialysis. When she returns from dialysis, I will reevaluate her to see if she is appropriate for being discharged. I evaluated the patient after dialysis. She states that she is feeling much better. She feels like she is appropriate for discharge at this time. I will discharge the patient home and she will be advised to follow-up with her nephrologist as previously scheduled. Return precautions have been given.

## 2021-07-06 NOTE — DIALYSIS
ACUTE HEMODIALYSIS TREATMENT    HEMODIALYSIS ORDERS: Physician: Dr. Francois Donovan     Dialyzer: Revaclear   Duration: 3 hr   BFR: 450   DFR: 800   Dialysate:  Temp 36-37*C   K+  2    Ca+ 2.5   Na 138   Bicarb 35   Wt Readings from Last 1 Encounters:   07/05/21 76.7 kg (169 lb)    Patient Chart [x]   Unable to Obtain []  Dry weight/UF Goal: 3000 ml    Heparin []  Bolus    Units    [] Hourly    Units    [x]None       Pre BP:   167/105   Pulse:  81   Respirations: 18   Temp:  98.8  [] Oral [] Axillary [] Esophageal   Labs: []  Pre  []  Post:   [x] N/A   Additional Orders(medications, blood products, hypotension management): [] Yes   [] No     [x]  [facility] Consent Verified     GRAFT/FISTULA ACCESS:   []N/A     []Right     [x]Left     [x]UE     []LE   [x]AVG   []AVF       [x]Medical Aseptic Prep Utilized   [x]No S/S infection  []Redness  []Drainage [] Cultured  [] Swelling  [] Pain  Bruit:   [x] Strong    [] Weak       Thrill :   [x] Strong    [] Weak     Needle Gauge: 15   Length: 1 inch   If access problem,  notified: []Yes     [x]N/A          GENERAL ASSESSMENT:    LUNGS:  Resp Rate 18   [x] Clear  [] Coarse  [] Crackles  [] Wheezing  [] Diminished         Respirations:  [x]Easy  []Labored  []N/A  Cough:  []Productive  [x]Dry  []N/A      Therapy:  [x]RA  O2 Type:  []NC  Mask: []  NRB    [] BiPaP  Flow:  l/min                   [] Ventilated  [] Intubated  [] Trach     CARDIAC: [x] Regular      [] Irregular   [] Rhythm:          [] Monitored   [] Bedside   [] Remotely monitored     EDEMA: [] None   [x]Generalized  [] Pitting [] 1+   [] 2 +   [] 3+    [] 4+  [] Pedal    SKIN:   [x] Warm  [] Hot     [] Cold   [x] Dry     [] Pale   [] Diaphoretic                  [] Flushed  [] Jaundiced  [] Cyanotic     LOC:    [x] Alert      [x]Oriented:    [x] Person     [x] Place  [x]Time               [] Confused  [] Lethargic  [] Medicated  [] Non-responsive  [] Non Verbal   GI / ABDOMEN:                     [] Flat    [] Distended    [x] Soft    [] Firm   []  Obese                   [] Diarrhea   [] FMS [x] Bowel Sounds  [] Nausea  [] Vomiting                   [] NGT  [] OGT    [] PEG  [] Tube Feedings @     / URINE ASSESSMENT:                   [x] Voiding  []  Garcia  [] Oliguria  [] Anuria                     [] Incontinent  []  Incontinent Brief   []  PureWick     PAIN:  [x] 0 []1  []2   []3   []4   []5   []6   []7   []8   []9   []10                MOBILITY:  [x] Bed    [] Stretcher      All Vitals and Treatment Details on Attached [address]: [facility]          Room # KRISTY/KRISTY    [x] Routine         [] 1st Time Acute/Chronic   [] Urgent      [] Stat            [x] Acute Room   []  Bedside    [] ICU/CCU     [] ER   Isolation Precautions:  [x] Dialysis    There are currently no Active Isolations     ALLERGIES:     No Known Allergies   Code Status:  No Order     Hepatitis Status      Lab Results   Component Value Date/Time    Hepatitis B surface Ag <0.10 07/05/2021 02:58 PM        Current Labs:      Lab Results   Component Value Date/Time    WBC 7.6 07/05/2021 02:58 PM    Hemoglobin, POC 12.6 09/27/2019 07:30 AM    HGB 10.8 (L) 07/05/2021 02:58 PM    Hematocrit, POC 37 09/27/2019 07:30 AM    HCT 35.0 07/05/2021 02:58 PM    PLATELET 696 00/13/9806 02:58 PM    MCV 91.1 07/05/2021 02:58 PM     Lab Results   Component Value Date/Time    Sodium 137 07/05/2021 02:58 PM    Potassium 7.2 (HH) 07/05/2021 02:58 PM    Chloride 103 07/05/2021 02:58 PM    CO2 23 07/05/2021 02:58 PM    Anion gap 11 07/05/2021 02:58 PM    Glucose 128 (H) 07/05/2021 02:58 PM     (H) 07/05/2021 02:58 PM    Creatinine 15.80 (H) 07/05/2021 02:58 PM    BUN/Creatinine ratio 8 (L) 07/05/2021 02:58 PM    GFR est AA 3 (L) 07/05/2021 02:58 PM    GFR est non-AA 2 (L) 07/05/2021 02:58 PM    Calcium 7.6 (L) 07/05/2021 02:58 PM          DIET:  None     PRIMARY NURSE REPORT:   Pre Dialysis: Petty Nuñez  RN    Time: 1630      EDUCATION:    [x] Patient Knowledge Basis: [x]None []Minimal [] Substantial [] Unknown  Barriers to learning  []N/A  [] Intubated/Trached/Ventilated  [] Sedated/Paralyzed   [] Access Care     [] S&S of infection  [] Fluid Management  [] K+   [x] Procedural    [] Medications   [] Tx Options   [] Transplant   [] Diet      Teaching Tools:  [x] Explain  [] Demo  [] Handouts [] Video  Patient response: [] Verbalized understanding   [] Requires follow up        [x] Time Out/Safety Check    [x] Extracorporeal Circuit Tested for integrity       RO/HEMODIALYSIS MACHINE SAFETY CHECKS - Before each treatment:        [address] [place] [address]                                     [x] Unit Machine # 8 with centralized RO                                    [] Portable Machine #1/RO serial # [id number]                                  [] Portable Machine #2/RO serial # [id number]                                  [] Portable Machine #4/RO serial # [id number]                                  [] Portable Machine #10/RO serial #  [id number]                                                                                                         Alarm Test:  Pass time 3335            [x] RO/Machine Log Complete    Machine Temp    36-37*C             Dialysate: pH 7.4    Conductivity: Meter 14.1   HD Machine  14.1     TCD: 14  Dialyzer Lot # [id number]     Blood Tubing Lot # [id number]     Saline Lot # [id number]     CHLORINE TESTING-Before each treatment and every 4 hours    Total Chlorine: [x] less than 0.1 ppm  Initial Time Check: 1700       4 Hr/2nd Check Time:    (if greater than 0.1 ppm from Primary then every 30 minutes from Secondary)     TREATMENT INITIATION - with Dialysis Precautions:   [x] All Connections Secured              [x] Saline Line Double Clamped   [x] Venous Parameters Set               [x] Arterial Parameters Set    [x] Prime Given 250ml NSS              [x]Air Foam Detector Engaged      Treatment Initiation Note:  Received Patient from ER awake a/o x3, assessed and stable for treatment. LUE AVf Accessed with no signs or symptoms of complication. Treatment initiated      During Treatment Notes:  1800  Benadryl 12.5 mg iv given per Dr. Sonia Elizondo. 1815  Vascular access visible with arterial and venous line connections intact. Pt resting comfortably. 1820  Vascular access visible with arterial and venous line connections intact. Pt resting comfortably. 1845  Vascular access visible with arterial and venous line connections intact. Pt resting comfortably. 1900  Vascular access visible with arterial and venous line connections intact. Pt resting comfortably. 1915  Vascular access visible with arterial and venous line connections intact. Pt resting comfortably. 1930  Vascular access visible with arterial and venous line connections intact. Pt resting comfortably. 1945  Vascular access visible with arterial and venous line connections intact. Pt resting comfortably. 2000  Vascular access visible with arterial and venous line connections intact. Pt resting comfortably. 2015  Vascular access visible with arterial and venous line connections intact. Pt resting comfortably. 2030  Vascular access visible with arterial and venous line connections intact. Pt resting comfortably. 2040  Vascular access visible with arterial and venous line connections intact. Pt resting comfortably. 2051  Dialysis treatment complete.        Medication Dose Volume Route Time [facility] Nurse, Title   Benadryl 12.5 mg 0.025 HD 1800 Elena Rene, NEGRITA      HD  Elena Rene RN      HD  Elena Rene RN     Post Assessment  Dialyzer Cleared:   [x] Good  [] Fair  [] Poor  Blood processed:  70.6 L  UF Removed:  3000 Ml    Post /108   Pulse  111 Resp  18  Temp 99 Lungs: [x] Clear [] Course  [] Crackles                 []  Wheezing   [] Diminished   Post Tx Vascular Access: [] N/A  AVF/AVG: Bleeding stopped with  Arterial Pressure for 10 min   Venous Pressure for 10 min      Cardiac :[x] Regular   [] Irregular   Rhythm:  [] Monitored   [] Not Monitored    CVC Catheter: [] N/A  Locking solution: Heparin 1:1000 U  Arterial port  ml   Venous port  ml   Edema:  [] None  [x] Generalized                     Skin:[x] Warm  [x] Dry [] Diaphoretic               [] Flushed  [] Pale [] Cyanotic Pain:  [x]0  []1 []2  []3 []4  []5  []6  []7 []8    []9  []10     Post Treatment Note:    Pt. Completed and  Tolerated 3 hrs of hemo dialysisl. 3000 ml of fluid removed. Arterial and venous needles removed and pressure applied until bleeding stopped. Dry dressings applied. Bruit/Thrill present above and below dressings.       POST TREATMENT PRIMARY NURSE HANDOFF REPORT:   Post Dialysis: Mala Tillman  RN               Time:  2127       Abbreviations: AVG-arterial venous graft, AVF-arterial venous fistula, IJ-Internal Jugular, Subcl-Subclavian, Fem-Femoral, Tx-treatment, AP/HR-apical heart rate, VSS- Vital Signs Stable, CVC- Central Venous Catheter, DFR-dialysate flow rate, BFR-blood flow rate, AP-arterial pressure, -venous pressure, UF-ultrafiltrate, TMP-transmembrane pressure, Jani-Venous, Art-Arterial, RO-Reverse Osmosis

## 2021-07-14 ENCOUNTER — HOSPITAL ENCOUNTER (EMERGENCY)
Age: 61
Discharge: STILL ADMITTED OR ADMINISTRATIVE | End: 2021-07-14

## 2021-07-20 ENCOUNTER — TRANSCRIBE ORDER (OUTPATIENT)
Dept: SCHEDULING | Age: 61
End: 2021-07-20

## 2021-07-20 DIAGNOSIS — I50.9 HEART FAILURE, UNSPECIFIED (HCC): ICD-10-CM

## 2021-07-20 DIAGNOSIS — I10 HTN (HYPERTENSION): Primary | ICD-10-CM

## 2021-07-30 ENCOUNTER — HOSPITAL ENCOUNTER (OUTPATIENT)
Dept: NON INVASIVE DIAGNOSTICS | Age: 61
Discharge: HOME | End: 2021-07-30
Attending: NURSE PRACTITIONER
Payer: COMMERCIAL

## 2021-07-30 VITALS
SYSTOLIC BLOOD PRESSURE: 194 MMHG | DIASTOLIC BLOOD PRESSURE: 108 MMHG | HEIGHT: 62 IN | BODY MASS INDEX: 31.1 KG/M2 | WEIGHT: 169 LBS

## 2021-07-30 DIAGNOSIS — I10 HTN (HYPERTENSION): ICD-10-CM

## 2021-07-30 DIAGNOSIS — I50.9 HEART FAILURE, UNSPECIFIED (HCC): ICD-10-CM

## 2021-07-30 LAB
ECHO AO ROOT DIAM: 3.16 CM
ECHO LV GLOBAL LONGITUDINAL STRAIN (GLS): -13.2 PERCENT
ECHO LV INTERNAL DIMENSION DIASTOLIC: 4.12 CM (ref 3.9–5.3)
ECHO LV INTERNAL DIMENSION SYSTOLIC: 2.18 CM
ECHO LV IVSD: 2.41 CM (ref 0.6–0.9)
ECHO LV MASS 2D: 532.9 G (ref 67–162)
ECHO LV MASS INDEX 2D: 299.4 G/M2 (ref 43–95)
ECHO LV POSTERIOR WALL DIASTOLIC: 2.39 CM (ref 0.6–0.9)
ECHO LVOT DIAM: 2.04 CM
GLOBAL LONGITUDINAL STRAIN 2 CHAMBER: -12.7 PERCENT
GLOBAL LONGITUDINAL STRAIN 4 CHAMBER: -14.5 PERCENT
GLOBAL LONGITUDINAL STRAIN LONG AXIS: -12.5 PERCENT

## 2021-07-30 PROCEDURE — 93308 TTE F-UP OR LMTD: CPT

## 2021-10-07 ENCOUNTER — TRANSCRIBE ORDER (OUTPATIENT)
Dept: SCHEDULING | Age: 61
End: 2021-10-07

## 2021-10-07 DIAGNOSIS — Z12.31 VISIT FOR SCREENING MAMMOGRAM: Primary | ICD-10-CM

## 2021-10-08 NOTE — PROGRESS NOTES
[facility] - Anel Cohen  [address] [facility]  [phone] fax     Physical Therapy Discharge Summary     Patient name: Skyler Sher Start of Care:  3/2/18   Referral source: Rosa M Joyner, * : 1960   Medical/Treatment Diagnosis: Neck pain [M54.2]  Neck sprain [S13. 9XXA] Onset Date: 17   Prior Hospitalization: see medical history Provider#: [id number]   Medications: Verified on Patient Summary List      Comorbidities:  HTN  Prior Level of Function: Ind with ADLs  Visits from Start of Care:  6                                                                                   QQZVJA Visits: 0        Reporting Period : 2017 to 2018     Summary of Care:  Goal: Patient will improve cervical rotation AROM to right to 60 degrees in order to increase ease of ADLs. Status at last note/certification: progressing well ~50 degrees 3-23-18  Status at discharge: not met     Goal:  Patient will demonstrate understanding of updated HEP in order to continue to improve following D/C. Status at last note/certification: Met 00  Status at discharge: met         Carry    Goal  CK= 40-59%   D/C  CL= 60-79%     The severity rating is based on clinical judgment and the FOTO score.        ASSESSMENT/RECOMMENDATIONS: pt cont to present with mod pain but good progress with AROM of c/s. She also please with TRP for UT, Lev and parascapular muscles; edu pt on massage therapy or use of tennis ball/theracane at home for self STM/DTM/TPR; verbalized good understanding.  PT firmly requested to be discharge; stating that she feels much better already and she will cont to work on the tightness/nodes with home exercises; will follow up with MD soon for further instruction.      [x]Discontinue therapy:             [x]Patient has reached or is progressing toward set goals       [x]Patient is non-compliant or has abdicated       []Due to lack of appreciable progress towards set goals     Jayden Padilla, PT 3/23/2018 10:43 AM Billing Type: Third-Party Bill Expected Date Of Service: 10/08/2021 Bill For Surgical Tray: no

## 2021-10-30 ENCOUNTER — HOSPITAL ENCOUNTER (EMERGENCY)
Age: 61
Discharge: HOME | End: 2021-10-30
Attending: EMERGENCY MEDICINE
Payer: MEDICARE

## 2021-10-30 ENCOUNTER — APPOINTMENT (OUTPATIENT)
Dept: GENERAL RADIOLOGY | Age: 61
End: 2021-10-30
Attending: EMERGENCY MEDICINE
Payer: MEDICARE

## 2021-10-30 VITALS
OXYGEN SATURATION: 95 % | DIASTOLIC BLOOD PRESSURE: 81 MMHG | TEMPERATURE: 98.6 F | HEART RATE: 67 BPM | SYSTOLIC BLOOD PRESSURE: 131 MMHG | RESPIRATION RATE: 17 BRPM

## 2021-10-30 DIAGNOSIS — J06.9 UPPER RESPIRATORY TRACT INFECTION, UNSPECIFIED TYPE: Primary | ICD-10-CM

## 2021-10-30 PROCEDURE — 99282 EMERGENCY DEPT VISIT SF MDM: CPT

## 2021-10-30 PROCEDURE — 71045 X-RAY EXAM CHEST 1 VIEW: CPT

## 2021-10-30 RX ORDER — AZITHROMYCIN 250 MG/1
TABLET, FILM COATED ORAL
Qty: 6 TABLET | Refills: 0 | Status: SHIPPED | OUTPATIENT
Start: 2021-10-30 | End: 2021-11-04

## 2021-10-30 NOTE — ED PROVIDER NOTES
The patient is a 62-year-old woman with past medical history significant for end-stage renal disease on dialysis, Tuesday, Thursday, and Saturday, who presented to the ED today with chest congestion and a stuffy nose. She has also can concerned because she cannot breathe out of her nose. She feels like she has \"cold in her chest and in her neck. \"    She does state that she has had her Covid immunizations. Patient states that she is in a little bit of a rush to get home and declines a full work-up at this time. She does not feel as though she needs blood drawn. Past Medical History:   Diagnosis Date    Adenomatous polyp     [facility]congestive [facility] failure) [facility]     Chronic kidney disease     [facility]    Dialysis patient ([facility]     tues, thurs, sat    ESRD (end stage renal disease) ([facility]     Family hx of colon cancer     Mother    GI bleed due to NSAIDs     Heart failure ([facility]     History of blood transfusion     Hypertension     LUIS (obstructive sleep apnea)     no cpap       Past Surgical History:   Procedure Laterality Date    COLONOSCOPY N/A 9/27/2019    COLONOSCOPY with polyp bx performed by Jhonathan Huerta MD at [address] HX APPENDECTOMY       [address] HX COLONOSCOPY  09/21/2016    colonoscopy, Dr. Khari Gibbs, [address] [place] [address]  05/2020    HX OTHER SURGICAL       ARTERIOVENOUS FISTULA INSERTION LEFT ARM     HX TOTAL ABDOMINAL HYSTERECTOMY      HX TOTAL COLECTOMY  10/11/2016    LAPAROSCOPIC COLECTOMY PARTIAL RIGHT FOR CECAL POLYP, DR. PAEZ, [facility]    HX TUBAL LIGATION      VASCULAR SURGERY PROCEDURE UNLIST      left arm graft         Family History:   Problem Relation Age of Onset    Colon Cancer Mother     Colon Polyps Mother        Social History     Socioeconomic History    Marital status:      Spouse name: Not on file    Number of children: Not on file    Years of education: Not on file    Highest education level: Not on file   Occupational History    Not on file   Tobacco Use    Smoking status: Former Smoker    Smokeless tobacco: Never Used   Substance and Sexual Activity    Alcohol use: No    Drug use: No    Sexual activity: Yes     Partners: Male   Other Topics Concern    Not on file   Social History Narrative    Not on file     Social Determinants of Health     Financial Resource Strain:     Difficulty of Paying Living Expenses:    Food Insecurity:     Worried About Running Out of Food in the Last Year:     [address] in the Last Year:    Transportation Needs:     Lack of Transportation (Medical):  Lack of Transportation (Non-Medical):    Physical Activity:     Days of Exercise per Week:     Minutes of Exercise per Session:    Stress:     Feeling of Stress :    Social Connections:     Frequency of Communication with Friends and Family:     Frequency of Social Gatherings with Friends and Family:     Attends Religion Services:     Active Member of Clubs or Organizations:     Attends Club or Organization Meetings:     Marital Status:    Intimate Partner Violence:     Fear of Current or Ex-Partner:     Emotionally Abused:     Physically Abused:     Sexually Abused: ALLERGIES: Patient has no known allergies. Review of Systems   All other systems reviewed and are negative. Vitals:    10/30/21 0211   BP: 131/81   Pulse: 67   Resp: 17   Temp: 98.6 °F (37 °C)   SpO2: 95%            Physical Exam  Vitals and nursing note reviewed. Constitutional:       Appearance: Normal appearance. HENT:      Head: Normocephalic and atraumatic. Right Ear: Tympanic membrane, ear canal and external ear normal.      Left Ear: Tympanic membrane, ear canal and external ear normal.      Nose: Congestion present.       Comments: Swollen and erythematous turbinates     Mouth/Throat:      Mouth: Mucous membranes are moist.      Comments: Erythema of the throat with some cobblestoning  Eyes: Extraocular Movements: Extraocular movements intact. Conjunctiva/sclera: Conjunctivae normal.      Pupils: Pupils are equal, round, and reactive to light. Cardiovascular:      Rate and Rhythm: Normal rate and regular rhythm. Pulses: Normal pulses. Heart sounds: Normal heart sounds. Pulmonary:      Effort: Pulmonary effort is normal.      Breath sounds: Normal breath sounds. Abdominal:      General: Abdomen is flat. Bowel sounds are normal.      Palpations: Abdomen is soft. Musculoskeletal:         General: Normal range of motion. Cervical back: Normal range of motion and neck supple. Skin:     General: Skin is warm and dry. Capillary Refill: Capillary refill takes less than 2 seconds. Neurological:      General: No focal deficit present. Mental Status: She is alert and oriented to person, place, and time. Psychiatric:         Mood and Affect: Mood normal.         Behavior: Behavior normal.         Thought Content: Thought content normal.         Judgment: Judgment normal.          CXR: My read no acute cardiopulmonary abnormalities    MDM  Number of Diagnoses or Management Options  Upper respiratory tract infection, unspecified type  Diagnosis management comments: Patient is a 81-year-old woman with a history of end-stage renal disease on dialysis, as well as all of the medical problems listed above, who presents to the ED today with nasal congestion and feeling like she has \"cold in her chest and her neck. \"  Her chest x-ray by my read is negative for pneumonia. She is afebrile. She is Covid immunized. She declines blood work at this time. She is requesting that we give her a \"Z-Malcolm and something to help and stuff her nose. \"  She will be discharged home with a prescription for a azithromycin and Mynor-Synephrine. She will be advised to follow-up with her nephrologist at dialysis tomorrow. Return precautions have been given.          Procedures

## 2021-10-30 NOTE — ED TRIAGE NOTES
Patient c/o cough and nasal congestion that started about 3 days ago. Patient is also a dialysis patient and concerned about weight fluctuations.

## 2021-12-14 ENCOUNTER — APPOINTMENT (OUTPATIENT)
Dept: GENERAL RADIOLOGY | Age: 61
End: 2021-12-14
Attending: STUDENT IN AN ORGANIZED HEALTH CARE EDUCATION/TRAINING PROGRAM
Payer: MEDICARE

## 2021-12-14 ENCOUNTER — HOSPITAL ENCOUNTER (EMERGENCY)
Age: 61
Discharge: HOME | End: 2021-12-14
Attending: STUDENT IN AN ORGANIZED HEALTH CARE EDUCATION/TRAINING PROGRAM
Payer: MEDICARE

## 2021-12-14 VITALS
RESPIRATION RATE: 16 BRPM | DIASTOLIC BLOOD PRESSURE: 99 MMHG | SYSTOLIC BLOOD PRESSURE: 148 MMHG | HEART RATE: 63 BPM | HEIGHT: 62 IN | OXYGEN SATURATION: 96 % | BODY MASS INDEX: 32.2 KG/M2 | TEMPERATURE: 98.6 F | WEIGHT: 175 LBS

## 2021-12-14 DIAGNOSIS — R06.02 SHORTNESS OF BREATH: Primary | ICD-10-CM

## 2021-12-14 DIAGNOSIS — N18.6 ANEMIA DUE TO CHRONIC KIDNEY DISEASE, ON CHRONIC DIALYSIS (HCC): ICD-10-CM

## 2021-12-14 DIAGNOSIS — L29.9 PRURITUS: ICD-10-CM

## 2021-12-14 DIAGNOSIS — D63.1 ANEMIA DUE TO CHRONIC KIDNEY DISEASE, ON CHRONIC DIALYSIS (HCC): ICD-10-CM

## 2021-12-14 DIAGNOSIS — N18.6 ESRD (END STAGE RENAL DISEASE) ON DIALYSIS (HCC): ICD-10-CM

## 2021-12-14 DIAGNOSIS — Z99.2 ANEMIA DUE TO CHRONIC KIDNEY DISEASE, ON CHRONIC DIALYSIS (HCC): ICD-10-CM

## 2021-12-14 DIAGNOSIS — Z99.2 ESRD (END STAGE RENAL DISEASE) ON DIALYSIS (HCC): ICD-10-CM

## 2021-12-14 LAB
ALBUMIN SERPL-MCNC: 3.1 G/DL (ref 3.4–5)
ALBUMIN/GLOB SERPL: 0.7 {RATIO} (ref 0.8–1.7)
ALP SERPL-CCNC: 119 U/L (ref 45–117)
ALT SERPL-CCNC: 19 U/L (ref 13–56)
ANION GAP SERPL CALC-SCNC: 11 MMOL/L (ref 3–18)
AST SERPL-CCNC: 26 U/L (ref 10–38)
BASOPHILS # BLD: 0 K/UL (ref 0–0.1)
BASOPHILS NFR BLD: 0 % (ref 0–2)
BILIRUB SERPL-MCNC: 0.2 MG/DL (ref 0.2–1)
BUN SERPL-MCNC: 96 MG/DL (ref 7–18)
BUN/CREAT SERPL: 9 (ref 12–20)
CALCIUM SERPL-MCNC: 8.4 MG/DL (ref 8.5–10.1)
CHLORIDE SERPL-SCNC: 100 MMOL/L (ref 100–111)
CO2 SERPL-SCNC: 26 MMOL/L (ref 21–32)
CREAT SERPL-MCNC: 11.1 MG/DL (ref 0.6–1.3)
DIFFERENTIAL METHOD BLD: ABNORMAL
EOSINOPHIL # BLD: 0 K/UL (ref 0–0.4)
EOSINOPHIL NFR BLD: 0 % (ref 0–5)
ERYTHROCYTE [DISTWIDTH] IN BLOOD BY AUTOMATED COUNT: 16.2 % (ref 11.6–14.5)
GLOBULIN SER CALC-MCNC: 4.5 G/DL (ref 2–4)
GLUCOSE SERPL-MCNC: 105 MG/DL (ref 74–99)
HCT VFR BLD AUTO: 33.7 % (ref 35–45)
HGB BLD-MCNC: 10.4 G/DL (ref 12–16)
IMM GRANULOCYTES # BLD AUTO: 0 K/UL
IMM GRANULOCYTES NFR BLD AUTO: 0 %
LIPASE SERPL-CCNC: 253 U/L (ref 73–393)
LYMPHOCYTES # BLD: 2.2 K/UL (ref 0.9–3.6)
LYMPHOCYTES NFR BLD: 34 % (ref 21–52)
MAGNESIUM SERPL-MCNC: 3.4 MG/DL (ref 1.6–2.6)
MCH RBC QN AUTO: 30.7 PG (ref 24–34)
MCHC RBC AUTO-ENTMCNC: 30.9 G/DL (ref 31–37)
MCV RBC AUTO: 99.4 FL (ref 78–100)
MONOCYTES # BLD: 0.1 K/UL (ref 0.05–1.2)
MONOCYTES NFR BLD: 2 % (ref 3–10)
NEUTS SEG # BLD: 4.1 K/UL (ref 1.8–8)
NEUTS SEG NFR BLD: 64 % (ref 40–73)
NRBC # BLD: 0 K/UL (ref 0–0.01)
NRBC BLD-RTO: 0 PER 100 WBC
PLATELET # BLD AUTO: 166 K/UL (ref 135–420)
PLATELET COMMENTS,PCOM: ABNORMAL
PMV BLD AUTO: 12.9 FL (ref 9.2–11.8)
POTASSIUM SERPL-SCNC: 5 MMOL/L (ref 3.5–5.5)
PROT SERPL-MCNC: 7.6 G/DL (ref 6.4–8.2)
RBC # BLD AUTO: 3.39 M/UL (ref 4.2–5.3)
RBC MORPH BLD: ABNORMAL
SODIUM SERPL-SCNC: 137 MMOL/L (ref 136–145)
WBC # BLD AUTO: 6.4 K/UL (ref 4.6–13.2)

## 2021-12-14 PROCEDURE — 74011250637 HC RX REV CODE- 250/637: Performed by: STUDENT IN AN ORGANIZED HEALTH CARE EDUCATION/TRAINING PROGRAM

## 2021-12-14 PROCEDURE — 80053 COMPREHEN METABOLIC PANEL: CPT

## 2021-12-14 PROCEDURE — 85025 COMPLETE CBC W/AUTO DIFF WBC: CPT

## 2021-12-14 PROCEDURE — 83735 ASSAY OF MAGNESIUM: CPT

## 2021-12-14 PROCEDURE — 83690 ASSAY OF LIPASE: CPT

## 2021-12-14 PROCEDURE — 99282 EMERGENCY DEPT VISIT SF MDM: CPT

## 2021-12-14 PROCEDURE — 71045 X-RAY EXAM CHEST 1 VIEW: CPT

## 2021-12-14 RX ORDER — HYDROXYZINE 25 MG/1
25 TABLET, FILM COATED ORAL
Status: COMPLETED | OUTPATIENT
Start: 2021-12-14 | End: 2021-12-14

## 2021-12-14 RX ORDER — HYDROXYZINE 50 MG/1
50 TABLET, FILM COATED ORAL
Qty: 20 TABLET | Refills: 0 | Status: SHIPPED | OUTPATIENT
Start: 2021-12-14 | End: 2021-12-24

## 2021-12-14 RX ADMIN — HYDROXYZINE HYDROCHLORIDE 25 MG: 25 TABLET, FILM COATED ORAL at 03:51

## 2021-12-14 NOTE — ED TRIAGE NOTES
Pt to ED with complaints of SOB that started this morning and has not improved. Pt is on Dialysis, next session this morning at 0600.

## 2021-12-14 NOTE — ED PROVIDER NOTES
EMERGENCY DEPARTMENT HISTORY AND PHYSICAL EXAM      Date: 12/14/2021  Patient Name: Nicolas Medina    History of Presenting Illness     Chief Complaint   Patient presents with    Shortness of Breath       History (Context): Nicolas Medina is a 62 y.o. female with a past medical history significant for ESRD, CHF, hypertension comes into the ED today due to dyspnea. Patient states that she has been dyspneic over the past few days. Feels that she is not having enough fluid taken off during her dialysis sessions. Patient has been compliant with her dialysis sessions and last appointment was this past Saturday. She states dyspnea is exacerbated with ambulation. She denies any recent illness-like symptoms such as fever, chills, or cough. She does admit to feeling like she has gained weight secondary to fluid retention. She denies taking any medication for treatment of her symptoms prior to arrival.  Patient describes severity of her symptoms as moderate. PCP: Davidson Palumbo MD    Current Outpatient Medications   Medication Sig Dispense Refill    hydrOXYzine HCL (ATARAX) 50 mg tablet Take 1 Tablet by mouth every eight (8) hours as needed for Itching for up to 10 days. 20 Tablet 0    phenylephrine (NEOSYNEPHRINE) 0.25 % nasal spray 1 [place] by Both Nostrils route every six (6) hours as needed for Congestion. 5 mL 0    gabapentin (NEURONTIN) 400 mg capsule Take 1 Capsule by mouth two (2) times a day. Max Daily Amount: 800 mg. Indications: neuropathic pain 20 Capsule 0    lidocaine (Lidoderm) 5 % Apply patch to the affected area for 12 hours a day and remove for 12 hours a day. 10 Each 0    psyllium husk (METAMUCIL PO) Take  by mouth.  cyclobenzaprine (FLEXERIL) 10 mg tablet Take 1 Tab by mouth three (3) times daily as needed for Muscle Spasm(s).  14 Tab 0    bisacodyl (DULCOLAX, BISACODYL,) 5 mg EC tablet Take as directed by prep instructions 4 Tab 0    hydrOXYzine pamoate (VISTARIL) 25 mg capsule 25 mg nightly.  carvedilol (COREG) 25 mg tablet Take 25 mg by mouth two (2) times daily (with meals).  NIFEdipine ER (PROCARDIA XL) 90 mg ER tablet Take 90 mg by mouth daily.  cloNIDine HCl (CATAPRES) 0.3 mg tablet Take 0.3 mg by mouth three (3) times daily. Indications: hypertension         Past History     Past Medical History:   Past Medical History:   Diagnosis Date    Adenomatous polyp     [facility]     [facility]    Dialysis patient ([facility]     tues, thurs, sat    ESRD (end stage renal disease) [facility]     Family hx of colon cancer     Mother    GI bleed due to NSAIDs     Heart failure ([facility]     History of blood transfusion     Hypertension     LUIS (obstructive sleep apnea)     no cpap       Past Surgical History:  Past Surgical History:   Procedure Laterality Date    COLONOSCOPY N/A 9/27/2019    COLONOSCOPY with polyp bx performed by Sophia John MD at [address] HX APPENDECTOMY       [address] HX COLONOSCOPY  09/21/2016    colonoscopy, Dr. Марина Marcial, [address] [place] [address]  05/2020    HX OTHER SURGICAL       ARTERIOVENOUS FISTULA INSERTION LEFT ARM     HX TOTAL ABDOMINAL HYSTERECTOMY      HX TOTAL COLECTOMY  10/11/2016    LAPAROSCOPIC COLECTOMY PARTIAL RIGHT FOR CECAL POLYP, DR[facility]    HX TUBAL LIGATION      VASCULAR SURGERY PROCEDURE UNLIST      left arm graft       Family History:  Family History   Problem Relation Age of Onset    Colon Cancer Mother     Colon Polyps Mother        Social History:   Social History     Tobacco Use    Smoking status: Former Smoker    Smokeless tobacco: Never Used   Substance Use Topics    Alcohol use: No    Drug use: No       Allergies:  No Known Allergies    PMH, PSH, family history, social history, allergies reviewed with the patient with significant items noted above.   Review of Systems   Review of Systems   Constitutional: Negative for chills and fever. HENT: Negative for sore throat. Eyes: Negative for visual disturbance. Respiratory: Positive for shortness of breath. Negative for cough. Cardiovascular: Negative for chest pain. Gastrointestinal: Negative for abdominal pain, nausea and vomiting. Genitourinary: Negative for difficulty urinating. Musculoskeletal: Negative for myalgias. Skin: Negative for rash. Neurological: Negative for headaches. Physical Exam     Vitals:    12/14/21 0123   BP: (!) 148/99   Pulse: 63   Resp: 16   Temp: 98.6 °F (37 °C)   SpO2: 96%   Weight: 79.4 kg (175 lb)   Height: 5' 2\" (1.575 m)       Physical Exam  Vitals and nursing note reviewed. Constitutional:       General: She is not in acute distress. Appearance: Normal appearance. She is not ill-appearing or toxic-appearing. HENT:      Head: Normocephalic and atraumatic. Mouth/Throat:      Mouth: Mucous membranes are moist.   Eyes:      General: No scleral icterus. Conjunctiva/sclera: Conjunctivae normal.   Cardiovascular:      Rate and Rhythm: Normal rate and regular rhythm. Comments: Normal peripheral perfusion  Pulmonary:      Effort: Pulmonary effort is normal. No respiratory distress. Abdominal:      General: There is no distension. Palpations: Abdomen is soft. Tenderness: There is no abdominal tenderness. There is no guarding or rebound. Musculoskeletal:         General: No deformity. Normal range of motion. Cervical back: Normal range of motion and neck supple. Skin:     General: Skin is warm and dry. Findings: No rash. Neurological:      General: No focal deficit present. Mental Status: She is alert and oriented to person, place, and time. Mental status is at baseline. Psychiatric:         Mood and Affect: Mood normal.         Thought Content:  Thought content normal.         Diagnostic Study Results     Labs -     Recent Results (from the past 12 hour(s))   CBC WITH AUTOMATED DIFF    Collection Time: 12/14/21  1:30 AM   Result Value Ref Range    WBC 6.4 4.6 - 13.2 K/uL    RBC 3.39 (L) 4.20 - 5.30 M/uL    HGB 10.4 (L) 12.0 - 16.0 g/dL    HCT 33.7 (L) 35.0 - 45.0 %    MCV 99.4 78.0 - 100.0 FL    MCH 30.7 24.0 - 34.0 PG    MCHC 30.9 (L) 31.0 - 37.0 g/dL    RDW 16.2 (H) 11.6 - 14.5 %    PLATELET 823 700 - 688 K/uL    MPV 12.9 (H) 9.2 - 11.8 FL    NRBC 0.0 0  WBC    ABSOLUTE NRBC 0.00 0.00 - 0.01 K/uL    NEUTROPHILS 64 40 - 73 %    LYMPHOCYTES 34 21 - 52 %    MONOCYTES 2 (L) 3 - 10 %    EOSINOPHILS 0 0 - 5 %    BASOPHILS 0 0 - 2 %    IMMATURE GRANULOCYTES 0 %    ABS. NEUTROPHILS 4.1 1.8 - 8.0 K/UL    ABS. LYMPHOCYTES 2.2 0.9 - 3.6 K/UL    ABS. MONOCYTES 0.1 0.05 - 1.2 K/UL    ABS. EOSINOPHILS 0.0 0.0 - 0.4 K/UL    ABS. BASOPHILS 0.0 0.0 - 0.1 K/UL    ABS. IMM. GRANS. 0.0 K/UL    DF MANUAL      PLATELET COMMENTS ADEQUATE PLATELETS      RBC COMMENTS NORMOCYTIC, NORMOCHROMIC     METABOLIC PANEL, COMPREHENSIVE    Collection Time: 12/14/21  1:30 AM   Result Value Ref Range    Sodium 137 136 - 145 mmol/L    Potassium 5.0 3.5 - 5.5 mmol/L    Chloride 100 100 - 111 mmol/L    CO2 26 21 - 32 mmol/L    Anion gap 11 3.0 - 18 mmol/L    Glucose 105 (H) 74 - 99 mg/dL    BUN 96 (H) 7.0 - 18 MG/DL    Creatinine 11.10 (H) 0.6 - 1.3 MG/DL    BUN/Creatinine ratio 9 (L) 12 - 20      GFR est AA 4 (L) >60 ml/min/1.73m2    GFR est non-AA 4 (L) >60 ml/min/1.73m2    Calcium 8.4 (L) 8.5 - 10.1 MG/DL    Bilirubin, total 0.2 0.2 - 1.0 MG/DL    ALT (SGPT) 19 13 - 56 U/L    AST (SGOT) 26 10 - 38 U/L    Alk.  phosphatase 119 (H) 45 - 117 U/L    Protein, total 7.6 6.4 - 8.2 g/dL    Albumin 3.1 (L) 3.4 - 5.0 g/dL    Globulin 4.5 (H) 2.0 - 4.0 g/dL    A-G Ratio 0.7 (L) 0.8 - 1.7     LIPASE    Collection Time: 12/14/21  1:30 AM   Result Value Ref Range    Lipase 253 73 - 393 U/L   MAGNESIUM    Collection Time: 12/14/21  1:30 AM   Result Value Ref Range    Magnesium 3.4 (H) 1.6 - 2.6 mg/dL      Labs Reviewed   CBC WITH AUTOMATED DIFF - Abnormal; Notable for the following components:       Result Value    RBC 3.39 (*)     HGB 10.4 (*)     HCT 33.7 (*)     MCHC 30.9 (*)     RDW 16.2 (*)     MPV 12.9 (*)     MONOCYTES 2 (*)     All other components within normal limits   METABOLIC PANEL, COMPREHENSIVE - Abnormal; Notable for the following components:    Glucose 105 (*)     BUN 96 (*)     Creatinine 11.10 (*)     BUN/Creatinine ratio 9 (*)     GFR est AA 4 (*)     GFR est non-AA 4 (*)     Calcium 8.4 (*)     Alk. phosphatase 119 (*)     Albumin 3.1 (*)     Globulin 4.5 (*)     A-G Ratio 0.7 (*)     All other components within normal limits   MAGNESIUM - Abnormal; Notable for the following components:    Magnesium 3.4 (*)     All other components within normal limits   LIPASE       Radiologic Studies -   XR CHEST PORT    (Results Pending)     CT Results  (Last 48 hours)    None        CXR Results  (Last 48 hours)    None          The laboratory results, imaging results, and other diagnostic exams were reviewed in the EMR. Medical Decision Making   I am the first provider for this patient. I reviewed the vital signs, available nursing notes, past medical history, past surgical history, family history and social history. Vital Signs-Reviewed the patient's vital signs. Records Reviewed: Personally, on initial evaluation    MDM:   Ivana Luke presents with complaint of dyspnea  DDX includes but is not limited to: Pulmonary edema, fluid overload, electrolyte abnormalities    Patient overall well-appearing, no acute distress, vital signs grossly within normal limits. Will obtain lab work and imaging for further evaluation of patients complaint. Will continue to monitor and evaluate patient while in the ED.       Orders as below:  Orders Placed This Encounter    XR CHEST PORT    CBC WITH AUTOMATED DIFF    COMPREHENSIVE METABOLIC PANEL    LIPASE    MAGNESIUM    hydrOXYzine HCL (ATARAX) tablet 25 mg    hydrOXYzine HCL (ATARAX) 50 mg tablet        ED Course:      4:24 AM  Patient's lab work without any significant abnormalities or urgent indications for dialysis. Patient's chest x-ray on ED provider read shows interstitial edema without any further acute cardiopulmonary abnormalities. .  Patient in no respiratory distress and comfortably laying on the bed without any supplemental oxygen. Explained to patient that she should go to her dialysis session this morning and if she continues to have worsening or persistent symptoms to speak with her nephrologist about further dialysis. Provided patient with strict return precautions and follow-up recommendations. Patient verbalized understanding is without any further questions. Will discharge patient this time. Procedures:  Procedures        Diagnosis and Disposition     CLINICAL IMPRESSION:  1. Shortness of breath    2. ESRD (end stage renal disease) on dialysis ([facility])    3. Anemia due to chronic kidney disease, on chronic dialysis ([facility])    4. Pruritus      Discharge Medication List as of 12/14/2021  3:44 AM      START taking these medications    Details   hydrOXYzine HCL (ATARAX) 50 mg tablet Take 1 Tablet by mouth every eight (8) hours as needed for Itching for up to 10 days. , Normal, Disp-20 Tablet, R-0         CONTINUE these medications which have NOT CHANGED    Details   phenylephrine (NEOSYNEPHRINE) 0.25 % nasal spray 1 [place] by Both Nostrils route every six (6) hours as needed for Congestion. , Normal, Disp-5 mL, R-0      gabapentin (NEURONTIN) 400 mg capsule Take 1 Capsule by mouth two (2) times a day. Max Daily Amount: 800 mg.  Indications: neuropathic pain, Print, Disp-20 Capsule, R-0      lidocaine (Lidoderm) 5 % Apply patch to the affected area for 12 hours a day and remove for 12 hours a day., Print, Disp-10 Each, R-0      psyllium husk (METAMUCIL PO) Take  by mouth., Historical Med      cyclobenzaprine (FLEXERIL) 10 mg tablet Take 1 Tab by mouth three (3) times daily as needed for Muscle Spasm(s). , Normal, Disp-14 Tab, R-0      bisacodyl (DULCOLAX, BISACODYL,) 5 mg EC tablet Take as directed by prep instructions, Normal, Disp-4 Tab, R-0      hydrOXYzine pamoate (VISTARIL) 25 mg capsule 25 mg nightly., Historical Med      carvedilol (COREG) 25 mg tablet Take 25 mg by mouth two (2) times daily (with meals). , Historical Med      NIFEdipine ER (PROCARDIA XL) 90 mg ER tablet Take 90 mg by mouth daily. , Historical Med      cloNIDine HCl (CATAPRES) 0.3 mg tablet Take 0.3 mg by mouth three (3) times daily. Indications: hypertension, Historical Med             Disposition: Home    Patient condition at time of disposition: Stable    DISCHARGE NOTE:   Pt has been reexamined. Patient has no new complaints, changes, or physical findings. Care plan outlined and precautions discussed. Results were reviewed with the patient. All medications were reviewed with the patient. All of pt's questions and concerns were addressed. Alarm symptoms and return precautions associated with chief complaint and evaluation were reviewed with the patient in detail. The patient demonstrated adequate understanding. Patient was instructed to follow up with PCP, Nephrology, as well as strict return precautions to the ED upon further deterioration. Patient is ready to go home. The patient is happy with this plan        Dragon Disclaimer     Please note that this dictation was completed with [email or web address], the computer voice recognition software. Quite often unanticipated grammatical, syntax, homophones, and other interpretive errors are inadvertently transcribed by the computer software. Please disregard these errors. Please excuse any errors that have escaped final proofreading. Carmen ROSENTHAL.

## 2021-12-18 ENCOUNTER — HOSPITAL ENCOUNTER (OUTPATIENT)
Dept: MAMMOGRAPHY | Age: 61
Discharge: HOME | End: 2021-12-18
Attending: FAMILY MEDICINE
Payer: MEDICARE

## 2021-12-18 DIAGNOSIS — Z12.31 VISIT FOR SCREENING MAMMOGRAM: ICD-10-CM

## 2021-12-18 PROCEDURE — 77063 BREAST TOMOSYNTHESIS BI: CPT

## 2022-01-08 ENCOUNTER — APPOINTMENT (OUTPATIENT)
Dept: GENERAL RADIOLOGY | Age: 62
End: 2022-01-08
Attending: EMERGENCY MEDICINE
Payer: MEDICARE

## 2022-01-08 ENCOUNTER — HOSPITAL ENCOUNTER (EMERGENCY)
Age: 62
Discharge: HOME | End: 2022-01-08
Attending: EMERGENCY MEDICINE
Payer: MEDICARE

## 2022-01-08 VITALS
DIASTOLIC BLOOD PRESSURE: 77 MMHG | RESPIRATION RATE: 18 BRPM | TEMPERATURE: 98.5 F | OXYGEN SATURATION: 100 % | BODY MASS INDEX: 30.36 KG/M2 | HEART RATE: 65 BPM | HEIGHT: 62 IN | WEIGHT: 165 LBS | SYSTOLIC BLOOD PRESSURE: 126 MMHG

## 2022-01-08 DIAGNOSIS — Z20.822 SUSPECTED COVID-19 VIRUS INFECTION: Primary | ICD-10-CM

## 2022-01-08 LAB — SARS-COV-2, COV2: NORMAL

## 2022-01-08 PROCEDURE — 71045 X-RAY EXAM CHEST 1 VIEW: CPT

## 2022-01-08 PROCEDURE — 74011250637 HC RX REV CODE- 250/637: Performed by: EMERGENCY MEDICINE

## 2022-01-08 PROCEDURE — U0003 INFECTIOUS AGENT DETECTION BY NUCLEIC ACID (DNA OR RNA); SEVERE ACUTE RESPIRATORY SYNDROME CORONAVIRUS 2 (SARS-COV-2) (CORONAVIRUS DISEASE [COVID-19]), AMPLIFIED PROBE TECHNIQUE, MAKING USE OF HIGH THROUGHPUT TECHNOLOGIES AS DESCRIBED BY CMS-2020-01-R: HCPCS

## 2022-01-08 PROCEDURE — 99283 EMERGENCY DEPT VISIT LOW MDM: CPT

## 2022-01-08 RX ORDER — AZITHROMYCIN 250 MG/1
500 TABLET, FILM COATED ORAL ONCE
Status: COMPLETED | OUTPATIENT
Start: 2022-01-08 | End: 2022-01-08

## 2022-01-08 RX ORDER — AZITHROMYCIN 250 MG/1
250 TABLET, FILM COATED ORAL DAILY
Qty: 4 TABLET | Refills: 0 | Status: SHIPPED | OUTPATIENT
Start: 2022-01-08 | End: 2022-01-08 | Stop reason: SDUPTHER

## 2022-01-08 RX ORDER — AZITHROMYCIN 250 MG/1
250 TABLET, FILM COATED ORAL DAILY
Qty: 4 TABLET | Refills: 0 | Status: SHIPPED | OUTPATIENT
Start: 2022-01-08 | End: 2022-01-12

## 2022-01-08 RX ADMIN — AZITHROMYCIN MONOHYDRATE 500 MG: 250 TABLET ORAL at 11:50

## 2022-01-08 NOTE — Clinical Note
[address] [facility] EMERGENCY DEPT  [address] [address] [phone]    Work/School Note    Date: 1/8/2022     To Whom It May concern:    Joaquina Francis was evaluated by the following provider(s):  Attending Provider: Elie Sanches MD.   Ching Hallmane virus is suspected. Per the CDC guidelines we recommend home isolation until the following conditions are all met:    1. At least five days have passed since symptoms first appeared and/or had a close exposure,   2. After home isolation for five days, wearing a mask around others for the next five days,  3. At least 24 have passed since last fever without the use of fever-reducing medications and  4.  Symptoms (eg cough, shortness of breath) have improved      Sincerely,          Jermaine Lemus MD

## 2022-01-08 NOTE — ED PROVIDER NOTES
EMERGENCY DEPARTMENT HISTORY AND PHYSICAL EXAM    11:32 AM  Date: 1/8/2022  Patient Name: Sujey Zavala    History of Presenting Illness       History Provided By:     HPI: Sujey Zavala is a 62 y.o. female with past medical history as below presents with congestion, yellow phlegm for 3 days. Patient states that she is concerned for bronchitis. She had all 3 dose of Covid vaccine     PCP: Jazmine Mallory MD    Past History     Past Medical History:  Past Medical History:   Diagnosis Date    Adenomatous polyp     [facility]     Chronic kidney disease     TT    Dialysis patient ([facility]     tues, thurs, sat    ESRD (end stage renal disease) ([facility]     Family hx of colon cancer     Mother    GI bleed due to NSAIDs     Heart failure ([facility]     History of blood transfusion     Hypertension     LUIS (obstructive sleep apnea)     no cpap       Past Surgical History:  Past Surgical History:   Procedure Laterality Date    COLONOSCOPY N/A 9/27/2019    COLONOSCOPY with polyp bx performed by Mary Moses MD at [address] HX APPENDECTOMY       [address] HX COLONOSCOPY  09/21/2016    colonoscopy, Dr. Tubbs [facility], [address] [place] [address]  05/2020    HX OTHER SURGICAL       ARTERIOVENOUS FISTULA INSERTION LEFT ARM     HX TOTAL ABDOMINAL HYSTERECTOMY      HX TOTAL COLECTOMY  10/11/2016    [facility]    HX TUBAL LIGATION      VASCULAR SURGERY PROCEDURE UNLIST      left arm graft       Family History:  Family History   Problem Relation Age of Onset    Colon Cancer Mother     Colon Polyps Mother        Social History:  Social History     Tobacco Use    Smoking status: Former Smoker    Smokeless tobacco: Never Used   Substance Use Topics    Alcohol use: No    Drug use: No       Allergies:  No Known Allergies    Review of Systems   Review of Systems   Constitutional: Negative for activity change, appetite change and chills. HENT: Positive for rhinorrhea. Negative for congestion, ear discharge, ear pain and sore throat. Eyes: Negative for photophobia and pain. Respiratory: Negative for cough and choking. Cardiovascular: Negative for palpitations and leg swelling. Gastrointestinal: Negative for anal bleeding and rectal pain. Endocrine: Negative for polydipsia and polyuria. Genitourinary: Negative for genital sores and urgency. Musculoskeletal: Negative for arthralgias and myalgias. Neurological: Negative for dizziness, seizures and speech difficulty. Psychiatric/Behavioral: Negative for hallucinations, self-injury and suicidal ideas. Physical Exam     Patient Vitals for the past 12 hrs:   Temp Pulse Resp BP SpO2   01/08/22 1105 98.5 °F (36.9 °C) 65 18 126/77 100 %       Physical Exam  Vitals and nursing note reviewed. Constitutional:       Appearance: She is well-developed. HENT:      Head: Normocephalic and atraumatic. Nose: Congestion present. Eyes:      General:         Right eye: No discharge. Left eye: No discharge. Cardiovascular:      Rate and Rhythm: Normal rate and regular rhythm. Heart sounds: Normal heart sounds. No murmur heard. Pulmonary:      Effort: Pulmonary effort is normal. No respiratory distress. Breath sounds: Normal breath sounds. No stridor. No wheezing or rales. Chest:      Chest wall: No tenderness. Abdominal:      General: Bowel sounds are normal. There is no distension. Palpations: Abdomen is soft. Tenderness: There is no abdominal tenderness. There is no guarding or rebound. Musculoskeletal:         General: Normal range of motion. Cervical back: Normal range of motion and neck supple. Skin:     General: Skin is warm and dry. Neurological:      Mental Status: She is alert and oriented to person, place, and time.          Diagnostic Study Results     Labs -  No results found for this or any previous visit (from the past 12 hour(s)). Radiologic Studies -   No results found. Medical Decision Making     ED Course: Progress Notes, Reevaluation, and Consults:    11:32 AM Initial assessment performed. The patients presenting problems have been discussed, and they/their family are in agreement with the care plan formulated and outlined with them. I have encouraged them to ask questions as they arise throughout their visit. Provider Notes (Medical Decision Making):   Patient presents with cold-like symptoms  X-ray no pneumonia  Appears well vitals within normal limits  Patient will be tested for Covid  Patient would like to to have prescription of azithromycin because has helped her in the past with bronchitis symptoms. Advised to follow-up with PMD  Advised to self quarantine before Covid results are back and check results on MyChart   Please return if shortness of breath or any other concerns              Vital Signs-Reviewed the patient's vital signs. Reviewed pt's pulse ox reading. Records Reviewed: old medical records  -I am the first provider for this patient.  -I reviewed the vital signs, available nursing notes, past medical history, past surgical history, family history and social history. Current Outpatient Medications   Medication Sig Dispense Refill    gabapentin (NEURONTIN) 400 mg capsule Take 1 Capsule by mouth two (2) times a day. Max Daily Amount: 800 mg. Indications: neuropathic pain 20 Capsule 0    cyclobenzaprine (FLEXERIL) 10 mg tablet Take 1 Tab by mouth three (3) times daily as needed for Muscle Spasm(s). 14 Tab 0    carvedilol (COREG) 25 mg tablet Take 25 mg by mouth two (2) times daily (with meals).  cloNIDine HCl (CATAPRES) 0.3 mg tablet Take 0.3 mg by mouth three (3) times daily. Indications: hypertension      NIFEdipine ER (PROCARDIA XL) 90 mg ER tablet Take 90 mg by mouth daily.           Clinical Impression     Clinical Impression: No diagnosis found. Disposition:    Pt has been reexamined. Patient has no new complaints, changes, or physical findings. Care plan outlined and precautions discussed. Results were reviewed with the patient. All medications were reviewed with the patient; will d/c home with PMD f/u. All of pt's questions and concerns were addressed. Patient was instructed and agrees to follow up with PMD, as well as to return to the ED upon further deterioration. Patient is ready to go home. This note was dictated utilizing voice recognition software which may lead to typographical errors. I apologize in advance if the situation occurs. If questions arise please do not hesitate to contact me or call our department. This note was dictated utilizing voice recognition software which may lead to typographical errors. I apologize in advance if the situation occurs. If questions arise please do not hesitate to contact me or call our department.     Pastor Eliel MD  11:32 AM

## 2022-01-08 NOTE — ED TRIAGE NOTES
Patient c/o productive cough with yellow phlegm x one week. She states experiencing chest congestion especially at night. Patient states that she is vaccinated against covid.

## 2022-01-10 ENCOUNTER — PATIENT OUTREACH (OUTPATIENT)
Dept: CASE MANAGEMENT | Age: 62
End: 2022-01-10

## 2022-01-10 NOTE — PROGRESS NOTES
Patient contacted regarding COVID-19 suspected virus infection. Discussed COVID-19 related testing which was pending at this time. Test results were pending. Patient informed of results, if available? Patient informed that she can access test results via [employer] when available. Ambulatory Care Manager contacted the patient by telephone to perform post discharge assessment. Call within 2 business days of discharge: Yes Verified name and  with patient as identifiers. Provided introduction to self, and explanation of the CTN/ACM role, and reason for call due to risk factors for infection and/or exposure to COVID-19. Symptoms reviewed with patient who verbalized the following symptoms: cough and congestion      Due to no new or worsening symptoms encounter was not routed to provider for escalation. Discussed follow-up appointments. If no appointment was previously scheduled, appointment scheduling offered:  no. [address] follow up appointment(s): No future appointments. Non-[facility] follow up appointment(s): NA. Interventions to address risk factors: Obtained and reviewed discharge summary and/or continuity of care documents     Advance Care Planning:   Does patient have an Advance Directive: not on file. Educated patient about risk for severe COVID-19 due to risk factors according to CDC guidelines. ACM reviewed discharge instructions, medical action plan and red flag symptoms with the patient who verbalized understanding. Discussed COVID vaccination status: yes. Education provided on COVID-19 vaccination as appropriate. Discussed exposure protocols and quarantine with CDC Guidelines. Patient was given an opportunity to verbalize any questions and concerns and agrees to contact ACM or health care provider for questions related to their healthcare. Reviewed and educated patient on any new and changed medications related to discharge diagnosis     Was patient discharged with a pulse oximeter?  no Discussed and confirmed pulse oximeter discharge instructions and when to notify provider or seek emergency care. ACM provided contact information. Plan for follow-up call in 1-2 days based on severity of symptoms and risk factors, test results. Patient has received vaccinations and booster. States she will notify dialysis that she has been tested for COVID-19 and is waiting on results.

## 2022-01-11 ENCOUNTER — PATIENT OUTREACH (OUTPATIENT)
Dept: CASE MANAGEMENT | Age: 62
End: 2022-01-11

## 2022-01-11 LAB — SARS-COV-2, COV2NT: NOT DETECTED

## 2022-01-25 ENCOUNTER — PATIENT OUTREACH (OUTPATIENT)
Dept: CASE MANAGEMENT | Age: 62
End: 2022-01-25

## 2022-01-25 NOTE — PROGRESS NOTES
Patient resolved from [address] Transitions episode on 1/25/22. Discussed COVID-19 related testing which was available at this time. Test results were negative. Patient informed of results, if available? Patient was informed of test results on 1/11/22     Patient/family has been provided the following resources and education related to COVID-19:                         Signs, symptoms and red flags related to COVID-19            CDC exposure and quarantine guidelines            [facility] exposure contact - [phone]            Contact for their local Department of Health                 Patient currently reports that the following symptoms have improved:  no new symptoms and no worsening symptoms. No further outreach scheduled with this CTN/ACM/LPN/HC/ MA. Episode of Care resolved. Patient has this CTN/ACM/LPN/HC/MA contact information if future needs arise.

## 2022-02-02 ENCOUNTER — TRANSCRIBE ORDER (OUTPATIENT)
Dept: SCHEDULING | Age: 62
End: 2022-02-02

## 2022-02-02 DIAGNOSIS — R92.8 ABNORMAL MAMMOGRAM: Primary | ICD-10-CM

## 2022-02-23 ENCOUNTER — HOSPITAL ENCOUNTER (OUTPATIENT)
Dept: MAMMOGRAPHY | Age: 62
Discharge: HOME | End: 2022-02-23
Attending: FAMILY MEDICINE
Payer: MEDICARE

## 2022-02-23 ENCOUNTER — HOSPITAL ENCOUNTER (OUTPATIENT)
Dept: ULTRASOUND IMAGING | Age: 62
Discharge: HOME | End: 2022-02-23
Attending: FAMILY MEDICINE
Payer: MEDICARE

## 2022-02-23 DIAGNOSIS — N63.0 BREAST NODULE: ICD-10-CM

## 2022-02-23 DIAGNOSIS — R92.8 ABNORMAL MAMMOGRAM: ICD-10-CM

## 2022-02-23 PROCEDURE — 76642 ULTRASOUND BREAST LIMITED: CPT

## 2022-02-23 PROCEDURE — 77061 BREAST TOMOSYNTHESIS UNI: CPT

## 2022-03-09 ENCOUNTER — OFFICE VISIT (OUTPATIENT)
Dept: ORTHOPEDIC SURGERY | Age: 62
End: 2022-03-09
Payer: MEDICARE

## 2022-03-09 VITALS
TEMPERATURE: 97 F | OXYGEN SATURATION: 100 % | HEIGHT: 62 IN | WEIGHT: 162.2 LBS | BODY MASS INDEX: 29.85 KG/M2 | HEART RATE: 101 BPM

## 2022-03-09 DIAGNOSIS — R22.31 SUBCUTANEOUS MASS OF RIGHT THUMB: Primary | ICD-10-CM

## 2022-03-09 DIAGNOSIS — Z01.818 PREOP EXAMINATION: Primary | ICD-10-CM

## 2022-03-09 PROCEDURE — G8432 DEP SCR NOT DOC, RNG: HCPCS | Performed by: ORTHOPAEDIC SURGERY

## 2022-03-09 PROCEDURE — G8427 DOCREV CUR MEDS BY ELIG CLIN: HCPCS | Performed by: ORTHOPAEDIC SURGERY

## 2022-03-09 PROCEDURE — G9899 SCRN MAM PERF RSLTS DOC: HCPCS | Performed by: ORTHOPAEDIC SURGERY

## 2022-03-09 PROCEDURE — 99204 OFFICE O/P NEW MOD 45 MIN: CPT | Performed by: ORTHOPAEDIC SURGERY

## 2022-03-09 PROCEDURE — G9711 PT HX TOT COL OR COLON CA: HCPCS | Performed by: ORTHOPAEDIC SURGERY

## 2022-03-09 PROCEDURE — 73140 X-RAY EXAM OF FINGER(S): CPT | Performed by: ORTHOPAEDIC SURGERY

## 2022-03-09 PROCEDURE — G8419 CALC BMI OUT NRM PARAM NOF/U: HCPCS | Performed by: ORTHOPAEDIC SURGERY

## 2022-03-09 NOTE — PROGRESS NOTES
Sheldon Isabel is a 62 y.o. female right handed unknown employment. Worker's Compensation and legal considerations: none filed. Vitals:    22 0817   Pulse: (!) 101   Temp: 97 °F (36.1 °C)   SpO2: 100%   Weight: 162 lb 3.2 oz (73.6 kg)   Height: 5' 2\" (1.575 m)   PainSc:   0 - No pain   PainLoc: Hand           Chief Complaint   Patient presents with    Hand Pain     both         HPI: Patient presents today with complaints of right thumb mass. She reports to be painful.     Date of onset: Indeterminate    Injury: No    Prior Treatment:  No    Numbness/ Tingling: No      ROS: Review of Systems - General ROS: negative  Psychological ROS: negative  ENT ROS: negative  Allergy and Immunology ROS: negative  Hematological and Lymphatic ROS: negative  Respiratory ROS: no cough, shortness of breath, or wheezing  Cardiovascular ROS: no chest pain or dyspnea on exertion  Gastrointestinal ROS: no abdominal pain, change in bowel habits, or black or bloody stools  Musculoskeletal ROS: negative  Neurological ROS: negative  Dermatological ROS: negative    Past Medical History:   Diagnosis Date    Adenomatous polyp     CHF (congestive heart failure) (HCC)     Chronic kidney disease     [facility]    Dialysis patient ([facility] [address]     tues, th, sat    ESRD (end stage renal disease) ([facility] [address] [facility]     Family hx of colon cancer     Mother    GI bleed due to NSAIDs     Heart failure ([facility] [address]     History of blood transfusion     Hypertension     LUIS (obstructive sleep apnea)     no cpap       Past Surgical History:   Procedure Laterality Date    COLONOSCOPY N/A 2019    COLONOSCOPY with polyp bx performed by Donald Stratton MD at [facility]    HX APPENDECTOMY      HX  SECTION      HX COLONOSCOPY  2016    colonoscopy, Dr. Richard Espana, [address] [place] [address]  2020    HX OTHER SURGICAL       ARTERIOVENOUS FISTULA INSERTION LEFT ARM     HX TOTAL ABDOMINAL HYSTERECTOMY      HX TOTAL COLECTOMY 10/11/2016    LAPAROSCOPIC COLECTOMY PARTIAL RIGHT FOR CECAL POLYP, DR. PAEZ, [facility]    HX TUBAL LIGATION      VASCULAR SURGERY PROCEDURE UNLIST      left arm graft       Current Outpatient Medications   Medication Sig Dispense Refill    gabapentin (NEURONTIN) 400 mg capsule Take 1 Capsule by mouth two (2) times a day. Max Daily Amount: 800 mg. Indications: neuropathic pain 20 Capsule 0    cyclobenzaprine (FLEXERIL) 10 mg tablet Take 1 Tab by mouth three (3) times daily as needed for Muscle Spasm(s). 14 Tab 0    carvedilol (COREG) 25 mg tablet Take 25 mg by mouth two (2) times daily (with meals).  NIFEdipine ER (PROCARDIA XL) 90 mg ER tablet Take 90 mg by mouth daily.  cloNIDine HCl (CATAPRES) 0.3 mg tablet Take 0.3 mg by mouth three (3) times daily. Indications: hypertension         No Known Allergies        PE:     Physical Exam  Vitals and nursing note reviewed. Constitutional:       General: She is not in acute distress. Appearance: Normal appearance. She is not ill-appearing, toxic-appearing or diaphoretic. HENT:      Head: Normocephalic and atraumatic. Nose: Nose normal.      Mouth/Throat:      Mouth: Mucous membranes are moist.   Eyes:      Extraocular Movements: Extraocular movements intact. Pupils: Pupils are equal, round, and reactive to light. Cardiovascular:      Pulses: Normal pulses. Pulmonary:      Effort: Pulmonary effort is normal.   Abdominal:      General: Abdomen is flat. There is no distension. Musculoskeletal:         General: Swelling and tenderness present. No deformity or signs of injury. Normal range of motion. Cervical back: Normal range of motion. Right lower leg: No edema. Left lower leg: No edema. Skin:     General: Skin is warm and dry. Capillary Refill: Capillary refill takes less than 2 seconds. Findings: No bruising or erythema. Neurological:      General: No focal deficit present.       Mental Status: She is alert and oriented to person, place, and time. Psychiatric:         Mood and Affect: Mood normal.         Behavior: Behavior normal.            Right thumb: There is a mass along the volar ulnar aspect of the thumb near the MCP joint. Neurovascularly intact distally. Cap refill brisk. Range of motion full. Minimally tender at the mass. Imaging:     3/9/2022 2 views of right thumb shows a small density that is radiopaque with likely calcification along the volar ulnar aspect of the thumb near the MCP joint. ICD-10-CM ICD-9-CM    1. Subcutaneous mass of right thumb  R22.31 782.2 AMB POC XRAY, FINGER(S), 2+ VIEWS      SCHEDULE SURGERY         Plan:     Schedule right thumb mass excision. Patient will need dialysis prior to surgery. We will try to get dialysis moved up as early as possible and we will schedule her for later in the day. 45 minutes was spent discussing operative versus nonoperative treatment, postoperative convalescence, and obtaining informed consent. Follow-up and Dispositions    · Return for Postop.           Plan was reviewed with patient, who verbalized agreement and understanding of the plan

## 2022-03-10 ENCOUNTER — TRANSCRIBE ORDER (OUTPATIENT)
Dept: SCHEDULING | Age: 62
End: 2022-03-10

## 2022-03-10 DIAGNOSIS — R92.8 ABNORMAL MAMMOGRAM: Primary | ICD-10-CM

## 2022-03-30 ENCOUNTER — HOSPITAL ENCOUNTER (OUTPATIENT)
Dept: LAB | Age: 62
Discharge: HOME | End: 2022-03-30

## 2022-03-30 LAB — XX-LABCORP SPECIMEN COL,LCBCF: NORMAL

## 2022-03-30 PROCEDURE — 99001 SPECIMEN HANDLING PT-LAB: CPT

## 2022-04-01 DIAGNOSIS — Z01.818 PREOP EXAMINATION: ICD-10-CM

## 2022-04-04 ENCOUNTER — ANESTHESIA EVENT (OUTPATIENT)
Dept: SURGERY | Age: 62
End: 2022-04-04
Payer: MEDICARE

## 2022-04-04 PROBLEM — R22.31 SUBCUTANEOUS MASS OF RIGHT THUMB: Status: ACTIVE | Noted: 2022-04-04

## 2022-04-04 NOTE — H&P
Elaina Calhoun is a 62 y.o. female right handed unknown employment. Worker's Compensation and legal considerations: none filed.         Vitals:     22 0817   Pulse: (!) 101   Temp: 97 °F (36.1 °C)   SpO2: 100%   Weight: 162 lb 3.2 oz (73.6 kg)   Height: 5' 2\" (1.575 m)   PainSc:   0 - No pain   PainLoc: Hand                    Chief Complaint   Patient presents with    Hand Pain       both            HPI: Patient presents today with complaints of right thumb mass. She reports to be painful.     Date of onset: Indeterminate     Injury: No     Prior Treatment:  No     Numbness/ Tingling:  No        ROS: Review of Systems - General ROS: negative  Psychological ROS: negative  ENT ROS: negative  Allergy and Immunology ROS: negative  Hematological and Lymphatic ROS: negative  Respiratory ROS: no cough, shortness of breath, or wheezing  Cardiovascular ROS: no chest pain or dyspnea on exertion  Gastrointestinal ROS: no abdominal pain, change in bowel habits, or black or bloody stools  Musculoskeletal ROS: negative  Neurological ROS: negative  Dermatological ROS: negative          Past Medical History:   Diagnosis Date    Adenomatous polyp      CHF (congestive heart failure) (HCC)      Chronic kidney disease       [facility]    [facility]ialysis [facility] ([facility] [address]       tues, thping, sat    ESRD (end stage renal disease) ([facility] [address]      Family hx of colon cancer       Mother    GI bleed due to NSAIDs      Heart failure (HCC)      History of blood transfusion      Hypertension      LUIS (obstructive sleep apnea)       no cpap         Surgical History         Past Surgical History:   Procedure Laterality Date    COLONOSCOPY N/A 2019     COLONOSCOPY with polyp bx performed by Tatyana Miller MD at [facility]    HX APPENDECTOMY        HX  SECTION        HX COLONOSCOPY   2016     colonoscopy, Dr. Monet Perez, [address] [place] [address]   2020    HX OTHER SURGICAL          ARTERIOVENOUS FISTULA INSERTION LEFT ARM     HX TOTAL ABDOMINAL HYSTERECTOMY        HX TOTAL COLECTOMY   10/11/2016     LAPAROSCOPIC COLECTOMY PARTIAL RIGHT FOR CECAL POLYP, DR[facility] PAEZ, [facility]    HX TUBAL LIGATION        VASCULAR SURGERY PROCEDURE UNLIST         left arm graft                   Current Outpatient Medications   Medication Sig Dispense Refill    gabapentin (NEURONTIN) 400 mg capsule Take 1 Capsule by mouth two (2) times a day. Max Daily Amount: 800 mg. Indications: neuropathic pain 20 Capsule 0    cyclobenzaprine (FLEXERIL) 10 mg tablet Take 1 Tab by mouth three (3) times daily as needed for Muscle Spasm(s). 14 Tab 0    carvedilol (COREG) 25 mg tablet Take 25 mg by mouth two (2) times daily (with meals).        NIFEdipine ER (PROCARDIA XL) 90 mg ER tablet Take 90 mg by mouth daily.        cloNIDine HCl (CATAPRES) 0.3 mg tablet Take 0.3 mg by mouth three (3) times daily. Indications: hypertension             No Known Allergies           PE:      Physical Exam  Vitals and nursing note reviewed. Constitutional:       General: She is not in acute distress. Appearance: Normal appearance. She is not ill-appearing, toxic-appearing or diaphoretic. HENT:      Head: Normocephalic and atraumatic. Nose: Nose normal.      Mouth/Throat:      Mouth: Mucous membranes are moist.   Eyes:      Extraocular Movements: Extraocular movements intact. Pupils: Pupils are equal, round, and reactive to light. Cardiovascular:      Pulses: Normal pulses. Pulmonary:      Effort: Pulmonary effort is normal.   Abdominal:      General: Abdomen is flat. There is no distension. Musculoskeletal:         General: Swelling and tenderness present. No deformity or signs of injury. Normal range of motion. Cervical back: Normal range of motion. Right lower leg: No edema. Left lower leg: No edema. Skin:     General: Skin is warm and dry. Capillary Refill: Capillary refill takes less than 2 seconds. Findings: No bruising or erythema. Neurological:      General: No focal deficit present. Mental Status: She is alert and oriented to person, place, and time. Psychiatric:         Mood and Affect: Mood normal.         Behavior: Behavior normal.               Right thumb: There is a mass along the volar ulnar aspect of the thumb near the MCP joint. Neurovascularly intact distally. Cap refill brisk. Range of motion full. Minimally tender at the mass.        Imaging:      3/9/2022 2 views of right thumb shows a small density that is radiopaque with likely calcification along the volar ulnar aspect of the thumb near the MCP joint.            ICD-10-CM ICD-9-CM     1. Subcutaneous mass of right thumb  R22.31 782.2 AMB POC XRAY, FINGER(S), 2+ VIEWS         SCHEDULE SURGERY            Plan:      Schedule right thumb mass excision.     Patient will need dialysis prior to surgery.   We will try to get dialysis moved up as early as possible and we will schedule her for later in the day.     45 minutes was spent discussing operative versus nonoperative treatment, postoperative convalescence, and obtaining informed consent.     Follow-up and Dispositions    · Return for Postop.            Plan was reviewed with patient, who verbalized agreement and understanding of the plan

## 2022-04-05 ENCOUNTER — ANESTHESIA (OUTPATIENT)
Dept: SURGERY | Age: 62
End: 2022-04-05
Payer: MEDICARE

## 2022-04-05 ENCOUNTER — HOSPITAL ENCOUNTER (OUTPATIENT)
Age: 62
Setting detail: OUTPATIENT SURGERY
Discharge: HOME | End: 2022-04-05
Attending: ORTHOPAEDIC SURGERY | Admitting: ORTHOPAEDIC SURGERY
Payer: MEDICARE

## 2022-04-05 VITALS
DIASTOLIC BLOOD PRESSURE: 89 MMHG | SYSTOLIC BLOOD PRESSURE: 138 MMHG | TEMPERATURE: 97.3 F | OXYGEN SATURATION: 94 % | BODY MASS INDEX: 29.44 KG/M2 | HEART RATE: 71 BPM | RESPIRATION RATE: 18 BRPM | HEIGHT: 62 IN | WEIGHT: 160 LBS

## 2022-04-05 DIAGNOSIS — R22.31 SUBCUTANEOUS MASS OF RIGHT THUMB: Primary | ICD-10-CM

## 2022-04-05 LAB
ANION GAP SERPL CALC-SCNC: 4 MMOL/L (ref 3–18)
BUN SERPL-MCNC: 33 MG/DL (ref 7–18)
BUN/CREAT SERPL: 6 (ref 12–20)
CALCIUM SERPL-MCNC: 9.7 MG/DL (ref 8.5–10.1)
CHLORIDE SERPL-SCNC: 98 MMOL/L (ref 100–111)
CO2 SERPL-SCNC: 35 MMOL/L (ref 21–32)
CREAT SERPL-MCNC: 5.45 MG/DL (ref 0.6–1.3)
GLUCOSE SERPL-MCNC: 88 MG/DL (ref 74–99)
POTASSIUM SERPL-SCNC: 3.7 MMOL/L (ref 3.5–5.5)
SODIUM SERPL-SCNC: 137 MMOL/L (ref 136–145)

## 2022-04-05 PROCEDURE — 74011000250 HC RX REV CODE- 250: Performed by: ORTHOPAEDIC SURGERY

## 2022-04-05 PROCEDURE — 77030000032 HC CUF TRNQT ZIMM -B: Performed by: ORTHOPAEDIC SURGERY

## 2022-04-05 PROCEDURE — 88305 TISSUE EXAM BY PATHOLOGIST: CPT

## 2022-04-05 PROCEDURE — 74011250636 HC RX REV CODE- 250/636: Performed by: ORTHOPAEDIC SURGERY

## 2022-04-05 PROCEDURE — 77030010813: Performed by: ORTHOPAEDIC SURGERY

## 2022-04-05 PROCEDURE — 77030040356 HC CORD BPLR FRCP COVD -A: Performed by: ORTHOPAEDIC SURGERY

## 2022-04-05 PROCEDURE — 74011250636 HC RX REV CODE- 250/636: Performed by: NURSE ANESTHETIST, CERTIFIED REGISTERED

## 2022-04-05 PROCEDURE — 74011250637 HC RX REV CODE- 250/637: Performed by: ORTHOPAEDIC SURGERY

## 2022-04-05 PROCEDURE — 77030018836 HC SOL IRR NACL ICUM -A: Performed by: ORTHOPAEDIC SURGERY

## 2022-04-05 PROCEDURE — 76210000021 HC REC RM PH II 0.5 TO 1 HR: Performed by: ORTHOPAEDIC SURGERY

## 2022-04-05 PROCEDURE — 74011000250 HC RX REV CODE- 250: Performed by: NURSE ANESTHETIST, CERTIFIED REGISTERED

## 2022-04-05 PROCEDURE — 00400 ANES INTEGUMENTARY SYS NOS: CPT | Performed by: ANESTHESIOLOGY

## 2022-04-05 PROCEDURE — 77030002888 HC SUT CHRMC J&J -A: Performed by: ORTHOPAEDIC SURGERY

## 2022-04-05 PROCEDURE — 2709999900 HC NON-CHARGEABLE SUPPLY: Performed by: ORTHOPAEDIC SURGERY

## 2022-04-05 PROCEDURE — 76060000032 HC ANESTHESIA 0.5 TO 1 HR: Performed by: ORTHOPAEDIC SURGERY

## 2022-04-05 PROCEDURE — 76210000063 HC OR PH I REC FIRST 0.5 HR: Performed by: ORTHOPAEDIC SURGERY

## 2022-04-05 PROCEDURE — 80048 BASIC METABOLIC PNL TOTAL CA: CPT

## 2022-04-05 PROCEDURE — 76010000138 HC OR TIME 0.5 TO 1 HR: Performed by: ORTHOPAEDIC SURGERY

## 2022-04-05 PROCEDURE — 00400 ANES INTEGUMENTARY SYS NOS: CPT | Performed by: NURSE ANESTHETIST, CERTIFIED REGISTERED

## 2022-04-05 PROCEDURE — 26160 REMOVE TENDON SHEATH LESION: CPT | Performed by: ORTHOPAEDIC SURGERY

## 2022-04-05 PROCEDURE — 74011250637 HC RX REV CODE- 250/637: Performed by: NURSE ANESTHETIST, CERTIFIED REGISTERED

## 2022-04-05 PROCEDURE — 77030006689 HC BLD OPHTH BVR BD -A: Performed by: ORTHOPAEDIC SURGERY

## 2022-04-05 RX ORDER — SODIUM CHLORIDE 0.9 % (FLUSH) 0.9 %
5-40 SYRINGE (ML) INJECTION EVERY 8 HOURS
Status: DISCONTINUED | OUTPATIENT
Start: 2022-04-05 | End: 2022-04-05 | Stop reason: HOSPADM

## 2022-04-05 RX ORDER — HYDROCODONE BITARTRATE AND ACETAMINOPHEN 5; 325 MG/1; MG/1
1 TABLET ORAL
Qty: 12 TABLET | Refills: 0 | Status: SHIPPED | OUTPATIENT
Start: 2022-04-05 | End: 2022-04-08

## 2022-04-05 RX ORDER — PROPOFOL 10 MG/ML
VIAL (ML) INTRAVENOUS
Status: DISCONTINUED | OUTPATIENT
Start: 2022-04-05 | End: 2022-04-05 | Stop reason: HOSPADM

## 2022-04-05 RX ORDER — FENTANYL CITRATE 50 UG/ML
INJECTION, SOLUTION INTRAMUSCULAR; INTRAVENOUS AS NEEDED
Status: DISCONTINUED | OUTPATIENT
Start: 2022-04-05 | End: 2022-04-05 | Stop reason: HOSPADM

## 2022-04-05 RX ORDER — MAGNESIUM SULFATE 100 %
4 CRYSTALS MISCELLANEOUS AS NEEDED
Status: DISCONTINUED | OUTPATIENT
Start: 2022-04-05 | End: 2022-04-05 | Stop reason: HOSPADM

## 2022-04-05 RX ORDER — HYDROCODONE BITARTRATE AND ACETAMINOPHEN 5; 325 MG/1; MG/1
1 TABLET ORAL ONCE
Status: COMPLETED | OUTPATIENT
Start: 2022-04-05 | End: 2022-04-05

## 2022-04-05 RX ORDER — FENTANYL CITRATE 50 UG/ML
25 INJECTION, SOLUTION INTRAMUSCULAR; INTRAVENOUS AS NEEDED
Status: DISCONTINUED | OUTPATIENT
Start: 2022-04-05 | End: 2022-04-05 | Stop reason: HOSPADM

## 2022-04-05 RX ORDER — ONDANSETRON 2 MG/ML
4 INJECTION INTRAMUSCULAR; INTRAVENOUS ONCE
Status: DISCONTINUED | OUTPATIENT
Start: 2022-04-05 | End: 2022-04-05 | Stop reason: HOSPADM

## 2022-04-05 RX ORDER — LIDOCAINE HYDROCHLORIDE 10 MG/ML
0.1 INJECTION, SOLUTION EPIDURAL; INFILTRATION; INTRACAUDAL; PERINEURAL AS NEEDED
Status: DISCONTINUED | OUTPATIENT
Start: 2022-04-05 | End: 2022-04-05 | Stop reason: HOSPADM

## 2022-04-05 RX ORDER — ROPIVACAINE HYDROCHLORIDE 5 MG/ML
INJECTION, SOLUTION EPIDURAL; INFILTRATION; PERINEURAL
Status: DISCONTINUED
Start: 2022-04-05 | End: 2022-04-05 | Stop reason: HOSPADM

## 2022-04-05 RX ORDER — DEXAMETHASONE SODIUM PHOSPHATE 4 MG/ML
INJECTION, SOLUTION INTRA-ARTICULAR; INTRALESIONAL; INTRAMUSCULAR; INTRAVENOUS; SOFT TISSUE AS NEEDED
Status: DISCONTINUED | OUTPATIENT
Start: 2022-04-05 | End: 2022-04-05 | Stop reason: HOSPADM

## 2022-04-05 RX ORDER — INSULIN LISPRO 100 [IU]/ML
INJECTION, SOLUTION INTRAVENOUS; SUBCUTANEOUS ONCE
Status: DISCONTINUED | OUTPATIENT
Start: 2022-04-05 | End: 2022-04-05 | Stop reason: HOSPADM

## 2022-04-05 RX ORDER — PROPOFOL 10 MG/ML
INJECTION, EMULSION INTRAVENOUS AS NEEDED
Status: DISCONTINUED | OUTPATIENT
Start: 2022-04-05 | End: 2022-04-05 | Stop reason: HOSPADM

## 2022-04-05 RX ORDER — SODIUM CHLORIDE 0.9 % (FLUSH) 0.9 %
5-40 SYRINGE (ML) INJECTION AS NEEDED
Status: DISCONTINUED | OUTPATIENT
Start: 2022-04-05 | End: 2022-04-05 | Stop reason: HOSPADM

## 2022-04-05 RX ORDER — FAMOTIDINE 20 MG/1
20 TABLET, FILM COATED ORAL ONCE
Status: COMPLETED | OUTPATIENT
Start: 2022-04-05 | End: 2022-04-05

## 2022-04-05 RX ORDER — FENTANYL CITRATE 50 UG/ML
50 INJECTION, SOLUTION INTRAMUSCULAR; INTRAVENOUS
Status: DISCONTINUED | OUTPATIENT
Start: 2022-04-05 | End: 2022-04-05 | Stop reason: HOSPADM

## 2022-04-05 RX ORDER — LIDOCAINE HYDROCHLORIDE 20 MG/ML
INJECTION, SOLUTION EPIDURAL; INFILTRATION; INTRACAUDAL; PERINEURAL
Status: COMPLETED
Start: 2022-04-05 | End: 2022-04-05

## 2022-04-05 RX ORDER — ONDANSETRON 2 MG/ML
INJECTION INTRAMUSCULAR; INTRAVENOUS AS NEEDED
Status: DISCONTINUED | OUTPATIENT
Start: 2022-04-05 | End: 2022-04-05 | Stop reason: HOSPADM

## 2022-04-05 RX ORDER — LIDOCAINE HYDROCHLORIDE 20 MG/ML
INJECTION, SOLUTION EPIDURAL; INFILTRATION; INTRACAUDAL; PERINEURAL AS NEEDED
Status: DISCONTINUED | OUTPATIENT
Start: 2022-04-05 | End: 2022-04-05 | Stop reason: HOSPADM

## 2022-04-05 RX ORDER — SODIUM CHLORIDE 9 MG/ML
25 INJECTION, SOLUTION INTRAVENOUS CONTINUOUS
Status: DISCONTINUED | OUTPATIENT
Start: 2022-04-05 | End: 2022-04-05 | Stop reason: HOSPADM

## 2022-04-05 RX ADMIN — SODIUM CHLORIDE: 9 INJECTION, SOLUTION INTRAVENOUS at 16:05

## 2022-04-05 RX ADMIN — PROPOFOL 120 MCG/KG/MIN: 10 INJECTION, EMULSION INTRAVENOUS at 15:54

## 2022-04-05 RX ADMIN — ONDANSETRON 4 MG: 2 INJECTION INTRAMUSCULAR; INTRAVENOUS at 16:06

## 2022-04-05 RX ADMIN — HYDROCODONE BITARTRATE AND ACETAMINOPHEN 1 TABLET: 5; 325 TABLET ORAL at 17:31

## 2022-04-05 RX ADMIN — CEFAZOLIN SODIUM 2 G: 1 INJECTION, POWDER, FOR SOLUTION INTRAMUSCULAR; INTRAVENOUS at 16:00

## 2022-04-05 RX ADMIN — DEXAMETHASONE SODIUM PHOSPHATE 4 MG: 4 INJECTION, SOLUTION INTRAMUSCULAR; INTRAVENOUS at 16:06

## 2022-04-05 RX ADMIN — FENTANYL CITRATE 50 MCG: 50 INJECTION, SOLUTION INTRAMUSCULAR; INTRAVENOUS at 16:05

## 2022-04-05 RX ADMIN — FENTANYL CITRATE 50 MCG: 50 INJECTION, SOLUTION INTRAMUSCULAR; INTRAVENOUS at 15:54

## 2022-04-05 RX ADMIN — LIDOCAINE HYDROCHLORIDE 100 MG: 20 INJECTION, SOLUTION EPIDURAL; INFILTRATION; INTRACAUDAL; PERINEURAL at 15:54

## 2022-04-05 RX ADMIN — PROPOFOL 50 MG: 10 INJECTION, EMULSION INTRAVENOUS at 15:54

## 2022-04-05 RX ADMIN — FAMOTIDINE 20 MG: 20 TABLET, FILM COATED ORAL at 11:53

## 2022-04-05 NOTE — DISCHARGE INSTRUCTIONS
Ice and Elevate operative wound/dressing. Begin moving fingers immediately after surgery. Keep dressing clean and dry. Cover for showering. Remove dressing on Friday, wash, dry and leave open to air. Shower normally after dressing removal.      DISCHARGE SUMMARY from Nurse    PATIENT INSTRUCTIONS:    After general anesthesia or intravenous sedation, for 24 hours or while taking prescription Narcotics:  · Limit your activities  · Do not drive and operate hazardous machinery  · Do not make important personal or business decisions  · Do  not drink alcoholic beverages  · If you have not urinated within 8 hours after discharge, please contact your surgeon on call. Report the following to your surgeon:  · Excessive pain, swelling, redness or odor of or around the surgical area  · Temperature over 100.5  · Nausea and vomiting lasting longer than 4 hours or if unable to take medications  · Any signs of decreased circulation or nerve impairment to extremity: change in color, persistent  numbness, tingling, coldness or increase pain  · Any questions    What to do at Home:  Recommended activity: Activity as tolerated,     If you experience any of the following symptoms BRIGHT RED BLEEDING, PAIN NOT RELIEVED BY PAIN MEDICATION, please follow up with DR. Paulene Moritz. *  Please give a list of your current medications to your Primary Care Provider. *  Please update this list whenever your medications are discontinued, doses are      changed, or new medications (including over-the-counter products) are added. *  Please carry medication information at all times in case of emergency situations. These are general instructions for a healthy lifestyle:    No smoking/ No tobacco products/ Avoid exposure to second hand smoke  Surgeon General's Warning:  Quitting smoking now greatly reduces serious risk to your health.     Obesity, smoking, and sedentary lifestyle greatly increases your risk for illness    A healthy diet, regular physical exercise & weight monitoring are important for maintaining a healthy lifestyle    You may be retaining fluid if you have a history of heart failure or if you experience any of the following symptoms:  Weight gain of 3 pounds or more overnight or 5 pounds in a week, increased swelling in our hands or feet or shortness of breath while lying flat in bed. Please call your doctor as soon as you notice any of these symptoms; do not wait until your next office visit. The discharge information has been reviewed with the patient. The patient verbalized understanding. Discharge medications reviewed with the patient and appropriate educational materials and side effects teaching were provided.   ___________________________________________________________________________________________________________________________________

## 2022-04-05 NOTE — H&P
Update History & Physical    The Patient's History and Physical of March 9, 2022 was reviewed with the patient and I examined the patient. There was no change. The surgical site was confirmed by the patient and me. Plan:  The risk, benefits, expected outcome, and alternative to the recommended procedure have been discussed with the patient. Patient understands and wants to proceed with the procedure.     Electronically signed by Wilber Vega DO on 4/5/2022 at 2:53 PM

## 2022-04-05 NOTE — OP NOTES
Operative Report    Patient: Evelai Wilkins MRN: [id number]  SSN: [id number]    YOB: 1960  Age: 62 y.o. Sex: female       Date of Surgery: 4/5/2022     Preoperative Diagnosis: Subcutaneous mass of right thumb [R22.31]     Postoperative Diagnosis: Subcutaneous mass of right thumb [R22.31]     Surgeon(s) and Role:     Leonel Rowland,  - Primary    Assistant: Sherren Perch    Anesthesia: Mac + Local    Procedure: Procedure(s):  RIGHT THUMB MASS EXCISION     Findings: Cystic-appearing mass similar to an epidermal inclusion cyst deep to the fascial layer. Procedure in Detail:     Indications for procedure been outlined in the perioperative documentation, most notably being not amenable to conservative treatment. Informed consent was obtained from the patient. The risks and benefits of the procedure were discussed with the patient. They include but are not limited to neurovascular injury, tendon/ligamentous injury, blood loss, infection, need for further surgery, hematoma, neuroma, seroma, chronic pain, chronic stiffness, complications from anesthesia including death, and the possibility of coral Covid. After informed consent was obtained, the patient was taken back to the operative suite. The operative extremity and it was prepped and draped in the normal sterile fashion. The arm was exsanguinated with an Esmarch which was subsequently used as a forearm tourniquet. Attention was turned to the right thumb. A mid lateral incision was made along the ulnar border near the area of the mass. Subcutaneous tissue dissected and electrocautery used for hemostasis. Care was taken to protect the neurovascular bundles. The mass is identified and found to be very mobile. There was yellowish in appearance. It was isolated and dissected around from the surrounding tissue. There was a couple stocks but no neural structures identified.   The mass was subsequently excised and stalk cauterized. The mass was sent to pathology. The wound was copiously irrigated and closed with 5-0 chromic in interrupted fashion. The patient was placed into a sterile dressing and sent to recovery in stable condition. Prior to prepping and draping 10 mL of quarter percent Marcaine plain mixed with 1% lidocaine plain was injected in the form of a dorsal ulnar thumb block. Estimated Blood Loss: Minimal    Tourniquet Time: * No tourniquets in log *      Implants: * No implants in log *            Specimens:   ID Type Source Tests Collected by Time Destination   1 : rt. thumb mass Preservative   Mone BALL DO 4/5/2022 1628 Pathology           Drains: None                Complications: None    Counts: Sponge and needle counts were correct times two.     Signed By:  Haile Mckenzie DO     April 5, 2022

## 2022-04-05 NOTE — ANESTHESIA PREPROCEDURE EVALUATION
Relevant Problems   No relevant active problems       Anesthetic History   No history of anesthetic complications            Review of Systems / Medical History  Patient summary reviewed and pertinent labs reviewed    Pulmonary  Within defined limits      Sleep apnea: No treatment           Neuro/Psych   Within defined limits           Cardiovascular  Within defined limits  Hypertension              Exercise tolerance: >4 METS     GI/Hepatic/Renal         Renal disease: ESRD and CRI       Endo/Other  Within defined limits           Other Findings              Physical Exam    Airway  Mallampati: II  TM Distance: 4 - 6 cm  Neck ROM: normal range of motion   Mouth opening: Normal     Cardiovascular  Regular rate and rhythm,  S1 and S2 normal,  no murmur, click, rub, or gallop  Rhythm: regular  Rate: normal         Dental    Dentition: Lower dentition intact and Upper dentition intact     Pulmonary  Breath sounds clear to auscultation               Abdominal  GI exam deferred       Other Findings            Anesthetic Plan    ASA: 2  Anesthesia type: MAC          Induction: Intravenous  Anesthetic plan and risks discussed with: Patient

## 2022-04-05 NOTE — BRIEF OP NOTE
Brief Postoperative Note    Patient: Alecia Ganser  YOB: 1960  MRN: [id number]    Date of Procedure: 4/5/2022     Pre-Op Diagnosis: Subcutaneous mass of right thumb [R22.31]    Post-Op Diagnosis: Same as preoperative diagnosis. Procedure(s):  RIGHT THUMB MASS EXCISION    Surgeon(s):   Renee De DO    Surgical Assistant: Surg Asst-1: Yandel Plater    Anesthesia: General & Local    Estimated Blood Loss (mL): Minimal    Complications: None    Specimens:   ID Type Source Tests Collected by Time Destination   1 : rt. thumb mass Preservative   Stan BALL DO 4/5/2022 1628 Pathology        Implants: * No implants in log *    Drains: * No LDAs found *    Findings: epidermal inclusion cyst appearing mass    Electronically Signed by Wilber Vega DO on 4/5/2022 at 4:29 PM

## 2022-04-05 NOTE — ANESTHESIA POSTPROCEDURE EVALUATION
Procedure(s):  RIGHT THUMB MASS EXCISION. MAC    Anesthesia Post Evaluation      Multimodal analgesia: multimodal analgesia used between 6 hours prior to anesthesia start to PACU discharge  Patient location during evaluation: bedside  Patient participation: complete - patient participated  Level of consciousness: awake  Pain management: adequate  Airway patency: patent  Anesthetic complications: no  Cardiovascular status: stable  Respiratory status: acceptable  Hydration status: acceptable  Post anesthesia nausea and vomiting:  controlled  Final Post Anesthesia Temperature Assessment:  Normothermia (36.0-37.5 degrees C)      INITIAL Post-op Vital signs:   Vitals Value Taken Time   /89 04/05/22 1702   Temp 35.9 °C (96.7 °F) 04/05/22 1636   Pulse 67 04/05/22 1706   Resp 23 04/05/22 1706   SpO2 89 % 04/05/22 1706   Vitals shown include unvalidated device data.

## 2022-04-07 ENCOUNTER — TELEPHONE (OUTPATIENT)
Dept: ORTHOPEDIC SURGERY | Age: 62
End: 2022-04-07

## 2022-04-07 NOTE — LETTER
NOTIFICATION RETURN TO WORK / SCHOOL    4/7/2022 4:11 PM    [address] [facility] [id number]      To Whom It May Concern:    Clemencia Strickland is currently under the care of [address] [place] [address] Gregory Burr. She will return to work on 4/11/22. Please excuse for 4/7/22 and 4/8/22. If there are questions or concerns please have the patient contact our office.         Sincerely,      Daniel Mascorro, DO

## 2022-04-07 NOTE — TELEPHONE ENCOUNTER
Patient is requesting a note to put her out of work today and tomorrow. She stated her hand starting aching today and she is unable to work. Patient had right thumb mass excision 4/5. Patient can be reached at [phone].

## 2022-05-28 ENCOUNTER — HOSPITAL ENCOUNTER (INPATIENT)
Age: 62
LOS: 2 days | Discharge: HOME | DRG: 308 | End: 2022-05-30
Attending: STUDENT IN AN ORGANIZED HEALTH CARE EDUCATION/TRAINING PROGRAM | Admitting: INTERNAL MEDICINE
Payer: MEDICARE

## 2022-05-28 ENCOUNTER — APPOINTMENT (OUTPATIENT)
Dept: GENERAL RADIOLOGY | Age: 62
DRG: 308 | End: 2022-05-28
Attending: EMERGENCY MEDICINE
Payer: MEDICARE

## 2022-05-28 DIAGNOSIS — R07.9 CHEST PAIN, UNSPECIFIED TYPE: ICD-10-CM

## 2022-05-28 DIAGNOSIS — N18.6 ESRD (END STAGE RENAL DISEASE) ON DIALYSIS (HCC): ICD-10-CM

## 2022-05-28 DIAGNOSIS — I48.91 ATRIAL FIBRILLATION WITH RVR (HCC): ICD-10-CM

## 2022-05-28 DIAGNOSIS — I48.0 PAROXYSMAL A-FIB (HCC): ICD-10-CM

## 2022-05-28 DIAGNOSIS — I21.4 NSTEMI (NON-ST ELEVATED MYOCARDIAL INFARCTION) (HCC): ICD-10-CM

## 2022-05-28 DIAGNOSIS — Z99.2 ESRD (END STAGE RENAL DISEASE) ON DIALYSIS (HCC): ICD-10-CM

## 2022-05-28 LAB
ALBUMIN SERPL-MCNC: 3.2 G/DL (ref 3.4–5)
ALBUMIN/GLOB SERPL: 0.7 {RATIO} (ref 0.8–1.7)
ALP SERPL-CCNC: 119 U/L (ref 45–117)
ALT SERPL-CCNC: 18 U/L (ref 13–56)
ANION GAP SERPL CALC-SCNC: 9 MMOL/L (ref 3–18)
APTT PPP: 40.8 SEC (ref 23–36.4)
AST SERPL-CCNC: 30 U/L (ref 10–38)
ATRIAL RATE: 69 BPM
BASOPHILS # BLD: 0.1 K/UL (ref 0–0.1)
BASOPHILS # BLD: 0.1 K/UL (ref 0–0.1)
BASOPHILS NFR BLD: 1 % (ref 0–2)
BASOPHILS NFR BLD: 1 % (ref 0–2)
BILIRUB SERPL-MCNC: 0.3 MG/DL (ref 0.2–1)
BNP SERPL-MCNC: ABNORMAL PG/ML (ref 0–900)
BUN SERPL-MCNC: 45 MG/DL (ref 7–18)
BUN/CREAT SERPL: 8 (ref 12–20)
CALCIUM SERPL-MCNC: 9.8 MG/DL (ref 8.5–10.1)
CALCULATED P AXIS, ECG09: 40 DEGREES
CALCULATED R AXIS, ECG10: -49 DEGREES
CALCULATED R AXIS, ECG10: -51 DEGREES
CALCULATED T AXIS, ECG11: 59 DEGREES
CALCULATED T AXIS, ECG11: 90 DEGREES
CHLORIDE SERPL-SCNC: 100 MMOL/L (ref 100–111)
CO2 SERPL-SCNC: 30 MMOL/L (ref 21–32)
CREAT SERPL-MCNC: 5.88 MG/DL (ref 0.6–1.3)
DIAGNOSIS, 93000: NORMAL
DIAGNOSIS, 93000: NORMAL
DIFFERENTIAL METHOD BLD: ABNORMAL
DIFFERENTIAL METHOD BLD: ABNORMAL
EOSINOPHIL # BLD: 0.3 K/UL (ref 0–0.4)
EOSINOPHIL # BLD: 0.4 K/UL (ref 0–0.4)
EOSINOPHIL NFR BLD: 5 % (ref 0–5)
EOSINOPHIL NFR BLD: 6 % (ref 0–5)
ERYTHROCYTE [DISTWIDTH] IN BLOOD BY AUTOMATED COUNT: 17.3 % (ref 11.6–14.5)
ERYTHROCYTE [DISTWIDTH] IN BLOOD BY AUTOMATED COUNT: 17.4 % (ref 11.6–14.5)
FERRITIN SERPL-MCNC: 1091 NG/ML (ref 8–388)
FOLATE SERPL-MCNC: >20 NG/ML (ref 3.1–17.5)
GLOBULIN SER CALC-MCNC: 4.3 G/DL (ref 2–4)
GLUCOSE SERPL-MCNC: 101 MG/DL (ref 74–99)
HCT VFR BLD AUTO: 32.7 % (ref 35–45)
HCT VFR BLD AUTO: 33 % (ref 35–45)
HGB BLD-MCNC: 10 G/DL (ref 12–16)
HGB BLD-MCNC: 10 G/DL (ref 12–16)
IMM GRANULOCYTES # BLD AUTO: 0 K/UL (ref 0–0.04)
IMM GRANULOCYTES # BLD AUTO: 0 K/UL (ref 0–0.04)
IMM GRANULOCYTES NFR BLD AUTO: 0 % (ref 0–0.5)
IMM GRANULOCYTES NFR BLD AUTO: 0 % (ref 0–0.5)
IRON SATN MFR SERPL: 27 % (ref 20–50)
IRON SERPL-MCNC: 61 UG/DL (ref 50–175)
LYMPHOCYTES # BLD: 1 K/UL (ref 0.9–3.6)
LYMPHOCYTES # BLD: 1.3 K/UL (ref 0.9–3.6)
LYMPHOCYTES NFR BLD: 17 % (ref 21–52)
LYMPHOCYTES NFR BLD: 19 % (ref 21–52)
MAGNESIUM SERPL-MCNC: 2.6 MG/DL (ref 1.6–2.6)
MCH RBC QN AUTO: 28.7 PG (ref 24–34)
MCH RBC QN AUTO: 29 PG (ref 24–34)
MCHC RBC AUTO-ENTMCNC: 30.3 G/DL (ref 31–37)
MCHC RBC AUTO-ENTMCNC: 30.6 G/DL (ref 31–37)
MCV RBC AUTO: 93.7 FL (ref 78–100)
MCV RBC AUTO: 95.7 FL (ref 78–100)
MONOCYTES # BLD: 0.7 K/UL (ref 0.05–1.2)
MONOCYTES # BLD: 0.7 K/UL (ref 0.05–1.2)
MONOCYTES NFR BLD: 11 % (ref 3–10)
MONOCYTES NFR BLD: 13 % (ref 3–10)
NEUTS SEG # BLD: 3.7 K/UL (ref 1.8–8)
NEUTS SEG # BLD: 4.3 K/UL (ref 1.8–8)
NEUTS SEG NFR BLD: 63 % (ref 40–73)
NEUTS SEG NFR BLD: 64 % (ref 40–73)
NRBC # BLD: 0 K/UL (ref 0–0.01)
NRBC # BLD: 0 K/UL (ref 0–0.01)
NRBC BLD-RTO: 0 PER 100 WBC
NRBC BLD-RTO: 0 PER 100 WBC
P-R INTERVAL, ECG05: 148 MS
PLATELET # BLD AUTO: 224 K/UL (ref 135–420)
PLATELET # BLD AUTO: 230 K/UL (ref 135–420)
PLATELET COMMENTS,PCOM: ABNORMAL
PMV BLD AUTO: 12.5 FL (ref 9.2–11.8)
PMV BLD AUTO: 12.9 FL (ref 9.2–11.8)
POTASSIUM SERPL-SCNC: 4.1 MMOL/L (ref 3.5–5.5)
PROT SERPL-MCNC: 7.5 G/DL (ref 6.4–8.2)
Q-T INTERVAL, ECG07: 352 MS
Q-T INTERVAL, ECG07: 460 MS
QRS DURATION, ECG06: 104 MS
QRS DURATION, ECG06: 90 MS
QTC CALCULATION (BEZET), ECG08: 492 MS
QTC CALCULATION (BEZET), ECG08: 531 MS
RBC # BLD AUTO: 3.45 M/UL (ref 4.2–5.3)
RBC # BLD AUTO: 3.49 M/UL (ref 4.2–5.3)
RBC MORPH BLD: ABNORMAL
SODIUM SERPL-SCNC: 139 MMOL/L (ref 136–145)
T4 FREE SERPL-MCNC: 0.8 NG/DL (ref 0.7–1.5)
TIBC SERPL-MCNC: 228 UG/DL (ref 250–450)
TROPONIN-HIGH SENSITIVITY: 43 NG/L (ref 0–54)
TROPONIN-HIGH SENSITIVITY: 678 NG/L (ref 0–54)
TROPONIN-HIGH SENSITIVITY: 89 NG/L (ref 0–54)
TSH SERPL DL<=0.05 MIU/L-ACNC: 0.06 UIU/ML (ref 0.36–3.74)
VENTRICULAR RATE, ECG03: 137 BPM
VENTRICULAR RATE, ECG03: 69 BPM
VIT B12 SERPL-MCNC: 939 PG/ML (ref 211–911)
WBC # BLD AUTO: 5.7 K/UL (ref 4.6–13.2)
WBC # BLD AUTO: 6.8 K/UL (ref 4.6–13.2)

## 2022-05-28 PROCEDURE — 99285 EMERGENCY DEPT VISIT HI MDM: CPT

## 2022-05-28 PROCEDURE — 82728 ASSAY OF FERRITIN: CPT

## 2022-05-28 PROCEDURE — 84443 ASSAY THYROID STIM HORMONE: CPT

## 2022-05-28 PROCEDURE — 71045 X-RAY EXAM CHEST 1 VIEW: CPT

## 2022-05-28 PROCEDURE — 83540 ASSAY OF IRON: CPT

## 2022-05-28 PROCEDURE — 94640 AIRWAY INHALATION TREATMENT: CPT

## 2022-05-28 PROCEDURE — 85025 COMPLETE CBC W/AUTO DIFF WBC: CPT

## 2022-05-28 PROCEDURE — 82607 VITAMIN B-12: CPT

## 2022-05-28 PROCEDURE — 36415 COLL VENOUS BLD VENIPUNCTURE: CPT

## 2022-05-28 PROCEDURE — 65270000046 HC RM TELEMETRY

## 2022-05-28 PROCEDURE — 74011250637 HC RX REV CODE- 250/637: Performed by: INTERNAL MEDICINE

## 2022-05-28 PROCEDURE — 2709999900 HC NON-CHARGEABLE SUPPLY

## 2022-05-28 PROCEDURE — G0378 HOSPITAL OBSERVATION PER HR: HCPCS

## 2022-05-28 PROCEDURE — 99222 1ST HOSP IP/OBS MODERATE 55: CPT | Performed by: INTERNAL MEDICINE

## 2022-05-28 PROCEDURE — 93005 ELECTROCARDIOGRAM TRACING: CPT

## 2022-05-28 PROCEDURE — 74011000250 HC RX REV CODE- 250: Performed by: INTERNAL MEDICINE

## 2022-05-28 PROCEDURE — 85730 THROMBOPLASTIN TIME PARTIAL: CPT

## 2022-05-28 PROCEDURE — 96374 THER/PROPH/DIAG INJ IV PUSH: CPT

## 2022-05-28 PROCEDURE — 80053 COMPREHEN METABOLIC PANEL: CPT

## 2022-05-28 PROCEDURE — 74011250637 HC RX REV CODE- 250/637: Performed by: EMERGENCY MEDICINE

## 2022-05-28 PROCEDURE — 84439 ASSAY OF FREE THYROXINE: CPT

## 2022-05-28 PROCEDURE — 84484 ASSAY OF TROPONIN QUANT: CPT

## 2022-05-28 PROCEDURE — 83880 ASSAY OF NATRIURETIC PEPTIDE: CPT

## 2022-05-28 PROCEDURE — 83735 ASSAY OF MAGNESIUM: CPT

## 2022-05-28 PROCEDURE — 74011250636 HC RX REV CODE- 250/636: Performed by: INTERNAL MEDICINE

## 2022-05-28 PROCEDURE — 74011250637 HC RX REV CODE- 250/637: Performed by: STUDENT IN AN ORGANIZED HEALTH CARE EDUCATION/TRAINING PROGRAM

## 2022-05-28 PROCEDURE — 74011000250 HC RX REV CODE- 250: Performed by: STUDENT IN AN ORGANIZED HEALTH CARE EDUCATION/TRAINING PROGRAM

## 2022-05-28 RX ORDER — CLONIDINE HYDROCHLORIDE 0.1 MG/1
0.3 TABLET ORAL 3 TIMES DAILY
Status: DISCONTINUED | OUTPATIENT
Start: 2022-05-28 | End: 2022-05-30 | Stop reason: HOSPADM

## 2022-05-28 RX ORDER — HEPARIN SODIUM 5000 [USP'U]/ML
5000 INJECTION, SOLUTION INTRAVENOUS; SUBCUTANEOUS EVERY 8 HOURS
Status: DISCONTINUED | OUTPATIENT
Start: 2022-05-28 | End: 2022-05-28 | Stop reason: SDUPTHER

## 2022-05-28 RX ORDER — HEPARIN SODIUM 1000 [USP'U]/ML
4000 INJECTION, SOLUTION INTRAVENOUS; SUBCUTANEOUS ONCE
Status: COMPLETED | OUTPATIENT
Start: 2022-05-28 | End: 2022-05-28

## 2022-05-28 RX ORDER — CARVEDILOL 25 MG/1
25 TABLET ORAL 2 TIMES DAILY WITH MEALS
Status: DISCONTINUED | OUTPATIENT
Start: 2022-05-28 | End: 2022-05-30 | Stop reason: HOSPADM

## 2022-05-28 RX ORDER — SODIUM CHLORIDE 0.9 % (FLUSH) 0.9 %
5-40 SYRINGE (ML) INJECTION EVERY 8 HOURS
Status: DISCONTINUED | OUTPATIENT
Start: 2022-05-28 | End: 2022-05-30 | Stop reason: HOSPADM

## 2022-05-28 RX ORDER — BUDESONIDE AND FORMOTEROL FUMARATE DIHYDRATE 160; 4.5 UG/1; UG/1
2 AEROSOL RESPIRATORY (INHALATION)
Status: DISCONTINUED | OUTPATIENT
Start: 2022-05-28 | End: 2022-05-28 | Stop reason: CLARIF

## 2022-05-28 RX ORDER — ATORVASTATIN CALCIUM 40 MG/1
80 TABLET, FILM COATED ORAL
Status: DISCONTINUED | OUTPATIENT
Start: 2022-05-29 | End: 2022-05-30 | Stop reason: HOSPADM

## 2022-05-28 RX ORDER — ZOLPIDEM TARTRATE 5 MG/1
5 TABLET ORAL
COMMUNITY
Start: 2022-05-02 | End: 2022-10-21

## 2022-05-28 RX ORDER — LISINOPRIL 40 MG/1
40 TABLET ORAL DAILY
COMMUNITY
Start: 2022-05-02 | End: 2022-09-27

## 2022-05-28 RX ORDER — GUAIFENESIN 100 MG/5ML
324 LIQUID (ML) ORAL
Status: COMPLETED | OUTPATIENT
Start: 2022-05-28 | End: 2022-05-28

## 2022-05-28 RX ORDER — FAMOTIDINE 20 MG/1
20 TABLET, FILM COATED ORAL EVERY EVENING
Status: DISCONTINUED | OUTPATIENT
Start: 2022-05-28 | End: 2022-05-30 | Stop reason: HOSPADM

## 2022-05-28 RX ORDER — DILTIAZEM HYDROCHLORIDE 30 MG/1
30 TABLET, FILM COATED ORAL
Status: COMPLETED | OUTPATIENT
Start: 2022-05-28 | End: 2022-05-28

## 2022-05-28 RX ORDER — SODIUM CHLORIDE 0.9 % (FLUSH) 0.9 %
5-40 SYRINGE (ML) INJECTION AS NEEDED
Status: DISCONTINUED | OUTPATIENT
Start: 2022-05-28 | End: 2022-05-30 | Stop reason: HOSPADM

## 2022-05-28 RX ORDER — POLYETHYLENE GLYCOL 3350 17 G/17G
17 POWDER, FOR SOLUTION ORAL DAILY PRN
Status: DISCONTINUED | OUTPATIENT
Start: 2022-05-28 | End: 2022-05-30 | Stop reason: HOSPADM

## 2022-05-28 RX ORDER — FLUTICASONE PROPIONATE AND SALMETEROL 250; 50 UG/1; UG/1
1 POWDER RESPIRATORY (INHALATION) 2 TIMES DAILY
COMMUNITY
Start: 2022-04-14

## 2022-05-28 RX ORDER — ONDANSETRON 2 MG/ML
4 INJECTION INTRAMUSCULAR; INTRAVENOUS
Status: DISCONTINUED | OUTPATIENT
Start: 2022-05-28 | End: 2022-05-30 | Stop reason: HOSPADM

## 2022-05-28 RX ORDER — BUDESONIDE 0.5 MG/2ML
500 INHALANT ORAL
Status: DISCONTINUED | OUTPATIENT
Start: 2022-05-28 | End: 2022-05-28

## 2022-05-28 RX ORDER — METOPROLOL TARTRATE 5 MG/5ML
5 INJECTION INTRAVENOUS ONCE
Status: COMPLETED | OUTPATIENT
Start: 2022-05-28 | End: 2022-05-28

## 2022-05-28 RX ORDER — GUAIFENESIN 100 MG/5ML
81 LIQUID (ML) ORAL DAILY
Status: DISCONTINUED | OUTPATIENT
Start: 2022-05-29 | End: 2022-05-30 | Stop reason: HOSPADM

## 2022-05-28 RX ORDER — IPRATROPIUM BROMIDE AND ALBUTEROL SULFATE 2.5; .5 MG/3ML; MG/3ML
3 SOLUTION RESPIRATORY (INHALATION)
Status: DISCONTINUED | OUTPATIENT
Start: 2022-05-28 | End: 2022-05-30 | Stop reason: HOSPADM

## 2022-05-28 RX ORDER — PAROXETINE 10 MG/1
10 TABLET, FILM COATED ORAL DAILY
COMMUNITY
Start: 2022-05-27 | End: 2022-09-27

## 2022-05-28 RX ORDER — SEVELAMER CARBONATE 800 MG/1
3200 TABLET, FILM COATED ORAL
COMMUNITY
Start: 2022-04-14

## 2022-05-28 RX ORDER — PROMETHAZINE HYDROCHLORIDE 12.5 MG/1
12.5 TABLET ORAL
Status: DISCONTINUED | OUTPATIENT
Start: 2022-05-28 | End: 2022-05-30 | Stop reason: HOSPADM

## 2022-05-28 RX ORDER — SEVELAMER CARBONATE 800 MG/1
3200 TABLET, FILM COATED ORAL
Status: DISCONTINUED | OUTPATIENT
Start: 2022-05-28 | End: 2022-05-30 | Stop reason: HOSPADM

## 2022-05-28 RX ORDER — ACETAMINOPHEN 325 MG/1
650 TABLET ORAL
Status: DISCONTINUED | OUTPATIENT
Start: 2022-05-28 | End: 2022-05-30 | Stop reason: HOSPADM

## 2022-05-28 RX ORDER — LISINOPRIL 40 MG/1
40 TABLET ORAL DAILY
Status: DISCONTINUED | OUTPATIENT
Start: 2022-05-29 | End: 2022-05-30 | Stop reason: HOSPADM

## 2022-05-28 RX ORDER — HYDROXYZINE 50 MG/1
50 TABLET, FILM COATED ORAL
COMMUNITY
Start: 2022-05-27

## 2022-05-28 RX ORDER — ACETAMINOPHEN 650 MG/1
650 SUPPOSITORY RECTAL
Status: DISCONTINUED | OUTPATIENT
Start: 2022-05-28 | End: 2022-05-30 | Stop reason: HOSPADM

## 2022-05-28 RX ORDER — ALPRAZOLAM 0.25 MG/1
0.5 TABLET ORAL
Status: COMPLETED | OUTPATIENT
Start: 2022-05-28 | End: 2022-05-28

## 2022-05-28 RX ADMIN — HEPARIN SODIUM 4000 UNITS: 1000 INJECTION INTRAVENOUS; SUBCUTANEOUS at 20:42

## 2022-05-28 RX ADMIN — SODIUM CHLORIDE, PRESERVATIVE FREE 10 ML: 5 INJECTION INTRAVENOUS at 17:27

## 2022-05-28 RX ADMIN — ALPRAZOLAM 0.5 MG: 0.25 TABLET ORAL at 14:30

## 2022-05-28 RX ADMIN — CARVEDILOL 25 MG: 25 TABLET, FILM COATED ORAL at 15:51

## 2022-05-28 RX ADMIN — ASPIRIN 81 MG 324 MG: 81 TABLET ORAL at 11:15

## 2022-05-28 RX ADMIN — SODIUM CHLORIDE, PRESERVATIVE FREE 10 ML: 5 INJECTION INTRAVENOUS at 22:38

## 2022-05-28 RX ADMIN — METOPROLOL TARTRATE 5 MG: 1 INJECTION, SOLUTION INTRAVENOUS at 11:15

## 2022-05-28 RX ADMIN — FAMOTIDINE 20 MG: 20 TABLET ORAL at 17:27

## 2022-05-28 RX ADMIN — SEVELAMER CARBONATE 3200 MG: 800 TABLET, FILM COATED ORAL at 17:26

## 2022-05-28 RX ADMIN — CLONIDINE HYDROCHLORIDE 0.3 MG: 0.1 TABLET ORAL at 17:27

## 2022-05-28 RX ADMIN — CLONIDINE HYDROCHLORIDE 0.3 MG: 0.1 TABLET ORAL at 22:38

## 2022-05-28 RX ADMIN — ACETAMINOPHEN 650 MG: 325 TABLET ORAL at 17:29

## 2022-05-28 RX ADMIN — HEPARIN SODIUM 5000 UNITS: 5000 INJECTION INTRAVENOUS; SUBCUTANEOUS at 17:26

## 2022-05-28 RX ADMIN — HEPARIN SODIUM 12 UNITS/KG/HR: 1000 INJECTION INTRAVENOUS; SUBCUTANEOUS at 20:42

## 2022-05-28 RX ADMIN — ARFORMOTEROL TARTRATE: 15 SOLUTION RESPIRATORY (INHALATION) at 20:11

## 2022-05-28 RX ADMIN — DILTIAZEM HYDROCHLORIDE 30 MG: 30 TABLET, FILM COATED ORAL at 13:20

## 2022-05-28 NOTE — PROGRESS NOTES
Bedside and Verbal shift change report given to Blake Calle (oncoming nurse) by Lizzie Moore RN (offgoing nurse). Report included the following information SBAR, Kardex, Intake/Output, MAR and Recent Results.

## 2022-05-28 NOTE — PROGRESS NOTES
Arformoterol 15 mcg/budesonide 0.5 mg neb solution therapeutically interchanged for Symbicort 160/4.5 per the P&T approved therapeutic interchange policy.

## 2022-05-28 NOTE — ED TRIAGE NOTES
Pt was at dialysis this morning when she began experiencing chest discomfort and feeling like her heart was beating too fast. Pt left 45 minutes early from dialysis.

## 2022-05-28 NOTE — ED PROVIDER NOTES
Patient is a 62-year-old female with a history of ESRD (on dialysis Tuesday Thursday Saturday), hypertension, and previous episode of paroxysmal atrial fibrillation related to anemia. Patient presents today with primary complaint of sudden onset of heart palpitations, shortness of breath that began after she received approximately 53 minutes of her normal dialysis. Patient denies any chest pain, lightheadedness, nausea/vomiting, and states she has been in her normal state of health with no recent illness and had previously been tolerating dialysis well and had been receiving dialysis for 7 years. Past Medical History:   Diagnosis Date    Adenomatous polyp     CHF (congestive heart failure) (HCC)     Chronic kidney disease     [facility]    Dialysis patient ([facility]     tues, thurs, sat    ESRD (end stage renal disease) ([facility]     Family hx of colon cancer     Mother    GI bleed due to NSAIDs     Heart failure ([facility]     History of blood transfusion     Hypertension     LUIS (obstructive sleep apnea)     no cpap       Past Surgical History:   Procedure Laterality Date    COLONOSCOPY N/A 9/27/2019    COLONOSCOPY with polyp bx performed by Robert Thomas MD at [address] HX APPENDECTOMY       [address] HX COLONOSCOPY  09/21/2016    colonoscopy, Dr. Ama Dowling, [address] [place] [address]  05/2020    HX OTHER SURGICAL       ARTERIOVENOUS FISTULA INSERTION LEFT ARM     HX TOTAL ABDOMINAL HYSTERECTOMY      HX TOTAL COLECTOMY  10/11/2016    LAPAROSCOPIC COLECTOMY PARTIAL RIGHT FOR CECAL POLYP, DR. PAEZ, [facility]    HX TUBAL LIGATION      VASCULAR SURGERY PROCEDURE UNLIST      left arm graft         Family History:   Problem Relation Age of Onset    Colon Cancer Mother     Colon Polyps Mother        Social History     Socioeconomic History    Marital status:      Spouse name: Not on file    Number of children: Not on file    Years of education: Not on file    Highest education level: Not on file   Occupational History    Not on file   Tobacco Use    Smoking status: Former Smoker    Smokeless tobacco: Never Used   Substance and Sexual Activity    Alcohol use: No    Drug use: No    Sexual activity: Yes     Partners: Male   Other Topics Concern    Not on file   Social History Narrative    Not on file     Social Determinants of Health     Financial Resource Strain:     Difficulty of Paying Living Expenses: Not on file   Food Insecurity:     Worried About Running Out of Food in the Last Year: Not on file    Nora of Food in the Last Year: Not on file   Transportation Needs:     Lack of Transportation (Medical): Not on file    Lack of Transportation (Non-Medical): Not on file   Physical Activity:     Days of Exercise per Week: Not on file    Minutes of Exercise per Session: Not on file   Stress:     Feeling of Stress : Not on file   Social Connections:     Frequency of Communication with Friends and Family: Not on file    Frequency of Social Gatherings with Friends and Family: Not on file    Attends Hinduism Services: Not on file    Active Member of [address] [place] [address] or Organizations: Not on file    Attends Club or Organization Meetings: Not on file    Marital Status: Not on file   Intimate Partner Violence:     Fear of Current or Ex-Partner: Not on file    Emotionally Abused: Not on file    Physically Abused: Not on file    Sexually Abused: Not on file   Housing Stability:     Unable to Pay for Housing in the Last Year: Not on file    Number of [place] in the Last Year: Not on file    Unstable Housing in the Last Year: Not on file         ALLERGIES: Patient has no known allergies. Review of Systems   Constitutional: Negative for chills and fever. HENT: Negative for rhinorrhea and sore throat. Eyes: Negative for discharge and redness. Respiratory: Positive for shortness of breath. Negative for cough.     Cardiovascular: Positive for palpitations. Negative for chest pain and leg swelling. Gastrointestinal: Negative for abdominal pain, diarrhea, nausea and vomiting. Genitourinary: Negative for difficulty urinating and dysuria. Musculoskeletal: Negative for back pain and neck pain. Skin: Negative for rash and wound. Neurological: Negative for syncope, light-headedness and headaches. Vitals:    05/28/22 1055   BP: (!) 164/111   Pulse: (!) 145   Resp: 16   Temp: 98.5 °F (36.9 °C)   SpO2: 98%   Weight: 72.6 kg (160 lb)   Height: 5' 2\" (1.575 m)            Physical Exam  Constitutional:       General: She is not in acute distress. Appearance: She is not ill-appearing, toxic-appearing or diaphoretic. HENT:      Head: Normocephalic and atraumatic. Right Ear: External ear normal.      Left Ear: External ear normal.      Nose: No congestion or rhinorrhea. Mouth/Throat:      Mouth: Mucous membranes are moist.      Pharynx: No oropharyngeal exudate or posterior oropharyngeal erythema. Eyes:      General:         Right eye: No discharge. Left eye: No discharge. Pupils: Pupils are equal, round, and reactive to light. Neck:      Vascular: No carotid bruit or hepatojugular reflux. Cardiovascular:      Rate and Rhythm: Normal rate and regular rhythm. Heart sounds: No murmur heard. No systolic murmur is present. No diastolic murmur is present. No friction rub. No gallop. No S3 or S4 sounds. Pulmonary:      Effort: Pulmonary effort is normal. No tachypnea, accessory muscle usage or respiratory distress. Breath sounds: No stridor. No decreased breath sounds, wheezing, rhonchi or rales. Abdominal:      General: Abdomen is flat. There is no distension. Palpations: Abdomen is soft. Tenderness: There is no right CVA tenderness, left CVA tenderness, guarding or rebound. Musculoskeletal:         General: No swelling, tenderness, deformity or signs of injury.       Cervical back: No rigidity or tenderness. Right lower leg: No tenderness. No edema. Left lower leg: No tenderness. No edema. Lymphadenopathy:      Cervical: No cervical adenopathy. Skin:     General: Skin is warm. Capillary Refill: Capillary refill takes less than 2 seconds. Coloration: Skin is not jaundiced or pale. Findings: No bruising, erythema, lesion or rash. Neurological:      General: No focal deficit present. Mental Status: She is alert and oriented to person, place, and time. Sensory: No sensory deficit. Motor: No weakness. Psychiatric:         Mood and Affect: Mood normal.          MDM  Number of Diagnoses or Management Options  Diagnosis management comments: Patient resents with primary complaint of sudden onset of heart palpitations found to be in atrial fibrillation RVR, patient reports 1 similar episode in the past that was related to significant anemia. Patient is otherwise well reports no active chest pain and is hemodynamically stable. We will work towards rate control, evaluate for primary cause to include anemia, electrolyte abnormality, infection, or related to fluid shifts associated with her dialysis and plan for cardiology consultation with disposition pending their input, clinical improvement, and results of testing.        Amount and/or Complexity of Data Reviewed  Clinical lab tests: reviewed  Tests in the medicine section of CPT®: reviewed           Procedures

## 2022-05-28 NOTE — ED NOTES
TRANSFER - OUT REPORT:    Verbal report given to Mary Kay Sotelo RN(name) on [place] [facility]  being transferred to [address] [place] [address](unit) for routine progression of care       Report consisted of patients Situation, Background, Assessment and   Recommendations(SBAR). Information from the following report(s) ED Summary, Intake/Output and MAR was reviewed with the receiving nurse. Lines:   Peripheral IV 05/28/22 Right Forearm (Active)   Site Assessment Clean, dry, & intact 05/28/22 1114   Phlebitis Assessment 0 05/28/22 1114   Infiltration Assessment 0 05/28/22 1114   Dressing Status Clean, dry, & intact 05/28/22 1114        Opportunity for questions and clarification was provided.       Patient transported with:   Transport

## 2022-05-28 NOTE — H&P
History & Physical    Patient: Albania Malagon MRN: [id number]  CSN: [id number]    YOB: 1960  Age: 62 y.o. Sex: female      DOA: 5/28/2022  CC: chest palpitation and shortness of breath    PCP: Radha Abreu MD       HPI:     Albania Malagon is a 62 y.o. female with medical co-morbidities including HTN, Chronic diastolic CHF, ESRD on HD, LUIS, h/o GI Bleed, anxiety, insomnia, presented from dialysis unit due to chest palpitation. She was experiencing chest palpitation and shortness of breath during HD run, she asked to have her run stop but her chest palpitation persisted. She expressed that she has not feeling well since last night and she has not been sleeping. She denied of chest pain. No cough or fever. She expressed that she has had this similar event in the past and has seen her cardiologist. She denied of having irregular heart rate. She has recently started on Flexeril for muscle spasm. She was also recently started on hydroxyzine for itchiness and anxiety. Otherwise, she has been compliant with all her medications. In the ER, she was found to have AFIB with RVR. She was given Metoprolol IV, and Diltiazem 30mg orally. Her troponin was negative. Her rate was controlled. She was given xanax 0.5mg x1 for anxiety. ER MD spoke with on-call cardiologist who recommended to continue rate control for now. Currently, she is back in regular rate. Review of Systems  GENERAL: No fever, No chill, +malaise   HEENT: No change in vision, no ear ache, no sore throat or sinus congestion. NECK: No pain or stiffness. PULMONARY: No shortness of breath, no cough or wheeze. Cardiovascular: no pnd / orthopnea, no Chest Pain  GASTROINTESTINAL: No abd pain, No nausea/vomiting, No diarrhea, No bright red blood per rectum. GENITOURINARY: No urinary frequency, No urgency or pain with urination. MUSCULOSKELETAL: No joint or muscle pain, no back pain, no recent trauma.    DERMATOLOGIC: No rash, no itching, no lesions. ENDOCRINE: No polyuria, polydipsia,  No recent change in weight. HEMATOLOGICAL: No easy bruising or bleeding. NEUROLOGIC: No headache, No seizures, +generalized weakness         Past Medical History:   Diagnosis Date    Adenomatous polyp     CHF (congestive heart failure) (HCC)     Chronic kidney disease     [facility]    D[facility] ([facility]     tues, thurs, sat    ESRD (end stage renal disease) ([facility]     Family hx of colon cancer     Mother    GI bleed due to NSAIDs     Heart failure ([facility]     History of blood transfusion     Hypertension     LUIS (obstructive sleep apnea)     no cpap       Past Surgical History:   Procedure Laterality Date    COLONOSCOPY N/A 9/27/2019    COLONOSCOPY with polyp bx performed by Germania Ryder MD at [address] HX APPENDECTOMY       [address] HX COLONOSCOPY  09/21/2016    colonoscopy, Dr. Edelmira Hamilton, [address] [place] [address]  05/2020    HX OTHER SURGICAL       ARTERIOVENOUS FISTULA INSERTION LEFT ARM     HX TOTAL ABDOMINAL HYSTERECTOMY      HX TOTAL COLECTOMY  10/11/2016    LAPAROSCOPIC COLECTOMY PARTIAL RIGHT FOR CECAL POLYP, DR[facility] PAE[facility], [facility]    HX TUBAL LIGATION      VASCULAR SURGERY PROCEDURE UNLIST      left arm graft       Family History   Problem Relation Age of Onset    Colon Cancer Mother     Colon Polyps Mother        Social History     Socioeconomic History    Marital status:    Tobacco Use    Smoking status: Former Smoker    Smokeless tobacco: Never Used   Substance and Sexual Activity    Alcohol use: No    Drug use: No    Sexual activity: Yes     Partners: Male       Prior to Admission medications    Medication Sig Start Date End Date Taking? Authorizing Provider   fluticasone propion-salmeteroL (ADVAIR/WIXELA) 250-50 mcg/dose diskus inhaler Take 1 Puff by inhalation two (2) times a day.  4/14/22  Yes Provider, Historical   hydrOXYzine HCL (ATARAX) 50 mg tablet Take 50 mg by mouth two (2) times daily as needed for Anxiety. 5/27/22  Yes Provider, Historical   lisinopriL (PRINIVIL, ZESTRIL) 40 mg tablet Take 40 mg by mouth daily. Indications: high blood pressure 5/2/22  Yes Provider, Historical   PARoxetine (PAXIL) 10 mg tablet Take 10 mg by mouth daily. in the morning  Indications: repeated episodes of anxiety 5/27/22  Yes Provider, Historical   sevelamer carbonate (RENVELA) 800 mg tab tab Take 3,200 mg by mouth three (3) times daily (with meals). 4/14/22  Yes Provider, Historical   zolpidem (AMBIEN) 5 mg tablet Take 5 mg by mouth nightly as needed. 5/2/22  Yes Provider, Historical   cyclobenzaprine (FLEXERIL) 10 mg tablet Take 1 Tab by mouth three (3) times daily as needed for Muscle Spasm(s). 4/7/21  Yes Jennifer Miller MD   carvedilol (COREG) 25 mg tablet Take 25 mg by mouth two (2) times daily (with meals). Indications: high blood pressure   Yes Other, MD Juan   cloNIDine HCl (CATAPRES) 0.3 mg tablet Take 0.3 mg by mouth three (3) times daily. Indications: hypertension   Yes Mary, MD Juan   NIFEdipine ER (PROCARDIA XL) 90 mg ER tablet Take 90 mg by mouth daily. Other, MD Juan       No Known Allergies           Physical Exam:      Visit Vitals  BP (!) 163/82 (BP 1 Location: Left upper arm, BP Patient Position: At rest)   Pulse 66   Temp 98.2 °F (36.8 °C)   Resp 20   Ht 5' 2\" (1.575 m)   Wt 72.6 kg (160 lb)   SpO2 96%   BMI 29.26 kg/m²       Physical Exam:  Tele: sinus   General:  Cooperative, Not in acute distress, speaks in full sentence while in bed  HEENT: PERRL, EOMI, supple neck, no JVD, dry oral mucosa  Cardiovascular: S1S2 regular, no rub/gallop   Pulmonary: air entry bilaterally, no wheezing, no crackle  GI:  Soft, non tender, non distended, +bs, no guarding   Extremities:  No pedal edema, +distal pulses appreciated, left arm AV fistula   Neuro: AOx3, moving all extremities, no gross deficit.      Lab/Data Review:  Labs: Results:       Chemistry Recent Labs     05/28/22  1107   *      K 4.1      CO2 30   BUN 45*   CREA 5.88*   CA 9.8   AGAP 9   BUCR 8*   *   TP 7.5   ALB 3.2*   GLOB 4.3*   AGRAT 0.7*      CBC w/Diff Recent Labs     05/28/22  1107   WBC 6.8   RBC 3.49*   HGB 10.0*   HCT 32.7*      GRANS 63   LYMPH 19*   EOS 6*      Coagulation No results for input(s): PTP, INR, APTT, INREXT, INREXT in the last 72 hours. Iron/Ferritin Recent Labs     05/28/22  1107   IRON 61      BNP    Cardiac Enzymes    Liver Enzymes Recent Labs     05/28/22  1107   TP 7.5   ALB 3.2*   *      Thyroid Studies Lab Results   Component Value Date/Time    T4, Total 8.0 02/16/2012 08:00 PM    TSH 0.06 (L) 05/28/2022 11:07 AM          All Micro Results     None          Imaging Reviewed:  XR Results (most recent):  Results from Hospital Encounter encounter on 05/28/22    XR CHEST PORT    Narrative  CHEST PORTABLE 1109 hours    COMPARISON: January 2022. INDICATIONS: Chest pain. FINDINGS:    Portable single view chest demonstrates the depth of inspiration is quite  shallow    Lungs: Perihilar vascular congestion and crowding of vessels in the lung  bases-this may be a secondary to the hypoventilation. No focal infiltrate is  evident. . A small lingular scar is again noted    Cardiac Silhouette And Mediastinal Contours: Cardiomegaly is unchanged. Tortuosity and dilation of the thoracic aorta is again noted. Pleural Spaces: No pneumothorax or pleural effusion evident. Bones And Soft Tissues: Unremarkable for age. Impression  Hypoventilated. .  Pulmonary vascular congestion may be more apparent than real due to the shallow  depth of inspiration. Otherwise negative          Assessment:   Active Problems:  1. Paroxysmal A-fib with RVR ([address] (5/28/2022), now in sinus      -reviewed of her previous EKG showed that hs might have evidence of AFIB in Aug/2021      -she has not been on anticoagulation due to history of GI bleed   2.   Hypertension 3.  Chronic diastolic CHF, volume managed by HD   4. ESRD on HD   5. Subclinical hyperthyroidism, previously TSH has been low   6. Anxiety   7. Insomnia   8. Normocytic anemia due to ESRD     Plan:     She is placed on OBSERVATION, continue to monitor on telemetry. Trend her cardiac enzyme. Resume her Carvedilol BID, cont Lisinopril, Clonidine for goal SBP<140. Check FT4, FT3.    Limited Echo follow up   Cardiology consult follow up, hold off [address] for now until seen by Cardiology  Resume home medications as tolerated, hold flexeril for now   pepcid  ICS  Nephrology consult for further need of HD tomorrow     Risk of deterioration:  []Low    [x]Moderate  []High     Prophylaxis:  []Lovenox  []Coumadin  [x]Hep SQ  []SCDs  [x]H2B/PPI     Disposition:  []Home w/ Family   [x] PT,OT,RN   []SNF/LTC   []SAH/Rehab     Discussed Code Status:         [x]Full Code      []DNR         ___________________________________________________     Care Plan discussed with:    [x]Patient   []Family    []ED Care Manager  [x]ED Doc   []Specialist :  Total Time Coordinating Admission:   60  minutes    []Total Critical Care Time:       Sundar Hopkins MD  5/28/2022, 3:42 PM

## 2022-05-28 NOTE — PROGRESS NOTES
Elevated troponin with persistent intermittent chest pain. She remains stable. Will start heparin gtt.    Cardiology consult follow up      Signed By: Boaz Mendoza MD     May 28, 2022

## 2022-05-28 NOTE — PROGRESS NOTES
TRANSFER - IN REPORT:    Verbal report received from [facility] [address] [place] [address] RN(name) on Kaylah Knight  being received from ED(unit) for routine progression of care      Report consisted of patients Situation, Background, Assessment and   Recommendations(SBAR). Information from the following report(s) SBAR, Kardex, Intake/Output, MAR and Recent Results was reviewed with the receiving nurse. Opportunity for questions and clarification was provided. Assessment completed upon patients arrival to unit and care assumed.

## 2022-05-29 ENCOUNTER — APPOINTMENT (OUTPATIENT)
Dept: NON INVASIVE DIAGNOSTICS | Age: 62
DRG: 308 | End: 2022-05-29
Attending: INTERNAL MEDICINE
Payer: MEDICARE

## 2022-05-29 PROBLEM — Z99.2 ESRD (END STAGE RENAL DISEASE) ON DIALYSIS (HCC): Status: ACTIVE | Noted: 2022-05-29

## 2022-05-29 PROBLEM — N18.6 ESRD (END STAGE RENAL DISEASE) ON DIALYSIS (HCC): Status: ACTIVE | Noted: 2022-05-29

## 2022-05-29 PROBLEM — I21.4 NSTEMI (NON-ST ELEVATED MYOCARDIAL INFARCTION) (HCC): Status: ACTIVE | Noted: 2022-05-29

## 2022-05-29 LAB
ANION GAP SERPL CALC-SCNC: 10 MMOL/L (ref 3–18)
APTT PPP: 96.6 SEC (ref 23–36.4)
ATRIAL RATE: 60 BPM
BASOPHILS # BLD: 0.1 K/UL (ref 0–0.1)
BASOPHILS NFR BLD: 1 % (ref 0–2)
BUN SERPL-MCNC: 60 MG/DL (ref 7–18)
BUN/CREAT SERPL: 8 (ref 12–20)
CALCIUM SERPL-MCNC: 9.2 MG/DL (ref 8.5–10.1)
CALCULATED P AXIS, ECG09: 40 DEGREES
CALCULATED R AXIS, ECG10: -30 DEGREES
CALCULATED T AXIS, ECG11: 51 DEGREES
CHLORIDE SERPL-SCNC: 99 MMOL/L (ref 100–111)
CO2 SERPL-SCNC: 30 MMOL/L (ref 21–32)
CREAT SERPL-MCNC: 7.4 MG/DL (ref 0.6–1.3)
DIAGNOSIS, 93000: NORMAL
DIFFERENTIAL METHOD BLD: ABNORMAL
ECHO AO ASC DIAM: 3.5 CM
ECHO AO ASCENDING AORTA INDEX: 2.01 CM/M2
ECHO AO ROOT DIAM: 3.2 CM
ECHO AO ROOT INDEX: 1.84 CM/M2
ECHO LA DIAMETER INDEX: 2.59 CM/M2
ECHO LA DIAMETER: 4.5 CM
ECHO LA TO AORTIC ROOT RATIO: 1.41
ECHO LA VOL 2C: 148 ML (ref 22–52)
ECHO LA VOL 4C: 160 ML (ref 22–52)
ECHO LA VOLUME AREA LENGTH: 167 ML
ECHO LA VOLUME INDEX A2C: 85 ML/M2 (ref 16–34)
ECHO LA VOLUME INDEX A4C: 92 ML/M2 (ref 16–34)
ECHO LA VOLUME INDEX AREA LENGTH: 96 ML/M2 (ref 16–34)
ECHO LV E' LATERAL VELOCITY: 6 CM/S
ECHO LV FRACTIONAL SHORTENING: 30 % (ref 28–44)
ECHO LV GLOBAL LONGITUDINAL STRAIN (GLS): -13.7 %
ECHO LV GLOBAL LONGITUDINAL STRAIN (GLS): -14.6 %
ECHO LV GLOBAL LONGITUDINAL STRAIN (GLS): -15.1 %
ECHO LV GLOBAL LONGITUDINAL STRAIN (GLS): -17 %
ECHO LV INTERNAL DIMENSION DIASTOLE INDEX: 2.47 CM/M2
ECHO LV INTERNAL DIMENSION DIASTOLIC: 4.3 CM (ref 3.9–5.3)
ECHO LV INTERNAL DIMENSION SYSTOLIC INDEX: 1.72 CM/M2
ECHO LV INTERNAL DIMENSION SYSTOLIC: 3 CM
ECHO LV IVSD: 1.7 CM (ref 0.6–0.9)
ECHO LV MASS 2D: 299.7 G (ref 67–162)
ECHO LV MASS INDEX 2D: 172.2 G/M2 (ref 43–95)
ECHO LV POSTERIOR WALL DIASTOLIC: 1.6 CM (ref 0.6–0.9)
ECHO LV RELATIVE WALL THICKNESS RATIO: 0.74
ECHO MV A VELOCITY: 0.6 M/S
ECHO MV E DECELERATION TIME (DT): 302.8 MS
ECHO MV E VELOCITY: 0.95 M/S
ECHO MV E/A RATIO: 1.58
ECHO MV E/E' LATERAL: 15.83
ECHO RV TAPSE: 1.9 CM (ref 1.7–?)
ECHO TRICUSPID ANNULAR PEAK SYSTOLIC VELOCITY: 12 CM/S
EOSINOPHIL # BLD: 0.4 K/UL (ref 0–0.4)
EOSINOPHIL NFR BLD: 7 % (ref 0–5)
ERYTHROCYTE [DISTWIDTH] IN BLOOD BY AUTOMATED COUNT: 17.6 % (ref 11.6–14.5)
GLUCOSE SERPL-MCNC: 71 MG/DL (ref 74–99)
HCT VFR BLD AUTO: 30.6 % (ref 35–45)
HGB BLD-MCNC: 9.5 G/DL (ref 12–16)
IMM GRANULOCYTES # BLD AUTO: 0 K/UL
IMM GRANULOCYTES NFR BLD AUTO: 0 %
LYMPHOCYTES # BLD: 2.1 K/UL (ref 0.9–3.6)
LYMPHOCYTES NFR BLD: 35 % (ref 21–52)
MAGNESIUM SERPL-MCNC: 3.1 MG/DL (ref 1.6–2.6)
MCH RBC QN AUTO: 29.6 PG (ref 24–34)
MCHC RBC AUTO-ENTMCNC: 31 G/DL (ref 31–37)
MCV RBC AUTO: 95.3 FL (ref 78–100)
MONOCYTES # BLD: 0.5 K/UL (ref 0.05–1.2)
MONOCYTES NFR BLD: 9 % (ref 3–10)
NEUTS SEG # BLD: 2.9 K/UL (ref 1.8–8)
NEUTS SEG NFR BLD: 48 % (ref 40–73)
NRBC # BLD: 0 K/UL (ref 0–0.01)
NRBC BLD-RTO: 0 PER 100 WBC
P-R INTERVAL, ECG05: 152 MS
PLATELET # BLD AUTO: 233 K/UL (ref 135–420)
PLATELET COMMENTS,PCOM: ABNORMAL
PMV BLD AUTO: 12.5 FL (ref 9.2–11.8)
POTASSIUM SERPL-SCNC: 4.1 MMOL/L (ref 3.5–5.5)
Q-T INTERVAL, ECG07: 514 MS
QRS DURATION, ECG06: 94 MS
QTC CALCULATION (BEZET), ECG08: 514 MS
RBC # BLD AUTO: 3.21 M/UL (ref 4.2–5.3)
RBC MORPH BLD: ABNORMAL
RBC MORPH BLD: ABNORMAL
SODIUM SERPL-SCNC: 139 MMOL/L (ref 136–145)
T3FREE SERPL-MCNC: 2.4 PG/ML (ref 2.18–3.98)
TROPONIN-HIGH SENSITIVITY: 88 NG/L (ref 0–54)
VENTRICULAR RATE, ECG03: 60 BPM
WBC # BLD AUTO: 6 K/UL (ref 4.6–13.2)

## 2022-05-29 PROCEDURE — 74011250637 HC RX REV CODE- 250/637: Performed by: INTERNAL MEDICINE

## 2022-05-29 PROCEDURE — 94640 AIRWAY INHALATION TREATMENT: CPT

## 2022-05-29 PROCEDURE — 83735 ASSAY OF MAGNESIUM: CPT

## 2022-05-29 PROCEDURE — 99223 1ST HOSP IP/OBS HIGH 75: CPT | Performed by: INTERNAL MEDICINE

## 2022-05-29 PROCEDURE — 80048 BASIC METABOLIC PNL TOTAL CA: CPT

## 2022-05-29 PROCEDURE — APPSS45 APP SPLIT SHARED TIME 31-45 MINUTES: Performed by: NURSE PRACTITIONER

## 2022-05-29 PROCEDURE — 74011250637 HC RX REV CODE- 250/637: Performed by: NURSE PRACTITIONER

## 2022-05-29 PROCEDURE — 93356 MYOCRD STRAIN IMG SPCKL TRCK: CPT | Performed by: INTERNAL MEDICINE

## 2022-05-29 PROCEDURE — 85730 THROMBOPLASTIN TIME PARTIAL: CPT

## 2022-05-29 PROCEDURE — 99232 SBSQ HOSP IP/OBS MODERATE 35: CPT | Performed by: HOSPITALIST

## 2022-05-29 PROCEDURE — 65270000046 HC RM TELEMETRY

## 2022-05-29 PROCEDURE — 85025 COMPLETE CBC W/AUTO DIFF WBC: CPT

## 2022-05-29 PROCEDURE — 74011000250 HC RX REV CODE- 250: Performed by: INTERNAL MEDICINE

## 2022-05-29 PROCEDURE — 94761 N-INVAS EAR/PLS OXIMETRY MLT: CPT

## 2022-05-29 PROCEDURE — G0378 HOSPITAL OBSERVATION PER HR: HCPCS

## 2022-05-29 PROCEDURE — 84484 ASSAY OF TROPONIN QUANT: CPT

## 2022-05-29 PROCEDURE — 84481 FREE ASSAY (FT-3): CPT

## 2022-05-29 PROCEDURE — 74011250636 HC RX REV CODE- 250/636: Performed by: INTERNAL MEDICINE

## 2022-05-29 PROCEDURE — 93005 ELECTROCARDIOGRAM TRACING: CPT

## 2022-05-29 PROCEDURE — 93306 TTE W/DOPPLER COMPLETE: CPT

## 2022-05-29 PROCEDURE — 93306 TTE W/DOPPLER COMPLETE: CPT | Performed by: INTERNAL MEDICINE

## 2022-05-29 PROCEDURE — 36415 COLL VENOUS BLD VENIPUNCTURE: CPT

## 2022-05-29 RX ORDER — DILTIAZEM HYDROCHLORIDE 120 MG/1
120 CAPSULE, COATED, EXTENDED RELEASE ORAL DAILY
Status: DISCONTINUED | OUTPATIENT
Start: 2022-05-29 | End: 2022-05-30 | Stop reason: HOSPADM

## 2022-05-29 RX ORDER — HYDROXYZINE PAMOATE 25 MG/1
25 CAPSULE ORAL
Status: DISCONTINUED | OUTPATIENT
Start: 2022-05-29 | End: 2022-05-30 | Stop reason: HOSPADM

## 2022-05-29 RX ORDER — PAROXETINE 10 MG/1
10 TABLET, FILM COATED ORAL DAILY
Status: DISCONTINUED | OUTPATIENT
Start: 2022-05-29 | End: 2022-05-30 | Stop reason: HOSPADM

## 2022-05-29 RX ADMIN — CARVEDILOL 25 MG: 25 TABLET, FILM COATED ORAL at 18:36

## 2022-05-29 RX ADMIN — LISINOPRIL 40 MG: 40 TABLET ORAL at 09:50

## 2022-05-29 RX ADMIN — SODIUM CHLORIDE, PRESERVATIVE FREE 10 ML: 5 INJECTION INTRAVENOUS at 05:42

## 2022-05-29 RX ADMIN — SODIUM CHLORIDE, PRESERVATIVE FREE 10 ML: 5 INJECTION INTRAVENOUS at 21:40

## 2022-05-29 RX ADMIN — ARFORMOTEROL TARTRATE: 15 SOLUTION RESPIRATORY (INHALATION) at 09:02

## 2022-05-29 RX ADMIN — ATORVASTATIN CALCIUM 80 MG: 40 TABLET, FILM COATED ORAL at 21:40

## 2022-05-29 RX ADMIN — CARVEDILOL 25 MG: 25 TABLET, FILM COATED ORAL at 09:50

## 2022-05-29 RX ADMIN — APIXABAN 5 MG: 5 TABLET, FILM COATED ORAL at 18:37

## 2022-05-29 RX ADMIN — CLONIDINE HYDROCHLORIDE 0.3 MG: 0.1 TABLET ORAL at 21:40

## 2022-05-29 RX ADMIN — ARFORMOTEROL TARTRATE: 15 SOLUTION RESPIRATORY (INHALATION) at 21:17

## 2022-05-29 RX ADMIN — SODIUM CHLORIDE, PRESERVATIVE FREE 10 ML: 5 INJECTION INTRAVENOUS at 14:00

## 2022-05-29 RX ADMIN — FAMOTIDINE 20 MG: 20 TABLET ORAL at 18:36

## 2022-05-29 RX ADMIN — SEVELAMER CARBONATE 3200 MG: 800 TABLET, FILM COATED ORAL at 18:36

## 2022-05-29 RX ADMIN — HYDROXYZINE PAMOATE 25 MG: 25 CAPSULE ORAL at 18:39

## 2022-05-29 RX ADMIN — ONDANSETRON 4 MG: 2 INJECTION INTRAMUSCULAR; INTRAVENOUS at 04:01

## 2022-05-29 RX ADMIN — CLONIDINE HYDROCHLORIDE 0.3 MG: 0.1 TABLET ORAL at 18:37

## 2022-05-29 RX ADMIN — ATORVASTATIN CALCIUM 80 MG: 40 TABLET, FILM COATED ORAL at 00:16

## 2022-05-29 RX ADMIN — SEVELAMER CARBONATE 3200 MG: 800 TABLET, FILM COATED ORAL at 09:50

## 2022-05-29 RX ADMIN — ASPIRIN 81 MG 81 MG: 81 TABLET ORAL at 09:50

## 2022-05-29 RX ADMIN — CLONIDINE HYDROCHLORIDE 0.3 MG: 0.1 TABLET ORAL at 09:50

## 2022-05-29 NOTE — PROGRESS NOTES
Reason for Admission:  Paroxysmal A-fib ([facility] [I48.0]  Atrial fibrillation with RVR ([facility] [I48.91]  NSTEMI (non-ST elevated myocardial infarction) ([facility] [I21.4]                 RUR Score:    14%            Plan for utilizing home health:    No, not at this time. Patient is ambulatory, and self-care. Likelihood of Readmission:   LOW                         Transition of Care Plan:              Initial assessment completed with patient. Cognitive status of patient: oriented to time, place, person and situation. Face sheet information confirmed:  yes. The patient designates her daughter Judy Stephenson [phone] to participate in her discharge plan and to receive any needed information. This patient lives alone in a single family home,  with 4 to 5 steps to enter. Patient is able to navigate steps as needed. Prior to hospitalization, patient was considered to be independent with ADLs/IADLS : yes . Patient has a current ACP document on file: no.      Healthcare Decision Maker:     Click here to complete [address] including selection of the Healthcare Decision Maker Relationship (ie \"Primary\")      The patient's daughter will be available to transport patient home upon discharge. The patient already has none reported,  medical equipment available in the home. Patient is not currently active with home health. Patient has not stayed in a skilled nursing facility or rehab. This patient is on dialysis :yes. If yes, hemodialysis patient and receives treatment on Tuesday/Thursday/Saturday at [facility] [address]    Chair time is 5am.   Pt drives herself to and from Dialysis. Currently, the discharge plan is Home. The patient states that she can obtain her medications from the pharmacy, and take her medications as directed. Patient's [place] [employer] and [place].        Care Management Interventions  PCP Verified by CM:  Yes  Mode of Transport at Discharge: Self (Patient's daughter will be transporting patient home at time of discharge. )  Transition of Care Consult (CM Consult): Discharge Planning  Discharge Durable Medical Equipment: No  Physical Therapy Consult: Yes  Occupational Therapy Consult: Yes  Speech Therapy Consult: No  Support Systems: Other (Comment) (Patient lives alone.)  Confirm Follow Up Transport: Self  Discharge Location  Patient Expects to be Discharged to[de-identified]  Sherin Duarte RN  Case Management [phone]

## 2022-05-29 NOTE — ROUTINE PROCESS
Bedside and Verbal shift change report given to NABIL Mtz (oncoming nurse) by Rob Herrmann RN   (offgoing nurse). Report included the following information SBAR, Kardex, Intake/Output, MAR and Recent Results.

## 2022-05-29 NOTE — PROGRESS NOTES
[facility]  [facility] Note    Patient: Vivien Olvera Age: 62 y.o. : 1960 MR#: [id number] SSN: [id number]  Date: 2022     Subjective:   Alert and oriented x4. Denies palpitations or chest pain. Overall feels better. Assessment/Plan:   1. Paroxysmal Afib with RVR  2. Chest pain   3. Palpitations  4. Uncontrolled hypertension   5. Elevated troponin likely demand ischemia   6. ESRD TRS    Consult  Cardiology   Nephrology     Plan  1. Heparin gtt was started for persistent chest pain and palpitations. Troponin monitored. Cardiology recommendations to stop heparin gtt and start on Eliquis. Added cardizem daily. No changes to home BP medications. Savings card given to patient by CM. Explanation of Eliquis. Risks and benefits and she continues to agree to new treatment. Cardiology ok to discharge if she remains clinically stable. 2. Nephrology consulted since she is a HD patient. Next HD session Tuesday. No need for urgent HD. Ok with [address]. Ok to discharge per renal standpoint if she remains clinically stable. 3. Patient is alert and oriented x4 and of sound mind to update family. There was a family member on the phone while in the room. Placed on speaker phone and was able tup update family with patient's permission. 4. Discharge plan home tomorrow is clinically stable. \"I spent 30 minutes F2F with the patient, over half in discussion of the diagnosis and the importance of compliance with the treatment plan. \"    Past Medical History:   Diagnosis Date    Adenomatous polyp     CHF (congestive heart failure) (HCC)     Chronic kidney disease     [facility]    Dialysis patient ([facility]     tues, thurs, sat    ESRD (end stage renal disease) ([facility]     Family hx of colon cancer     Mother    GI bleed due to NSAIDs     Heart failure ([facility]     History of blood transfusion     Hypertension     LUIS (obstructive sleep apnea)     no cpap           Case discussed with:  [x]Patient  [x]Family  []Nursing  [x]Case Management  DVT Prophylaxis:  []Lovenox  []Hep SQ  []SCDs  []Eliquis   []On Heparin gtt    Objective:   VS:   Visit Vitals  /76   Pulse 77   Temp 98 °F (36.7 °C)   Resp 20   Ht 5' 2\" (1.575 m)   Wt 72.6 kg (160 lb)   SpO2 94%   Breastfeeding No   BMI 29.26 kg/m²      Tmax/24hrs: Temp (24hrs), Av.3 °F (36.8 °C), Min:98 °F (36.7 °C), Max:98.7 °F (37.1 °C)  No intake or output data in the 24 hours ending 22 1128    General:         Alert, cooperative, no acute distress. Left HD site +bruit/thrill    HEENT:           NC, Atraumatic. PERRLA, anicteric sclerae. Lungs:            CTA bilaterally. No Wheezing/Rhonchi/Rales  Heart:              RRR. No murmur, No Rubs, No Gallops  Abdomen:      Soft, Non distended, Non tender. +Bowel sounds, no HSM  Extremities:   No c/c/e  Psych:              Good insight. Not anxious or agitated. Neurologic:     Alert and oriented X 4. No acute neurological deficits    Labs:    Recent Results (from the past 24 hour(s))   EKG, 12 LEAD, SUBSEQUENT    Collection Time: 22 12:45 PM   Result Value Ref Range    Ventricular Rate 69 BPM    Atrial Rate 69 BPM    P-R Interval 148 ms    QRS Duration 104 ms    Q-T Interval 460 ms    QTC Calculation (Bezet) 492 ms    Calculated P Axis 40 degrees    Calculated R Axis -51 degrees    Calculated T Axis 59 degrees    Diagnosis       Normal sinus rhythm with sinus arrhythmia  Possible Left atrial enlargement  Left anterior fascicular block  Left ventricular hypertrophy with repolarization abnormality  Prolonged QT  Abnormal ECG  When compared with ECG of 28-MAY-2022 10:48,  Sinus rhythm has replaced Atrial fibrillation  Vent.  rate has decreased BY  68 BPM  ST no longer depressed in Lateral leads  T wave inversion now evident in Inferior leads  Confirmed by Elsie Benjamin ([id number]) on 2022 3:29:16 PM     TROPONIN-HIGH SENSITIVITY    Collection Time: 22  4:15 PM   Result Value Ref Range    Troponin-High Sensitivity 89 (HH) 0 - 54 ng/L   T4, FREE    Collection Time: 05/28/22  4:15 PM   Result Value Ref Range    T4, Free 0.8 0.7 - 1.5 NG/DL   CBC WITH AUTOMATED DIFF    Collection Time: 05/28/22  7:40 PM   Result Value Ref Range    WBC 5.7 4.6 - 13.2 K/uL    RBC 3.45 (L) 4.20 - 5.30 M/uL    HGB 10.0 (L) 12.0 - 16.0 g/dL    HCT 33.0 (L) 35.0 - 45.0 %    MCV 95.7 78.0 - 100.0 FL    MCH 29.0 24.0 - 34.0 PG    MCHC 30.3 (L) 31.0 - 37.0 g/dL    RDW 17.3 (H) 11.6 - 14.5 %    PLATELET 473 380 - 579 K/uL    MPV 12.9 (H) 9.2 - 11.8 FL    NRBC 0.0 0  WBC    ABSOLUTE NRBC 0.00 0.00 - 0.01 K/uL    NEUTROPHILS 64 40 - 73 %    LYMPHOCYTES 17 (L) 21 - 52 %    MONOCYTES 13 (H) 3 - 10 %    EOSINOPHILS 5 0 - 5 %    BASOPHILS 1 0 - 2 %    IMMATURE GRANULOCYTES 0 0.0 - 0.5 %    ABS. NEUTROPHILS 3.7 1.8 - 8.0 K/UL    ABS. LYMPHOCYTES 1.0 0.9 - 3.6 K/UL    ABS. MONOCYTES 0.7 0.05 - 1.2 K/UL    ABS. EOSINOPHILS 0.3 0.0 - 0.4 K/UL    ABS. BASOPHILS 0.1 0.0 - 0.1 K/UL    ABS. IMM.  GRANS. 0.0 0.00 - 0.04 K/UL    DF AUTOMATED     PTT    Collection Time: 05/28/22  7:40 PM   Result Value Ref Range    aPTT 40.8 (H) 23.0 - 36.4 SEC   TROPONIN-HIGH SENSITIVITY    Collection Time: 05/28/22  7:40 PM   Result Value Ref Range    Troponin-High Sensitivity 678 (HH) 0 - 54 ng/L   METABOLIC PANEL, BASIC    Collection Time: 05/29/22  3:11 AM   Result Value Ref Range    Sodium 139 136 - 145 mmol/L    Potassium 4.1 3.5 - 5.5 mmol/L    Chloride 99 (L) 100 - 111 mmol/L    CO2 30 21 - 32 mmol/L    Anion gap 10 3.0 - 18 mmol/L    Glucose 71 (L) 74 - 99 mg/dL    BUN 60 (H) 7.0 - 18 MG/DL    Creatinine 7.40 (H) 0.6 - 1.3 MG/DL    BUN/Creatinine ratio 8 (L) 12 - 20      GFR est AA 7 (L) >60 ml/min/1.73m2    GFR est non-AA 6 (L) >60 ml/min/1.73m2    Calcium 9.2 8.5 - 10.1 MG/DL   MAGNESIUM    Collection Time: 05/29/22  3:11 AM   Result Value Ref Range    Magnesium 3.1 (H) 1.6 - 2.6 mg/dL   T3, FREE    Collection Time: 05/29/22  3:11 AM   Result Value Ref Range    Triiodothyronine (T3), free 2.4 2.18 - 3.98 PG/ML   CBC WITH AUTOMATED DIFF    Collection Time: 05/29/22  3:11 AM   Result Value Ref Range    WBC 6.0 4.6 - 13.2 K/uL    RBC 3.21 (L) 4.20 - 5.30 M/uL    HGB 9.5 (L) 12.0 - 16.0 g/dL    HCT 30.6 (L) 35.0 - 45.0 %    MCV 95.3 78.0 - 100.0 FL    MCH 29.6 24.0 - 34.0 PG    MCHC 31.0 31.0 - 37.0 g/dL    RDW 17.6 (H) 11.6 - 14.5 %    PLATELET 004 645 - 447 K/uL    MPV 12.5 (H) 9.2 - 11.8 FL    NRBC 0.0 0  WBC    ABSOLUTE NRBC 0.00 0.00 - 0.01 K/uL    NEUTROPHILS 48 40 - 73 %    LYMPHOCYTES 35 21 - 52 %    MONOCYTES 9 3 - 10 %    EOSINOPHILS 7 (H) 0 - 5 %    BASOPHILS 1 0 - 2 %    IMMATURE GRANULOCYTES 0 %    ABS. NEUTROPHILS 2.9 1.8 - 8.0 K/UL    ABS. LYMPHOCYTES 2.1 0.9 - 3.6 K/UL    ABS. MONOCYTES 0.5 0.05 - 1.2 K/UL    ABS. EOSINOPHILS 0.4 0.0 - 0.4 K/UL    ABS. BASOPHILS 0.1 0.0 - 0.1 K/UL    ABS. IMM.  GRANS. 0.0 K/UL    DF MANUAL      PLATELET COMMENTS ADEQUATE PLATELETS      RBC COMMENTS POLYCHROMASIA  1+        RBC COMMENTS ANISOCYTOSIS  1+       PTT    Collection Time: 05/29/22  3:11 AM   Result Value Ref Range    aPTT 96.6 (H) 23.0 - 36.4 SEC   TROPONIN-HIGH SENSITIVITY    Collection Time: 05/29/22  3:11 AM   Result Value Ref Range    Troponin-High Sensitivity 88 (HH) 0 - 54 ng/L   EKG, 12 LEAD, SUBSEQUENT    Collection Time: 05/29/22  4:17 AM   Result Value Ref Range    Ventricular Rate 60 BPM    Atrial Rate 60 BPM    P-R Interval 152 ms    QRS Duration 94 ms    Q-T Interval 514 ms    QTC Calculation (Bezet) 514 ms    Calculated P Axis 40 degrees    Calculated R Axis -30 degrees    Calculated T Axis 51 degrees    Diagnosis       Normal sinus rhythm  Possible Left atrial enlargement  Left axis deviation  Prolonged QT  Abnormal ECG  When compared with ECG of 28-MAY-2022 12:45,  No significant change was found  Confirmed by Ivana Patel ([id number]) on 5/29/2022 10:49:09 AM     ECHO ADULT COMPLETE    Collection Time: 05/29/22  8:00 AM Result Value Ref Range    IVSd 1.7 (A) 0.6 - 0.9 cm    LVIDd 4.3 3.9 - 5.3 cm    LVIDs 3.0 cm    LVPWd 1.6 (A) 0.6 - 0.9 cm    Global Longitudinal Strain -17.0 %    Global Longitudinal Strain -13.7 %    Global Longitudinal Strain -14.6 %    Global Longitudinal Strain -15.1 %    LA Diameter 4.5 cm    LA Volume A/L 167 mL    LA Volume 2C 148 (A) 22 - 52 mL    LA Volume 4C 160 (A) 22 - 52 mL    MV A Velocity 0.60 m/s    MV E Wave Deceleration Time 302.8 ms    MV E Velocity 0.95 m/s    LV E' Lateral Velocity 6 cm/s    Ascending Aorta 3.5 cm    Aortic Root 3.2 cm    Fractional Shortening 2D 30 28 - 44 %    LVIDd Index 2.47 cm/m2    LVIDs Index 1.72 cm/m2    LV RWT Ratio 0.74     LV Mass 2D 299.7 (A) 67 - 162 g    LV Mass 2D Index 172.2 (A) 43 - 95 g/m2    MV E/A 1.58     E/E' Lateral 15.83     LA Volume Index A/L 96 16 - 34 mL/m2    LA Volume Index 2C 85 (A) 16 - 34 mL/m2    LA Volume Index 4C 92 (A) 16 - 34 mL/m2    LA Size Index 2.59 cm/m2    LA/AO Root Ratio 1.41     Ao Root Index 1.84 cm/m2    Ascending Aorta Index 2.01 cm/m2    TAPSE 1.9 1.7 cm    TAPSV 12 cm/s       Signed By: Ulises Mcgregor NP     May 29, 2022

## 2022-05-29 NOTE — PROGRESS NOTES
Bedside shift change report given to Daun Hatchet (oncoming nurse) by Ricardo Perez LPN (offgoing nurse). Report included the following information .

## 2022-05-29 NOTE — PROGRESS NOTES
Problem: Pain  Goal: *Control of Pain  Outcome: Progressing Towards Goal     Problem: Patient Education: Go to Patient Education Activity  Goal: Patient/Family Education  Outcome: Progressing Towards Goal     Problem: Patient Education: Go to Patient Education Activity  Goal: Patient/Family Education  Outcome: Progressing Towards Goal     Problem: Afib Pathway: Day 1  Goal: Off Pathway (Use only if patient is Off Pathway)  Outcome: Progressing Towards Goal  Goal: Activity/Safety  Outcome: Progressing Towards Goal

## 2022-05-29 NOTE — PROGRESS NOTES
Occupational Therapy Note:  Orders received, chart reviewed and OT evaluation attempted. She is politely declining OT services at this time. She is reporting that she has no OT needs. Will sign off and will be available should her needs change or concerns arise.  Gloria Torres, OTR/L

## 2022-05-29 NOTE — CONSULTS
Consult Note  Consult requested by: Dr. Kathe Weinberg is a 62 y.o. female BLACK/ who is being seen on consult for ESRD management. Chief Complaint   Patient presents with    Palpitations    Chest Pain     Admission diagnosis: palpitation     HPI: 25-year-old female with past medical history of hypertension, ESRD, obstructive sleep apnea, congestive heart failure, history of GI bleed came to the emergency room with chest palpitation. She admits she had a dialysis on Saturday after that she was not feeling well had a persistent palpitation and come to the emergency room. In the ER she was A. fib with RVR. In the emergency room she was not hypoxic but tachycardic. She was started on IV heparin. She was evaluated by cardiology colleague started on anticoagulation. During my visit this morning she denies for any chest pain or short of breath. She admitted she has been gaining weight looks like not able to tolerate higher UF to reach her target weight during dialysis.   Past Medical History:   Diagnosis Date    Adenomatous polyp     CHF (congestive heart failure) (HCC)     Chronic kidney disease     TT    Dialysis patient ([facility]     tues, thurs, sat    ESRD (end stage renal disease) ([address]     Family hx of colon cancer     Mother    GI bleed due to NSAIDs     Heart failure ([address]     History of blood transfusion     Hypertension     LUIS (obstructive sleep apnea)     no cpap      Past Surgical History:   Procedure Laterality Date    COLONOSCOPY N/A 2019    COLONOSCOPY with polyp bx performed by Autumn Torres MD at [address] HX APPENDECTOMY      HX  SECTION      HX COLONOSCOPY  2016    colonoscopy, Dr. Jl Morris, [facility]  2020    HX OTHER SURGICAL       ARTERIOVENOUS FISTULA INSERTION LEFT ARM     HX TOTAL ABDOMINAL HYSTERECTOMY      HX TOTAL COLECTOMY  10/11/2016    LAPAROSCOPIC COLECTOMY PARTIAL RIGHT FOR CECAL POLYP, DR. PAEZ, [place] [facility]    HX TUBAL LIGATION      VASCULAR SURGERY PROCEDURE UNLIST      left arm graft       Social History     Socioeconomic History    Marital status:      Spouse name: Not on file    Number of children: Not on file    Years of education: Not on file    Highest education level: Not on file   Occupational History    Not on file   Tobacco Use    Smoking status: Former Smoker    Smokeless tobacco: Never Used   Substance and Sexual Activity    Alcohol use: No    Drug use: No    Sexual activity: Yes     Partners: Male   Other Topics Concern    Not on file   Social History Narrative    Not on file     Social Determinants of Health     Financial Resource Strain:     Difficulty of Paying Living Expenses: Not on file   Food Insecurity:     Worried About Running Out of Food in the Last Year: Not on file    Nora of Food in the Last Year: Not on file   Transportation Needs:     Lack of Transportation (Medical): Not on file    Lack of Transportation (Non-Medical):  Not on file   Physical Activity:     Days of Exercise per Week: Not on file    Minutes of Exercise per Session: Not on file   Stress:     Feeling of Stress : Not on file   Social Connections:     Frequency of Communication with Friends and Family: Not on file    Frequency of Social Gatherings with Friends and Family: Not on file    Attends Anabaptism Services: Not on file    Active Member of [address] [place] [address] or Organizations: Not on file    Attends Club or Organization Meetings: Not on file    Marital Status: Not on file   Intimate Partner Violence:     Fear of Current or Ex-Partner: Not on file    Emotionally Abused: Not on file    Physically Abused: Not on file    Sexually Abused: Not on file   Housing Stability:     Unable to Pay for Housing in the Last Year: Not on file    Number of [place] in the Last Year: Not on file    Unstable Housing in the Last Year: Not on file       Family History Problem Relation Age of Onset    Colon Cancer Mother     Colon Polyps Mother      No Known Allergies     Home Medications:     Prior to Admission Medications   Prescriptions Last Dose Informant Patient Reported? Taking? NIFEdipine ER (PROCARDIA XL) 90 mg ER tablet Unknown at Unknown time  Yes No   Sig: Take 90 mg by mouth daily. PARoxetine (PAXIL) 10 mg tablet 5/27/2022  Yes Yes   Sig: Take 10 mg by mouth daily. in the morning  Indications: repeated episodes of anxiety   carvedilol (COREG) 25 mg tablet 5/28/2022 at Unknown time  Yes Yes   Sig: Take 25 mg by mouth two (2) times daily (with meals). Indications: high blood pressure   cloNIDine HCl (CATAPRES) 0.3 mg tablet 5/27/2022  Yes Yes   Sig: Take 0.3 mg by mouth three (3) times daily. Indications: hypertension   cyclobenzaprine (FLEXERIL) 10 mg tablet 5/27/2022  No Yes   Sig: Take 1 Tab by mouth three (3) times daily as needed for Muscle Spasm(s). fluticasone propion-salmeteroL (ADVAIR/WIXELA) 250-50 mcg/dose diskus inhaler 5/27/2022  Yes Yes   Sig: Take 1 Puff by inhalation two (2) times a day.   hydrOXYzine HCL (ATARAX) 50 mg tablet 5/27/2022  Yes Yes   Sig: Take 50 mg by mouth two (2) times daily as needed for Anxiety. lisinopriL (PRINIVIL, ZESTRIL) 40 mg tablet 5/27/2022  Yes Yes   Sig: Take 40 mg by mouth daily. Indications: high blood pressure   sevelamer carbonate (RENVELA) 800 mg tab tab 5/27/2022  Yes Yes   Sig: Take 3,200 mg by mouth three (3) times daily (with meals). zolpidem (AMBIEN) 5 mg tablet 5/27/2022  Yes Yes   Sig: Take 5 mg by mouth nightly as needed.       Facility-Administered Medications: None       Current Facility-Administered Medications   Medication Dose Route Frequency    dilTIAZem ER (CARDIZEM CD) capsule 120 mg  120 mg Oral DAILY    apixaban (ELIQUIS) tablet 5 mg  5 mg Oral BID    hydrOXYzine pamoate (VISTARIL) capsule 25 mg  25 mg Oral TID PRN    PARoxetine (PAXIL) tablet 10 mg  10 mg Oral DAILY    carvediloL (COREG) tablet 25 mg  25 mg Oral BID WITH MEALS    famotidine (PEPCID) tablet 20 mg  20 mg Oral QPM    albuterol-ipratropium (DUO-NEB) 2.5 MG-0.5 MG/3 ML  3 mL Nebulization Q4H PRN    lisinopriL (PRINIVIL, ZESTRIL) tablet 40 mg  40 mg Oral DAILY    sevelamer carbonate (RENVELA) tab 3,200 mg  3,200 mg Oral TID WITH MEALS    cloNIDine HCL (CATAPRES) tablet 0.3 mg  0.3 mg Oral TID    sodium chloride (NS) flush 5-40 mL  5-40 mL IntraVENous Q8H    sodium chloride (NS) flush 5-40 mL  5-40 mL IntraVENous PRN    acetaminophen (TYLENOL) tablet 650 mg  650 mg Oral Q6H PRN    Or    acetaminophen (TYLENOL) suppository 650 mg  650 mg Rectal Q6H PRN    polyethylene glycol (MIRALAX) packet 17 g  17 g Oral DAILY PRN    promethazine (PHENERGAN) tablet 12.5 mg  12.5 mg Oral Q6H PRN    Or    ondansetron (ZOFRAN) injection 4 mg  4 mg IntraVENous Q6H PRN    arformoterol 15 mcg/budesonide 0.5 mg neb solution   Nebulization BID RT    aspirin chewable tablet 81 mg  81 mg Oral DAILY    atorvastatin (LIPITOR) tablet 80 mg  80 mg Oral QHS       Review of Systems:     Complete 10-point review of systems were obtained and discussed in length  with the patient. Complete review of systems was negative/unremarkable  except as mentioned in HPI section.   Data Review:    Labs: Results:       Chemistry Recent Labs     05/29/22 0311 05/28/22  1107   GLU 71* 101*    139   K 4.1 4.1   CL 99* 100   CO2 30 30   BUN 60* 45*   CREA 7.40* 5.88*   CA 9.2 9.8   AGAP 10 9   BUCR 8* 8*   AP  --  119*   TP  --  7.5   ALB  --  3.2*   GLOB  --  4.3*   AGRAT  --  0.7*      CBC w/Diff Recent Labs     05/29/22 0311 05/28/22  1940 05/28/22  1107   WBC 6.0 5.7 6.8   RBC 3.21* 3.45* 3.49*   HGB 9.5* 10.0* 10.0*   HCT 30.6* 33.0* 32.7*    224 230   GRANS 48 64 63   LYMPH 35 17* 19*   EOS 7* 5 6*      Coagulation Recent Labs     05/29/22  0311 05/28/22  1940   APTT 96.6* 40.8*       Iron/Ferritin Recent Labs     05/28/22  1107   IRON 61 BNP No results for input(s): BNPP in the last 72 hours. Cardiac Enzymes No results for input(s): CPK, CKND1, ANDREW in the last 72 hours. No lab exists for component: CKRMB, TROIP   Liver Enzymes Recent Labs     05/28/22  1107   TP 7.5   ALB 3.2*   *      Thyroid Studies Lab Results   Component Value Date/Time    T4, Total 8.0 02/16/2012 08:00 PM    TSH 0.06 (L) 05/28/2022 11:07 AM         EKG: Atrial fibrillation    Physical Assessment:     Visit Vitals  /76   Pulse 77   Temp 98 °F (36.7 °C)   Resp 20   Ht 5' 2\" (1.575 m)   Wt 72.6 kg (160 lb)   SpO2 94%   Breastfeeding No   BMI 29.26 kg/m²     Weight change:   No intake or output data in the 24 hours ending 05/29/22 1356  Physical Exam:   General: comfortable, no acute distress   HEENT sclera anicteric, supple neck, no thyromegaly  CVS: S1S2 heard,  no rub  RS: + air entry b/l,   Abd: Soft, Non tender, Not distended, Positive bowel sounds, no organomegaly, no CVA / supra pubic tenderness  Neuro: non focal, awake, alert , CN II-XII are grossly intact  Extrm: edema, no cyanosis, clubbing   Skin: no visible  Rash  Musculoskeletal: No gross joints or bone deformities     Procedures/imaging: see electronic medical records for all procedures, Xrays and details which were not copied into this note but were reviewed prior to creation of Plan    20-year-old female with past medical history of hypertension, ESRD presented with palpitation and A. fib with RVR following for ESRD management  Impression & Plan:   IMPRESSION:   ESRD, TTS schedule  Access, left arm AV fistula  A. fib with RVR, symptomatic on admission  Congestive heart failure appears well compensated   Anemia, history of GI bleed  Hypertension   PLAN:   No ear urgent indication for dialysis. Plan for anticoagulation noted. I will decrease heparin during dialysis. Recommend to monitor for GI bleed. Okay to discharge from renal standpoint.   Caution with heavy use of flexril  Encourage compliance for fluid restriction     Discussed with Dr. Josesito Salazar MD  May 29, 2022  [facility]  Office [phone]

## 2022-05-29 NOTE — PROGRESS NOTES
conducted an initial consultation and Spiritual Assessment for Cristel Barnett, who is a 62 y.o.,female. Patient's Primary Language is: [place]. According to the patient's EMR Buddhist Affiliation is: [place].     The reason the Patient came to the hospital is:   Patient Active Problem List    Diagnosis Date Noted    [facility]non-ST elevated [facility]) [facility] 05/29/2022    ESRD (end stage renal disease) on dialysis ([facility] 05/29/2022    Paroxysmal A-fib ([facility] 05/28/2022    Atrial fibrillation with RVR ([facility] 05/28/2022    Subcutaneous mass of right thumb 04/04/2022        The  provided the following Interventions:  Initiated a relationship of care and support in this brief introductory visit. Explored for spiritual needs while hospitalized. Listened empathically to her request for prayer. Offered prayer according to her concerns. Addressed Advance Medical Directives and asked if she would be interested in completing one. Confirmed she was interested though not at this time as she was trying to rest.   Chart reviewed. The following outcomes where achieved:  Patient shared limited information about both their medical narrative and spiritual journey/beliefs.  confirmed Patient's Buddhist Affiliation. Patient processed feelings about current hospitalization. Patient expressed interest in receiving an AMD document to review and complete at a later time. Patient expressed gratitude for 's visit. Assessment:  Patient does not have any Pentecostalism/cultural needs that will affect patient's preferences in health care. There are no spiritual or Pentecostalism issues which require intervention at this time. Plan:  I will bring her an AMD form later and encourage her to request a  to assist her later if she is not interested in completing it at the time of my visit.    Chaplains will continue to follow and will provide pastoral care on an as needed/requested basis.   recommends bedside caregivers page  on duty if patient shows signs of acute spiritual or emotional distress.     [address] [place]   [phone]

## 2022-05-29 NOTE — PROGRESS NOTES
Aruna Hector Served CM to let CM know that patient is a possible discharge for tomorrow, and patient needs an Eliquis Card. CM delivered Eliquis Card to patient, and updated patient on possible discharge for tomorrow. Patient said her daughter will be transporting her home at time of discharge.              Sylvia Ward, RN  Case Management [phone]

## 2022-05-29 NOTE — ACP (ADVANCE CARE PLANNING)
Advance Care Planning   Advance Care Planning Inpatient Note  [facility]  Spiritual Care Department    Today's Date: 5/29/2022  [address]: [address]    Received request from admission screening. Upon review of chart and communication with care team, patient's decision making abilities are not in question. Patient was/were present in the room during visit. Goals of ACP Conversation:  Discuss Advance Care planning documents  Facilitate a discussion related to patient's goals of care as they align with the patient's values and beliefs    Health Care Decision Makers:      Primary Decision Maker: Terese Wells - Daughter - [phone]      Summary:  Documented Next of Kin, per patient report. Patient explained she also has a son who is a [employer] who I explained would also be classified as her next of kin. She understands that her children would be the ones to speak on her behalf if she were unable to speak for herself since she has not completed an AMD,. She expressed interest in completing and Advance Medical directive but endorsed needing to rest at the time of my visit. She expressed an interest in receiving a blank AMD form to review.     Advance Care Planning Documents (Patient Wishes) on file:  None     Assessment:    Ms. Ryan Romano expressed understanding and peace concerning  her children speaking on her behalf if needed and also expressed interest in completing an AMD.     Interventions:  Discussed and provided education on state decision maker hierarchy  Encouraged ongoing ACP conversation with future decision makers and loved ones  Agreed to bring a blank form for her to review and offered assistance in completing it    Care Preferences Communicated:  No    Outcomes/Plan:  ACP Discussion Postponed    Chaplain Cecille on 5/29/2022 at 1:24 PM

## 2022-05-29 NOTE — CONSULTS
Cardiology Consult Note    Consultation request by Kassie Magallanes MD for advice/opinion related to evaluating for atrial fibrillation and elevated troponin    Date of  Admission: 5/28/2022 10:35 AM   Primary Care Physician:  Adam Garcia MD       Assessment:     Hospital Problems  Date Reviewed: 3/9/2022          Codes Class Noted POA    Paroxysmal A-fib ([facility] ICD-10-CM: I48.0  ICD-9-CM: 427.31  5/28/2022 Unknown        Atrial fibrillation with RVR ([facility] ICD-10-CM: I48.91  ICD-9-CM: 427.31  5/28/2022 Unknown            1. Paroxysmal atrial fibrillation-converted back to sinus rhythm. 2.  Elevated troponin likely demand ischemia non-STEMI type II. Normal ejection fraction no wall motion abnormality noncritical CAD on CT angiogram on 1/2022. Continue medical management nuclear stress test was - 11/2021  3. End-stage renal disease on dialysis  4. Hypertension  . Primary cardiologist none     Plan:     Patient is clinically stabilized will discontinue heparin and change her over to p.o. Eliquis  Add low-dose Cardizem to prevent tachycardic episode as well as blood pressure  Risk-benefit option of anticoagulation discussed in detail and she is willing to start  Troponin elevation appears type II MI. In view of extensive work-up done recently I do not see any benefit of additional work-up at present. Patient remained stable after discontinuation of heparin and switching over to oral medication she may be discharged with follow-up in office. History of Present Illness: This is a 62 y.o. female admitted for Paroxysmal A-fib ([facility] [I48.0]; Atrial fibrillation with RVR ([facility] [I48.91]. Patient complains of: Palpitation    Poly Candelaria is a 62 y.o. female with medical co-morbidities including HTN, Chronic diastolic CHF, ESRD on HD, LUIS, h/o GI Bleed, anxiety, insomnia, presented from dialysis unit due to chest palpitation.   She was experiencing chest palpitation and shortness of breath during HD run, she asked to have her run stop but her chest palpitation persisted. She expressed that she has not feeling well since last night and she has not been sleeping. She denied of chest pain. No cough or fever. She expressed that she has had this similar event in the past and has seen her cardiologist. She denied of having irregular heart rate. She has recently started on Flexeril for muscle spasm. She was also recently started on hydroxyzine for itchiness and anxiety. Otherwise, she has been compliant with all her medications  Patient converted to sinus rhythm in the emergency room.   Slight chest pain after that and was started on IV heparin currently denies any chest pain shortness of breath orthopnea PND or any other complaints        Review of Symptoms:  Except as stated above include:  Constitutional:  negative  Respiratory:  negative  Cardiovascular: Palpitation  Gastrointestinal: negative  Genitourinary:  negative  Musculoskeletal:  Negative  Neurological:  Negative  Dermatological:  Negative  Endocrinological: Negative  Psychological:  Negative       Past Medical History:     Past Medical History:   Diagnosis Date    Adenomatous polyp     CHF (congestive heart failure) (HCC)     Chronic kidney disease     TT    Dialysis patient ([facility]     tues, thurs, sat    ESRD (end stage renal disease) ([facility]     Family hx of colon cancer     Mother    GI bleed due to NSAIDs     Heart failure ([facility]     History of blood transfusion     Hypertension     LUIS (obstructive sleep apnea)     no cpap         Social History:     Social History     Socioeconomic History    Marital status:    Tobacco Use    Smoking status: Former Smoker    Smokeless tobacco: Never Used   Substance and Sexual Activity    Alcohol use: No    Drug use: No    Sexual activity: Yes     Partners: Male        Family History:     Family History   Problem Relation Age of Onset    Colon Cancer Mother     Colon Polyps Mother Medications:   No Known Allergies     Current Facility-Administered Medications   Medication Dose Route Frequency    carvediloL (COREG) tablet 25 mg  25 mg Oral BID WITH MEALS    famotidine (PEPCID) tablet 20 mg  20 mg Oral QPM    albuterol-ipratropium (DUO-NEB) 2.5 MG-0.5 MG/3 ML  3 mL Nebulization Q4H PRN    lisinopriL (PRINIVIL, ZESTRIL) tablet 40 mg  40 mg Oral DAILY    sevelamer carbonate (RENVELA) tab 3,200 mg  3,200 mg Oral TID WITH MEALS    cloNIDine HCL (CATAPRES) tablet 0.3 mg  0.3 mg Oral TID    sodium chloride (NS) flush 5-40 mL  5-40 mL IntraVENous Q8H    sodium chloride (NS) flush 5-40 mL  5-40 mL IntraVENous PRN    acetaminophen (TYLENOL) tablet 650 mg  650 mg Oral Q6H PRN    Or    acetaminophen (TYLENOL) suppository 650 mg  650 mg Rectal Q6H PRN    polyethylene glycol (MIRALAX) packet 17 g  17 g Oral DAILY PRN    promethazine (PHENERGAN) tablet 12.5 mg  12.5 mg Oral Q6H PRN    Or    ondansetron (ZOFRAN) injection 4 mg  4 mg IntraVENous Q6H PRN    arformoterol 15 mcg/budesonide 0.5 mg neb solution   Nebulization BID RT    heparin 25,000 units in  mL infusion  12-25 Units/kg/hr IntraVENous TITRATE    aspirin chewable tablet 81 mg  81 mg Oral DAILY    atorvastatin (LIPITOR) tablet 80 mg  80 mg Oral QHS         Physical Exam:     Visit Vitals  BP (!) 146/80   Pulse 64   Temp 98.4 °F (36.9 °C)   Resp 20   Ht 5' 2\" (1.575 m)   Wt 72.6 kg (160 lb)   SpO2 98%   Breastfeeding No   BMI 29.26 kg/m²       TELE: normal sinus rhythm    BP Readings from Last 3 Encounters:   05/29/22 (!) 146/80   04/05/22 138/89   01/08/22 126/77     Pulse Readings from Last 3 Encounters:   05/29/22 64   04/05/22 71   03/09/22 (!) 101     Wt Readings from Last 3 Encounters:   05/29/22 72.6 kg (160 lb)   04/05/22 72.6 kg (160 lb)   03/09/22 73.6 kg (162 lb 3.2 oz)       General:  alert, cooperative, no distress, appears stated age  Neck:  no carotid bruit, no JVD  Lungs:  clear to auscultation bilaterally  Heart:  S1, S2 normal, no S3 or S4  Abdomen:  abdomen is soft without significant tenderness, masses, organomegaly or guarding  Extremities:  extremities normal, atraumatic, no cyanosis or edema  Skin: Warm and dry. no hyperpigmentation, vitiligo, or suspicious lesions  Neuro: alert, oriented x3, affect appropriate, no focal neurological deficits, moves all extremities well, no involuntary movements, reflexes at knee and ankle intact  Psych: non focal     Data Review:     Recent Labs     05/29/22  0311 05/28/22  1940 05/28/22  1107   WBC 6.0 5.7 6.8   HGB 9.5* 10.0* 10.0*   HCT 30.6* 33.0* 32.7*    224 230     Recent Labs     05/29/22  0311 05/28/22  1107    139   K 4.1 4.1   CL 99* 100   CO2 30 30   GLU 71* 101*   BUN 60* 45*   CREA 7.40* 5.88*   CA 9.2 9.8   MG 3.1* 2.6   ALB  --  3.2*   ALT  --  18       Results for orders placed or performed during the hospital encounter of 05/28/22   EKG, 12 LEAD, INITIAL   Result Value Ref Range    Ventricular Rate 137 BPM    QRS Duration 90 ms    Q-T Interval 352 ms    QTC Calculation (Bezet) 531 ms    Calculated R Axis -49 degrees    Calculated T Axis 90 degrees    Diagnosis       Atrial fibrillation with rapid ventricular response with premature   ventricular or aberrantly conducted complexes  Left anterior fascicular block  Minimal voltage criteria for LVH, may be normal variant ( [place] product )  Nonspecific ST and T wave abnormality  Abnormal ECG  When compared with ECG of 05-JUL-2021 15:11,  Atrial fibrillation has replaced Sinus rhythm  Vent.  rate has increased BY  58 BPM  Confirmed by Tone Ballesteros ([id number]) on 5/28/2022 11:59:59 AM         All Cardiac Markers in the last 24 hours:  No results found for: CPK, CK, CKMMB, CKMB, RCK3, CKMBT, CKNDX, CKND1, ANDREW, TROPT, TROIQ, NHAN, TROPT, TNIPOC, BNP, BNPP    Last Lipid:    Lab Results   Component Value Date/Time    Cholesterol, total 260 (H) 02/07/2013 04:21 PM    HDL Cholesterol 68 (H) 02/07/2013 04:21 PM    LDL, calculated 157.8 02/07/2013 04:21 PM    Triglyceride 171 (H) 02/07/2013 04:21 PM    CHOL/HDL Ratio 3.8 02/07/2013 04:21 PM       Cardiographics:     EKG Results     Procedure 720 Value Units Date/Time    EKG, 12 LEAD, SUBSEQUENT [[id number]] Collected: 05/29/22 0417    Order Status: Completed Updated: 05/29/22 0426     Ventricular Rate 60 BPM      Atrial Rate 60 BPM      P-R Interval 152 ms      QRS Duration 94 ms      Q-T Interval 514 ms      QTC Calculation (Bezet) 514 ms      Calculated P Axis 40 degrees      Calculated R Axis -30 degrees      Calculated T Axis 51 degrees      Diagnosis --     Normal sinus rhythm  Possible Left atrial enlargement  Left axis deviation  Prolonged QT  Abnormal ECG  When compared with ECG of 28-MAY-2022 12:45,  No significant change was found      EKG, 12 LEAD, REPEAT [[id number]] Collected: 05/28/22 1245    Order Status: Completed Updated: 05/28/22 1529     Ventricular Rate 69 BPM      Atrial Rate 69 BPM      P-R Interval 148 ms      QRS Duration 104 ms      Q-T Interval 460 ms      QTC Calculation (Bezet) 492 ms      Calculated P Axis 40 degrees      Calculated R Axis -51 degrees      Calculated T Axis 59 degrees      Diagnosis --     Normal sinus rhythm with sinus arrhythmia  Possible Left atrial enlargement  Left anterior fascicular block  Left ventricular hypertrophy with repolarization abnormality  Prolonged QT  Abnormal ECG  When compared with ECG of 28-MAY-2022 10:48,  Sinus rhythm has replaced Atrial fibrillation  Vent.  rate has decreased BY  68 BPM  ST no longer depressed in Lateral leads  T wave inversion now evident in Inferior leads  Confirmed by Kevin Vogel ([id number]) on 5/28/2022 3:29:16 PM      EKG, 12 LEAD, INITIAL [[id number]] Collected: 05/28/22 1048    Order Status: Completed Updated: 05/28/22 1200     Ventricular Rate 137 BPM      QRS Duration 90 ms      Q-T Interval 352 ms      QTC Calculation (Bezet) 531 ms      Calculated R Axis -49 degrees Calculated T Axis 90 degrees      Diagnosis --     Atrial fibrillation with rapid ventricular response with premature   ventricular or aberrantly conducted complexes  Left anterior fascicular block  Minimal voltage criteria for LVH, may be normal variant ( [place] product )  Nonspecific ST and T wave abnormality  Abnormal ECG  When compared with ECG of 05-JUL-2021 15:11,  Atrial fibrillation has replaced Sinus rhythm  Vent. rate has increased BY  58 BPM  Confirmed by Patriciaann Angle ([id number]) on 5/28/2022 11:59:59 AM          05/28/22    ECHO ADULT COMPLETE 05/29/2022 5/29/2022    Interpretation Summary    Left Ventricle: Normal left ventricular systolic function with a visually estimated EF of 60 - 65%. Left ventricle size is normal. Moderately increased wall thickness. Normal wall motion. Global longitudinal strain is mildly reduced (-65.9%) Diastolic dysfunction present with normal LV EF.   Left Atrium: Left atrium is severely dilated.   Normal right ventricular size and function    Insufficient TR to estimate pulmonary artery pressure    Signed by: Jean-Pierre Argueta MD on 5/29/2022  9:32 AM      Impression CTA heart- 1/2022  --------------------   Nonobstructive coronary artery disease. There is less than 25% stenosis in   the LAD, LM, RCA, and LCX. Nuclear stress test S 11/2021    * Stress SPECT tomographic images reveal homogeneous tracer uptake   throughout the myocardium. Rest images show no significant change.     * This study is normal.     * This study is low risk.     * ECG response is negative.     * Patient did not have chest pain in response to stress.     * Transient ischemic dilatation absent.     * Post stress images segmental wall thickening and motion are normal.     * Left ventricular function is normal  with calculated ejection fraction 69   %.        XR Results (most recent):  Results from Hospital Encounter encounter on 05/28/22    XR CHEST PORT    Narrative  CHEST PORTABLE 1100 hours    COMPARISON: January 2022. INDICATIONS: Chest pain. FINDINGS:    Portable single view chest demonstrates the depth of inspiration is quite  shallow    Lungs: Perihilar vascular congestion and crowding of vessels in the lung  bases-this may be a secondary to the hypoventilation. No focal infiltrate is  evident. . A small lingular scar is again noted    Cardiac Silhouette And Mediastinal Contours: Cardiomegaly is unchanged. Tortuosity and dilation of the thoracic aorta is again noted. Pleural Spaces: No pneumothorax or pleural effusion evident. Bones And Soft Tissues: Unremarkable for age. Impression  Hypoventilated. .  Pulmonary vascular congestion may be more apparent than real due to the shallow  depth of inspiration.  Otherwise negative        Signed By: Sushma Sneed MD     May 29, 2022

## 2022-05-29 NOTE — PROGRESS NOTES
PT orders received and chart reviewed. Patient denies mobility concerns with discharge to home. Reports amb in room and resolution of symptoms of admission. Demonstrates safe and appropriate amb in room. Returned to seated in bed. Educated on EOB/OOB during admission to prevent deconditioning; verbalized understanding. Formal PT evaluation not indicated; discontinuing orders.

## 2022-05-30 VITALS
TEMPERATURE: 97.2 F | WEIGHT: 160 LBS | HEART RATE: 64 BPM | SYSTOLIC BLOOD PRESSURE: 149 MMHG | RESPIRATION RATE: 20 BRPM | OXYGEN SATURATION: 93 % | HEIGHT: 62 IN | BODY MASS INDEX: 29.44 KG/M2 | DIASTOLIC BLOOD PRESSURE: 84 MMHG

## 2022-05-30 LAB
ANION GAP SERPL CALC-SCNC: 9 MMOL/L (ref 3–18)
BUN SERPL-MCNC: 83 MG/DL (ref 7–18)
BUN/CREAT SERPL: 9 (ref 12–20)
CALCIUM SERPL-MCNC: 9.1 MG/DL (ref 8.5–10.1)
CHLORIDE SERPL-SCNC: 98 MMOL/L (ref 100–111)
CO2 SERPL-SCNC: 28 MMOL/L (ref 21–32)
CREAT SERPL-MCNC: 9.21 MG/DL (ref 0.6–1.3)
ERYTHROCYTE [DISTWIDTH] IN BLOOD BY AUTOMATED COUNT: 17.5 % (ref 11.6–14.5)
GLUCOSE SERPL-MCNC: 116 MG/DL (ref 74–99)
HCT VFR BLD AUTO: 28.5 % (ref 35–45)
HGB BLD-MCNC: 8.8 G/DL (ref 12–16)
MAGNESIUM SERPL-MCNC: 3.2 MG/DL (ref 1.6–2.6)
MCH RBC QN AUTO: 29.2 PG (ref 24–34)
MCHC RBC AUTO-ENTMCNC: 30.9 G/DL (ref 31–37)
MCV RBC AUTO: 94.7 FL (ref 78–100)
NRBC # BLD: 0 K/UL (ref 0–0.01)
NRBC BLD-RTO: 0 PER 100 WBC
PLATELET # BLD AUTO: 228 K/UL (ref 135–420)
PMV BLD AUTO: 12.6 FL (ref 9.2–11.8)
POTASSIUM SERPL-SCNC: 4.5 MMOL/L (ref 3.5–5.5)
RBC # BLD AUTO: 3.01 M/UL (ref 4.2–5.3)
SODIUM SERPL-SCNC: 135 MMOL/L (ref 136–145)
WBC # BLD AUTO: 6.3 K/UL (ref 4.6–13.2)

## 2022-05-30 PROCEDURE — 99239 HOSP IP/OBS DSCHRG MGMT >30: CPT | Performed by: HOSPITALIST

## 2022-05-30 PROCEDURE — 94761 N-INVAS EAR/PLS OXIMETRY MLT: CPT

## 2022-05-30 PROCEDURE — 80048 BASIC METABOLIC PNL TOTAL CA: CPT

## 2022-05-30 PROCEDURE — 74011250637 HC RX REV CODE- 250/637: Performed by: INTERNAL MEDICINE

## 2022-05-30 PROCEDURE — 83735 ASSAY OF MAGNESIUM: CPT

## 2022-05-30 PROCEDURE — 74011000250 HC RX REV CODE- 250: Performed by: INTERNAL MEDICINE

## 2022-05-30 PROCEDURE — 74011250637 HC RX REV CODE- 250/637: Performed by: NURSE PRACTITIONER

## 2022-05-30 PROCEDURE — 94640 AIRWAY INHALATION TREATMENT: CPT

## 2022-05-30 PROCEDURE — 85027 COMPLETE CBC AUTOMATED: CPT

## 2022-05-30 PROCEDURE — 74011250636 HC RX REV CODE- 250/636: Performed by: INTERNAL MEDICINE

## 2022-05-30 PROCEDURE — APPSS30 APP SPLIT SHARED TIME 16-30 MINUTES: Performed by: NURSE PRACTITIONER

## 2022-05-30 PROCEDURE — 36415 COLL VENOUS BLD VENIPUNCTURE: CPT

## 2022-05-30 RX ORDER — ATORVASTATIN CALCIUM 80 MG/1
80 TABLET, FILM COATED ORAL
Qty: 30 TABLET | Refills: 0 | Status: SHIPPED | OUTPATIENT
Start: 2022-05-30 | End: 2022-10-07

## 2022-05-30 RX ORDER — GUAIFENESIN 100 MG/5ML
81 LIQUID (ML) ORAL DAILY
Qty: 30 TABLET | Refills: 0 | Status: SHIPPED | OUTPATIENT
Start: 2022-05-31 | End: 2022-10-07

## 2022-05-30 RX ORDER — DILTIAZEM HYDROCHLORIDE 120 MG/1
120 CAPSULE, COATED, EXTENDED RELEASE ORAL DAILY
Qty: 30 CAPSULE | Refills: 1 | Status: SHIPPED | OUTPATIENT
Start: 2022-05-31 | End: 2022-09-28

## 2022-05-30 RX ADMIN — LISINOPRIL 40 MG: 40 TABLET ORAL at 08:26

## 2022-05-30 RX ADMIN — PAROXETINE HYDROCHLORIDE 10 MG: 10 TABLET, FILM COATED ORAL at 08:26

## 2022-05-30 RX ADMIN — DILTIAZEM HYDROCHLORIDE 120 MG: 120 CAPSULE, EXTENDED RELEASE ORAL at 08:26

## 2022-05-30 RX ADMIN — ARFORMOTEROL TARTRATE: 15 SOLUTION RESPIRATORY (INHALATION) at 07:53

## 2022-05-30 RX ADMIN — SEVELAMER CARBONATE 3200 MG: 800 TABLET, FILM COATED ORAL at 08:27

## 2022-05-30 RX ADMIN — ONDANSETRON 4 MG: 2 INJECTION INTRAMUSCULAR; INTRAVENOUS at 03:28

## 2022-05-30 RX ADMIN — POLYETHYLENE GLYCOL (3350) 17 G: 17 POWDER, FOR SOLUTION ORAL at 08:51

## 2022-05-30 RX ADMIN — CARVEDILOL 25 MG: 25 TABLET, FILM COATED ORAL at 08:26

## 2022-05-30 RX ADMIN — APIXABAN 5 MG: 5 TABLET, FILM COATED ORAL at 08:26

## 2022-05-30 RX ADMIN — CLONIDINE HYDROCHLORIDE 0.3 MG: 0.1 TABLET ORAL at 08:27

## 2022-05-30 RX ADMIN — ASPIRIN 81 MG 81 MG: 81 TABLET ORAL at 08:26

## 2022-05-30 RX ADMIN — SODIUM CHLORIDE, PRESERVATIVE FREE 10 ML: 5 INJECTION INTRAVENOUS at 06:26

## 2022-05-30 NOTE — PROGRESS NOTES
Problem: Pain  Goal: *Control of Pain  Outcome: Progressing Towards Goal     Problem: Patient Education: Go to Patient Education Activity  Goal: Patient/Family Education  Outcome: Progressing Towards Goal     Problem: Patient Education: Go to Patient Education Activity  Goal: Patient/Family Education  Outcome: Progressing Towards Goal     Problem: Afib Pathway: Day 1  Goal: Off Pathway (Use only if patient is Off Pathway)  Outcome: Progressing Towards Goal  Goal: Activity/Safety  Outcome: Progressing Towards Goal     Problem: Falls - Risk of  Goal: *Absence of Falls  Description: Document Edgar Fall Risk and appropriate interventions in the flowsheet.   Outcome: Progressing Towards Goal  Note: Fall Risk Interventions:            Medication Interventions: Patient to call before getting OOB,Teach patient to arise slowly                   Problem: Patient Education: Go to Patient Education Activity  Goal: Patient/Family Education  Outcome: Progressing Towards Goal

## 2022-05-30 NOTE — ROUTINE PROCESS
Bedside and Verbal shift change report given to NABIL Mtz (oncoming nurse) by Shereen Bruce RN   (offgoing nurse). Report included the following information SBAR, Kardex, Intake/Output, MAR and Recent Results.

## 2022-05-30 NOTE — PROGRESS NOTES
D/C order noted for today. Orders reviewed. No needs identified at this time. CM gave patient Eliquis card yesterday. Patient's daughter to transport patient home today at time of discharge. CM remains available if needed.          Vicki Vela, [phone]

## 2022-05-30 NOTE — PROGRESS NOTES
Problem: Pain  Goal: *Control of Pain  Outcome: Resolved/Met     Problem: Patient Education: Go to Patient Education Activity  Goal: Patient/Family Education  Outcome: Resolved/Met     Problem: Patient Education: Go to Patient Education Activity  Goal: Patient/Family Education  Outcome: Resolved/Met     Problem: Afib Pathway: Day 1  Goal: Off Pathway (Use only if patient is Off Pathway)  Outcome: Resolved/Met  Goal: Activity/Safety  Outcome: Resolved/Met     Problem: Falls - Risk of  Goal: *Absence of Falls  Description: Document Edgar Fall Risk and appropriate interventions in the flowsheet.   Outcome: Resolved/Met  Note: Fall Risk Interventions:            Medication Interventions: Patient to call before getting OOB,Teach patient to arise slowly                   Problem: Patient Education: Go to Patient Education Activity  Goal: Patient/Family Education  Outcome: Resolved/Met

## 2022-05-30 NOTE — DISCHARGE SUMMARY
[facility]  Discharge Summary       Patient: Fozia Gold Age: 62 y.o. : 1960 MR#: [id number] SSN: [id number]  PCP on record: Amalia Mendoza MD  Admit date: 2022  Discharge date: 2022    Disposition:    [x]Home   []Home with Home Health   []SNF/NH   []Rehab   []Home with family   []Alternate Facility:____________________    Discharge Diagnoses:                             Paroxysmal Afib  Atrial fibrillation with RVR  NSTEMI likely demand ischemia    Past Medical History:   Diagnosis Date    Adenomatous polyp     [facility]     Chronic kidney disease     TT    Dialysis patient ([facility]     tues, thurs, sat    ESRD (end stage renal disease) ([facility]     Family hx of colon cancer     Mother    GI bleed due to NSAIDs     Heart failure ([facility]     History of blood transfusion     Hypertension     LUIS (obstructive sleep apnea)     no cpap       Discharge Medications:     Discharge Medication List as of 2022  8:36 AM      START taking these medications    Details   apixaban (ELIQUIS) 5 mg tablet Take 1 Tablet by mouth two (2) times a day., Normal, Disp-60 Tablet, R-1      aspirin 81 mg chewable tablet Take 1 Tablet by mouth daily. , Normal, Disp-30 Tablet, R-0      atorvastatin (LIPITOR) 80 mg tablet Take 1 Tablet by mouth nightly., Normal, Disp-30 Tablet, R-0      dilTIAZem ER (CARDIZEM CD) 120 mg capsule Take 1 Capsule by mouth daily. , Normal, Disp-30 Capsule, R-1         CONTINUE these medications which have NOT CHANGED    Details   fluticasone propion-salmeteroL (ADVAIR/WIXELA) 250-50 mcg/dose diskus inhaler Take 1 Puff by inhalation two (2) times a day., Historical Med      hydrOXYzine HCL (ATARAX) 50 mg tablet Take 50 mg by mouth two (2) times daily as needed for Anxiety. , Historical Med      lisinopriL (PRINIVIL, ZESTRIL) 40 mg tablet Take 40 mg by mouth daily.  Indications: high blood pressure, Historical Med PARoxetine (PAXIL) 10 mg tablet Take 10 mg by mouth daily. in the morning  Indications: repeated episodes of anxiety, Historical Med      sevelamer carbonate (RENVELA) 800 mg tab tab Take 3,200 mg by mouth three (3) times daily (with meals). , Historical Med      zolpidem (AMBIEN) 5 mg tablet Take 5 mg by mouth nightly as needed., Historical Med      cyclobenzaprine (FLEXERIL) 10 mg tablet Take 1 Tab by mouth three (3) times daily as needed for Muscle Spasm(s). , Normal, Disp-14 Tab, R-0      carvedilol (COREG) 25 mg tablet Take 25 mg by mouth two (2) times daily (with meals). Indications: high blood pressure, Historical Med      NIFEdipine ER (PROCARDIA XL) 90 mg ER tablet Take 90 mg by mouth daily. , Historical Med      cloNIDine HCl (CATAPRES) 0.3 mg tablet Take 0.3 mg by mouth three (3) times daily.  Indications: hypertension, Historical Med           Consults:    Cardiology        Significant Diagnostic Studies:   EKG RESULTS     Procedure Component Value Units Date/Time    EKG, 12 LEAD, SUBSEQUENT [[id number]] Collected: 05/29/22 0417    Order Status: Completed Updated: 05/29/22 1049     Ventricular Rate 60 BPM      Atrial Rate 60 BPM      P-R Interval 152 ms      QRS Duration 94 ms      Q-T Interval 514 ms      QTC Calculation (Bezet) 514 ms      Calculated P Axis 40 degrees      Calculated R Axis -30 degrees      Calculated T Axis 51 degrees      Diagnosis --     Normal sinus rhythm  Possible Left atrial enlargement  Left axis deviation  Prolonged QT  Abnormal ECG  When compared with ECG of 28-MAY-2022 12:45,  No significant change was found  Confirmed by Rianna Wallace ([id number]) on 5/29/2022 10:49:09 AM      EKG, 12 LEAD, REPEAT [[id number]] Collected: 05/28/22 1245    Order Status: Completed Updated: 05/28/22 1529     Ventricular Rate 69 BPM      Atrial Rate 69 BPM      P-R Interval 148 ms      QRS Duration 104 ms      Q-T Interval 460 ms      QTC Calculation (Bezet) 492 ms      Calculated P Axis 40 degrees Calculated R Axis -51 degrees      Calculated T Axis 59 degrees      Diagnosis --     Normal sinus rhythm with sinus arrhythmia  Possible Left atrial enlargement  Left anterior fascicular block  Left ventricular hypertrophy with repolarization abnormality  Prolonged QT  Abnormal ECG  When compared with ECG of 28-MAY-2022 10:48,  Sinus rhythm has replaced Atrial fibrillation  Vent. rate has decreased BY  68 BPM  ST no longer depressed in Lateral leads  T wave inversion now evident in Inferior leads  Confirmed by Bhargavi Session ([id number]) on 5/28/2022 3:29:16 PM      EKG, 12 LEAD, INITIAL [[id number]] Collected: 05/28/22 1048    Order Status: Completed Updated: 05/28/22 1200     Ventricular Rate 137 BPM      QRS Duration 90 ms      Q-T Interval 352 ms      QTC Calculation (Bezet) 531 ms      Calculated R Axis -49 degrees      Calculated T Axis 90 degrees      Diagnosis --     Atrial fibrillation with rapid ventricular response with premature   ventricular or aberrantly conducted complexes  Left anterior fascicular block  Minimal voltage criteria for LVH, may be normal variant ( Timothy product )  Nonspecific ST and T wave abnormality  Abnormal ECG  When compared with ECG of 05-JUL-2021 15:11,  Atrial fibrillation has replaced Sinus rhythm  Vent. rate has increased BY  58 BPM  Confirmed by Bhargavi Session ([phone]) on 5/28/2022 11:59:59 AM            XR Results (most recent):  Results from [place] encounter on 05/28/22    XR CHEST PORT    Narrative  CHEST PORTABLE 1109 hours    COMPARISON: January 2022. INDICATIONS: Chest pain. FINDINGS:    Portable single view chest demonstrates the depth of inspiration is quite  shallow    Lungs: Perihilar vascular congestion and crowding of vessels in the lung  bases-this may be a secondary to the hypoventilation. No focal infiltrate is  evident. . A small lingular scar is again noted    Cardiac Silhouette And Mediastinal Contours: Cardiomegaly is unchanged.   Tortuosity and dilation of the thoracic aorta is again noted. Pleural Spaces: No pneumothorax or pleural effusion evident. Bones And Soft Tissues: Unremarkable for age. Impression  Hypoventilated. .  Pulmonary vascular congestion may be more apparent than real due to the shallow  depth of inspiration. Otherwise negative  22    ECHO ADULT COMPLETE 2022    Interpretation Summary    Left Ventricle: Normal left ventricular systolic function with a visually estimated EF of 60 - 65%. Left ventricle size is normal. Moderately increased wall thickness. Normal wall motion. Global longitudinal strain is mildly reduced (-85.3%) Diastolic dysfunction present with normal LV EF.   Left Atrium: Left atrium is severely dilated.   Normal right ventricular size and function    Insufficient TR to estimate pulmonary artery pressure    Signed by: Sukhwinder Giang MD on 2022  9:32 AM      Hospital Course by Problem   Admitted on 22 with chest palpitations and SOB during HD. Please refer to H&P on EMR. In ER found Afib with RVR. She was given IV metorpolol and PO cardizem. Troponin negative. HR more controlled and converted to NSR. IV heparin gtt started and transitioned to [facility]. Started on oral AC Eliquis. Risks and benefits reviewed and patient will proceed with treatment therapy. Savings cards given to patient. Today's examination of the patient revealed:     Subjective:    All 10 systems reviewed and negative   Objective:   VS:   Visit Vitals  BP (!) 149/84   Pulse 64   Temp 97.2 °F (36.2 °C)   Resp 20   Ht 5' 2\" (1.575 m)   Wt 72.6 kg (160 lb)   SpO2 93%   Breastfeeding No   BMI 29.26 kg/m²      Tmax/24hrs: Temp (24hrs), Av.7 °F (36.5 °C), Min:97.2 °F (36.2 °C), Max:98.1 °F (36.7 °C)     Input/Output: No intake or output data in the 24 hours ending 22 1109    General:  Alert, NAD  Cardiovascular:  RRR  Pulmonary:  LSC throughout  GI:  +BS in all four quadrants, soft, non-tender  Extremities:  No edema; 2+ dorsalis pedis pulses bilaterally  Neurology: Patient A&O x4    Labs:    Recent Results (from the past 24 hour(s))   METABOLIC PANEL, BASIC    Collection Time: 05/30/22  2:32 AM   Result Value Ref Range    Sodium 135 (L) 136 - 145 mmol/L    Potassium 4.5 3.5 - 5.5 mmol/L    Chloride 98 (L) 100 - 111 mmol/L    CO2 28 21 - 32 mmol/L    Anion gap 9 3.0 - 18 mmol/L    Glucose 116 (H) 74 - 99 mg/dL    BUN 83 (H) 7.0 - 18 MG/DL    Creatinine 9.21 (H) 0.6 - 1.3 MG/DL    BUN/Creatinine ratio 9 (L) 12 - 20      GFR est AA 5 (L) >60 ml/min/1.73m2    GFR est non-AA 4 (L) >60 ml/min/1.73m2    Calcium 9.1 8.5 - 10.1 MG/DL   MAGNESIUM    Collection Time: 05/30/22  2:32 AM   Result Value Ref Range    Magnesium 3.2 (H) 1.6 - 2.6 mg/dL   CBC W/O DIFF    Collection Time: 05/30/22  2:32 AM   Result Value Ref Range    WBC 6.3 4.6 - 13.2 K/uL    RBC 3.01 (L) 4.20 - 5.30 M/uL    HGB 8.8 (L) 12.0 - 16.0 g/dL    HCT 28.5 (L) 35.0 - 45.0 %    MCV 94.7 78.0 - 100.0 FL    MCH 29.2 24.0 - 34.0 PG    MCHC 30.9 (L) 31.0 - 37.0 g/dL    RDW 17.5 (H) 11.6 - 14.5 %    PLATELET 457 275 - 985 K/uL    MPV 12.6 (H) 9.2 - 11.8 FL    NRBC 0.0 0  WBC    ABSOLUTE NRBC 0.00 0.00 - 0.01 K/uL     Additional Data Reviewed:     Condition: stable   Follow-up Appointments:   1. Your PCP: Amalia Mendoza MD, within 5-7 days  2. Follow up with cardiology in 2-4 weeks.    3. Continue HD schedule             >30 minutes spent coordinating this discharge (review instructions/follow-up, prescriptions, preparing report for sign off)    Signed:  Nahid Ansari NP  5/30/2022  11:09 AM

## 2022-05-30 NOTE — DISCHARGE INSTRUCTIONS
DISCHARGE SUMMARY from Nurse    PATIENT INSTRUCTIONS:    After general anesthesia or intravenous sedation, for 24 hours or while taking prescription Narcotics:  · Limit your activities  · Do not drive and operate hazardous machinery  · Do not make important personal or business decisions  · Do  not drink alcoholic beverages  · If you have not urinated within 8 hours after discharge, please contact your surgeon on call. Report the following to your surgeon:  · Excessive pain, swelling, redness or odor of or around the surgical area  · Temperature over 100.5  · Nausea and vomiting lasting longer than 4 hours or if unable to take medications  · Any signs of decreased circulation or nerve impairment to extremity: change in color, persistent  numbness, tingling, coldness or increase pain  · Any questions    What to do at Home:  Recommended activity: Activity as tolerated    If you experience any of the following symptoms chest pain, shortness of breath, nausea/vomiting, fever, or pain unrelieved by medication, please follow up with Dr. Afshin Ramirez. *  Please give a list of your current medications to your Primary Care Provider. *  Please update this list whenever your medications are discontinued, doses are      changed, or new medications (including over-the-counter products) are added. *  Please carry medication information at all times in case of emergency situations. These are general instructions for a healthy lifestyle:    No smoking/ No tobacco products/ Avoid exposure to second hand smoke  Surgeon General's Warning:  Quitting smoking now greatly reduces serious risk to your health.     Obesity, smoking, and sedentary lifestyle greatly increases your risk for illness    A healthy diet, regular physical exercise & weight monitoring are important for maintaining a healthy lifestyle    You may be retaining fluid if you have a history of heart failure or if you experience any of the following symptoms: Weight gain of 3 pounds or more overnight or 5 pounds in a week, increased swelling in our hands or feet or shortness of breath while lying flat in bed. Please call your doctor as soon as you notice any of these symptoms; do not wait until your next office visit. Patient armband removed and shredded  MyChart Activation    Thank you for requesting access to [employer]. Please follow the instructions below to securely access and download your online medical record. [employer] allows you to send messages to your doctor, view your test results, renew your prescriptions, schedule appointments, and more. How Do I Sign Up? 1. In your internet browser, go to [email or web address].[email or web address] [email or web address]  2. Click on the First Time User? Click Here link in the Sign In box. You will be redirect to the New Member Sign Up page. 3. Enter your [employer] Access Code exactly as it appears below. You will not need to use this code after youve completed the sign-up process. If you do not sign up before the expiration date, you must request a new code. [employer] Access Code: [email or web address]  Expires: 2022 10:55 AM (This is the date your [employer] access code will )    4. Enter the last four digits of your Social Security Number (xxxx) and Date of Birth (mm/dd/yyyy) as indicated and click Submit. You will be taken to the next sign-up page. 5. Create a [employer] ID. This will be your [employer] login ID and cannot be changed, so think of one that is secure and easy to remember. 6. Create a [employer] password. You can change your password at any time. 7. Enter your Password Reset Question and Answer. This can be used at a later time if you forget your password. 8. Enter your e-mail address. You will receive e-mail notification when new information is available in [address] 9. Click Sign Up. You can now view and download portions of your medical record.   10. Click the Download Summary menu link to download a portable copy of your medical information. Additional Information    If you have questions, please visit the Frequently Asked Questions section of the [employer] website at [email or web address] Remember, [employer] is NOT to be used for urgent needs. For medical emergencies, dial 911. The discharge information has been reviewed with the patient. The patient verbalized understanding. Discharge medications reviewed with the patient and appropriate educational materials and side effects teaching were provided.   ___________________________________________________________________________________________________________________________________

## 2022-06-01 ENCOUNTER — TELEPHONE (OUTPATIENT)
Dept: CARDIOLOGY CLINIC | Age: 62
End: 2022-06-01

## 2022-06-01 DIAGNOSIS — I48.0 PAROXYSMAL ATRIAL FIBRILLATION (HCC): ICD-10-CM

## 2022-06-01 DIAGNOSIS — R07.9 CHEST PAIN, UNSPECIFIED TYPE: Primary | ICD-10-CM

## 2022-06-01 DIAGNOSIS — R77.8 ELEVATED TROPONIN: ICD-10-CM

## 2022-06-01 NOTE — TELEPHONE ENCOUNTER
Per Dr Rodriguez [facility] hospital rounding list:  Set up  Nuclear Stress and follow up [facility]

## 2022-06-02 NOTE — TELEPHONE ENCOUNTER
called and spoke with patient. Nuclear stress scheduled for 6/10/22.  Instructions verbally given to pt

## 2022-06-03 NOTE — PROGRESS NOTES
Physician Progress Note      Moris Martin  CSN #:                  [id number]  :                       1960  ADMIT DATE:       2022 10:35 AM  [address] DATE:        2022 9:00 AM  RESPONDING  PROVIDER #:        Radha Cuevas MD          QUERY TEXT:    Dear Dr. Stalin Austin,    Patient admitted with chest pain. If possible, please document in the progress notes and discharge summary if you are evaluating and/or treating any of the following: The medical record reflects the following:  Risk Factors: chest pain, atrial fibrillation, ESRD, HTN    Clinical Indicators:  \"admitted to the hospital secondary to chest pain. Patient noted to have A. fib with RVR, has a history of uncontrolled hypertension and ESRD. \"  per Dr. Stalin Austin, PN, 2022. per Carline Solomon, NP, PN, 2022:  \"Heparin gtt was started for persistent chest pain and palpitations. Troponin monitored. Cardiology recommendations to stop heparin gtt and start on Eliquis. Added Cardizem daily. \"  per Dr. Nelson Rodríguez, consult note, 2022:  \" Elevated troponin likely demand ischemia non-STEMI type II. Normal ejection fraction no wall motion abnormality noncritical CAD on CT angiogram on 2022. Continue medical management nuclear stress test was - 2021\"  per initial EKG on 2022:  \"Atrial fibrillation with rapid ventricular response with premature  ventricular or aberrantly conducted complexes. Left anterior fascicular block . Minimal voltage criteria for LVH, may be normal variant ( Timothy product )  Nonspecific ST and T wave abnormality  Abnormal ECG When compared with ECG of 2021 15:11,  Atrial fibrillation has replaced Sinus rhythm   Vent.  rate has increased BY ?58 BPM   Confirmed by Hamilton Acosta ([id number]) on 2022 \"  troponin high sens:  43 on 2022 !1107 ---> 89 on 2022 @ 1615 ---> 678 on 2022 @1940 ---> 88 on 2022 @0311    Treatment: heparin IV, Eliquis, Cardizem    Thank you,  JODY Harris RN, CDS  [email or web address]  Options provided:  -- NSTEMI  -- Type 2 MI  -- Demand Ischemia with MI  -- Demand Ischemia only, no MI  -- Other - I will add my own diagnosis  -- Disagree - Not applicable / Not valid  -- Disagree - Clinically unable to determine / Unknown  -- Refer to Clinical Documentation Reviewer    PROVIDER RESPONSE TEXT:    This patient has demand ischemia only, no MI. Query created by:  Racquel Mccabe on 6/2/2022 2:36 PM      Electronically signed by:  Jeremy Bauer MD [facility] MD 6/3/2022 10:31 AM

## 2022-06-09 ENCOUNTER — TELEPHONE (OUTPATIENT)
Dept: CARDIOLOGY CLINIC | Age: 62
End: 2022-06-09

## 2022-06-09 NOTE — TELEPHONE ENCOUNTER
Spoke with [facility] scheduling, faxed nuc stress test order to [phone]. Spoke with pt advised order will be sent and pt should contact scheduling at [phone] to make appointment. No questions or concerns at this time.

## 2022-06-09 NOTE — TELEPHONE ENCOUNTER
Pt contacted office wondering if nuc stress test can be done at [facility]. States that when test was done there before they gave her medication through IV for anxiety

## 2022-06-20 ENCOUNTER — APPOINTMENT (OUTPATIENT)
Dept: GENERAL RADIOLOGY | Age: 62
End: 2022-06-20
Attending: PHYSICIAN ASSISTANT
Payer: MEDICARE

## 2022-06-20 ENCOUNTER — HOSPITAL ENCOUNTER (EMERGENCY)
Age: 62
Discharge: HOME | End: 2022-06-21
Attending: EMERGENCY MEDICINE
Payer: MEDICARE

## 2022-06-20 VITALS
RESPIRATION RATE: 22 BRPM | WEIGHT: 168 LBS | DIASTOLIC BLOOD PRESSURE: 72 MMHG | BODY MASS INDEX: 30.91 KG/M2 | TEMPERATURE: 97.3 F | OXYGEN SATURATION: 94 % | HEIGHT: 62 IN | HEART RATE: 75 BPM | SYSTOLIC BLOOD PRESSURE: 134 MMHG

## 2022-06-20 DIAGNOSIS — N18.6 ESRD (END STAGE RENAL DISEASE) ON DIALYSIS (HCC): ICD-10-CM

## 2022-06-20 DIAGNOSIS — I50.33 ACUTE ON CHRONIC DIASTOLIC CONGESTIVE HEART FAILURE (HCC): Primary | ICD-10-CM

## 2022-06-20 DIAGNOSIS — D64.9 NORMOCYTIC ANEMIA: ICD-10-CM

## 2022-06-20 DIAGNOSIS — Z99.2 ESRD (END STAGE RENAL DISEASE) ON DIALYSIS (HCC): ICD-10-CM

## 2022-06-20 LAB
ATRIAL RATE: 87 BPM
BASOPHILS # BLD: 0.1 K/UL (ref 0–0.1)
BASOPHILS NFR BLD: 1 % (ref 0–2)
CALCULATED P AXIS, ECG09: 30 DEGREES
CALCULATED R AXIS, ECG10: -38 DEGREES
CALCULATED T AXIS, ECG11: 69 DEGREES
DIAGNOSIS, 93000: NORMAL
DIFFERENTIAL METHOD BLD: ABNORMAL
EOSINOPHIL # BLD: 0.1 K/UL (ref 0–0.4)
EOSINOPHIL NFR BLD: 1 % (ref 0–5)
ERYTHROCYTE [DISTWIDTH] IN BLOOD BY AUTOMATED COUNT: 17.5 % (ref 11.6–14.5)
HCT VFR BLD AUTO: 36.5 % (ref 35–45)
HGB BLD-MCNC: 11.5 G/DL (ref 12–16)
IMM GRANULOCYTES # BLD AUTO: 0 K/UL (ref 0–0.04)
IMM GRANULOCYTES NFR BLD AUTO: 0 % (ref 0–0.5)
LYMPHOCYTES # BLD: 0.9 K/UL (ref 0.9–3.6)
LYMPHOCYTES NFR BLD: 11 % (ref 21–52)
MCH RBC QN AUTO: 28.9 PG (ref 24–34)
MCHC RBC AUTO-ENTMCNC: 31.5 G/DL (ref 31–37)
MCV RBC AUTO: 91.7 FL (ref 78–100)
MONOCYTES # BLD: 0.8 K/UL (ref 0.05–1.2)
MONOCYTES NFR BLD: 9 % (ref 3–10)
NEUTS SEG # BLD: 6.7 K/UL (ref 1.8–8)
NEUTS SEG NFR BLD: 79 % (ref 40–73)
NRBC # BLD: 0 K/UL (ref 0–0.01)
NRBC BLD-RTO: 0 PER 100 WBC
P-R INTERVAL, ECG05: 160 MS
PLATELET # BLD AUTO: 241 K/UL (ref 135–420)
PMV BLD AUTO: 12.2 FL (ref 9.2–11.8)
Q-T INTERVAL, ECG07: 394 MS
QRS DURATION, ECG06: 92 MS
QTC CALCULATION (BEZET), ECG08: 474 MS
RBC # BLD AUTO: 3.98 M/UL (ref 4.2–5.3)
VENTRICULAR RATE, ECG03: 87 BPM
WBC # BLD AUTO: 8.5 K/UL (ref 4.6–13.2)

## 2022-06-20 PROCEDURE — 71046 X-RAY EXAM CHEST 2 VIEWS: CPT

## 2022-06-20 PROCEDURE — 85025 COMPLETE CBC W/AUTO DIFF WBC: CPT

## 2022-06-20 PROCEDURE — G0257 UNSCHED DIALYSIS ESRD PT HOS: HCPCS

## 2022-06-20 PROCEDURE — 99285 EMERGENCY DEPT VISIT HI MDM: CPT

## 2022-06-20 PROCEDURE — 90935 HEMODIALYSIS ONE EVALUATION: CPT

## 2022-06-20 PROCEDURE — 93005 ELECTROCARDIOGRAM TRACING: CPT

## 2022-06-20 NOTE — ED NOTES
I performed a brief history of the patient here in triage and I have determined that pt will need further treatment and evaluation from the main side ER physician or [facility]. I have placed initial orders based on the history to help in expediting patients care. Pt last went for dialysis on 6/16. She missed Saturday due to a family member's graduation. States she called the center and they did not have a chair available. Nephrologist Dr. Tamela Merino.      GINNY Pitts 12:32 PM

## 2022-06-20 NOTE — ED TRIAGE NOTES
Patient arrives for evaluation of increased shortness of breath. Was scheduled for dialysis over the weekend, but missed it for family graduation. Attempted today, but no chair available. States short of breath. Denies any chest pain. Speaking complete sentences.

## 2022-06-20 NOTE — ED PROVIDER NOTES
81-year-old lady with a history of CHF, paroxysmal A. fib on Eliquis, and ESRD on HD TTS, presenting to the emergency department for worsening shortness of breath, intermittent chest pains, cough, and orthopnea since yesterday, in the setting of missing her Saturday dialysis session. Patient states that her chest pain are in her bilateral lower rib cage, that are described as pleuritic and exertional in nature. Patient states that they are dull 8/10 pains that intermittently come and go, usually lasting for a couple minutes. Patient is also endorsing a mildly productive cough with yellowish sputum. Patient denies any hemoptysis. Patient is also endorsing worsening orthopnea over the past couple days. Past Medical History:   Diagnosis Date    Adenomatous polyp     CHF (congestive heart failure) (HCC)     Chronic kidney disease     [facility]    Dialysis patient ([facility]     tues, thurs, sat    ESRD (end stage renal disease) ([facility]     Family hx of colon cancer     Mother    GI bleed due to NSAIDs     Heart failure ([facility]     History of blood transfusion     Hypertension     LUIS (obstructive sleep apnea)     no cpap       Past Surgical History:   Procedure Laterality Date    COLONOSCOPY N/A 9/27/2019    COLONOSCOPY with polyp bx performed by Loreto Velez MD at [address] HX APPENDECTOMY       [address] HX COLONOSCOPY  09/21/2016    colonoscopy, Dr. Torie Tillman, [address] [place] [address]  05/2020    HX OTHER SURGICAL       ARTERIOVENOUS FISTULA INSERTION LEFT ARM     HX TOTAL ABDOMINAL HYSTERECTOMY      HX TOTAL COLECTOMY  10/11/2016    LAPAROSCOPIC COLECTOMY PARTIAL RIGHT FOR CECAL POLYP, DR. PAEZ, [facility]    HX TUBAL LIGATION      VASCULAR SURGERY PROCEDURE UNLIST      left arm graft         Family History:   Problem Relation Age of Onset    Colon Cancer Mother     Colon Polyps Mother        Social History     Socioeconomic History    Marital status:      Spouse name: Not on file    Number of children: Not on file    Years of education: Not on file    Highest education level: Not on file   Occupational History    Not on file   Tobacco Use    Smoking status: Former Smoker    Smokeless tobacco: Never Used   Substance and Sexual Activity    Alcohol use: No    Drug use: No    Sexual activity: Yes     Partners: Male   Other Topics Concern    Not on file   Social History Narrative    Not on file     Social Determinants of Health     Financial Resource Strain:     Difficulty of Paying Living Expenses: Not on file   Food Insecurity:     Worried About Running Out of Food in the Last Year: Not on file    Nora of Food in the Last Year: Not on file   Transportation Needs:     Lack of Transportation (Medical): Not on file    Lack of Transportation (Non-Medical): Not on file   Physical Activity:     Days of Exercise per Week: Not on file    Minutes of Exercise per Session: Not on file   Stress:     Feeling of Stress : Not on file   Social Connections:     Frequency of Communication with Friends and Family: Not on file    Frequency of Social Gatherings with Friends and Family: Not on file    Attends Voodoo Services: Not on file    Active Member of [address] [place] [address] [employer] or Organizations: Not on file    Attends Club or Organization Meetings: Not on file    Marital Status: Not on file   Intimate Partner Violence:     Fear of Current or Ex-Partner: Not on file    Emotionally Abused: Not on file    Physically Abused: Not on file    Sexually Abused: Not on file   Housing Stability:     Unable to Pay for Housing in the Last Year: Not on file    Number of [place] in the Last Year: Not on file    Unstable Housing in the Last Year: Not on file         ALLERGIES: Patient has no known allergies. Review of Systems   Constitutional: Negative for chills, fatigue and fever. HENT: Negative for congestion, rhinorrhea and sneezing.     Respiratory: Positive for cough and shortness of breath. Negative for chest tightness and wheezing. Cardiovascular: Positive for chest pain. Negative for palpitations and leg swelling. Gastrointestinal: Positive for constipation. Negative for abdominal pain, anal bleeding, diarrhea, nausea and vomiting. Genitourinary: Negative for dysuria, hematuria and urgency. Musculoskeletal: Negative for myalgias. Skin: Negative for rash. Neurological: Negative for dizziness, seizures, syncope, weakness, numbness and headaches. Psychiatric/Behavioral: Negative for agitation and confusion. Vitals:    06/20/22 1234   BP: (!) 211/117   Pulse: 92   Resp: 18   Temp: 98.1 °F (36.7 °C)   SpO2: 94%   Weight: 76.2 kg (168 lb)   Height: 5' 2\" (1.575 m)            Physical Exam  Vitals reviewed. Constitutional:       General: She is not in acute distress. Appearance: She is well-developed. She is obese. She is not diaphoretic. HENT:      Head: Normocephalic and atraumatic. Eyes:      Extraocular Movements: Extraocular movements intact. Pupils: Pupils are equal, round, and reactive to light. Cardiovascular:      Rate and Rhythm: Normal rate and regular rhythm. Heart sounds: No murmur heard. No friction rub. No gallop. Pulmonary:      Effort: No respiratory distress. Breath sounds: Normal breath sounds. No decreased breath sounds, wheezing, rhonchi or rales. Chest:      Chest wall: No tenderness. Abdominal:      Palpations: Abdomen is soft. Tenderness: There is no abdominal tenderness. There is no guarding or rebound. Musculoskeletal:      Cervical back: Neck supple. Right lower leg: No tenderness. No edema. Left lower leg: No tenderness. No edema. Skin:     General: Skin is warm and dry. Findings: No erythema or rash. Neurological:      General: No focal deficit present. Mental Status: She is alert and oriented to person, place, and time. Cranial Nerves:  No cranial nerve deficit. Motor: No weakness. Psychiatric:         Mood and Affect: Mood normal.         Behavior: Behavior normal.          MDM  Number of Diagnoses or Management Options  Acute on chronic diastolic congestive heart failure (HCC)  ESRD (end stage renal disease) on dialysis (HCC)  Normocytic anemia  Diagnosis management comments: Concerns for dyspnea, chest pain, and cough secondary to volume overload in the setting of missed dialysis. Differential includes CHF exacerbation versus atrial fibrillation versus ACS versus pneumonia. EKG with normal sinus rhythm, rate 87, left axis deviation, narrow QRS, , LVH, no ST segment or T wave abnormalities to suggest ischemia. [address] notable for normocytic anemia 11.5. Paging nephrology to get patient set up for dialysis. 1355  Discussed case with nephrology, agreeing to set up dialysis session in the ED for patient's dyspnea, concerns for volume overload in the setting of CHF.    1507  Patient turned over to Dr. Charissa Dean in stable condition, pending dialysis. Amount and/or Complexity of Data Reviewed  Clinical lab tests: reviewed  Tests in the radiology section of CPT®: reviewed      ED Course as of 06/20/22 1520   Mon Jun 20, 2022   1346 Patient seen at this time in the main lobby, some visible shortness of breath no oxygen requirement.   We will plan on dialysis [CB]      ED Course User Index  [CB] Eleno Carranza MD       Procedures

## 2022-06-20 NOTE — DIALYSIS
ACUTE HEMODIALYSIS TREATMENT    HEMODIALYSIS ORDERS: Physician: Dr. Lorrie Dimas     Dialyzer: Revaclear   Duration: 3 hr   BFR: 350   DFR: 600   Dialysate:  Temp 36-37*C   K+  2    Ca+ 2.5   Na 138   Bicarb 32   Wt Readings from Last 1 Encounters:   06/20/22 76.2 kg (168 lb)    Patient Chart [x]   Unable to Obtain []  Dry weight/UF Goal: 3500 ml    Heparin []  Bolus    Units    [] Hourly    Units    [x]None       Pre BP:   133/93   Pulse:  88   Respirations: 16   Temp:  97.2  [] Oral [] Axillary [] Esophageal   Labs: []  Pre  []  Post:   [x] N/A   Additional Orders(medications, blood products, hypotension management): [] Yes   [] No     [x]  [facility] Consent Verified     GRAFT/FISTULA ACCESS:   []N/A     []Right     [x]Left     [x]UE     []LE   []AVG   [x]AVF       [x]Medical Aseptic Prep Utilized   [x]No S/S infection  []Redness  []Drainage [] Cultured  [] Swelling  [] Pain  Bruit:   [x] Strong    [] Weak       Thrill :   [x] Strong    [] Weak     Needle Gauge: 15   Length: 1 inch   If access problem,  notified: []Yes     [x]N/A          GENERAL ASSESSMENT:    LUNGS:  Resp Rate 18   [x] Clear  [] Coarse  [] Crackles  [] Wheezing  [] Diminished         Respirations:  [x]Easy  []Labored  []N/A  Cough:  []Productive  []Dry  [x]N/A      Therapy:  [x]RA  O2 Type:  []NC  Mask: []  NRB    [] BiPaP  Flow:  l/min                   [] Ventilated  [] Intubated  [] Trach     CARDIAC: [x] Regular      [] Irregular   [] Rhythm:          [] Monitored   [] Bedside   [] Remotely monitored     EDEMA: [] None   [x]Generalized  [] Pitting [] 1+   [] 2 +   [] 3+    [] 4+  [] Pedal    SKIN:   [x] Warm  [] Hot     [] Cold   [x] Dry     [] Pale   [] Diaphoretic                  [] Flushed  [] Jaundiced  [] Cyanotic     LOC:    [x] Alert      [x]Oriented:    [x] Person     [x] Place  [x]Time               [] Confused  [] Lethargic  [] Medicated  [] Non-responsive  [] Non Verbal   GI / ABDOMEN:                     [] Flat    [] Distended    [x] Soft    [] Firm   []  Obese                   [] Diarrhea   [] FMS [x] Bowel Sounds  [] Nausea  [] Vomiting                   [] NGT  [] OGT    [] PEG  [] Tube Feedings @     / URINE ASSESSMENT:                   [] Voiding  []  Garcia  [x] Oliguria  [] Anuria                     [] Incontinent  []  Incontinent Brief   []  PureWick     PAIN:  [x] 0 []1  []2   []3   []4   []5   []6   []7   []8   []9   []10                MOBILITY:  [x] Bed    [] Stretcher      All Vitals and Treatment Details on Attached [address]: [facility]          Room # D01/D01    [x] Routine         [] 1st Time Acute/Chronic   [] Urgent      [] Stat            [x] Acute Room   []  Bedside    [] ICU/CCU     [] ER   Isolation Precautions:  [x] Dialysis    There are currently no Active Isolations     ALLERGIES:     No Known Allergies   Code Status:  Prior     Hepatitis Status      Lab Results   Component Value Date/Time    Hepatitis B surface Ag <0.10 07/05/2021 02:58 PM    Hepatitis B surface Ab 272.55 07/05/2021 02:58 PM        Current Labs:      Lab Results   Component Value Date/Time    WBC 8.5 06/20/2022 01:20 PM    Hemoglobin, POC 12.6 09/27/2019 07:30 AM    HGB 11.5 (L) 06/20/2022 01:20 PM    Hematocrit, POC 37 09/27/2019 07:30 AM    HCT 36.5 06/20/2022 01:20 PM    PLATELET 063 84/23/9974 01:20 PM    MCV 91.7 06/20/2022 01:20 PM     Lab Results   Component Value Date/Time    Sodium 135 (L) 05/30/2022 02:32 AM    Potassium 4.5 05/30/2022 02:32 AM    Chloride 98 (L) 05/30/2022 02:32 AM    CO2 28 05/30/2022 02:32 AM    Anion gap 9 05/30/2022 02:32 AM    Glucose 116 (H) 05/30/2022 02:32 AM    BUN 83 (H) 05/30/2022 02:32 AM    Creatinine 9.21 (H) 05/30/2022 02:32 AM    BUN/Creatinine ratio 9 (L) 05/30/2022 02:32 AM    GFR est AA 5 (L) 05/30/2022 02:32 AM    GFR est non-AA 4 (L) 05/30/2022 02:32 AM    Calcium 9.1 05/30/2022 02:32 AM          DIET:  None     PRIMARY NURSE REPORT:   Pre Dialysis: Jaiden Riggs nurse, RN Time: 2108      EDUCATION:    [x] Patient           Knowledge Basis: [x]None []Minimal [] Substantial [] Unknown  Barriers to learning  [x]N/A  [] Intubated/Trached/Ventilated  [] Sedated/Paralyzed   [] Access Care     [] S&S of infection  [] Fluid Management  [] K+   [x] Procedural    [] Medications   [] Tx Options   [] Transplant   [] Diet      Teaching Tools:  [x] Explain  [] Demo  [] Handouts [] Video  Patient response: [] Verbalized understanding   [x] Requires follow up        [x] Time Out/Safety Check    [x] Extracorporeal Circuit Tested for integrity       RO/HEMODIALYSIS MACHINE SAFETY CHECKS - Before each treatment:        [facility]                                    [x] Unit Machine # 3 with centralized RO                                    [] Portable Machine #1/RO serial # [id number]                                  [] Portable Machine #2/RO serial # [id number]                                  [] Portable Machine #4/RO serial # [id number]                                  [] Portable Machine #10/RO serial #  [id number]                                                                                                         Alarm Test:  Pass time 1600            [x] RO/Machine Log Complete    Machine Temp    36-37*C             Dialysate: pH 7.4    Conductivity: Meter 14   HD Machine  13.9     TCD: 13.9  Dialyzer Lot # [id number]     Blood Tubing Lot # [id number]     Saline Lot # [id number]     CHLORINE TESTING-Before each treatment and every 4 hours    Total Chlorine: [x] less than 0.1 ppm  Initial Time Check: 1300       4 Hr/2nd Check Time: 1700   (if greater than 0.1 ppm from Primary then every 30 minutes from Secondary)     TREATMENT INITIATION - with Dialysis Precautions:   [x] All Connections Secured              [x] Saline Line Double Clamped   [x] Venous Parameters Set               [x] Arterial Parameters Set    [x] Prime Given 250ml NSS              [x]Air Foam Detector Engaged      Treatment Initiation Note:    Patient arrived the unit in stable condition, assessed and deem stable for treatment. BELENE AVF Accessed by nurse rigoberto, Jose Key, with no signs or symptoms of complication. Treatment initiated and monitored by nurse tech. During Treatment Notes:  7886  Vascular access visible with arterial and venous line connections intact. Pt resting comfortably. 1645  Vascular access visible with arterial and venous line connections intact. Pt resting comfortably. 1700  Vascular access visible with arterial and venous line connections intact. Pt resting comfortably. 1715  Vascular access visible with arterial and venous line connections intact. Pt resting comfortably. 1730  Vascular access visible with arterial and venous line connections intact. Pt resting comfortably. 1800  Vascular access visible with arterial and venous line connections intact. Pt resting comfortably. 1815  Vascular access visible with arterial and venous line connections intact. Pt resting comfortably. 1830  Vascular access visible with arterial and venous line connections intact. Pt resting comfortably. 1845  Vascular access visible with arterial and venous line connections intact. Pt resting comfortably. 1900  Vascular access visible with arterial and venous line connections intact. Pt resting comfortably. 1928  Dialysis treatment complete.        Medication Dose Volume Route Time Jessica Nurse, Title      TERRI Rivera, RN      TERIR Rivera, RN      TERRI Rivera, RN     Post Assessment  Dialyzer Cleared:   [] Good  [x] Fair  [] Poor  Blood processed:  58.2 L  UF Removed:  2151 Ml    Post /62   Pulse  65 Resp  18  Temp 97.3 Lungs: [x] Clear [] Course  [] Crackles                 []  Wheezing   [] Diminished   Post Tx Vascular Access: [] N/A  AVF/AVG: Bleeding stopped with  Arterial Pressure for 10 min   Venous Pressure for 10 min      Cardiac :[x] Regular   [] Irregular   Rhythm:  [] Monitored   [] Not Monitored    CVC Catheter: [x] N/A Edema:  [] None  [x] Generalized                     Skin:[x] Warm  [x] Dry [] Diaphoretic               [] Flushed  [] Pale [] Cyanotic Pain:  [x]0  []1 []2  []3 []4  []5  []6  []7 []8    []9  []10     Post Treatment Note:    Patient completed and  Tolerated 3 of hemo dialysis. 3275 ml of fluid removed. Pt. Taken off by nurse tech, [address]. Arterial and venous needles removed and pressure applied until bleeding stopped. Dry dressings applied. Bruit/Thrill present above and below dressings.       POST TREATMENT PRIMARY NURSE HANDOFF REPORT:   Post Dialysis: Sunita Ca RN               Time:  2015       Abbreviations: AVG-arterial venous graft, AVF-arterial venous fistula, IJ-Internal Jugular, Subcl-Subclavian, Fem-Femoral, Tx-treatment, AP/HR-apical heart rate, VSS- Vital Signs Stable, CVC- Central Venous Catheter, DFR-dialysate flow rate, BFR-blood flow rate, AP-arterial pressure, -venous pressure, UF-ultrafiltrate, TMP-transmembrane pressure, Jani-Venous, Art-Arterial, RO-Reverse Osmosis

## 2022-06-21 NOTE — ED NOTES
Roxanne Robledo from Dialysis and I spoke about this patient. 3.2 kilos were removed per Roxanne Robledo the pt began cramping and was unable to continue. Pt's last BP was 110/65. RN made aware of this information.

## 2022-07-28 ENCOUNTER — HOSPITAL ENCOUNTER (EMERGENCY)
Age: 62
Discharge: HOME | End: 2022-07-28
Attending: STUDENT IN AN ORGANIZED HEALTH CARE EDUCATION/TRAINING PROGRAM
Payer: MEDICARE

## 2022-07-28 VITALS
DIASTOLIC BLOOD PRESSURE: 91 MMHG | OXYGEN SATURATION: 97 % | RESPIRATION RATE: 16 BRPM | WEIGHT: 165 LBS | BODY MASS INDEX: 30.36 KG/M2 | HEIGHT: 62 IN | TEMPERATURE: 98.8 F | SYSTOLIC BLOOD PRESSURE: 162 MMHG | HEART RATE: 86 BPM

## 2022-07-28 DIAGNOSIS — T82.838A BLEEDING FROM DIALYSIS SHUNT, INITIAL ENCOUNTER (HCC): Primary | ICD-10-CM

## 2022-07-28 LAB
ALBUMIN SERPL-MCNC: 3.3 G/DL (ref 3.4–5)
ALBUMIN/GLOB SERPL: 0.7 {RATIO} (ref 0.8–1.7)
ALP SERPL-CCNC: 150 U/L (ref 45–117)
ALT SERPL-CCNC: 44 U/L (ref 13–56)
ANION GAP SERPL CALC-SCNC: 8 MMOL/L (ref 3–18)
AST SERPL-CCNC: 25 U/L (ref 10–38)
BASOPHILS # BLD: 0.1 K/UL (ref 0–0.1)
BASOPHILS NFR BLD: 2 % (ref 0–2)
BILIRUB SERPL-MCNC: 0.3 MG/DL (ref 0.2–1)
BUN SERPL-MCNC: 58 MG/DL (ref 7–18)
BUN/CREAT SERPL: 9 (ref 12–20)
CALCIUM SERPL-MCNC: 9.4 MG/DL (ref 8.5–10.1)
CHLORIDE SERPL-SCNC: 99 MMOL/L (ref 100–111)
CO2 SERPL-SCNC: 32 MMOL/L (ref 21–32)
CREAT SERPL-MCNC: 6.35 MG/DL (ref 0.6–1.3)
DIFFERENTIAL METHOD BLD: ABNORMAL
EOSINOPHIL # BLD: 0.4 K/UL (ref 0–0.4)
EOSINOPHIL NFR BLD: 7 % (ref 0–5)
ERYTHROCYTE [DISTWIDTH] IN BLOOD BY AUTOMATED COUNT: 19.9 % (ref 11.6–14.5)
GLOBULIN SER CALC-MCNC: 4.7 G/DL (ref 2–4)
GLUCOSE SERPL-MCNC: 112 MG/DL (ref 74–99)
HCT VFR BLD AUTO: 36.2 % (ref 35–45)
HGB BLD-MCNC: 11.2 G/DL (ref 12–16)
IMM GRANULOCYTES # BLD AUTO: 0 K/UL (ref 0–0.04)
IMM GRANULOCYTES NFR BLD AUTO: 0 % (ref 0–0.5)
LYMPHOCYTES # BLD: 0.9 K/UL (ref 0.9–3.6)
LYMPHOCYTES NFR BLD: 16 % (ref 21–52)
MCH RBC QN AUTO: 28.5 PG (ref 24–34)
MCHC RBC AUTO-ENTMCNC: 30.9 G/DL (ref 31–37)
MCV RBC AUTO: 92.1 FL (ref 78–100)
MONOCYTES # BLD: 0.5 K/UL (ref 0.05–1.2)
MONOCYTES NFR BLD: 9 % (ref 3–10)
NEUTS SEG # BLD: 3.6 K/UL (ref 1.8–8)
NEUTS SEG NFR BLD: 66 % (ref 40–73)
NRBC # BLD: 0 K/UL (ref 0–0.01)
NRBC BLD-RTO: 0 PER 100 WBC
PLATELET # BLD AUTO: 256 K/UL (ref 135–420)
PLATELET COMMENTS,PCOM: ABNORMAL
PMV BLD AUTO: 11.8 FL (ref 9.2–11.8)
POTASSIUM SERPL-SCNC: 3.8 MMOL/L (ref 3.5–5.5)
PROT SERPL-MCNC: 8 G/DL (ref 6.4–8.2)
RBC # BLD AUTO: 3.93 M/UL (ref 4.2–5.3)
RBC MORPH BLD: ABNORMAL
SODIUM SERPL-SCNC: 139 MMOL/L (ref 136–145)
WBC # BLD AUTO: 5.5 K/UL (ref 4.6–13.2)

## 2022-07-28 PROCEDURE — 80053 COMPREHEN METABOLIC PANEL: CPT

## 2022-07-28 PROCEDURE — 99283 EMERGENCY DEPT VISIT LOW MDM: CPT

## 2022-07-28 PROCEDURE — 85025 COMPLETE CBC W/AUTO DIFF WBC: CPT

## 2022-07-28 NOTE — Clinical Note
[address] [place] [address] Dr [facility] EMERGENCY DEPT  [address] [address] [phone]    Work/School Note    Date: 7/28/2022    To Whom It May concern:    Maciel Zamudio was seen and treated today in the emergency room by the following provider(s):  Attending Provider: Sara Orr MD.      Maciel Zamudio is excused from work/school on 07/28/22 and 07/29/22. She is medically clear to return to work/school on 7/30/2022.        Sincerely,          Danilo Ruiz MD

## 2022-07-28 NOTE — Clinical Note
[address] [place] [address] Dr [facility] EMERGENCY DEPT  [address] [address] [phone]    Work/School Note    Date: 7/28/2022    To Whom It May concern:    Madison Berry was seen and treated today in the emergency room by the following provider(s):  Attending Provider: Inga Hennessy MD.      Madison Berry is excused from work/school on 07/28/22 and 07/29/22. She is medically clear to return to work/school on 7/30/2022.        Sincerely,          Quoc Aquino MD

## 2022-07-29 NOTE — ED PROVIDER NOTES
Patient is a 52-year-old female with history of ESRD on Tuesday/Thursday/Saturday dialysis, congestive heart failure, hypertension, and LUIS. Patient presents today with primary complaint of bleeding from her shunt site, patient states she underwent her normal dialysis and upon returning home she took her normal dose of Eliquis after which she went to sleep and upon awakening though she noticed significant amount of blood within her bed. Patient wrapped the bleeding area at home and presented to the emergency department for further evaluation. Patient reports no other complaints, no active chest pain, abdominal pain, nausea/vomiting, fever/chills, and states he is otherwise been in her normal state of health. Past Medical History:   Diagnosis Date    Adenomatous polyp     CHF (congestive heart failure) ([facility]     Chronic kidney disease     [facility]    Dialysis patient ([facility]     tues, thurs, sat    ESRD (end stage renal disease) ([facility] [address]     Family hx of colon cancer     Mother    GI bleed due to NSAIDs     Heart failure ([facility] [address]     History of blood transfusion     Hypertension     LUIS (obstructive sleep apnea)     no cpap       Past Surgical History:   Procedure Laterality Date    COLONOSCOPY N/A 9/27/2019    COLONOSCOPY with polyp bx performed by Mian Treadwell MD at [address] [facility]      HX COLONOSCOPY  09/21/2016    colonoscopy, Dr. Ashly Blackmon, [address] [id number]  05/2020    HX OTHER SURGICAL       ARTERIOVENOUS FISTULA INSERTION LEFT ARM     HX TOTAL ABDOMINAL HYSTERECTOMY      HX TOTAL COLECTOMY  10/11/2016    LAPAROSCOPIC COLECTOMY PARTIAL RIGHT FOR CECAL POLYP, DR. PAEZ, [facility]    HX TUBAL LIGATION      VASCULAR SURGERY PROCEDURE UNLIST      left arm graft         Family History:   Problem Relation Age of Onset    Colon Cancer Mother     Colon Polyps Mother        Social History     Socioeconomic History    Marital status:      Spouse name: Not on file    Number of children: Not on file    Years of education: Not on file    Highest education level: Not on file   Occupational History    Not on file   Tobacco Use    Smoking status: Former    Smokeless tobacco: Never   Substance and Sexual Activity    Alcohol use: No    Drug use: No    Sexual activity: Yes     Partners: Male   Other Topics Concern    Not on file   Social History Narrative    Not on file     Social Determinants of Health     Financial Resource Strain: Not on file   Food Insecurity: Not on file   Transportation Needs: Not on file   Physical Activity: Not on file   Stress: Not on file   Social Connections: Not on file   Intimate Partner Violence: Not on file   Housing Stability: Not on file         ALLERGIES: Patient has no known allergies. Review of Systems   Constitutional:  Negative for chills and fever. HENT:  Negative for rhinorrhea and sore throat. Eyes:  Negative for discharge and redness. Respiratory:  Negative for cough and shortness of breath. Cardiovascular:  Negative for chest pain and leg swelling. Gastrointestinal:  Negative for abdominal pain, diarrhea, nausea and vomiting. Genitourinary:  Negative for difficulty urinating and dysuria. Musculoskeletal:  Negative for back pain and neck pain. Skin:  Positive for wound. Negative for rash. Neurological:  Negative for syncope, light-headedness and headaches. Vitals:    07/28/22 2038 07/28/22 2040   BP:  (!) 162/91   Pulse: 86    Resp: 16 16   Temp: 98.8 °F (37.1 °C)    SpO2: 97%    Weight: 74.8 kg (165 lb)    Height: 5' 2\" (1.575 m)             Physical Exam  Constitutional:       General: She is not in acute distress. Appearance: She is not ill-appearing, toxic-appearing or diaphoretic. HENT:      Head: Normocephalic and atraumatic. Right Ear: External ear normal.      Left Ear: External ear normal.      Nose: No congestion or rhinorrhea.       Mouth/Throat:      Mouth: Mucous membranes are moist.      Pharynx: No oropharyngeal exudate or posterior oropharyngeal erythema. Eyes:      General:         Right eye: No discharge. Left eye: No discharge. Pupils: Pupils are equal, round, and reactive to light. Cardiovascular:      Rate and Rhythm: Normal rate and regular rhythm. Heart sounds: No murmur heard. No friction rub. No gallop. Arteriovenous access: Left arteriovenous access is present. Comments: Punctate nonpulsatile bleeding site noted on left AV shunt with palpable thrill, no surrounding erythema, and no fluctuance. Pulmonary:      Effort: Pulmonary effort is normal. No respiratory distress. Breath sounds: No stridor. No wheezing, rhonchi or rales. Abdominal:      General: Abdomen is flat. There is no distension. Tenderness: There is no right CVA tenderness, left CVA tenderness, guarding or rebound. Musculoskeletal:         General: No swelling, tenderness, deformity or signs of injury. Cervical back: No rigidity or tenderness. Right lower leg: No edema. Left lower leg: No edema. Lymphadenopathy:      Cervical: No cervical adenopathy. Skin:     General: Skin is warm. Capillary Refill: Capillary refill takes less than 2 seconds. Coloration: Skin is not jaundiced or pale. Findings: No bruising, erythema, lesion or rash. Neurological:      General: No focal deficit present. Mental Status: She is alert and oriented to person, place, and time. Sensory: No sensory deficit. Motor: No weakness.    Psychiatric:         Mood and Affect: Mood normal.        MDM  Number of Diagnoses or Management Options  Diagnosis management comments: Patient presents with bleeding from dialysis access site with continued seeping bleeding, placed a pressure dressing which appeared to control bleeding, will continue to observe patient in the emergency department to ensure bleeding is under control, check basic laboratory studies to look for significant anemia. Patient's bleeding is likely secondary to blood thinner use especially given that she took it very close to when the bleeding started, patient states she will begin taking her prescribed blood thinner further from when she receives dialysis to prevent further episodes of bleeding. I have a low suspicion for underlying infection as cause of her bleeding given blood thinner use and overall well appearance. Anticipate discharge if bleeding is controlled and laboratory studies are reassuring.            Procedures

## 2022-07-29 NOTE — DISCHARGE INSTRUCTIONS
As we discussed please consider adjusting when you take your Eliquis to help reduce the risk of bleeding following dialysis. Please contact your primary care doctor soon as possible for follow-up over the next few days. Please remove the pressure dressing by tomorrow and if you have any further bleeding please immediately reapply pressure to the wound and return to the emergency department for further evaluation.

## 2022-07-29 NOTE — ED TRIAGE NOTES
Patient arrives with c/o dialysis shunt bleeding. States to have had dialysis today and has been bleeding ever since. Patient with dressing taped in place and bleeding appears to be controlled.   Distal neurovascular intact

## 2022-08-29 ENCOUNTER — HOSPITAL ENCOUNTER (EMERGENCY)
Age: 62
Discharge: HOME | End: 2022-08-29
Attending: STUDENT IN AN ORGANIZED HEALTH CARE EDUCATION/TRAINING PROGRAM
Payer: COMMERCIAL

## 2022-08-29 VITALS
TEMPERATURE: 98.3 F | HEART RATE: 81 BPM | SYSTOLIC BLOOD PRESSURE: 152 MMHG | DIASTOLIC BLOOD PRESSURE: 96 MMHG | BODY MASS INDEX: 30.36 KG/M2 | WEIGHT: 165 LBS | HEIGHT: 62 IN | RESPIRATION RATE: 18 BRPM | OXYGEN SATURATION: 98 %

## 2022-08-29 DIAGNOSIS — J06.9 VIRAL URI WITH COUGH: Primary | ICD-10-CM

## 2022-08-29 PROCEDURE — 99283 EMERGENCY DEPT VISIT LOW MDM: CPT

## 2022-08-29 RX ORDER — FLUTICASONE PROPIONATE 50 MCG
2 SPRAY, SUSPENSION (ML) NASAL DAILY
Qty: 16 G | Refills: 0 | Status: SHIPPED | OUTPATIENT
Start: 2022-08-29

## 2022-08-29 RX ORDER — OXYMETAZOLINE HCL 0.05 %
2 SPRAY, NON-AEROSOL (ML) NASAL 2 TIMES DAILY
Qty: 1 EACH | Refills: 0 | Status: SHIPPED | OUTPATIENT
Start: 2022-08-29 | End: 2022-09-01

## 2022-08-30 NOTE — ED PROVIDER NOTES
HPI   Patient is a 27-year-old female who presents with a 2-week history of runny nose and congestion, sore throat and cough. Patient states she has previously been seen by her doctor and was given a Z-Malcolm and prednisone but it did not help. She has been using throat lozenges with no improvement in her symptoms. She denies any chest pain, shortness of breath, fever, sweats or chills. Denies any nausea, vomiting or abdominal pain. Denies any headaches. Her symptoms have not been improving over the past 2 weeks. Past Medical History:   Diagnosis Date    Adenomatous polyp     CHF (congestive heart failure) ([facility] [address]     Chronic kidney disease     [facility]    Dialysis patient ([facility] [address]     tues, thurs, sat    ESRD (end stage renal disease) ([facility] [address]     Family hx of colon cancer     Mother    GI bleed due to NSAIDs     Heart failure ([facility] [address]     History of blood transfusion     Hypertension     LUIS (obstructive sleep apnea)     no cpap       Past Surgical History:   Procedure Laterality Date    COLONOSCOPY N/A 9/27/2019    COLONOSCOPY with polyp bx performed by Hattie France MD at [address] [place] [address]      HX COLONOSCOPY  09/21/2016    colonoscopy, Dr. Tone Stoner, [address] [facility]  05/2020    HX OTHER SURGICAL       ARTERIOVENOUS FISTULA INSERTION LEFT ARM     HX TOTAL ABDOMINAL HYSTERECTOMY      HX TOTAL COLECTOMY  10/11/2016    LAPAROSCOPIC COLECTOMY PARTIAL RIGHT FOR CECAL POLYP, DR. PAEZ, [facility]    HX TUBAL LIGATION      VASCULAR SURGERY PROCEDURE UNLIST      left arm graft         Family History:   Problem Relation Age of Onset    Colon Cancer Mother     Colon Polyps Mother        Social History     Socioeconomic History    Marital status:      Spouse name: Not on file    Number of children: Not on file    Years of education: Not on file    Highest education level: Not on file   Occupational History    Not on file   Tobacco Use    Smoking status: Former    Smokeless tobacco: Never   Substance and Sexual Activity    Alcohol use: No    Drug use: No    Sexual activity: Yes     Partners: Male   Other Topics Concern    Not on file   Social History Narrative    Not on file     Social Determinants of Health     Financial Resource Strain: Not on file   Food Insecurity: Not on file   Transportation Needs: Not on file   Physical Activity: Not on file   Stress: Not on file   Social Connections: Not on file   Intimate Partner Violence: Not on file   Housing Stability: Not on file         ALLERGIES: Patient has no known allergies. Review of Systems  Constitutional: No fever  HENT: No ear pain  Eyes: No change in vision  Respiratory: No SOB  Cardio: No chest pain  GI: No blood in stool  : No hematuria  MSK: No back pain  Skin: No rashes  Neuro: No headache    Vitals:    08/29/22 2030   BP: (!) 152/96   Pulse: 81   Resp: 18   Temp: 98.3 °F (36.8 °C)   SpO2: 98%   Weight: 74.8 kg (165 lb)   Height: 5' 2\" (1.575 m)            Physical Exam   General: No acute distress  Head: Normocephalic, atraumatic  Psych: Cooperative and alert  Eyes: No scleral icterus, normal conjunctiva  ENT: Moist oral mucosa, minor erythema to the posterior pharynx without any swelling or exudate, clear tympanic membranes bilaterally, no sinus tenderness bilaterally, no significant rhinorrhea or congestion noted on exam  Neck: Supple, no significant anterior cervical lymphadenopathy  CV: Regular rate and rhythm  Pulm: Clear breath sounds bilaterally without any wheezing or rhonchi, normal respiratory rate  GI: Normal bowel sounds, soft, non-tender  MSK: Moves all four extremities  Skin: No rashes  Neuro: Alert and conversive    MDM    Patient is a 28-year-old female who presents with runny nose, sore throat and cough for 2 weeks. Patient is hemodynamically stable and clinically appears well.   She does not have any concerning findings of a sinus infection and has already been treated with antibiotics any improvement in her symptoms. No concerning findings of ear infection, strep throat or other significant bacterial infection. Although she has a cough she has clear lung sounds bilaterally without any concerning findings of a pneumonia. Discussed with the patient that this is most likely seasonal allergies versus a viral URI. Discussed the importance of treating her sinuses to treat the underlying cause and she is most likely suffering from postnasal drip. Discussed taking over-the-counter Tylenol and throat lozenges to help with her discomfort in the meantime. Patient is dissatisfied with this result and would like a treatment. We discussed that there is no treatment that we will treat this and is just about treating her symptoms until her body can recover on its own. No concerning findings for more significant process at this time. Patient stable for discharge at this time. Patient is in agreement with the plan to be discharged at this time. All the patient's questions were answered. Patient was given written instructions on the diagnosis, and states understanding of the plan moving forward. We did discuss important signs and symptoms that should prompt quick return to the emergency department. Disposition: Patient was discharged home in stable condition.   They will follow up with their primary care physician    Prescriptions: Afrin, Flonase    Diagnosis: Viral URI versus seasonal allergies with postnasal drip    Procedures

## 2022-08-30 NOTE — ED TRIAGE NOTES
Pt presents with c/o sore throat; stated she woke up feeling this way. Stated she has some mild swelling & \"two little knots\" on the back of her tongue. Has not taken anything OTC for inflammation but has gargled with warm salt water. Afebrile in triage; no other complaints at this time.

## 2022-09-27 ENCOUNTER — APPOINTMENT (OUTPATIENT)
Dept: GENERAL RADIOLOGY | Age: 62
End: 2022-09-27
Attending: STUDENT IN AN ORGANIZED HEALTH CARE EDUCATION/TRAINING PROGRAM
Payer: MEDICARE

## 2022-09-27 ENCOUNTER — HOSPITAL ENCOUNTER (OUTPATIENT)
Age: 62
Setting detail: OBSERVATION
Discharge: HOME | End: 2022-09-28
Attending: STUDENT IN AN ORGANIZED HEALTH CARE EDUCATION/TRAINING PROGRAM | Admitting: INTERNAL MEDICINE
Payer: MEDICARE

## 2022-09-27 DIAGNOSIS — I16.1 HYPERTENSIVE EMERGENCY: Primary | ICD-10-CM

## 2022-09-27 DIAGNOSIS — E87.70 HYPERVOLEMIA, UNSPECIFIED HYPERVOLEMIA TYPE: ICD-10-CM

## 2022-09-27 DIAGNOSIS — I48.91 ATRIAL FIBRILLATION WITH RVR (HCC): ICD-10-CM

## 2022-09-27 LAB
ALBUMIN SERPL-MCNC: 3.5 G/DL (ref 3.4–5)
ALBUMIN/GLOB SERPL: 0.9 {RATIO} (ref 0.8–1.7)
ALP SERPL-CCNC: 184 U/L (ref 45–117)
ALT SERPL-CCNC: 59 U/L (ref 13–56)
ANION GAP SERPL CALC-SCNC: 14 MMOL/L (ref 3–18)
AST SERPL-CCNC: 53 U/L (ref 10–38)
ATRIAL RATE: 153 BPM
ATRIAL RATE: 93 BPM
BASOPHILS # BLD: 0.1 K/UL (ref 0–0.1)
BASOPHILS NFR BLD: 1 % (ref 0–2)
BILIRUB SERPL-MCNC: 0.6 MG/DL (ref 0.2–1)
BNP SERPL-MCNC: ABNORMAL PG/ML (ref 0–900)
BUN SERPL-MCNC: 104 MG/DL (ref 7–18)
BUN/CREAT SERPL: 8 (ref 12–20)
CALCIUM SERPL-MCNC: 8.6 MG/DL (ref 8.5–10.1)
CALCULATED P AXIS, ECG09: 44 DEGREES
CALCULATED R AXIS, ECG10: -54 DEGREES
CALCULATED R AXIS, ECG10: -58 DEGREES
CALCULATED T AXIS, ECG11: 103 DEGREES
CALCULATED T AXIS, ECG11: 76 DEGREES
CHLORIDE SERPL-SCNC: 100 MMOL/L (ref 100–111)
CO2 SERPL-SCNC: 26 MMOL/L (ref 21–32)
COVID-19 RAPID TEST, COVR: NOT DETECTED
CREAT SERPL-MCNC: 12.6 MG/DL (ref 0.6–1.3)
DIAGNOSIS, 93000: NORMAL
DIAGNOSIS, 93000: NORMAL
DIFFERENTIAL METHOD BLD: ABNORMAL
EOSINOPHIL # BLD: 0.1 K/UL (ref 0–0.4)
EOSINOPHIL NFR BLD: 1 % (ref 0–5)
ERYTHROCYTE [DISTWIDTH] IN BLOOD BY AUTOMATED COUNT: 19.4 % (ref 11.6–14.5)
GLOBULIN SER CALC-MCNC: 4.1 G/DL (ref 2–4)
GLUCOSE SERPL-MCNC: 109 MG/DL (ref 74–99)
HBV SURFACE AG SER QL: <0.1 INDEX
HBV SURFACE AG SER QL: NEGATIVE
HCT VFR BLD AUTO: 36.3 % (ref 35–45)
HGB BLD-MCNC: 11.2 G/DL (ref 12–16)
IMM GRANULOCYTES # BLD AUTO: 0 K/UL (ref 0–0.04)
IMM GRANULOCYTES NFR BLD AUTO: 0 % (ref 0–0.5)
INR PPP: 1.1 (ref 0.8–1.2)
LYMPHOCYTES # BLD: 1.2 K/UL (ref 0.9–3.6)
LYMPHOCYTES NFR BLD: 14 % (ref 21–52)
MAGNESIUM SERPL-MCNC: 4.4 MG/DL (ref 1.6–2.6)
MCH RBC QN AUTO: 28.2 PG (ref 24–34)
MCHC RBC AUTO-ENTMCNC: 30.9 G/DL (ref 31–37)
MCV RBC AUTO: 91.4 FL (ref 78–100)
MONOCYTES # BLD: 0.8 K/UL (ref 0.05–1.2)
MONOCYTES NFR BLD: 9 % (ref 3–10)
NEUTS SEG # BLD: 6.6 K/UL (ref 1.8–8)
NEUTS SEG NFR BLD: 75 % (ref 40–73)
NRBC # BLD: 0.02 K/UL (ref 0–0.01)
NRBC BLD-RTO: 0.2 PER 100 WBC
P-R INTERVAL, ECG05: 168 MS
PLATELET # BLD AUTO: 241 K/UL (ref 135–420)
PMV BLD AUTO: 11.7 FL (ref 9.2–11.8)
POTASSIUM SERPL-SCNC: 3.8 MMOL/L (ref 3.5–5.5)
PROT SERPL-MCNC: 7.6 G/DL (ref 6.4–8.2)
PROTHROMBIN TIME: 14.8 SEC (ref 11.5–15.2)
Q-T INTERVAL, ECG07: 304 MS
Q-T INTERVAL, ECG07: 426 MS
QRS DURATION, ECG06: 100 MS
QRS DURATION, ECG06: 100 MS
QTC CALCULATION (BEZET), ECG08: 473 MS
QTC CALCULATION (BEZET), ECG08: 529 MS
RBC # BLD AUTO: 3.97 M/UL (ref 4.2–5.3)
SODIUM SERPL-SCNC: 140 MMOL/L (ref 136–145)
SOURCE, COVRS: NORMAL
TROPONIN-HIGH SENSITIVITY: 105 NG/L (ref 0–54)
TROPONIN-HIGH SENSITIVITY: 117 NG/L (ref 0–54)
VENTRICULAR RATE, ECG03: 146 BPM
VENTRICULAR RATE, ECG03: 93 BPM
WBC # BLD AUTO: 8.8 K/UL (ref 4.6–13.2)

## 2022-09-27 PROCEDURE — G0257 UNSCHED DIALYSIS ESRD PT HOS: HCPCS

## 2022-09-27 PROCEDURE — 74011250637 HC RX REV CODE- 250/637: Performed by: INTERNAL MEDICINE

## 2022-09-27 PROCEDURE — 93005 ELECTROCARDIOGRAM TRACING: CPT

## 2022-09-27 PROCEDURE — 80053 COMPREHEN METABOLIC PANEL: CPT

## 2022-09-27 PROCEDURE — 71045 X-RAY EXAM CHEST 1 VIEW: CPT

## 2022-09-27 PROCEDURE — 87635 SARS-COV-2 COVID-19 AMP PRB: CPT

## 2022-09-27 PROCEDURE — 83735 ASSAY OF MAGNESIUM: CPT

## 2022-09-27 PROCEDURE — 74011000250 HC RX REV CODE- 250: Performed by: INTERNAL MEDICINE

## 2022-09-27 PROCEDURE — 96365 THER/PROPH/DIAG IV INF INIT: CPT

## 2022-09-27 PROCEDURE — 96376 TX/PRO/DX INJ SAME DRUG ADON: CPT

## 2022-09-27 PROCEDURE — 99222 1ST HOSP IP/OBS MODERATE 55: CPT | Performed by: INTERNAL MEDICINE

## 2022-09-27 PROCEDURE — G0378 HOSPITAL OBSERVATION PER HR: HCPCS

## 2022-09-27 PROCEDURE — 96375 TX/PRO/DX INJ NEW DRUG ADDON: CPT

## 2022-09-27 PROCEDURE — 96366 THER/PROPH/DIAG IV INF ADDON: CPT

## 2022-09-27 PROCEDURE — 90935 HEMODIALYSIS ONE EVALUATION: CPT

## 2022-09-27 PROCEDURE — 74011000250 HC RX REV CODE- 250: Performed by: STUDENT IN AN ORGANIZED HEALTH CARE EDUCATION/TRAINING PROGRAM

## 2022-09-27 PROCEDURE — 85610 PROTHROMBIN TIME: CPT

## 2022-09-27 PROCEDURE — 85025 COMPLETE CBC W/AUTO DIFF WBC: CPT

## 2022-09-27 PROCEDURE — 74011250637 HC RX REV CODE- 250/637: Performed by: STUDENT IN AN ORGANIZED HEALTH CARE EDUCATION/TRAINING PROGRAM

## 2022-09-27 PROCEDURE — 2709999900 HC NON-CHARGEABLE SUPPLY

## 2022-09-27 PROCEDURE — 74011250636 HC RX REV CODE- 250/636: Performed by: STUDENT IN AN ORGANIZED HEALTH CARE EDUCATION/TRAINING PROGRAM

## 2022-09-27 PROCEDURE — 87340 HEPATITIS B SURFACE AG IA: CPT

## 2022-09-27 PROCEDURE — 94640 AIRWAY INHALATION TREATMENT: CPT

## 2022-09-27 PROCEDURE — 84484 ASSAY OF TROPONIN QUANT: CPT

## 2022-09-27 PROCEDURE — 65270000046 HC RM TELEMETRY

## 2022-09-27 PROCEDURE — 83880 ASSAY OF NATRIURETIC PEPTIDE: CPT

## 2022-09-27 PROCEDURE — 74011000258 HC RX REV CODE- 258: Performed by: STUDENT IN AN ORGANIZED HEALTH CARE EDUCATION/TRAINING PROGRAM

## 2022-09-27 PROCEDURE — 99285 EMERGENCY DEPT VISIT HI MDM: CPT

## 2022-09-27 RX ORDER — SERTRALINE HYDROCHLORIDE 50 MG/1
50 TABLET, FILM COATED ORAL DAILY
COMMUNITY
Start: 2022-08-15

## 2022-09-27 RX ORDER — BUDESONIDE 0.5 MG/2ML
500 INHALANT ORAL
Status: DISCONTINUED | OUTPATIENT
Start: 2022-09-27 | End: 2022-09-28 | Stop reason: HOSPADM

## 2022-09-27 RX ORDER — POLYETHYLENE GLYCOL 3350 17 G/17G
17 POWDER, FOR SOLUTION ORAL DAILY PRN
Status: DISCONTINUED | OUTPATIENT
Start: 2022-09-27 | End: 2022-09-28 | Stop reason: HOSPADM

## 2022-09-27 RX ORDER — DILTIAZEM HYDROCHLORIDE 120 MG/1
120 CAPSULE, COATED, EXTENDED RELEASE ORAL DAILY
Status: DISCONTINUED | OUTPATIENT
Start: 2022-09-27 | End: 2022-09-28 | Stop reason: HOSPADM

## 2022-09-27 RX ORDER — IPRATROPIUM BROMIDE AND ALBUTEROL SULFATE 2.5; .5 MG/3ML; MG/3ML
3 SOLUTION RESPIRATORY (INHALATION)
Status: DISCONTINUED | OUTPATIENT
Start: 2022-09-27 | End: 2022-09-28 | Stop reason: HOSPADM

## 2022-09-27 RX ORDER — PROMETHAZINE HYDROCHLORIDE 25 MG/1
12.5 TABLET ORAL
Status: DISCONTINUED | OUTPATIENT
Start: 2022-09-27 | End: 2022-09-28 | Stop reason: HOSPADM

## 2022-09-27 RX ORDER — MECLIZINE HYDROCHLORIDE 25 MG/1
25 TABLET ORAL
COMMUNITY
Start: 2022-09-22 | End: 2022-10-23

## 2022-09-27 RX ORDER — LISINOPRIL 20 MG/1
20 TABLET ORAL DAILY
Status: DISCONTINUED | OUTPATIENT
Start: 2022-09-27 | End: 2022-09-27

## 2022-09-27 RX ORDER — ATORVASTATIN CALCIUM 40 MG/1
80 TABLET, FILM COATED ORAL
Status: DISCONTINUED | OUTPATIENT
Start: 2022-09-27 | End: 2022-09-28 | Stop reason: HOSPADM

## 2022-09-27 RX ORDER — NITROGLYCERIN 0.4 MG/1
0.4 TABLET SUBLINGUAL
Status: COMPLETED | OUTPATIENT
Start: 2022-09-27 | End: 2022-09-27

## 2022-09-27 RX ORDER — TRIAMCINOLONE ACETONIDE 5 MG/G
OINTMENT TOPICAL 3 TIMES DAILY
COMMUNITY
Start: 2022-06-28 | End: 2022-10-23

## 2022-09-27 RX ORDER — LISINOPRIL 20 MG/1
20 TABLET ORAL DAILY
Status: DISCONTINUED | OUTPATIENT
Start: 2022-09-28 | End: 2022-09-28 | Stop reason: HOSPADM

## 2022-09-27 RX ORDER — CLONIDINE HYDROCHLORIDE 0.1 MG/1
0.3 TABLET ORAL 3 TIMES DAILY
Status: DISCONTINUED | OUTPATIENT
Start: 2022-09-27 | End: 2022-09-27

## 2022-09-27 RX ORDER — NITROGLYCERIN 40 MG/100ML
0-200 INJECTION INTRAVENOUS
Status: DISCONTINUED | OUTPATIENT
Start: 2022-09-27 | End: 2022-09-27

## 2022-09-27 RX ORDER — DILTIAZEM HYDROCHLORIDE 5 MG/ML
10 INJECTION INTRAVENOUS
Status: COMPLETED | OUTPATIENT
Start: 2022-09-27 | End: 2022-09-27

## 2022-09-27 RX ORDER — LISINOPRIL 20 MG/1
40 TABLET ORAL DAILY
Status: DISCONTINUED | OUTPATIENT
Start: 2022-09-27 | End: 2022-09-27

## 2022-09-27 RX ORDER — CLONIDINE HYDROCHLORIDE 0.1 MG/1
0.2 TABLET ORAL 3 TIMES DAILY
Status: DISCONTINUED | OUTPATIENT
Start: 2022-09-27 | End: 2022-09-28 | Stop reason: HOSPADM

## 2022-09-27 RX ORDER — HYDROXYZINE 25 MG/1
25 TABLET, FILM COATED ORAL
Status: COMPLETED | OUTPATIENT
Start: 2022-09-27 | End: 2022-09-27

## 2022-09-27 RX ORDER — SODIUM CHLORIDE 0.9 % (FLUSH) 0.9 %
5-40 SYRINGE (ML) INJECTION EVERY 8 HOURS
Status: DISCONTINUED | OUTPATIENT
Start: 2022-09-27 | End: 2022-09-28 | Stop reason: HOSPADM

## 2022-09-27 RX ORDER — ARFORMOTEROL TARTRATE 15 UG/2ML
15 SOLUTION RESPIRATORY (INHALATION)
Status: DISCONTINUED | OUTPATIENT
Start: 2022-09-27 | End: 2022-09-28 | Stop reason: HOSPADM

## 2022-09-27 RX ORDER — HYDROXYZINE PAMOATE 50 MG/1
CAPSULE ORAL
COMMUNITY
Start: 2022-09-08 | End: 2022-09-27

## 2022-09-27 RX ORDER — GUAIFENESIN 100 MG/5ML
81 LIQUID (ML) ORAL DAILY
Status: DISCONTINUED | OUTPATIENT
Start: 2022-09-28 | End: 2022-09-28 | Stop reason: HOSPADM

## 2022-09-27 RX ORDER — FAMOTIDINE 20 MG/1
20 TABLET, FILM COATED ORAL EVERY EVENING
Status: DISCONTINUED | OUTPATIENT
Start: 2022-09-28 | End: 2022-09-28 | Stop reason: HOSPADM

## 2022-09-27 RX ORDER — IPRATROPIUM BROMIDE AND ALBUTEROL SULFATE 2.5; .5 MG/3ML; MG/3ML
3 SOLUTION RESPIRATORY (INHALATION)
Status: COMPLETED | OUTPATIENT
Start: 2022-09-27 | End: 2022-09-27

## 2022-09-27 RX ORDER — CLONIDINE HYDROCHLORIDE 0.2 MG/1
0.2 TABLET ORAL 3 TIMES DAILY
COMMUNITY
Start: 2022-07-15

## 2022-09-27 RX ORDER — NIFEDIPINE 90 MG/1
90 TABLET, EXTENDED RELEASE ORAL DAILY
Status: DISCONTINUED | OUTPATIENT
Start: 2022-09-27 | End: 2022-09-28 | Stop reason: HOSPADM

## 2022-09-27 RX ORDER — SODIUM CHLORIDE 0.9 % (FLUSH) 0.9 %
5-40 SYRINGE (ML) INJECTION AS NEEDED
Status: DISCONTINUED | OUTPATIENT
Start: 2022-09-27 | End: 2022-09-28 | Stop reason: HOSPADM

## 2022-09-27 RX ORDER — ONDANSETRON 2 MG/ML
4 INJECTION INTRAMUSCULAR; INTRAVENOUS
Status: DISCONTINUED | OUTPATIENT
Start: 2022-09-27 | End: 2022-09-28 | Stop reason: HOSPADM

## 2022-09-27 RX ORDER — CARVEDILOL 25 MG/1
25 TABLET ORAL 2 TIMES DAILY WITH MEALS
Status: DISCONTINUED | OUTPATIENT
Start: 2022-09-27 | End: 2022-09-28 | Stop reason: HOSPADM

## 2022-09-27 RX ORDER — LISINOPRIL 20 MG/1
20 TABLET ORAL DAILY
COMMUNITY
Start: 2022-08-16 | End: 2022-10-07

## 2022-09-27 RX ORDER — ACETAMINOPHEN 325 MG/1
650 TABLET ORAL
Status: DISCONTINUED | OUTPATIENT
Start: 2022-09-27 | End: 2022-09-28 | Stop reason: HOSPADM

## 2022-09-27 RX ADMIN — CLONIDINE HYDROCHLORIDE 0.2 MG: 0.1 TABLET ORAL at 22:04

## 2022-09-27 RX ADMIN — IPRATROPIUM BROMIDE AND ALBUTEROL SULFATE 3 ML: .5; 3 SOLUTION RESPIRATORY (INHALATION) at 17:46

## 2022-09-27 RX ADMIN — SODIUM CHLORIDE, PRESERVATIVE FREE 10 ML: 5 INJECTION INTRAVENOUS at 22:05

## 2022-09-27 RX ADMIN — NIFEDIPINE 90 MG: 90 TABLET, FILM COATED, EXTENDED RELEASE ORAL at 15:02

## 2022-09-27 RX ADMIN — LISINOPRIL 40 MG: 20 TABLET ORAL at 14:13

## 2022-09-27 RX ADMIN — NITROGLYCERIN 10 MCG/MIN: 40 INJECTION INTRAVENOUS at 08:47

## 2022-09-27 RX ADMIN — ATORVASTATIN CALCIUM 80 MG: 40 TABLET, FILM COATED ORAL at 22:04

## 2022-09-27 RX ADMIN — DILTIAZEM HYDROCHLORIDE 120 MG: 120 CAPSULE, COATED, EXTENDED RELEASE ORAL at 15:01

## 2022-09-27 RX ADMIN — SODIUM CHLORIDE 10 MG/HR: 900 INJECTION, SOLUTION INTRAVENOUS at 14:20

## 2022-09-27 RX ADMIN — SODIUM CHLORIDE, PRESERVATIVE FREE 10 ML: 5 INJECTION INTRAVENOUS at 14:34

## 2022-09-27 RX ADMIN — Medication 0.4 MG: at 07:10

## 2022-09-27 RX ADMIN — SODIUM CHLORIDE 5 MG/HR: 900 INJECTION, SOLUTION INTRAVENOUS at 09:14

## 2022-09-27 RX ADMIN — ACETAMINOPHEN 650 MG: 325 TABLET, FILM COATED ORAL at 19:02

## 2022-09-27 RX ADMIN — CLONIDINE HYDROCHLORIDE 0.3 MG: 0.1 TABLET ORAL at 14:13

## 2022-09-27 RX ADMIN — HYDROXYZINE HYDROCHLORIDE 25 MG: 25 TABLET ORAL at 08:08

## 2022-09-27 RX ADMIN — IPRATROPIUM BROMIDE AND ALBUTEROL SULFATE 3 ML: .5; 3 SOLUTION RESPIRATORY (INHALATION) at 07:10

## 2022-09-27 RX ADMIN — DILTIAZEM HYDROCHLORIDE 10 MG: 5 INJECTION, SOLUTION INTRAVENOUS at 09:08

## 2022-09-27 RX ADMIN — APIXABAN 5 MG: 5 TABLET, FILM COATED ORAL at 17:47

## 2022-09-27 RX ADMIN — CARVEDILOL 25 MG: 25 TABLET, FILM COATED ORAL at 14:14

## 2022-09-27 RX ADMIN — Medication 0.4 MG: at 08:08

## 2022-09-27 NOTE — DIALYSIS
JESSICA        ACUTE HEMODIALYSIS FLOW SHEET      HEMODIALYSIS ORDERS: Physician: jennifer     Dialyzer: revaclear   Duration: 3.2 hr  BFR: 400   DFR: 600   Dialysate:  Temp 36-37*C  K+   3    Ca+  2.5 Na 138 Bicarb 30   Weight: 74.8 kg   Patient Chart [x]     Unable to Obtain []   Dry weight/UF Goal: 3000 Access AVF  Needle Gauge 15    Heparin []  Bolus      Units    [] Hourly       Units    [x]None      Catheter locking solution    Pre BP:   178/119    Pulse:     127       Respirations: 18  Temperature:   98.1   Labs: Pre        Post:        [x] N/A   Additional Orders(medications, blood products, hypotension management) [x] N/A     [x] Jessica Consent Verified       GRAFT/FISTULA ACCESS:  []N/A     []Right     [x]Left     [x]UE     []LE   []AVG   [x]AVF        []Buttonhole    [x]Medical Aseptic Prep Utilized   [x]No S/S infection  []Redness  []Drainage []Cultured []Swelling []Pain    Bruit:   [x] Strong    [] Weak       Thrill :   [x] Strong    [] Weak       Needle Gauge: 15  Length: 1   If access problem,  notified: []Yes     [x]N/A  Date:        Please describe access if present and not used:                            GENERAL ASSESSMENT:      LUNGS:  Rate  SaO2% [] N/A    [x] Clear  [] Coarse  [] Crackles  [] Wheezing        [] Diminished     Location : []RLL   []LLL    []RUL  []STONEY     Cough: []Productive  []Dry  [x]N/A   Respirations:  [x]Easy  []Labored     Therapy:   []RA  [x]NC 2 l/min    Mask: []NRB []Venti       O2%                  []Ventilator  []Intubated  [] Trach  [] BiPaP     CARDIAC: []Regular      [x] Irregular   [] Pericardial Rub  [] JVD        []  Monitored  [] Bedside  [] Remotely monitored [] N/A  Rhythm: A fib     EDEMA: [] None  [x]Generalized  [] Pitting [] 1    [] 2    [] 3    [] 4                 [] Facial  [] Pedal  []  UE  [] LE     SKIN:   [x] Warm  [] Hot     [] Cold   [x] Dry     [] Pale   [] Diaphoretic                  [] Flushed  [] Jaundiced  [] Cyanotic  [] Rash  [] Weeping LOC:    [x] Alert      [x]Oriented:    [x] Person     [x] Place  [x]Time               [] Confused  [] Lethargic  [] Medicated  [] Non-responsive     GI / ABDOMEN:  [] Flat    [] Distended    [x] Soft    [] Firm   []  Obese                             [] Diarrhea  [x] Bowel Sounds  [] Nausea  [] Vomiting       / URINE ASSESSMENT:[] Voiding   [x] Oliguria  [] Anuria   []  Garcia     [] Incontinent    []  Incontinent Brief      []  Bathroom Privileges       PAIN: [x] 0 []1  []2   []3   []4   []5   []6   []7   []8   []9   []10              Scale 0-10  Action/Follow Up:      MOBILITY:  [] Amb    [] Amb/Assist    [x] Bed    [] Wheelchair  [] Stretcher      All Vitals and Treatment Details on Attached [address]: [facility]          Room # ER06/06      [] 1st Time Acute  [] Stat  [x] Routine  [] Urgent     [x] Acute Room  []  Bedside  [] ICU/CCU  [] ER   Isolation Precautions:  Droplet Plus      Special Considerations:         [] Blood Consent Verified [x]N/A     ALLERGIES: No Known Allergies            Code Status:Prior        Hepatitis Status:                        Lab Results   Component Value Date/Time    Hepatitis B surface Ag <0.10 09/27/2022 07:08 AM    Hepatitis B surface Ab 272.55 07/05/2021 02:58 PM                     Current Labs:   Lab Results   Component Value Date/Time    Sodium 140 09/27/2022 07:08 AM    Potassium 3.8 09/27/2022 07:08 AM    Chloride 100 09/27/2022 07:08 AM    CO2 26 09/27/2022 07:08 AM    Anion gap 14 09/27/2022 07:08 AM    Glucose 109 (H) 09/27/2022 07:08 AM     (H) 09/27/2022 07:08 AM    Creatinine 12.60 (H) 09/27/2022 07:08 AM    BUN/Creatinine ratio 8 (L) 09/27/2022 07:08 AM    GFR est AA 4 (L) 09/27/2022 07:08 AM    GFR est non-AA 3 (L) 09/27/2022 07:08 AM    Calcium 8.6 09/27/2022 07:08 AM      Lab Results   Component Value Date/Time    WBC 8.8 09/27/2022 07:08 AM    Hemoglobin, POC 12.6 09/27/2019 07:30 AM    HGB 11.2 (L) 09/27/2022 07:08 AM    Hematocrit, POC 37 09/27/2019 07:30 AM    HCT 36.3 09/27/2022 07:08 AM    PLATELET 312 82/65/3227 07:08 AM    MCV 91.4 09/27/2022 07:08 AM                                                                                     DIET: None       PRIMARY NURSE REPORT: First initial/Last name/Title      Pre Dialysis: Ifeoma Hung RN    Time: 0830      EDUCATION:    [x] Patient [] Other         Knowledge Basis: []None [x]Minimal [] Substantial   Barriers to learning  [x]N/A   [] Access Care     [] S&S of infection     [] Fluid Management     []K+     [x]Procedural    []Albumin     [] Medications     [] Tx Options     [] Transplant     [] Diet     [] Other   Teaching Tools:  [x] Explain  [] Demo  [] Handouts [] Video  Patient response:   [x] Verbalized understanding  [] Teach back  [] Return demonstration [] Requires follow up   Inappropriate due to            [x] Time Out/Safety Check  [x]Extracorporeal Circuit Tested for integrity       [address] - Before each treatment:     Machine Number:                   [address] [facility]                                   [x] Unit Machine # 5 with centralized RO                                  [] Portable Machine #1/RO serial # [id number]                                  [] Portable Machine #2/RO serial # [id number]                                  [] Portable Machine #4/RO serial # [id number]                                                     [facility]                                  [] Portable Machine #11/RO serial # [id number]                                   [] Portable Machine #12/RO serial # [id number]                                  [] Portable Machine #13/RO serial #  [id number]      Alarm Test:  Pass time 0830               [x] RO/Machine Log Complete      Temp    36*-37*             Dialysate: pH  7.4 Conductivity: Meter   14     HD Machine   14                  TCD: 14  Dialyzer Lot # [id number]         Blood Tubing Lot # [id number]        Saline Lot #  [id number] CHLORINE TESTING-Before each treatment and every 4 hours    Total Chlorine: [x] less than 0.1 ppm  Time: 0800 4 Hr/2nd Check Time: 1200   (if greater than 0.1 ppm from Primary then every 30 minutes from Secondary)     TREATMENT INITIATION - with Dialysis Precautions:   [x] All Connections Secured                 [x] Saline Line Double Clamped   [x] Venous Parameters Set                  [x] Arterial Parameters Set    [x] Prime Given 250ml                          [x]Air Foam Detector Engaged      Treatment Initiation Note:Pt in stable condition. AVF accessed and treatment initiated without complication. Dr Susy Burgess at bedside. Post Assessment:   Dialyzer Cleared: [] Good [x] Fair  [] Poor  Blood processed:  53.2 L  UF Removed  2200 Ml  POst BP:   135/79       Pulse: 66        Respirations: 18  Temperature: 98.7 Lungs:     [x] Clear      [] Course         [] Crackles    [] Wheezing         [] Diminished   Post Tx Vascular Access:   AVF/AVG: Bleeding stopped   Art 5 min. Jani. 5 Min    Cardiac:   [x] Regular   [] Irregular   [] Monitor  [] N/A      Rhythm:       Catheter:   Locking solution: Heparin 1:1000   Art. Jani. N/A    Skin:   Pain:    [x] Warm  [x] Dry [] Diaphoretic    [] Flushed    [] Pale [] Cyanotic [x]0  []1  []2   []3  []4   []5   []6   []7   []8   []9   []10     Post Treatment Note:   HD well tolerated. 2.2L UF removed. NAD noted during or post treatment.  Face and access remained visible throughout treatment     POST TREATMENT PRIMARY NURSE HANDOFF REPORT:     First initial/Last name/Title         Post Dialysis: Eulalia Cota RN Time:  1250     Abbreviations: AVG-arterial venous graft, AVF-arterial venous fistula, IJ-Internal Jugular, Subcl-Subclavian, Fem-Femoral, Tx-treatment, AP/HR-apical heart rate, DFR-dialysate flow rate, BFR-blood flow rate, AP-arterial pressure, -venous pressure, UF-ultrafiltrate, TMP-transmembrane pressure, Jani-Venous, Art-Arterial, RO-Reverse Osmosis
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
## 2022-09-27 NOTE — Clinical Note
Status[de-identified] INPATIENT [101]   Type of Bed: Telemetry [19]   Cardiac Monitoring Required?: Yes   Inpatient Hospitalization Certified Necessary for the Following Reasons: 3.  Patient receiving treatment that can only be provided in an inpatient setting (further clarification in H&P documentation)   Admitting Diagnosis: Hypertensive emergency [[id number]]   Admitting Physician: Lisa Marroquin [[id number]]   Attending Physician: Anil Hector   Estimated Length of Stay: 2 Midnights   Discharge Plan[de-identified] Home with Office Follow-up
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
## 2022-09-28 VITALS
RESPIRATION RATE: 18 BRPM | HEIGHT: 62 IN | OXYGEN SATURATION: 95 % | HEART RATE: 89 BPM | SYSTOLIC BLOOD PRESSURE: 123 MMHG | BODY MASS INDEX: 29.44 KG/M2 | TEMPERATURE: 98.3 F | DIASTOLIC BLOOD PRESSURE: 76 MMHG | WEIGHT: 160 LBS

## 2022-09-28 LAB
ANION GAP SERPL CALC-SCNC: 6 MMOL/L (ref 3–18)
BUN SERPL-MCNC: 75 MG/DL (ref 7–18)
BUN/CREAT SERPL: 9 (ref 12–20)
CALCIUM SERPL-MCNC: 9 MG/DL (ref 8.5–10.1)
CHLORIDE SERPL-SCNC: 101 MMOL/L (ref 100–111)
CO2 SERPL-SCNC: 29 MMOL/L (ref 21–32)
CREAT SERPL-MCNC: 8.73 MG/DL (ref 0.6–1.3)
ERYTHROCYTE [DISTWIDTH] IN BLOOD BY AUTOMATED COUNT: 19.9 % (ref 11.6–14.5)
GLUCOSE SERPL-MCNC: 97 MG/DL (ref 74–99)
HCT VFR BLD AUTO: 35.2 % (ref 35–45)
HGB BLD-MCNC: 10.7 G/DL (ref 12–16)
MAGNESIUM SERPL-MCNC: 3.7 MG/DL (ref 1.6–2.6)
MCH RBC QN AUTO: 27.9 PG (ref 24–34)
MCHC RBC AUTO-ENTMCNC: 30.4 G/DL (ref 31–37)
MCV RBC AUTO: 91.7 FL (ref 78–100)
NRBC # BLD: 0.02 K/UL (ref 0–0.01)
NRBC BLD-RTO: 0.2 PER 100 WBC
PLATELET # BLD AUTO: 221 K/UL (ref 135–420)
PMV BLD AUTO: 12.9 FL (ref 9.2–11.8)
POTASSIUM SERPL-SCNC: 4 MMOL/L (ref 3.5–5.5)
RBC # BLD AUTO: 3.84 M/UL (ref 4.2–5.3)
SODIUM SERPL-SCNC: 136 MMOL/L (ref 136–145)
WBC # BLD AUTO: 8.2 K/UL (ref 4.6–13.2)

## 2022-09-28 PROCEDURE — 36415 COLL VENOUS BLD VENIPUNCTURE: CPT

## 2022-09-28 PROCEDURE — 94640 AIRWAY INHALATION TREATMENT: CPT

## 2022-09-28 PROCEDURE — G0378 HOSPITAL OBSERVATION PER HR: HCPCS

## 2022-09-28 PROCEDURE — 99238 HOSP IP/OBS DSCHRG MGMT 30/<: CPT | Performed by: HOSPITALIST

## 2022-09-28 PROCEDURE — 97165 OT EVAL LOW COMPLEX 30 MIN: CPT

## 2022-09-28 PROCEDURE — 85027 COMPLETE CBC AUTOMATED: CPT

## 2022-09-28 PROCEDURE — 80048 BASIC METABOLIC PNL TOTAL CA: CPT

## 2022-09-28 PROCEDURE — 74011250637 HC RX REV CODE- 250/637: Performed by: INTERNAL MEDICINE

## 2022-09-28 PROCEDURE — 94761 N-INVAS EAR/PLS OXIMETRY MLT: CPT

## 2022-09-28 PROCEDURE — 83735 ASSAY OF MAGNESIUM: CPT

## 2022-09-28 PROCEDURE — 74011000250 HC RX REV CODE- 250: Performed by: INTERNAL MEDICINE

## 2022-09-28 PROCEDURE — 97535 SELF CARE MNGMENT TRAINING: CPT

## 2022-09-28 PROCEDURE — 2709999900 HC NON-CHARGEABLE SUPPLY

## 2022-09-28 RX ORDER — DILTIAZEM HYDROCHLORIDE 180 MG/1
180 CAPSULE, COATED, EXTENDED RELEASE ORAL DAILY
Qty: 30 CAPSULE | Refills: 0 | Status: SHIPPED | OUTPATIENT
Start: 2022-09-28

## 2022-09-28 RX ADMIN — BUDESONIDE 500 MCG: 0.5 SUSPENSION RESPIRATORY (INHALATION) at 07:42

## 2022-09-28 RX ADMIN — NIFEDIPINE 90 MG: 90 TABLET, FILM COATED, EXTENDED RELEASE ORAL at 10:51

## 2022-09-28 RX ADMIN — DILTIAZEM HYDROCHLORIDE 120 MG: 120 CAPSULE, COATED, EXTENDED RELEASE ORAL at 10:51

## 2022-09-28 RX ADMIN — CARVEDILOL 25 MG: 25 TABLET, FILM COATED ORAL at 10:54

## 2022-09-28 RX ADMIN — ARFORMOTEROL TARTRATE 15 MCG: 15 SOLUTION RESPIRATORY (INHALATION) at 07:42

## 2022-09-28 RX ADMIN — IPRATROPIUM BROMIDE AND ALBUTEROL SULFATE 3 ML: .5; 3 SOLUTION RESPIRATORY (INHALATION) at 05:46

## 2022-09-28 RX ADMIN — CLONIDINE HYDROCHLORIDE 0.2 MG: 0.1 TABLET ORAL at 10:52

## 2022-09-28 RX ADMIN — IPRATROPIUM BROMIDE AND ALBUTEROL SULFATE 3 ML: .5; 3 SOLUTION RESPIRATORY (INHALATION) at 13:43

## 2022-09-28 RX ADMIN — SODIUM CHLORIDE, PRESERVATIVE FREE 10 ML: 5 INJECTION INTRAVENOUS at 06:33

## 2022-09-28 RX ADMIN — ASPIRIN 81 MG CHEWABLE TABLET 81 MG: 81 TABLET CHEWABLE at 10:54

## 2022-09-28 RX ADMIN — APIXABAN 5 MG: 5 TABLET, FILM COATED ORAL at 10:52

## 2022-09-28 RX ADMIN — POLYETHYLENE GLYCOL 3350 17 G: 17 POWDER, FOR SOLUTION ORAL at 05:46

## 2022-09-28 RX ADMIN — LISINOPRIL 20 MG: 20 TABLET ORAL at 10:54

## 2022-09-28 RX ADMIN — IPRATROPIUM BROMIDE AND ALBUTEROL SULFATE 3 ML: .5; 3 SOLUTION RESPIRATORY (INHALATION) at 07:42

## 2022-09-28 NOTE — PROGRESS NOTES
Received pt from ED. Alert oriented x4 with no c/o pain on 2lt of o2. Telemetry box initiated on tele box 84 NSR. Skin warm intact with no open areas. Pt ambulatory no skid socks on.was oriented to room and use of call bell. Personal needs in reach bed locked to lowest position.

## 2022-09-28 NOTE — PROGRESS NOTES
D/C order noted for today. Orders reviewed. No needs identified at this time. Patient states she has ride to go to the car at Dialysis. CM remains available if needed.            Veronique Menendez -[phone]

## 2022-09-28 NOTE — PROGRESS NOTES
Call made to [facility] [phone], scheduled patient Medicaid Lyft to [facility] [address], this is where her car is located. The  is [place]pin [place] in a [employer]. Trip # [id number].

## 2022-09-28 NOTE — PROGRESS NOTES
Reason for Admission:   Hypertensive emergency [I16.1]    PCP: Daphney Casanova MD               RUR Score:     21%             Resources/supports as identified by patient/family:       Top Challenges facing patient (as identified by patient/family and CM): None, at this time. Finances/Medication cost?     Patient has [employer], and [employer]. Transportation      Patient drives herself. Support system or lack thereof? Patient has family support. Living arrangements? Patient lives alone. Self-care/ADLs/Cognition? Self-care        Current Advanced Directive/Advance Care Plan:   no.    Healthcare Decision Maker:   Primary Decision Maker: Barrie Salas - [phone]    Click here to complete [address] including selection of the Healthcare Decision Maker Relationship (ie \"Primary\")                          Plan for utilizing home health:    no, patient said she is ambulatory, and self-care. Likelihood of readmission:   HIGH    Transition of Care Plan:                    Initial assessment completed with patient. Cognitive status of patient: oriented to time, place, person and situation. Face sheet information confirmed:  yes. The patient designates her daughter Russel Hayes [phone] to participate in her discharge plan and to receive any needed information. This patient lives alone, in a single family home, with 5 steps to enter. Patient is able to navigate steps as needed. Prior to hospitalization, patient was considered to be independent with ADLs/IADLS : yes . Patient has a current ACP document on file: no. The patient said she needs a ride to  her car at [address] upon discharge. The patient already has unknown,  medical equipment available in the home. Patient is not currently active with home health.       Patient has not stayed in a skilled nursing facility or rehab.      This patient is on dialysis :yes. If yes, hemodialysis patient and receives treatment on Tuesday/Thursday/Saturday at [facility]  [phone]    Chair time is 5 AM.   Pt drives herself to/from dialysis. Currently, the discharge plan is Home. The patient states that she can obtain her medications from the pharmacy, and take her medications as directed. Patient's current insurance is Payor: BLUE CROSS / Plan: [facility] PPO / Product Type: PPO /       Care Management Interventions  PCP Verified by CM: Yes  Mode of Transport at Discharge:  Other (see comment) (Patient said she needs a ride to get her car at the Dialysis center.)  Transition of Care Consult (CM Consult): Discharge Planning  Discharge Durable Medical Equipment: No  Physical Therapy Consult: Yes  Occupational Therapy Consult: Yes  Speech Therapy Consult: No  Support Systems: Other (Comment) (Patient lives alone.)  Confirm Follow Up Transport: Self  Discharge Location  Patient Expects to be Discharged to[de-identified]  Sherin Duarte RN  Case Management [phone]

## 2022-09-28 NOTE — PROGRESS NOTES
[address] Specialist let CM know that no Lyft needed, because patient has Medicaid, # [id number]. Patient will need a Medicaid Cab.            Leslie Daily, RN  Case Management [phone]

## 2022-09-28 NOTE — PROGRESS NOTES
Requested Case Management specialist to assist with transportation to:      [employer]      Address is:       [address]      and phone number is:    [phone]        Patient will require  Medicaid Cab  transport. Pt requires Cab If stretcher, reason: Transport to patient's car at Dialysis Ctr. Patient is currently requiring oxygen No   Height: 5\"2   Weight: 160 lbs  Pt is on isolation: No    Is the pt ready now? yes  Requested time: Next Available  PCS Faxed: no  Insurance verified on face sheet: yes  Auth needed for transport: yes  CM completed PCS/ Envelope and placed on chart.

## 2022-09-28 NOTE — DISCHARGE SUMMARY
Hospitalist Discharge Summary    Patient: Rhiannon Mcallister MRN: [id number]  CSN: [id number]    YOB: 1960  Age: 62 y.o. Sex: female    DOA: 9/27/2022 LOS:  LOS: 1 day   Discharge Date:     Admission Diagnoses: Hypertensive emergency [I16.1]    Discharge Diagnoses:    Hypertensive urgency  Acute on chronic diastolic CHF  Paroxysmal A. fib with RVR  ESRD on HD    Discharge Condition: [address]    Discharge To: Home    CODE STATUS: Full Code      PHYSICAL EXAM  Visit Vitals  /76 (BP 1 Location: Left upper arm, BP Patient Position: Sitting)   Pulse 89   Temp 98.3 °F (36.8 °C)   Resp 18   Ht 5' 2\" (1.575 m)   Wt 72.6 kg (160 lb)   SpO2 93%   Breastfeeding Yes   BMI 29.26 kg/m²       General: Alert, cooperative, no acute distress    Lungs:  CTA Bilaterally. No Wheezing/Rhonchi/Rales. Heart:  Regular rate and Rhythm. Abdomen: Soft, Non distended, Non tender. + Bowel sounds. Extremities: No edema/ cyanosis/ clubbing  Neurologic:  AA oriented X 3. Moves all extremities. Rhiannon Mcallister is a 62 y.o. female with medical co-morbidities including HTN, chronic diastolic CHF, paroxysmal AFIB, LUIS, ESRD on HD, obesity, presented to the ER from HD due to persistent shortness of breath, cough, and chest pressure. [facility] something inside the center and she was reactive to it all night long. She has been coughing and has had shortness of breath. During today dialysis session, she continued to have symptoms associated with chest pressure. Apparently, her blood pressure was very elevated and hence she was sent to the ER for further evaluation. She denied fever/chill. She stated that she did not take her blood pressure medications this AM due to being on HD session. In the ER, she was found to be anxious.  She was given duoneb, Nitrostat and nitroglycerin gtt, thereafter, she went into AFIB with RVR and hence she was started on Diltiazem gtt. She remains stable and persistently elevated SBP>200, she was resumed on her oral antihypertensive medications. Lab with slight elevated troponin 117, proBnP 81148. CXR with vascular congestion. She has negative rapid covid test         Hospital Course:     Patient admitted to the hospital secondary to hypertensive urgency and paroxysmal A. fib with RVR. Patient noted to have a significant elevation in blood pressure and heart rate. Patient started on Cardizem gtt. on admission with resumption of her home medications. Patient's heart rate improved, Cardizem gtt. stopped. Patient seen and evaluated the following morning, currently is at baseline. Mentions she is doing well. Patient's blood pressure is also controlled. Patient is eager to go home. Patient's home medications will be resumed and is advised to closely follow-up with PCP in 2 weeks. Patient verbalized understanding. Follow up Care: With PCP in 2 weeks    Consults: None    Significant Diagnostic Studies:     Imaging:  XR Results (most recent):  Results from Hospital Encounter encounter on 09/27/22    XR CHEST PORT    Narrative  EXAM:  XR CHEST PORT    INDICATION:   Shortness of breath    COMPARISON: 6/20/2022 and 5/28/2022. FINDINGS:  Similar enlarged cardiac silhouette. Aortic atherosclerosis. Increased hilar and  perihilar densities right greater than left. Diffuse increased interstitial  prominence otherwise. Streaky opacities in the right base. No pneumothorax. No  pleural effusion. Stable osseous structures. Impression  Enlarged cardiac silhouette with vascular congestion and increased pulmonary  opacities right greater than left which could represent asymmetric pulmonary  edema versus infection or both, follow-up recommended. .       CT Results (most recent):  Results from Hospital Encounter encounter on 03/06/20    CT ABD PELV WO CONT    Narrative  EXAM: CT ABD PELV WO CONT    CLINICAL INDICATION/HISTORY : .  Pelvic mass    COMPARISON:  pelvis January 20, 2020    TECHNIQUE: Helical scan of the abdomen and pelvis was obtained  from the  diaphragm to the symphysis without oral and without uneventful IV contrast  administration. All CT scans at this facility are performed using dose optimization technique as  appropriate to a performed exam, to include automated exposure control,  adjustment of the MA and/or kV according to patient size (including appropriate  matching for site specific examinations), or use of iterative reconstruction  technique. FINDINGS:  Lung Bases: Mild cardiomegaly. Small pericardial effusion measuring up to 8 mm    Liver: Unremarkable    Gallbladder:  Unremarkable    Spleen: Unremarkable    Adrenals:  Unremarkable    Pancreas: Unremarkable    Kidneys: Multiple bilateral renal hypodensities some of which are too small to  characterize but likely represent cysts. In the upper pole of the left kidney  there is a peripherally calcified 1.4 cm hypodensity, likely a cyst also. No  hydronephrosis or nephrolithiasis. Kidneys are somewhat atrophic    Stomach, small bowel, colon:  A few scattered colon diverticula. Appendix not  seen. No secondary signs of appendicitis    Lymph nodes:  No adenopathy    Vasculature/Aorta: There are atherosclerotic vascular calcifications. Peritoneal spaces:  No ascites or free air. Small fatty periumbilical hernia. Small bilateral fatty inguinal hernias    Bladder:  Bladder not well distended and not well evaluated    Pelvic structures: No evidence for mass. Bones:  Prominent markings noted in inferior endplate at L1    Impression  IMPRESSION:    1. No acute findings. 2.  Mild cardiomegaly small pericardial effusion. 3.  Multiple bilateral renal hypodensities some of which are too small to  characterize but likely represent cysts. 4.  No evidence of pelvic mass. 5.  Small fatty bilateral inguinal hernias.  Very small fatty periumbilical  hernia. 6.  Fairly extensive atherosclerotic vascular calcification. 05/28/22    ECHO ADULT COMPLETE 05/29/2022 5/29/2022    Interpretation Summary    Left Ventricle: Normal left ventricular systolic function with a visually estimated EF of 60 - 65%. Left ventricle size is normal. Moderately increased wall thickness. Normal wall motion. Global longitudinal strain is mildly reduced (-81.0%) Diastolic dysfunction present with normal LV EF. Left Atrium: Left atrium is severely dilated. Normal right ventricular size and function    Insufficient TR to estimate pulmonary artery pressure    Signed by: Yaz Lozano MD on 5/29/2022  9:32 AM       MRI Results (most recent):  No results found for this or any previous visit. Procedures:     None       Current Discharge Medication List        CONTINUE these medications which have CHANGED    Details   dilTIAZem ER (Cardizem CD) 180 mg capsule Take 1 Capsule by mouth daily. Qty: 30 Capsule, Refills: 0  Start date: 9/28/2022           CONTINUE these medications which have NOT CHANGED    Details   lisinopriL (PRINIVIL, ZESTRIL) 20 mg tablet Take 20 mg by mouth daily. cloNIDine HCL (CATAPRES) 0.2 mg tablet Take 0.2 mg by mouth three (3) times daily. sertraline (ZOLOFT) 50 mg tablet Take 50 mg by mouth daily. Indications: major depressive disorder      meclizine (ANTIVERT) 25 mg tablet Take 25 mg by mouth three (3) times daily as needed. Indications: prevention of motion sickness      triamcinolone acetonide (KENALOG) 0.5 % ointment Apply  to affected area three (3) times daily. fluticasone propionate (FLONASE) 50 mcg/actuation nasal spray 2 Sprays by Both Nostrils route daily. Qty: 16 g, Refills: 0      apixaban (ELIQUIS) 5 mg tablet Take 1 Tablet by mouth two (2) times a day. Qty: 60 Tablet, Refills: 1      aspirin 81 mg chewable tablet Take 1 Tablet by mouth daily.   Qty: 30 Tablet, Refills: 0      atorvastatin (LIPITOR) 80 mg tablet Take 1 Tablet by mouth nightly. Qty: 30 Tablet, Refills: 0      fluticasone propion-salmeteroL (ADVAIR/WIXELA) 250-50 mcg/dose diskus inhaler Take 1 Puff by inhalation two (2) times a day.      hydrOXYzine HCL (ATARAX) 50 mg tablet Take 50 mg by mouth two (2) times daily as needed for Anxiety. sevelamer carbonate (RENVELA) 800 mg tab tab Take 3,200 mg by mouth three (3) times daily (with meals). zolpidem (AMBIEN) 5 mg tablet Take 5 mg by mouth nightly as needed. cyclobenzaprine (FLEXERIL) 10 mg tablet Take 1 Tab by mouth three (3) times daily as needed for Muscle Spasm(s). Qty: 14 Tab, Refills: 0      carvedilol (COREG) 25 mg tablet Take 25 mg by mouth two (2) times daily (with meals). Indications: high blood pressure           STOP taking these medications       NIFEdipine ER (PROCARDIA XL) 90 mg ER tablet Comments:   Reason for Stopping:                 Activity: Activity as tolerated    Diet: Cardiac Diet    Wound Care: None needed      Terese Hamm MD  9/28/2022, 1:14 PM    Total time spent 28 mins  Disclaimer: Sections of this note are dictated using utilizing voice recognition software. Minor typographical errors may be present. If questions arise, please do not hesitate to contact me or call our department.

## 2022-09-28 NOTE — ED NOTES
Admitted to call report, will call back.
Attempted to call report, states she is still in report from dayshift.      Will attempt at 2000
Attempted to call report.
Attempted to call report.
Bedside shift change report given to Roslyn Chapman RN by NEGRITA Iriwn. Report included the following information SBAR, ED Summary, and MAR.
Dialysis given update on patient condition, transport now taking patient to dialysis .
Dr Eddie Keenan at bedside, dilt drip stopped and aware of NSR
Patient given turkey sandwich, jello, applesauce, and ginger ale.
Patient is anxious, HR icreased to 150s on monitor, looks like johnathan, Dr. Jordy Munguia informed, verbal order to stop nitro give and completed, obtaining repeat EKG
Provided meal tray for pt.
Provided pt with peanut butter and crackers.
Pt back from dialysis, placed back on monitor at this time.
Pt request a breathing treatment. Medicated as per MAR. Cleaned up bedside table for pt. Bed low and locked, Call bell within reach, will continue to monitor.
Pt.states breathing improved following neb. Also states that chest is less tight following first NTG. Sitting up on bedside.
Received bedside shift report from [facility], [facility]. Pt alert and oriented times 4, sitting up on edge of bed. Pt reports feeling anxious and stated \"can I have something for my nerves. \" Made MD aware.
Report given to Royce Panda in Dialysis
TRANSFER - OUT REPORT:    Verbal report given to 4S RN(name) on Luciano Ferrell  being transferred to 4S(unit) for routine progression of care       Report consisted of patients Situation, Background, Assessment and   Recommendations(SBAR). Information from the following report(s) ED Summary and MAR was reviewed with the receiving nurse. Lines:   Peripheral IV 09/27/22 Anterior;Right Hand (Active)        Opportunity for questions and clarification was provided.       Patient transported with:   Transport
Patent

## 2022-09-28 NOTE — PROGRESS NOTES
RENAL DAILY PROGRESS NOTE    Patient: Priscilla Calero               Sex: female          DOA: 9/27/2022  6:04 AM        YOB: 1960      Age:  62 y.o.        LOS:  LOS: 1 day     Subjective:     Priscilla Calero is a 62 y.o.  who presents with Hypertensive emergency [I16.1].    Asked to evaluate for esrd,admitted with pulm edema  Chief complains: Patient denies nausea, vomiting, chest pain, dizziness, shortness of breath or headache.  - Reviewed last 24 hrs events     Current Facility-Administered Medications   Medication Dose Route Frequency    carvediloL (COREG) tablet 25 mg  25 mg Oral BID WITH MEALS    dilTIAZem ER (CARDIZEM CD) capsule 120 mg  120 mg Oral DAILY    NIFEdipine ER (PROCARDIA XL) tablet 90 mg  90 mg Oral DAILY    budesonide (PULMICORT) 500 mcg/2 ml nebulizer suspension  500 mcg Nebulization BID RT    And    arformoteroL (BROVANA) neb solution 15 mcg  15 mcg Nebulization BID RT    albuterol-ipratropium (DUO-NEB) 2.5 MG-0.5 MG/3 ML  3 mL Nebulization Q4H PRN    sodium chloride (NS) flush 5-40 mL  5-40 mL IntraVENous Q8H    sodium chloride (NS) flush 5-40 mL  5-40 mL IntraVENous PRN    acetaminophen (TYLENOL) tablet 650 mg  650 mg Oral Q6H PRN    Or    acetaminophen (TYLENOL) suppository 650 mg  650 mg Rectal Q6H PRN    polyethylene glycol (MIRALAX) packet 17 g  17 g Oral DAILY PRN    promethazine (PHENERGAN) tablet 12.5 mg  12.5 mg Oral Q6H PRN    Or    ondansetron (ZOFRAN) injection 4 mg  4 mg IntraVENous Q6H PRN    apixaban (ELIQUIS) tablet 5 mg  5 mg Oral BID    atorvastatin (LIPITOR) tablet 80 mg  80 mg Oral QHS    aspirin chewable tablet 81 mg  81 mg Oral DAILY    lisinopriL (PRINIVIL, ZESTRIL) tablet 20 mg  20 mg Oral DAILY    albuterol-ipratropium (DUO-NEB) 2.5 MG-0.5 MG/3 ML  3 mL Nebulization Q6HWA RT    cloNIDine HCL (CATAPRES) tablet 0.2 mg  0.2 mg Oral TID    famotidine (PEPCID) tablet 20 mg  20 mg Oral QPM       Objective:     Visit Vitals  /76 (BP 1 Location: Left upper arm, BP Patient Position: Sitting)   Pulse 89   Temp 98.3 °F (36.8 °C)   Resp 18   Ht 5' 2\" (1.575 m)   Wt 72.6 kg (160 lb)   SpO2 95%   Breastfeeding Yes   BMI 29.26 kg/m²       Intake/Output Summary (Last 24 hours) at 9/28/2022 1401  Last data filed at 9/27/2022 1430  Gross per 24 hour   Intake 29.17 ml   Output --   Net 29.17 ml       Physical Examination:     GEN: AAO X 3, NAD  RS: Chest is bilateral equal, no wheezing / rales / crackles  CVS: S1-S2 heard,  Abdomen: Soft, Non tender, Not distended, Positive bowel sounds, no organomegaly, no CVA / supra pubic tenderness  Extremities: No edema, no cyanosis, skin is warm on touch  CNS: Awake & follows commands,  HEENT: Head is atraumatic, PERRLA, conjunctiva pink & non icteric. No JVD or carotid bruit     Data Review:      Labs:     Hematology:   Recent Labs     09/28/22  0129 09/27/22  0708   WBC 8.2 8.8   HGB 10.7* 11.2*   HCT 35.2 36.3     Chemistry:   Recent Labs     09/28/22  0129 09/27/22  0708   BUN 75* 104*   CREA 8.73* 12.60*   CA 9.0 8.6   ALB  --  3.5   K 4.0 3.8    140    100   CO2 29 26   GLU 97 109*        Images:    XR (Most Recent). CXR reviewed by me and compared with previous CXR Results from Hospital Encounter encounter on 09/27/22    XR CHEST PORT    Narrative  EXAM:  XR CHEST PORT    INDICATION:   Shortness of breath    COMPARISON: 6/20/2022 and 5/28/2022. FINDINGS:  Similar enlarged cardiac silhouette. Aortic atherosclerosis. Increased hilar and  perihilar densities right greater than left. Diffuse increased interstitial  prominence otherwise. Streaky opacities in the right base. No pneumothorax. No  pleural effusion. Stable osseous structures. Impression  Enlarged cardiac silhouette with vascular congestion and increased pulmonary  opacities right greater than left which could represent asymmetric pulmonary  edema versus infection or both, follow-up recommended. .       CT (Most Recent) Results from Hospital Encounter encounter on 03/06/20    CT ABD PELV WO CONT    Narrative  EXAM: CT ABD PELV WO CONT    CLINICAL INDICATION/HISTORY : .  Pelvic mass    COMPARISON:  pelvis January 20, 2020    TECHNIQUE: Helical scan of the abdomen and pelvis was obtained  from the  diaphragm to the symphysis without oral and without uneventful IV contrast  administration. All CT scans at this facility are performed using dose optimization technique as  appropriate to a performed exam, to include automated exposure control,  adjustment of the MA and/or kV according to patient size (including appropriate  matching for site specific examinations), or use of iterative reconstruction  technique. FINDINGS:  Lung Bases: Mild cardiomegaly. Small pericardial effusion measuring up to 8 mm    Liver: Unremarkable    Gallbladder:  Unremarkable    Spleen: Unremarkable    Adrenals:  Unremarkable    Pancreas: Unremarkable    Kidneys: Multiple bilateral renal hypodensities some of which are too small to  characterize but likely represent cysts. In the upper pole of the left kidney  there is a peripherally calcified 1.4 cm hypodensity, likely a cyst also. No  hydronephrosis or nephrolithiasis. Kidneys are somewhat atrophic    Stomach, small bowel, colon:  A few scattered colon diverticula. Appendix not  seen. No secondary signs of appendicitis    Lymph nodes:  No adenopathy    Vasculature/Aorta: There are atherosclerotic vascular calcifications. Peritoneal spaces:  No ascites or free air. Small fatty periumbilical hernia. Small bilateral fatty inguinal hernias    Bladder:  Bladder not well distended and not well evaluated    Pelvic structures: No evidence for mass. Bones:  Prominent markings noted in inferior endplate at L1    Impression  IMPRESSION:    1. No acute findings. 2.  Mild cardiomegaly small pericardial effusion.   3.  Multiple bilateral renal hypodensities some of which are too small to  characterize but likely represent cysts. 4.  No evidence of pelvic mass. 5.  Small fatty bilateral inguinal hernias. Very small fatty periumbilical  hernia. 6.  Fairly extensive atherosclerotic vascular calcification. EKG No results found for this or any previous visit. I have personally reviewed the old medical records and patient's labs    Plan / Recommendation:      1.  Esrd,pulm edema resolved with dialysis,uf  2.htn controlled    D/w Dr. Yadiel Gregg MD  Nephrology  9/28/2022

## 2022-09-28 NOTE — PROGRESS NOTES
PT order received and chart reviewed. Spoke with patient, she is independent with mobility. Skilled PT not indicated and will sign off. Thank you.    Richardson Mtz PT, DPT

## 2022-09-28 NOTE — PROGRESS NOTES
Patient now said she has a ride coming to take [facility], and no Medicaid Cab needed. CM Perfect Delrae Primrose CM Specialist and [facility] and updated.                Darletta Libman, RN  Case Management [phone]

## 2022-09-28 NOTE — PROGRESS NOTES
Problem: Self Care Deficits Care Plan (Adult)  Goal: *Acute Goals and Plan of Care (Insert Text)  Outcome: Resolved/Met       OCCUPATIONAL THERAPY EVALUATION/DISCHARGE    Patient: Tennille Crawley (89 y.o. female)  Date: 9/28/2022  Primary Diagnosis: Hypertensive emergency [I16.1]  Precautions:    (standard)  PLOF: Patient was independent with self-care and functional mobility PTA. ASSESSMENT AND RECOMMENDATIONS:  Based on the objective data described below, the patient presents with no deficits that impede pt function with ADLs, functional transfers, and functional mobility in preparation for selfcare tasks. Patient reports improvement with chest pain and no SOB this session on room air. At this time patient is safe to d/c home from selfcare standpoint. OT to d/c from caseload. Skilled occupational therapy is not indicated at this time. Further Equipment Recommendations for Discharge: N/A    AMPAC: At this time and based on an AM-PAC score of 24/24, no further OT is recommended upon discharge due to patient's independence with ADLs. Recommend patient returns to prior setting with prior services. This AMPAC score should be considered in conjunction with interdisciplinary team recommendations to determine the most appropriate discharge setting. Patient's social support, diagnosis, medical stability, and prior level of function should also be taken into consideration.      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 [employer]    OBJECTIVE DATA SUMMARY:     Past Medical History:   Diagnosis Date    Adenomatous polyp     CHF (congestive heart failure) ([facility] [address]     Chronic kidney disease     [facility]    Dialysis patient ([facility] [address]     tues, thurs, sat    ESRD (end stage renal disease) ([facility] [address]     Family hx of colon cancer     Mother    GI bleed due to NSAIDs     Heart failure ([facility] [address]     History of blood transfusion     Hypertension     LUIS (obstructive sleep apnea)     no cpap Past Surgical History:   Procedure Laterality Date    COLONOSCOPY N/A 9/27/2019    COLONOSCOPY with polyp bx performed by Yon Matos MD at [facility]      HX COLONOSCOPY  09/21/2016    colonoscopy, Dr. Elmira Humphrey, [facility] [address]  05/2020    HX OTHER SURGICAL       ARTERIOVENOUS FISTULA INSERTION LEFT ARM     HX TOTAL ABDOMINAL HYSTERECTOMY      HX TOTAL COLECTOMY  10/11/2016    LAPAROSCOPIC COLECTOMY PARTIAL RIGHT FOR CECAL POLYP, DR. PAEZ, [facility]    HX TUBAL LIGATION      VASCULAR SURGERY PROCEDURE UNLIST      left arm graft     Barriers to Learning/Limitations: None  Compensate with: visual, verbal, tactile, kinesthetic cues/model    Home Situation:   Home Situation  Home Environment: Apartment  # Steps to Enter: 5  Rails to Enter: Yes  One/Two Story Residence: One story  Living Alone: Yes  Support Systems: Child(nicola)  Patient Expects to be Discharged to[de-identified] Home  Current DME Used/Available at Home: None  Tub or Shower Type: Tub/Shower combination  [x]     Right hand dominant   []     Left hand dominant    Cognitive/Behavioral Status:  Neurologic State: Alert  Orientation Level: Oriented X4  Cognition: Follows commands  Safety/Judgement: Fall prevention; Awareness of environment    Skin: Intact  Edema: None noted    Vision/Perceptual:    Acuity: Within Defined Limits         Coordination: BUE     Fine Motor Skills-Upper: Left Intact; Right Intact    Gross Motor Skills-Upper: Left Intact; Right Intact    Balance:  Sitting: Intact  Standing: Intact    Strength: BUE  Strength: Generally decreased, functional    Tone & Sensation: BUE  Tone: Normal  Sensation: Intact       Range of Motion: BUE  AROM: Within functional limits       Functional Mobility and Transfers for ADLs:  Bed Mobility:  Supine to Sit: Modified independent  Sit to Supine: Modified independent    Transfers:  Sit to Stand: Modified independent  Stand to Sit: Modified independent    ADL Assessment:  Feeding: Independent    Oral Facial Hygiene/Grooming: Independent    Bathing: Independent    Upper Body Dressing: Independent    Lower Body Dressing: Independent    Toileting: Independent    ADL Intervention:  Upper Body Dressing Assistance  Dressing Assistance: [id number]: [address] with hospital gown: Independent    Lower Body Dressing Assistance  Dressing Assistance: Independent  Underpants: Independent (pt reached down to feet while standing as if threading pants from ankles to waist; balance intact)  Socks: Independent  Leg Crossed Method Used: Yes  Position Performed: Seated edge of bed;Standing    Cognitive Retraining  Safety/Judgement: Fall prevention; Awareness of environment    Pain:  Pain level pre-treatment: 0/10   Pain level post-treatment: 0/10   Pain Intervention(s): Medication (see MAR); Rest, Ice, Repositioning   Response to intervention: Nurse notified, See doc flow    Activity Tolerance:   Good      Please refer to the flowsheet for vital signs taken during this treatment. After treatment:   []  Patient left in no apparent distress sitting up in chair  [x]  Patient left in no apparent distress in bed  [x]  Call bell left within reach  [x]  Nursing notified  []  Caregiver present  []  Bed alarm activated    COMMUNICATION/EDUCATION:   [x]      Role of Occupational Therapy in the acute care setting  [x]      Home safety education was provided and the patient/caregiver indicated understanding. [x]      Patient/family have participated as able and agree with findings and recommendations. []      Patient is unable to participate in plan of care at this time. Thank you for this referral.  Radha Kirk OTR/L  Time Calculation: 16 mins      Eval Complexity: History: MEDIUM Complexity : Expanded review of history including physical, cognitive and psychosocial  history ;    Examination: LOW Complexity : 1-3 performance deficits relating to physical, cognitive , or psychosocial skils that result in activity limitations and / or participation restrictions ; Decision Making:LOW Complexity : No comorbidities that affect functional and no verbal or physical assistance needed to complete eval tasks     Amy Natarajan -PAC® Daily Activity Inpatient Short Form (6-Clicks)*    How much HELP from another person does the patient currently need    (If the patient hasn't done an activity recently, how much help from another person do you think he/she would need if he/she tried?)   Total (Total A or Dep)   A Lot  (Mod to Max A)   A Little (Sup or Min A)   None (Mod I to I)   Putting on and taking off regular lower body clothing? [] 1 [] 2 [] 3 [x] 4   2. Bathing (including washing, rinsing,      drying)? [] 1 [] 2 [] 3 [x] 4   3. Toileting, which includes using toilet, bedpan or urinal?   [] 1 [] 2 [] 3 [x] 4   4. Putting on and taking off regular upper body clothing? [] 1 [] 2 [] 3 [x] 4   5. Taking care of personal grooming such as brushing teeth? [] 1 [] 2 [] 3 [x] 4   6. Eating meals?    [] 1 [] 2 [] 3 [x] 4

## 2022-09-28 NOTE — PROGRESS NOTES
[address] September 28, 2022       RE: King Frederick      To Whom It May Concern,    This is to certify that King Frederick was admitted to the hospital on 9/27/2022. She is discharged & can resume work on 10/1/2022. Please feel free to contact my office if you have any questions or concerns. Thank you for your assistance in this matter.       Sincerely,  Anju Chew MD

## 2022-09-28 NOTE — DISCHARGE INSTRUCTIONS
DISCHARGE SUMMARY from Nurse    PATIENT INSTRUCTIONS:    Patient armband removed and shredded       What to do at Home:  Recommended activity: Activity as tolerated,     If you experience any of the following symptoms: shortness of breath,  please follow up with your primary care provider or dial 911. *  Please give a list of your current medications to your Primary Care Provider. *  Please update this list whenever your medications are discontinued, doses are      changed, or new medications (including over-the-counter products) are added. *  Please carry medication information at all times in case of emergency situations. These are general instructions for a healthy lifestyle:    No smoking/ No tobacco products/ Avoid exposure to second hand smoke  Surgeon General's Warning:  Quitting smoking now greatly reduces serious risk to your health. Obesity, smoking, and sedentary lifestyle greatly increases your risk for illness    A healthy diet, regular physical exercise & weight monitoring are important for maintaining a healthy lifestyle    You may be retaining fluid if you have a history of heart failure or if you experience any of the following symptoms:  Weight gain of 3 pounds or more overnight or 5 pounds in a week, increased swelling in our hands or feet or shortness of breath while lying flat in bed. Please call your doctor as soon as you notice any of these symptoms; do not wait until your next office visit. The discharge information has been reviewed with the patient. The patient verbalized understanding. Discharge medications reviewed with the patient and appropriate educational materials and side effects teaching were provided.   ___________________________________________________________________________________________________________________________________

## 2022-09-28 NOTE — PROGRESS NOTES
ROLANDO spoke with patient, states she needs a ride to  her car at [employer], otherwise has to wait to discharge till 6-6:30 pm for a relative to get off work. ROLANDO Perfect Served Basilio Pennington and Chris Marquis for approval to order a Ly[employer] for patient.              Antoine Hwang, RN  Case Management [phone]

## 2022-10-07 ENCOUNTER — HOSPITAL ENCOUNTER (EMERGENCY)
Age: 62
Discharge: HOME | End: 2022-10-08
Attending: EMERGENCY MEDICINE
Payer: MEDICARE

## 2022-10-07 VITALS
DIASTOLIC BLOOD PRESSURE: 95 MMHG | OXYGEN SATURATION: 100 % | RESPIRATION RATE: 18 BRPM | TEMPERATURE: 98.5 F | SYSTOLIC BLOOD PRESSURE: 165 MMHG | BODY MASS INDEX: 30.36 KG/M2 | WEIGHT: 165 LBS | HEIGHT: 62 IN | HEART RATE: 94 BPM
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
## 2022-10-07 DIAGNOSIS — S80.861A INSECT BITE OF RIGHT LOWER EXTREMITY, INITIAL ENCOUNTER: Primary | ICD-10-CM

## 2022-10-07 PROCEDURE — 99283 EMERGENCY DEPT VISIT LOW MDM: CPT

## 2022-10-08 PROCEDURE — 74011250637 HC RX REV CODE- 250/637: Performed by: EMERGENCY MEDICINE

## 2022-10-08 RX ORDER — CLINDAMYCIN HYDROCHLORIDE 300 MG/1
300 CAPSULE ORAL 4 TIMES DAILY
Qty: 40 CAPSULE | Refills: 0 | Status: SHIPPED | OUTPATIENT
Start: 2022-10-08 | End: 2022-10-18

## 2022-10-08 RX ORDER — HYDROCODONE BITARTRATE AND ACETAMINOPHEN 5; 325 MG/1; MG/1
1 TABLET ORAL
Qty: 12 TABLET | Refills: 0 | Status: SHIPPED | OUTPATIENT
Start: 2022-10-08 | End: 2022-10-13

## 2022-10-08 RX ORDER — HYDROCODONE BITARTRATE AND ACETAMINOPHEN 5; 325 MG/1; MG/1
1 TABLET ORAL
Status: COMPLETED | OUTPATIENT
Start: 2022-10-08 | End: 2022-10-08

## 2022-10-08 RX ORDER — CLINDAMYCIN HYDROCHLORIDE 150 MG/1
300 CAPSULE ORAL
Status: COMPLETED | OUTPATIENT
Start: 2022-10-08 | End: 2022-10-08

## 2022-10-08 RX ADMIN — CLINDAMYCIN HYDROCHLORIDE 300 MG: 150 CAPSULE ORAL at 00:24

## 2022-10-08 RX ADMIN — HYDROCODONE BITARTRATE AND ACETAMINOPHEN 1 TABLET: 5; 325 TABLET ORAL at 00:24

## 2022-10-08 NOTE — ED TRIAGE NOTES
Pt reports waking with possible spider bites to bilateral lower legs 2 days ago. Yesterday began having severe right leg pain up to the hip. Redness and purulent drainage noted to lesion on right leg.

## 2022-10-08 NOTE — ED NOTES
I have reviewed discharge instructions with the patient. The patient verbalized understanding. Patient armband removed and given to patient to take home. Patient was informed of the privacy risks if armband lost or stolen  Current Discharge Medication List        START taking these medications    Details   clindamycin (CLEOCIN) 300 mg capsule Take 1 Capsule by mouth four (4) times daily for 10 days. Qty: 40 Capsule, Refills: 0  Start date: 10/8/2022, End date: 10/18/2022      HYDROcodone-acetaminophen (NORCO) 5-325 mg per tablet Take 1 Tablet by mouth every six (6) hours as needed for Pain for up to 5 days. Max Daily Amount: 4 Tablets. Take 1 tablets PO every 6 hours as needed for pain control. If over the counter ibuprofen or acetaminophen was suggested, then only take the vicodin for pain not well controlled with the over the counter medication. Qty: 12 Tablet, Refills: 0  Start date: 10/8/2022, End date: 10/13/2022    Associated Diagnoses: Insect bite of right lower extremity, initial encounter          Patient family member will take patient home.

## 2022-10-18 ENCOUNTER — APPOINTMENT (OUTPATIENT)
Dept: GENERAL RADIOLOGY | Age: 62
DRG: 308 | End: 2022-10-18
Attending: EMERGENCY MEDICINE
Payer: MEDICARE

## 2022-10-18 ENCOUNTER — HOSPITAL ENCOUNTER (EMERGENCY)
Age: 62
Discharge: HOME | DRG: 308 | End: 2022-10-19
Attending: EMERGENCY MEDICINE
Payer: MEDICARE

## 2022-10-18 VITALS
SYSTOLIC BLOOD PRESSURE: 131 MMHG | HEIGHT: 62 IN | DIASTOLIC BLOOD PRESSURE: 120 MMHG | HEART RATE: 99 BPM | RESPIRATION RATE: 20 BRPM | WEIGHT: 165 LBS | OXYGEN SATURATION: 92 % | BODY MASS INDEX: 30.36 KG/M2 | TEMPERATURE: 98.6 F

## 2022-10-18 DIAGNOSIS — R05.9 COUGH, UNSPECIFIED TYPE: ICD-10-CM

## 2022-10-18 DIAGNOSIS — R06.00 DYSPNEA, UNSPECIFIED TYPE: Primary | ICD-10-CM

## 2022-10-18 LAB
ANION GAP SERPL CALC-SCNC: 11 MMOL/L (ref 3–18)
ATRIAL RATE: 97 BPM
BASOPHILS # BLD: 0.1 K/UL (ref 0–0.1)
BASOPHILS NFR BLD: 1 % (ref 0–2)
BUN SERPL-MCNC: 104 MG/DL (ref 7–18)
BUN/CREAT SERPL: 9 (ref 12–20)
CALCIUM SERPL-MCNC: 10 MG/DL (ref 8.5–10.1)
CALCULATED P AXIS, ECG09: 41 DEGREES
CALCULATED R AXIS, ECG10: -46 DEGREES
CALCULATED T AXIS, ECG11: 81 DEGREES
CHLORIDE SERPL-SCNC: 100 MMOL/L (ref 100–111)
CO2 SERPL-SCNC: 23 MMOL/L (ref 21–32)
CREAT SERPL-MCNC: 12.1 MG/DL (ref 0.6–1.3)
DIAGNOSIS, 93000: NORMAL
DIFFERENTIAL METHOD BLD: ABNORMAL
EOSINOPHIL # BLD: 0.2 K/UL (ref 0–0.4)
EOSINOPHIL NFR BLD: 3 % (ref 0–5)
ERYTHROCYTE [DISTWIDTH] IN BLOOD BY AUTOMATED COUNT: 18.3 % (ref 11.6–14.5)
GLUCOSE SERPL-MCNC: 79 MG/DL (ref 74–99)
HCT VFR BLD AUTO: 36.3 % (ref 35–45)
HGB BLD-MCNC: 11.1 G/DL (ref 12–16)
IMM GRANULOCYTES # BLD AUTO: 0 K/UL (ref 0–0.04)
IMM GRANULOCYTES NFR BLD AUTO: 0 % (ref 0–0.5)
LYMPHOCYTES # BLD: 1.1 K/UL (ref 0.9–3.6)
LYMPHOCYTES NFR BLD: 15 % (ref 21–52)
MCH RBC QN AUTO: 28.1 PG (ref 24–34)
MCHC RBC AUTO-ENTMCNC: 30.6 G/DL (ref 31–37)
MCV RBC AUTO: 91.9 FL (ref 78–100)
MONOCYTES # BLD: 0.6 K/UL (ref 0.05–1.2)
MONOCYTES NFR BLD: 7 % (ref 3–10)
NEUTS SEG # BLD: 5.7 K/UL (ref 1.8–8)
NEUTS SEG NFR BLD: 74 % (ref 40–73)
NRBC # BLD: 0 K/UL (ref 0–0.01)
NRBC BLD-RTO: 0 PER 100 WBC
P-R INTERVAL, ECG05: 168 MS
PLATELET # BLD AUTO: 205 K/UL (ref 135–420)
PMV BLD AUTO: 12.7 FL (ref 9.2–11.8)
POTASSIUM SERPL-SCNC: 5.4 MMOL/L (ref 3.5–5.5)
Q-T INTERVAL, ECG07: 378 MS
QRS DURATION, ECG06: 94 MS
QTC CALCULATION (BEZET), ECG08: 480 MS
RBC # BLD AUTO: 3.95 M/UL (ref 4.2–5.3)
SODIUM SERPL-SCNC: 134 MMOL/L (ref 136–145)
VENTRICULAR RATE, ECG03: 97 BPM
WBC # BLD AUTO: 7.7 K/UL (ref 4.6–13.2)

## 2022-10-18 PROCEDURE — 71045 X-RAY EXAM CHEST 1 VIEW: CPT

## 2022-10-18 PROCEDURE — 74011250636 HC RX REV CODE- 250/636: Performed by: EMERGENCY MEDICINE

## 2022-10-18 PROCEDURE — 80048 BASIC METABOLIC PNL TOTAL CA: CPT

## 2022-10-18 PROCEDURE — 74011250637 HC RX REV CODE- 250/637: Performed by: EMERGENCY MEDICINE

## 2022-10-18 PROCEDURE — 85025 COMPLETE CBC W/AUTO DIFF WBC: CPT

## 2022-10-18 PROCEDURE — 74011000250 HC RX REV CODE- 250: Performed by: EMERGENCY MEDICINE

## 2022-10-18 PROCEDURE — 93005 ELECTROCARDIOGRAM TRACING: CPT

## 2022-10-18 RX ORDER — IPRATROPIUM BROMIDE AND ALBUTEROL SULFATE 2.5; .5 MG/3ML; MG/3ML
3 SOLUTION RESPIRATORY (INHALATION)
Status: COMPLETED | OUTPATIENT
Start: 2022-10-18 | End: 2022-10-18

## 2022-10-18 RX ORDER — ALBUTEROL SULFATE 90 UG/1
2 AEROSOL, METERED RESPIRATORY (INHALATION)
Qty: 18 G | Refills: 0 | Status: SHIPPED | OUTPATIENT
Start: 2022-10-18

## 2022-10-18 RX ORDER — GUAIFENESIN 600 MG/1
600 TABLET, EXTENDED RELEASE ORAL 2 TIMES DAILY
Qty: 20 TABLET | Refills: 0 | Status: SHIPPED | OUTPATIENT
Start: 2022-10-18

## 2022-10-18 RX ORDER — ACETAMINOPHEN 500 MG
1000 TABLET ORAL ONCE
Status: COMPLETED | OUTPATIENT
Start: 2022-10-18 | End: 2022-10-18

## 2022-10-18 RX ORDER — IPRATROPIUM BROMIDE AND ALBUTEROL SULFATE 2.5; .5 MG/3ML; MG/3ML
3 SOLUTION RESPIRATORY (INHALATION)
Status: ACTIVE | OUTPATIENT
Start: 2022-10-18 | End: 2022-10-18

## 2022-10-18 RX ORDER — HYDROXYZINE 25 MG/1
50 TABLET, FILM COATED ORAL
Status: COMPLETED | OUTPATIENT
Start: 2022-10-18 | End: 2022-10-18

## 2022-10-18 RX ORDER — ONDANSETRON 4 MG/1
4 TABLET, ORALLY DISINTEGRATING ORAL
Status: COMPLETED | OUTPATIENT
Start: 2022-10-18 | End: 2022-10-18

## 2022-10-18 RX ADMIN — ONDANSETRON 4 MG: 4 TABLET, ORALLY DISINTEGRATING ORAL at 06:39

## 2022-10-18 RX ADMIN — HYDROXYZINE HYDROCHLORIDE 50 MG: 25 TABLET ORAL at 06:39

## 2022-10-18 RX ADMIN — ACETAMINOPHEN 1000 MG: 500 TABLET ORAL at 11:56

## 2022-10-18 RX ADMIN — IPRATROPIUM BROMIDE AND ALBUTEROL SULFATE 3 ML: .5; 3 SOLUTION RESPIRATORY (INHALATION) at 06:40

## 2022-10-18 NOTE — ED PROVIDER NOTES
EMERGENCY DEPARTMENT HISTORY AND PHYSICAL EXAM      Date: 10/18/2022  Patient Name: Luciano Ferrell      History of Presenting Illness     Chief Complaint   Patient presents with    Shortness of Breath       Location/Duration/Severity/Modifying factors   Chief Complaint   Patient presents with    Shortness of Breath       HPI:  Luciano Ferrell is a 62 y.o. female with history as listed presents to the Emergency Department complaining of chest tightness, shortness of breath, cough productive of sputum that started yesterday. She has end-stage renal disease, is on hemodialysis, Tuesday Thursday and Saturday. Had normal dialysis Saturday. Denies any fevers. States she usually uses an inhaler, albuterol, but ran out of it. There are no other complaints, modifying factors, or associated symptoms. PCP: Demarco Herrera MD    Current Facility-Administered Medications   Medication Dose Route Frequency Provider Last Rate Last Admin    albuterol-ipratropium (DUO-NEB) 2.5 MG-0.5 MG/3 ML  3 mL Nebulization NOW Agnieszka Abdullahi MD         Current Outpatient Medications   Medication Sig Dispense Refill    albuterol (PROVENTIL HFA, VENTOLIN HFA, PROAIR HFA) 90 mcg/actuation inhaler Take 2 Puffs by inhalation every six (6) hours as needed for Shortness of Breath or Cough. 18 g 0    guaiFENesin ER (MUCINEX) 600 mg ER tablet Take 1 Tablet by mouth two (2) times a day. 20 Tablet 0    clindamycin (CLEOCIN) 300 mg capsule Take 1 Capsule by mouth four (4) times daily for 10 days. 40 Capsule 0    dilTIAZem ER (Cardizem CD) 180 mg capsule Take 1 Capsule by mouth daily. 30 Capsule 0    cloNIDine HCL (CATAPRES) 0.2 mg tablet Take 0.2 mg by mouth three (3) times daily. sertraline (ZOLOFT) 50 mg tablet Take 50 mg by mouth daily. Indications: major depressive disorder      meclizine (ANTIVERT) 25 mg tablet Take 25 mg by mouth three (3) times daily as needed.  Indications: prevention of motion sickness      triamcinolone acetonide (KENALOG) 0.5 % ointment Apply  to affected area three (3) times daily. fluticasone propionate (FLONASE) 50 mcg/actuation nasal spray 2 Sprays by Both Nostrils route daily. 16 g 0    fluticasone propion-salmeteroL (ADVAIR/WIXELA) 250-50 mcg/dose diskus inhaler Take 1 Puff by inhalation two (2) times a day.      hydrOXYzine HCL (ATARAX) 50 mg tablet Take 50 mg by mouth two (2) times daily as needed for Anxiety. sevelamer carbonate (RENVELA) 800 mg tab tab Take 3,200 mg by mouth three (3) times daily (with meals). zolpidem (AMBIEN) 5 mg tablet Take 5 mg by mouth nightly as needed. cyclobenzaprine (FLEXERIL) 10 mg tablet Take 1 Tab by mouth three (3) times daily as needed for Muscle Spasm(s). 14 Tab 0    carvedilol (COREG) 25 mg tablet Take 25 mg by mouth two (2) times daily (with meals). Indications: high blood pressure         Past History     Past Medical History:  Past Medical History:   Diagnosis Date    Adenomatous polyp     CHF (congestive heart failure) ([facility]     Chronic kidney disease     [facility]    Dialysis patient ([facility]     jourdan molina, sat    ESRD (end stage renal disease) ([facility]     Family hx of colon cancer     Mother    GI bleed due to NSAIDs     Heart failure ([facility]     History of blood transfusion     Hypertension     LUIS (obstructive sleep apnea)     no cpap       Past Surgical History:  Past Surgical History:   Procedure Laterality Date    COLONOSCOPY N/A 9/27/2019    COLONOSCOPY with polyp bx performed by Mason Christian MD at [address] [place] [address]      HX COLONOSCOPY  09/21/2016    colonoscopy, Dr. Yanet Singh, [address] [id number]  05/2020    HX OTHER SURGICAL       ARTERIOVENOUS FISTULA INSERTION LEFT ARM     HX TOTAL ABDOMINAL HYSTERECTOMY      HX TOTAL COLECTOMY  10/11/2016    LAPAROSCOPIC COLECTOMY PARTIAL RIGHT FOR CECAL POLYP, DR. PAEZ, [facility]    HX TUBAL LIGATION      VASCULAR SURGERY PROCEDURE UNLIST      left arm graft       Family History:  Family History   Problem Relation Age of Onset    Colon Cancer Mother     Colon Polyps Mother        Social History:  Social History     Tobacco Use    Smoking status: Former    Smokeless tobacco: Never   Substance Use Topics    Alcohol use: No    Drug use: No       Allergies:  No Known Allergies      Review of Systems     Review of Systems   All other systems reviewed and are negative. Physical Exam     Physical Exam  Vitals and nursing note reviewed. Constitutional:       General: She is not in acute distress. Appearance: She is well-developed and normal weight. She is not ill-appearing or toxic-appearing. HENT:      Head: Normocephalic and atraumatic. Right Ear: External ear normal.      Left Ear: External ear normal.      Nose: Nose normal.      Mouth/Throat:      Mouth: Mucous membranes are moist.      Pharynx: Oropharynx is clear. Eyes:      Extraocular Movements: Extraocular movements intact. Pupils: Pupils are equal, round, and reactive to light. Cardiovascular:      Rate and Rhythm: Normal rate and regular rhythm. Pulses: Normal pulses. Heart sounds: Normal heart sounds. No murmur heard. No friction rub. No gallop. Pulmonary:      Effort: Pulmonary effort is normal. No respiratory distress. Breath sounds: Normal breath sounds. No stridor. No wheezing, rhonchi or rales. Comments: Frequent coughing during exam.  Mildly diminished breath sounds bilaterally without overt wheezes, rales, or rhonchi. Abdominal:      General: Abdomen is flat. There is no distension. Palpations: Abdomen is soft. Tenderness: There is no abdominal tenderness. There is no guarding or rebound. Musculoskeletal:         General: Normal range of motion. Cervical back: Normal range of motion and neck supple. Skin:     General: Skin is warm and dry. Capillary Refill: Capillary refill takes less than 2 seconds.    Neurological: General: No focal deficit present. Mental Status: She is alert and oriented to person, place, and time. Psychiatric:         Mood and Affect: Mood normal.         Behavior: Behavior normal.       Lab and Diagnostic Study Results     Labs -  Recent Results (from the past 24 hour(s))   EKG, 12 LEAD, INITIAL    Collection Time: 10/18/22  1:16 AM   Result Value Ref Range    Ventricular Rate 97 BPM    Atrial Rate 97 BPM    P-R Interval 168 ms    QRS Duration 94 ms    Q-T Interval 378 ms    QTC Calculation (Bezet) 480 ms    Calculated P Axis 41 degrees    Calculated R Axis -46 degrees    Calculated T Axis 81 degrees    Diagnosis       Normal sinus rhythm  Possible Left atrial enlargement  Left anterior fascicular block  Minimal voltage criteria for LVH, may be normal variant ( Clawson product )  Prolonged QT  Abnormal ECG  When compared with ECG of 27-SEP-2022 09:03,  Significant changes have occurred     CBC WITH AUTOMATED DIFF    Collection Time: 10/18/22  1:37 AM   Result Value Ref Range    WBC 7.7 4.6 - 13.2 K/uL    RBC 3.95 (L) 4.20 - 5.30 M/uL    HGB 11.1 (L) 12.0 - 16.0 g/dL    HCT 36.3 35.0 - 45.0 %    MCV 91.9 78.0 - 100.0 FL    MCH 28.1 24.0 - 34.0 PG    MCHC 30.6 (L) 31.0 - 37.0 g/dL    RDW 18.3 (H) 11.6 - 14.5 %    PLATELET 661 899 - 376 K/uL    MPV 12.7 (H) 9.2 - 11.8 FL    NRBC 0.0 0  WBC    ABSOLUTE NRBC 0.00 0.00 - 0.01 K/uL    NEUTROPHILS 74 (H) 40 - 73 %    LYMPHOCYTES 15 (L) 21 - 52 %    MONOCYTES 7 3 - 10 %    EOSINOPHILS 3 0 - 5 %    BASOPHILS 1 0 - 2 %    IMMATURE GRANULOCYTES 0 0.0 - 0.5 %    ABS. NEUTROPHILS 5.7 1.8 - 8.0 K/UL    ABS. LYMPHOCYTES 1.1 0.9 - 3.6 K/UL    ABS. MONOCYTES 0.6 0.05 - 1.2 K/UL    ABS. EOSINOPHILS 0.2 0.0 - 0.4 K/UL    ABS. BASOPHILS 0.1 0.0 - 0.1 K/UL    ABS. IMM.  GRANS. 0.0 0.00 - 0.04 K/UL    DF AUTOMATED     METABOLIC PANEL, BASIC    Collection Time: 10/18/22  1:37 AM   Result Value Ref Range    Sodium 134 (L) 136 - 145 mmol/L    Potassium 5.4 3.5 - 5.5 mmol/L    Chloride 100 100 - 111 mmol/L    CO2 23 21 - 32 mmol/L    Anion gap 11 3.0 - 18 mmol/L    Glucose 79 74 - 99 mg/dL     (H) 7.0 - 18 MG/DL    Creatinine 12.10 (H) 0.6 - 1.3 MG/DL    BUN/Creatinine ratio 9 (L) 12 - 20      eGFR 3 (L) >60 ml/min/1.73m2    Calcium 10.0 8.5 - 10.1 MG/DL         Radiologic Studies -   XR CHEST PORT   Final Result   1. Similar cardiac silhouette enlargement. Interval improved pulmonary vascular   congestion. 2.  Patchy opacities in the bilateral perihilar regions and bilateral lung bases   which are overall improved compared to prior, favor atelectasis but infiltrates   or pulmonary edema are possible. EKG per my interpretation demonstrates normal sinus rhythm at a ventricular rate of 97 bpm.  Left anterior fascicular block, prolonged QTC of 480 ms, otherwise normal intervals. No acute ST elevations or T wave inversions. Minimal LVH present. Compared with prior EKG from 9/27/2022, normal sinus rhythm has replaced atrial fibrillation with RVR, and ventricular rate has decreased by 49 bpm.    Procedures and Critical Care       Performed by: Rajni Acevedo MD    Procedures         Rajni Acevedo MD    Medical Decision Making and ED Course   - I am the first and primary provider for this patient AND AM THE PRIMARY PROVIDER OF RECORD. - I reviewed the vital signs, available nursing notes, past medical history, past surgical history, family history and social history. - Initial assessment performed. The patients presenting problems have been discussed, and the staff are in agreement with the care plan formulated and outlined with them. I have encouraged them to ask questions as they arise throughout their visit. Vital Signs-Reviewed the patient's vital signs.     Patient Vitals for the past 12 hrs:   Temp Pulse Resp BP SpO2   10/18/22 0625 -- -- -- (!) 131/120 --   10/18/22 0610 -- -- -- (!) 134/108 --   10/18/22 0555 -- -- -- (!) 126/92 92 %   10/18/22 0540 -- -- -- (!) 154/128 95 %   10/18/22 0523 -- -- -- (!) 136/110 94 %   10/18/22 0140 98.6 °F (37 °C) 99 20 (!) 202/119 100 %         Provider Notes (Medical Decision Making): MDM     Patient was seen and evaluated. Medicated with DuoNeb treatment x2 in the ED with marked improvement in symptoms. Testing shows no concerning electrolyte abnormalities, no signs of overt infection, fluid overload, or respiratory distress. Discussed possibility of dialysis today, patient would prefer not to wait in the emergency department. See ED course notes below. Patient will be discharged with albuterol and Mucinex prescriptions. She is stable for discharge from the emergency department. Strict return precautions reviewed. All questions answered. ED Course:       ED Course as of 10/18/22 1055   Tue Oct 18, 2022   0538 XR CHEST PORT  Per my interpretation prior to availability of radiologist interpretation, vascular congestion appears improved compared with prior. [JA]   [id number] Patient feeling much better. She has a rescheduled dialysis appointment for tomorrow at 6, although she is concerned that this conflicts with an MRI that she has been trying to get a couple of times. Therefore, she will call the dialysis center on her own trying to reschedule her dialysis. She does feel comfortable going home. She will receive 1 breathing treatment prior to discharge, I will prescribe her albuterol and Mucinex. She is stable for discharge from the emergency department. Return precautions reviewed. All questions answered. [JA]      ED Course User Index  [HEBER] Shanon Elizondo MD     ------------------------------------------------------------------------------------------------------------        Consultations:       Consultations: - NONE      Disposition         Discharged      Diagnosis     Clinical Impression:   1. Dyspnea, unspecified type    2.  Cough, unspecified type        Attestations:    Rajni Acevedo MD

## 2022-10-18 NOTE — Clinical Note
[address] [place] [address] Dr [facility] EMERGENCY DEPT  [id number] [address] [phone]    Work/School Note    Date: 10/18/2022    To Whom It May concern:    Olman Ayala was seen and treated today in the emergency room by the following provider(s):  Attending Provider: Nestor Rodriguez MD.      Olman Ayala is excused from work/school on 10/18/22 and 10/19/22. She is medically clear to return to work/school on 10/20/2022.        Sincerely,          GINNY Wiley

## 2022-10-18 NOTE — ED TRIAGE NOTES
Patient ambulatory to triage c/o shortness of breath, chest tightness and productive cough since yesterday. Denies fevers. No chest pain. States she ran out of her inhalers.

## 2022-10-18 NOTE — Clinical Note
[address] [place] [address] Dr [facility] EMERGENCY DEPT  [address] [address] [phone]    Work/School Note    Date: 10/18/2022    To Whom It May concern:    Jim Mancera was seen and treated today in the emergency room by the following provider(s):  Attending Provider: Rob Kumari MD.      Jim Mancera is excused from work/school on 10/18/22 and 10/19/22. She is medically clear to return to work/school on 10/20/2022.        Sincerely,          Tyra Mon MD

## 2022-10-20 ENCOUNTER — HOSPITAL ENCOUNTER (INPATIENT)
Age: 62
LOS: 2 days | Discharge: HOME | DRG: 308 | End: 2022-10-23
Attending: STUDENT IN AN ORGANIZED HEALTH CARE EDUCATION/TRAINING PROGRAM | Admitting: INTERNAL MEDICINE
Payer: MEDICARE

## 2022-10-20 ENCOUNTER — APPOINTMENT (OUTPATIENT)
Dept: GENERAL RADIOLOGY | Age: 62
DRG: 308 | End: 2022-10-20
Attending: STUDENT IN AN ORGANIZED HEALTH CARE EDUCATION/TRAINING PROGRAM
Payer: MEDICARE

## 2022-10-20 DIAGNOSIS — R77.8 ELEVATED TROPONIN: ICD-10-CM

## 2022-10-20 DIAGNOSIS — J96.01 ACUTE RESPIRATORY FAILURE WITH HYPOXIA (HCC): ICD-10-CM

## 2022-10-20 DIAGNOSIS — N18.6 ESRD (END STAGE RENAL DISEASE) (HCC): Primary | ICD-10-CM

## 2022-10-20 DIAGNOSIS — I48.91 ATRIAL FIBRILLATION WITH RVR (HCC): ICD-10-CM

## 2022-10-20 DIAGNOSIS — I21.4 NSTEMI (NON-ST ELEVATED MYOCARDIAL INFARCTION) (HCC): ICD-10-CM

## 2022-10-20 LAB
ANION GAP SERPL CALC-SCNC: 13 MMOL/L (ref 3–18)
APTT PPP: 44.2 SEC (ref 23–36.4)
BASOPHILS # BLD: 0.1 K/UL (ref 0–0.1)
BASOPHILS NFR BLD: 2 % (ref 0–2)
BUN SERPL-MCNC: 50 MG/DL (ref 7–18)
BUN/CREAT SERPL: 7 (ref 12–20)
CALCIUM SERPL-MCNC: 9.1 MG/DL (ref 8.5–10.1)
CHLORIDE SERPL-SCNC: 101 MMOL/L (ref 100–111)
CO2 SERPL-SCNC: 29 MMOL/L (ref 21–32)
CREAT SERPL-MCNC: 7.37 MG/DL (ref 0.6–1.3)
DIFFERENTIAL METHOD BLD: ABNORMAL
EOSINOPHIL # BLD: 0.1 K/UL (ref 0–0.4)
EOSINOPHIL NFR BLD: 2 % (ref 0–5)
ERYTHROCYTE [DISTWIDTH] IN BLOOD BY AUTOMATED COUNT: 18 % (ref 11.6–14.5)
GLUCOSE SERPL-MCNC: 94 MG/DL (ref 74–99)
HCT VFR BLD AUTO: 34.3 % (ref 35–45)
HGB BLD-MCNC: 10.6 G/DL (ref 12–16)
IMM GRANULOCYTES # BLD AUTO: 0 K/UL (ref 0–0.04)
IMM GRANULOCYTES NFR BLD AUTO: 0 % (ref 0–0.5)
LYMPHOCYTES # BLD: 1.1 K/UL (ref 0.9–3.6)
LYMPHOCYTES NFR BLD: 18 % (ref 21–52)
MAGNESIUM SERPL-MCNC: 2.9 MG/DL (ref 1.6–2.6)
MCH RBC QN AUTO: 27.8 PG (ref 24–34)
MCHC RBC AUTO-ENTMCNC: 30.9 G/DL (ref 31–37)
MCV RBC AUTO: 90 FL (ref 78–100)
MONOCYTES # BLD: 0.8 K/UL (ref 0.05–1.2)
MONOCYTES NFR BLD: 13 % (ref 3–10)
NEUTS SEG # BLD: 4 K/UL (ref 1.8–8)
NEUTS SEG NFR BLD: 65 % (ref 40–73)
NRBC # BLD: 0 K/UL (ref 0–0.01)
NRBC BLD-RTO: 0 PER 100 WBC
PLATELET # BLD AUTO: 223 K/UL (ref 135–420)
PMV BLD AUTO: 12.1 FL (ref 9.2–11.8)
POTASSIUM SERPL-SCNC: 4.4 MMOL/L (ref 3.5–5.5)
RBC # BLD AUTO: 3.81 M/UL (ref 4.2–5.3)
SODIUM SERPL-SCNC: 143 MMOL/L (ref 136–145)
TROPONIN-HIGH SENSITIVITY: 1463 NG/L (ref 0–54)
WBC # BLD AUTO: 6.1 K/UL (ref 4.6–13.2)

## 2022-10-20 PROCEDURE — 85025 COMPLETE CBC W/AUTO DIFF WBC: CPT

## 2022-10-20 PROCEDURE — 74011000250 HC RX REV CODE- 250: Performed by: STUDENT IN AN ORGANIZED HEALTH CARE EDUCATION/TRAINING PROGRAM

## 2022-10-20 PROCEDURE — 96365 THER/PROPH/DIAG IV INF INIT: CPT

## 2022-10-20 PROCEDURE — 96376 TX/PRO/DX INJ SAME DRUG ADON: CPT

## 2022-10-20 PROCEDURE — 74011250636 HC RX REV CODE- 250/636: Performed by: STUDENT IN AN ORGANIZED HEALTH CARE EDUCATION/TRAINING PROGRAM

## 2022-10-20 PROCEDURE — 74011250637 HC RX REV CODE- 250/637: Performed by: STUDENT IN AN ORGANIZED HEALTH CARE EDUCATION/TRAINING PROGRAM

## 2022-10-20 PROCEDURE — 93005 ELECTROCARDIOGRAM TRACING: CPT

## 2022-10-20 PROCEDURE — 84484 ASSAY OF TROPONIN QUANT: CPT

## 2022-10-20 PROCEDURE — 71045 X-RAY EXAM CHEST 1 VIEW: CPT

## 2022-10-20 PROCEDURE — 83735 ASSAY OF MAGNESIUM: CPT

## 2022-10-20 PROCEDURE — 96375 TX/PRO/DX INJ NEW DRUG ADDON: CPT

## 2022-10-20 PROCEDURE — 83880 ASSAY OF NATRIURETIC PEPTIDE: CPT

## 2022-10-20 PROCEDURE — 80048 BASIC METABOLIC PNL TOTAL CA: CPT

## 2022-10-20 PROCEDURE — 99285 EMERGENCY DEPT VISIT HI MDM: CPT

## 2022-10-20 PROCEDURE — 85730 THROMBOPLASTIN TIME PARTIAL: CPT

## 2022-10-20 RX ORDER — DILTIAZEM HYDROCHLORIDE 5 MG/ML
20 INJECTION INTRAVENOUS
Status: COMPLETED | OUTPATIENT
Start: 2022-10-20 | End: 2022-10-20

## 2022-10-20 RX ORDER — ASPIRIN 325 MG
325 TABLET ORAL
Status: COMPLETED | OUTPATIENT
Start: 2022-10-20 | End: 2022-10-20

## 2022-10-20 RX ORDER — DILTIAZEM HYDROCHLORIDE 5 MG/ML
30 INJECTION INTRAVENOUS ONCE
Status: COMPLETED | OUTPATIENT
Start: 2022-10-20 | End: 2022-10-20

## 2022-10-20 RX ORDER — HEPARIN SODIUM 10000 [USP'U]/100ML
12-25 INJECTION, SOLUTION INTRAVENOUS
Status: DISCONTINUED | OUTPATIENT
Start: 2022-10-20 | End: 2022-10-22

## 2022-10-20 RX ORDER — ONDANSETRON 2 MG/ML
4 INJECTION INTRAMUSCULAR; INTRAVENOUS
Status: COMPLETED | OUTPATIENT
Start: 2022-10-20 | End: 2022-10-20

## 2022-10-20 RX ORDER — HEPARIN SODIUM 1000 [USP'U]/ML
4000 INJECTION, SOLUTION INTRAVENOUS; SUBCUTANEOUS ONCE
Status: COMPLETED | OUTPATIENT
Start: 2022-10-20 | End: 2022-10-20

## 2022-10-20 RX ADMIN — HEPARIN SODIUM 4000 UNITS: 1000 INJECTION INTRAVENOUS; SUBCUTANEOUS at 23:29

## 2022-10-20 RX ADMIN — AZITHROMYCIN DIHYDRATE 500 MG: 500 INJECTION, POWDER, LYOPHILIZED, FOR SOLUTION INTRAVENOUS at 23:33

## 2022-10-20 RX ADMIN — DILTIAZEM HYDROCHLORIDE 20 MG: 5 INJECTION, SOLUTION INTRAVENOUS at 22:31

## 2022-10-20 RX ADMIN — ONDANSETRON 4 MG: 2 INJECTION INTRAMUSCULAR; INTRAVENOUS at 23:01

## 2022-10-20 RX ADMIN — HEPARIN SODIUM 12 UNITS/KG/HR: 10000 INJECTION, SOLUTION INTRAVENOUS at 23:31

## 2022-10-20 RX ADMIN — CEFTRIAXONE 1 G: 1 INJECTION, POWDER, FOR SOLUTION INTRAMUSCULAR; INTRAVENOUS at 23:33

## 2022-10-20 RX ADMIN — DILTIAZEM HYDROCHLORIDE 30 MG: 5 INJECTION, SOLUTION INTRAVENOUS at 23:02

## 2022-10-20 RX ADMIN — ASPIRIN 325 MG ORAL TABLET 325 MG: 325 PILL ORAL at 23:10

## 2022-10-20 NOTE — Clinical Note
Status[de-identified] INPATIENT [101]   Type of Bed: Telemetry [19]   Cardiac Monitoring Required?: Yes   Inpatient Hospitalization Certified Necessary for the Following Reasons: 3.  Patient receiving treatment that can only be provided in an inpatient setting (further clarification in H&P documentation)   Admitting Diagnosis: Atrial fibrillation with RVR [facility]) [6526899]   Admitting Diagnosis: Elevated troponin [7559103]   Admitting Diagnosis: Dyspnea [402221]   Admitting Physician: Desmond Pal   Attending Physician: Desmond Pal   Estimated Length of Stay: 2 Midnights   Discharge Plan[de-identified] Home with Office Follow-up

## 2022-10-21 ENCOUNTER — APPOINTMENT (OUTPATIENT)
Dept: CT IMAGING | Age: 62
DRG: 308 | End: 2022-10-21
Attending: FAMILY MEDICINE
Payer: MEDICARE

## 2022-10-21 PROBLEM — Z99.2 ESRD (END STAGE RENAL DISEASE) ON DIALYSIS (HCC): Chronic | Status: ACTIVE | Noted: 2022-05-29

## 2022-10-21 PROBLEM — N18.6 ESRD (END STAGE RENAL DISEASE) ON DIALYSIS (HCC): Chronic | Status: ACTIVE | Noted: 2022-05-29

## 2022-10-21 PROBLEM — I10 PRIMARY HYPERTENSION: Chronic | Status: ACTIVE | Noted: 2022-10-21

## 2022-10-21 PROBLEM — G47.33 OSA (OBSTRUCTIVE SLEEP APNEA): Chronic | Status: ACTIVE | Noted: 2022-10-21

## 2022-10-21 PROBLEM — R06.00 DYSPNEA: Status: ACTIVE | Noted: 2022-10-21

## 2022-10-21 PROBLEM — R06.00 DYSPNEA: Status: RESOLVED | Noted: 2022-10-21 | Resolved: 2022-10-21

## 2022-10-21 PROBLEM — G47.33 OSA (OBSTRUCTIVE SLEEP APNEA): Status: ACTIVE | Noted: 2022-10-21

## 2022-10-21 PROBLEM — J96.01 ACUTE RESPIRATORY FAILURE WITH HYPOXIA (HCC): Status: ACTIVE | Noted: 2022-10-21

## 2022-10-21 PROBLEM — I10 PRIMARY HYPERTENSION: Status: ACTIVE | Noted: 2022-10-21

## 2022-10-21 PROBLEM — R77.8 ELEVATED TROPONIN: Status: ACTIVE | Noted: 2022-10-21

## 2022-10-21 LAB
ALBUMIN SERPL-MCNC: 2.9 G/DL (ref 3.4–5)
ALBUMIN/GLOB SERPL: 0.8 (ref 0.8–1.7)
ALP SERPL-CCNC: 149 U/L (ref 45–117)
ALT SERPL-CCNC: 41 U/L (ref 13–56)
ANION GAP SERPL CALC-SCNC: 9 MMOL/L (ref 3–18)
APTT PPP: 154.4 SEC (ref 23–36.4)
APTT PPP: 56.7 SEC (ref 23–36.4)
APTT PPP: 84.7 SEC (ref 23–36.4)
AST SERPL-CCNC: 36 U/L (ref 10–38)
ATRIAL RATE: 116 BPM
BASOPHILS # BLD: 0.1 K/UL (ref 0–0.1)
BASOPHILS NFR BLD: 1 % (ref 0–2)
BILIRUB SERPL-MCNC: 0.5 MG/DL (ref 0.2–1)
BNP SERPL-MCNC: ABNORMAL PG/ML (ref 0–900)
BUN SERPL-MCNC: 59 MG/DL (ref 7–18)
BUN/CREAT SERPL: 7 (ref 12–20)
CALCIUM SERPL-MCNC: 9.3 MG/DL (ref 8.5–10.1)
CALCULATED P AXIS, ECG09: 34 DEGREES
CALCULATED R AXIS, ECG10: -60 DEGREES
CALCULATED T AXIS, ECG11: 80 DEGREES
CHLORIDE SERPL-SCNC: 100 MMOL/L (ref 100–111)
CHOLEST SERPL-MCNC: 156 MG/DL
CO2 SERPL-SCNC: 32 MMOL/L (ref 21–32)
CREAT SERPL-MCNC: 8.44 MG/DL (ref 0.6–1.3)
DIAGNOSIS, 93000: NORMAL
DIFFERENTIAL METHOD BLD: ABNORMAL
EOSINOPHIL # BLD: 0.1 K/UL (ref 0–0.4)
EOSINOPHIL NFR BLD: 2 % (ref 0–5)
ERYTHROCYTE [DISTWIDTH] IN BLOOD BY AUTOMATED COUNT: 17.9 % (ref 11.6–14.5)
GLOBULIN SER CALC-MCNC: 3.8 G/DL (ref 2–4)
GLUCOSE SERPL-MCNC: 90 MG/DL (ref 74–99)
HBA1C MFR BLD: 5.4 % (ref 4.2–5.6)
HCT VFR BLD AUTO: 32.4 % (ref 35–45)
HDLC SERPL-MCNC: 73 MG/DL (ref 40–60)
HDLC SERPL: 2.1 (ref 0–5)
HGB BLD-MCNC: 9.7 G/DL (ref 12–16)
IMM GRANULOCYTES # BLD AUTO: 0 K/UL (ref 0–0.04)
IMM GRANULOCYTES NFR BLD AUTO: 0 % (ref 0–0.5)
LDLC SERPL CALC-MCNC: 64 MG/DL (ref 0–100)
LIPID PROFILE,FLP: ABNORMAL
LYMPHOCYTES # BLD: 1.4 K/UL (ref 0.9–3.6)
LYMPHOCYTES NFR BLD: 22 % (ref 21–52)
MCH RBC QN AUTO: 27.3 PG (ref 24–34)
MCHC RBC AUTO-ENTMCNC: 29.9 G/DL (ref 31–37)
MCV RBC AUTO: 91.3 FL (ref 78–100)
MONOCYTES # BLD: 1 K/UL (ref 0.05–1.2)
MONOCYTES NFR BLD: 16 % (ref 3–10)
NEUTS SEG # BLD: 3.7 K/UL (ref 1.8–8)
NEUTS SEG NFR BLD: 59 % (ref 40–73)
NRBC # BLD: 0 K/UL (ref 0–0.01)
NRBC BLD-RTO: 0 PER 100 WBC
P-R INTERVAL, ECG05: 134 MS
PLATELET # BLD AUTO: 215 K/UL (ref 135–420)
PMV BLD AUTO: 12.9 FL (ref 9.2–11.8)
POTASSIUM SERPL-SCNC: 4.4 MMOL/L (ref 3.5–5.5)
PROT SERPL-MCNC: 6.7 G/DL (ref 6.4–8.2)
Q-T INTERVAL, ECG07: 358 MS
QRS DURATION, ECG06: 86 MS
QTC CALCULATION (BEZET), ECG08: 497 MS
RBC # BLD AUTO: 3.55 M/UL (ref 4.2–5.3)
SODIUM SERPL-SCNC: 141 MMOL/L (ref 136–145)
TRIGL SERPL-MCNC: 95 MG/DL (ref ?–150)
TROPONIN-HIGH SENSITIVITY: 1197 NG/L (ref 0–54)
TROPONIN-HIGH SENSITIVITY: 1342 NG/L (ref 0–54)
TSH SERPL DL<=0.05 MIU/L-ACNC: 0.05 UIU/ML (ref 0.36–3.74)
VENTRICULAR RATE, ECG03: 116 BPM
VLDLC SERPL CALC-MCNC: 19 MG/DL
WBC # BLD AUTO: 6.2 K/UL (ref 4.6–13.2)

## 2022-10-21 PROCEDURE — 99223 1ST HOSP IP/OBS HIGH 75: CPT | Performed by: INTERNAL MEDICINE

## 2022-10-21 PROCEDURE — 65270000046 HC RM TELEMETRY

## 2022-10-21 PROCEDURE — 80053 COMPREHEN METABOLIC PANEL: CPT

## 2022-10-21 PROCEDURE — 96375 TX/PRO/DX INJ NEW DRUG ADDON: CPT

## 2022-10-21 PROCEDURE — 74011000636 HC RX REV CODE- 636: Performed by: FAMILY MEDICINE

## 2022-10-21 PROCEDURE — 94640 AIRWAY INHALATION TREATMENT: CPT

## 2022-10-21 PROCEDURE — 74011000250 HC RX REV CODE- 250: Performed by: INTERNAL MEDICINE

## 2022-10-21 PROCEDURE — 99222 1ST HOSP IP/OBS MODERATE 55: CPT | Performed by: INTERNAL MEDICINE

## 2022-10-21 PROCEDURE — 84484 ASSAY OF TROPONIN QUANT: CPT

## 2022-10-21 PROCEDURE — 74011250636 HC RX REV CODE- 250/636: Performed by: FAMILY MEDICINE

## 2022-10-21 PROCEDURE — 94762 N-INVAS EAR/PLS OXIMTRY CONT: CPT

## 2022-10-21 PROCEDURE — 83036 HEMOGLOBIN GLYCOSYLATED A1C: CPT

## 2022-10-21 PROCEDURE — 5A1D70Z PERFORMANCE OF URINARY FILTRATION, INTERMITTENT, LESS THAN 6 HOURS PER DAY: ICD-10-PCS | Performed by: INTERNAL MEDICINE

## 2022-10-21 PROCEDURE — 87340 HEPATITIS B SURFACE AG IA: CPT

## 2022-10-21 PROCEDURE — 77010033678 HC OXYGEN DAILY

## 2022-10-21 PROCEDURE — 80061 LIPID PANEL: CPT

## 2022-10-21 PROCEDURE — 96376 TX/PRO/DX INJ SAME DRUG ADON: CPT

## 2022-10-21 PROCEDURE — 84443 ASSAY THYROID STIM HORMONE: CPT

## 2022-10-21 PROCEDURE — 86706 HEP B SURFACE ANTIBODY: CPT

## 2022-10-21 PROCEDURE — 85730 THROMBOPLASTIN TIME PARTIAL: CPT

## 2022-10-21 PROCEDURE — 74011250637 HC RX REV CODE- 250/637: Performed by: INTERNAL MEDICINE

## 2022-10-21 PROCEDURE — 36415 COLL VENOUS BLD VENIPUNCTURE: CPT

## 2022-10-21 PROCEDURE — 90935 HEMODIALYSIS ONE EVALUATION: CPT

## 2022-10-21 PROCEDURE — 85025 COMPLETE CBC W/AUTO DIFF WBC: CPT

## 2022-10-21 PROCEDURE — 71275 CT ANGIOGRAPHY CHEST: CPT

## 2022-10-21 RX ORDER — HEPARIN SODIUM 1000 [USP'U]/ML
INJECTION, SOLUTION INTRAVENOUS; SUBCUTANEOUS
Status: DISPENSED
Start: 2022-10-21 | End: 2022-10-22

## 2022-10-21 RX ORDER — ACETAMINOPHEN 650 MG/1
650 SUPPOSITORY RECTAL
Status: DISCONTINUED | OUTPATIENT
Start: 2022-10-21 | End: 2022-10-23 | Stop reason: HOSPADM

## 2022-10-21 RX ORDER — HYDROXYZINE 50 MG/1
50 TABLET, FILM COATED ORAL
Status: DISCONTINUED | OUTPATIENT
Start: 2022-10-21 | End: 2022-10-23 | Stop reason: HOSPADM

## 2022-10-21 RX ORDER — LABETALOL HCL 20 MG/4 ML
10 SYRINGE (ML) INTRAVENOUS
Status: DISCONTINUED | OUTPATIENT
Start: 2022-10-21 | End: 2022-10-23 | Stop reason: HOSPADM

## 2022-10-21 RX ORDER — IPRATROPIUM BROMIDE AND ALBUTEROL SULFATE 2.5; .5 MG/3ML; MG/3ML
3 SOLUTION RESPIRATORY (INHALATION)
Status: DISCONTINUED | OUTPATIENT
Start: 2022-10-21 | End: 2022-10-23 | Stop reason: HOSPADM

## 2022-10-21 RX ORDER — ONDANSETRON 2 MG/ML
4 INJECTION INTRAMUSCULAR; INTRAVENOUS
Status: DISCONTINUED | OUTPATIENT
Start: 2022-10-21 | End: 2022-10-23 | Stop reason: HOSPADM

## 2022-10-21 RX ORDER — SERTRALINE HYDROCHLORIDE 50 MG/1
50 TABLET, FILM COATED ORAL DAILY
Status: DISCONTINUED | OUTPATIENT
Start: 2022-10-21 | End: 2022-10-23 | Stop reason: HOSPADM

## 2022-10-21 RX ORDER — CYCLOBENZAPRINE HCL 10 MG
10 TABLET ORAL
Status: DISCONTINUED | OUTPATIENT
Start: 2022-10-21 | End: 2022-10-23 | Stop reason: HOSPADM

## 2022-10-21 RX ORDER — DILTIAZEM HYDROCHLORIDE 180 MG/1
180 CAPSULE, COATED, EXTENDED RELEASE ORAL DAILY
Status: DISCONTINUED | OUTPATIENT
Start: 2022-10-21 | End: 2022-10-23 | Stop reason: HOSPADM

## 2022-10-21 RX ORDER — LISINOPRIL 20 MG/1
20 TABLET ORAL DAILY
Status: ON HOLD | COMMUNITY
End: 2022-10-23 | Stop reason: SDUPTHER

## 2022-10-21 RX ORDER — CHOLECALCIFEROL (VITAMIN D3) 125 MCG
5 CAPSULE ORAL
Status: DISCONTINUED | OUTPATIENT
Start: 2022-10-21 | End: 2022-10-23 | Stop reason: HOSPADM

## 2022-10-21 RX ORDER — HEPARIN SODIUM 1000 [USP'U]/ML
3000 INJECTION, SOLUTION INTRAVENOUS; SUBCUTANEOUS ONCE
Status: COMPLETED | OUTPATIENT
Start: 2022-10-21 | End: 2022-10-21

## 2022-10-21 RX ORDER — CLONIDINE HYDROCHLORIDE 0.1 MG/1
0.2 TABLET ORAL 3 TIMES DAILY
Status: DISCONTINUED | OUTPATIENT
Start: 2022-10-21 | End: 2022-10-23 | Stop reason: HOSPADM

## 2022-10-21 RX ORDER — SODIUM CHLORIDE 0.9 % (FLUSH) 0.9 %
5-40 SYRINGE (ML) INJECTION EVERY 8 HOURS
Status: DISCONTINUED | OUTPATIENT
Start: 2022-10-21 | End: 2022-10-23 | Stop reason: HOSPADM

## 2022-10-21 RX ORDER — SODIUM CHLORIDE 0.9 % (FLUSH) 0.9 %
5-40 SYRINGE (ML) INJECTION AS NEEDED
Status: DISCONTINUED | OUTPATIENT
Start: 2022-10-21 | End: 2022-10-23 | Stop reason: HOSPADM

## 2022-10-21 RX ORDER — METOPROLOL TARTRATE 5 MG/5ML
5 INJECTION INTRAVENOUS
Status: DISCONTINUED | OUTPATIENT
Start: 2022-10-21 | End: 2022-10-23 | Stop reason: HOSPADM

## 2022-10-21 RX ORDER — LISINOPRIL 20 MG/1
20 TABLET ORAL DAILY
Status: DISCONTINUED | OUTPATIENT
Start: 2022-10-21 | End: 2022-10-23

## 2022-10-21 RX ORDER — SODIUM CHLORIDE 9 MG/ML
250 INJECTION, SOLUTION INTRAVENOUS
Status: DISCONTINUED | OUTPATIENT
Start: 2022-10-21 | End: 2022-10-21 | Stop reason: SDUPTHER

## 2022-10-21 RX ORDER — FLUTICASONE PROPIONATE 50 MCG
2 SPRAY, SUSPENSION (ML) NASAL DAILY
Status: DISCONTINUED | OUTPATIENT
Start: 2022-10-21 | End: 2022-10-23 | Stop reason: HOSPADM

## 2022-10-21 RX ORDER — SEVELAMER CARBONATE 800 MG/1
3200 TABLET, FILM COATED ORAL
Status: DISCONTINUED | OUTPATIENT
Start: 2022-10-21 | End: 2022-10-23 | Stop reason: HOSPADM

## 2022-10-21 RX ORDER — GUAIFENESIN 600 MG/1
600 TABLET, EXTENDED RELEASE ORAL 2 TIMES DAILY
Status: DISCONTINUED | OUTPATIENT
Start: 2022-10-21 | End: 2022-10-23 | Stop reason: HOSPADM

## 2022-10-21 RX ORDER — BUDESONIDE AND FORMOTEROL FUMARATE DIHYDRATE 160; 4.5 UG/1; UG/1
2 AEROSOL RESPIRATORY (INHALATION)
Status: DISCONTINUED | OUTPATIENT
Start: 2022-10-21 | End: 2022-10-21 | Stop reason: CLARIF

## 2022-10-21 RX ORDER — LORAZEPAM 2 MG/ML
1-2 INJECTION INTRAMUSCULAR ONCE
Status: COMPLETED | OUTPATIENT
Start: 2022-10-21 | End: 2022-10-21

## 2022-10-21 RX ORDER — POLYETHYLENE GLYCOL 3350 17 G/17G
17 POWDER, FOR SOLUTION ORAL DAILY PRN
Status: DISCONTINUED | OUTPATIENT
Start: 2022-10-21 | End: 2022-10-23 | Stop reason: HOSPADM

## 2022-10-21 RX ORDER — SODIUM CHLORIDE 9 MG/ML
250 INJECTION, SOLUTION INTRAVENOUS
Status: DISCONTINUED | OUTPATIENT
Start: 2022-10-21 | End: 2022-10-23 | Stop reason: HOSPADM

## 2022-10-21 RX ORDER — ACETAMINOPHEN 325 MG/1
650 TABLET ORAL
Status: DISCONTINUED | OUTPATIENT
Start: 2022-10-21 | End: 2022-10-23 | Stop reason: HOSPADM

## 2022-10-21 RX ORDER — ONDANSETRON 4 MG/1
4 TABLET, ORALLY DISINTEGRATING ORAL
Status: DISCONTINUED | OUTPATIENT
Start: 2022-10-21 | End: 2022-10-23 | Stop reason: HOSPADM

## 2022-10-21 RX ORDER — HYDRALAZINE HYDROCHLORIDE 20 MG/ML
10 INJECTION INTRAMUSCULAR; INTRAVENOUS
Status: DISCONTINUED | OUTPATIENT
Start: 2022-10-21 | End: 2022-10-23 | Stop reason: HOSPADM

## 2022-10-21 RX ORDER — GUAIFENESIN 100 MG/5ML
81 LIQUID (ML) ORAL DAILY
Status: DISCONTINUED | OUTPATIENT
Start: 2022-10-21 | End: 2022-10-23 | Stop reason: HOSPADM

## 2022-10-21 RX ADMIN — IOPAMIDOL 80 ML: 755 INJECTION, SOLUTION INTRAVENOUS at 16:35

## 2022-10-21 RX ADMIN — CLONIDINE HYDROCHLORIDE 0.2 MG: 0.1 TABLET ORAL at 22:32

## 2022-10-21 RX ADMIN — CLONIDINE HYDROCHLORIDE 0.2 MG: 0.1 TABLET ORAL at 15:14

## 2022-10-21 RX ADMIN — METOPROLOL TARTRATE 5 MG: 5 INJECTION, SOLUTION INTRAVENOUS at 21:13

## 2022-10-21 RX ADMIN — ASPIRIN 81 MG 81 MG: 81 TABLET ORAL at 08:38

## 2022-10-21 RX ADMIN — FLUTICASONE PROPIONATE 2 SPRAY: 50 SPRAY, METERED NASAL at 08:38

## 2022-10-21 RX ADMIN — ACETAMINOPHEN 650 MG: 325 TABLET, FILM COATED ORAL at 19:57

## 2022-10-21 RX ADMIN — DILTIAZEM HYDROCHLORIDE 180 MG: 180 CAPSULE, COATED, EXTENDED RELEASE ORAL at 08:37

## 2022-10-21 RX ADMIN — HEPARIN SODIUM 3000 UNITS: 1000 INJECTION INTRAVENOUS; SUBCUTANEOUS at 12:30

## 2022-10-21 RX ADMIN — SODIUM CHLORIDE, PRESERVATIVE FREE 10 ML: 5 INJECTION INTRAVENOUS at 14:24

## 2022-10-21 RX ADMIN — GUAIFENESIN 600 MG: 600 TABLET, EXTENDED RELEASE ORAL at 21:51

## 2022-10-21 RX ADMIN — SODIUM CHLORIDE, PRESERVATIVE FREE 10 ML: 5 INJECTION INTRAVENOUS at 08:38

## 2022-10-21 RX ADMIN — SEVELAMER CARBONATE 3200 MG: 800 TABLET, FILM COATED ORAL at 08:37

## 2022-10-21 RX ADMIN — GUAIFENESIN 600 MG: 600 TABLET, EXTENDED RELEASE ORAL at 08:37

## 2022-10-21 RX ADMIN — SODIUM CHLORIDE, PRESERVATIVE FREE 10 ML: 5 INJECTION INTRAVENOUS at 21:13

## 2022-10-21 RX ADMIN — Medication 5 MG: at 11:07

## 2022-10-21 RX ADMIN — ARFORMOTEROL TARTRATE: 15 SOLUTION RESPIRATORY (INHALATION) at 07:39

## 2022-10-21 RX ADMIN — LORAZEPAM 2 MG: 2 INJECTION INTRAMUSCULAR; INTRAVENOUS at 15:14

## 2022-10-21 RX ADMIN — NITROGLYCERIN 1 INCH: 20 OINTMENT TOPICAL at 08:39

## 2022-10-21 RX ADMIN — SEVELAMER CARBONATE 3200 MG: 800 TABLET, FILM COATED ORAL at 12:54

## 2022-10-21 RX ADMIN — SERTRALINE 50 MG: 50 TABLET, FILM COATED ORAL at 08:38

## 2022-10-21 RX ADMIN — LISINOPRIL 20 MG: 20 TABLET ORAL at 08:37

## 2022-10-21 RX ADMIN — CYCLOBENZAPRINE 10 MG: 10 TABLET, FILM COATED ORAL at 12:00

## 2022-10-21 RX ADMIN — HYDROXYZINE HYDROCHLORIDE 50 MG: 50 TABLET, FILM COATED ORAL at 10:53

## 2022-10-21 RX ADMIN — CLONIDINE HYDROCHLORIDE 0.2 MG: 0.1 TABLET ORAL at 08:38

## 2022-10-21 NOTE — PROGRESS NOTES
Comprehensive Nutrition Assessment    Type and Reason for Visit: Initial, Positive nutrition screen    Nutrition Recommendations/Plan:   Add oral nutrition supplement to optimize nutrition intake opportunity: Nepro with Carb Steady (each provides 425 kcal, 19g protein) TID (Butter Pecan)    Liberalize CHO restriction on current diet as tolerated by patient-updated food preferences   Monitor PO intake, compliance of oral supplement, weight, labs, and plan of care during admission. Malnutrition Assessment:  Malnutrition Status: At risk for malnutrition (specify) (r/t varied PO intake and dialysis) (10/21/22 1207)      Nutrition History and Allergies:   Past medical history: adenomatous polyp, HTN, CHF, ESRD on hemodialysis Tuesday Thursday Saturday, hypertension, atrial fibrillation. NKFA. Weight history per chart review:  CBW: 10/20/22 : 72.1 kg (159 lb), 30 days: 09/28/22 : 72.6 kg (160 lb), 90 days: 07/28/22 : 74.8 kg (165 lb), 180 days: 04/05/22 : 72.6 kg (160 lb). Weight stable x 180 days. Nutrition Assessment:    Visited pt in room, laying in bed, alert and able to communicate-very pleasant. Pt admitted for evaluation of shortness of breath and palpitations. Pt reported tolerating current diet with 50-75% PO intake-no additional oral supplement at this time. Pt reported PTA  eating three meals a day but consuming less than 50% at most meals-included 2 Nepro a day as well. Updated food preferences-pt receptive to continue Nepro while inpatient. Current lab shows elevated Mg (2.9), BUN (50) and Creatinine (7.37). Nutrition Related Findings:    Last BM 8/4. Output: 400mL (urine). Pertinent Medications: metoprolol, miralax, zofran, renvela. Wound Type: None    Current Nutrition Intake & Therapies:  Average Meal Intake: 51-75%  Average Supplement Intake: None ordered  ADULT DIET Regular; 3 carb choices (45 gm/meal); Low Fat/Low Chol/High Fiber/BRISA; Low Sodium (2 gm);  Low Potassium (Less than 3000 mg/day); Low Phosphorus (Less than 1000 mg); 2000 ml    Anthropometric Measures:  Height: 5' 2\" (157.5 cm)  Ideal Body Weight (IBW): 110 lbs (50 kg)  Admission Body Weight: 158 lb 15.2 oz  Current Body Wt:  72.1 kg (158 lb 15.2 oz), 144.5 % IBW. Stated  Current BMI (kg/m2): 29.1  Usual Body Weight: 74.8 kg (165 lb)  % Weight Change (Calculated): -3.7  Weight Adjustment: No adjustment  BMI Category: Overweight (BMI 25.0-29. 9)    Estimated Daily Nutrient Needs:  Energy Requirements Based On: Kcal/kg (25-30)  Weight Used for Energy Requirements: Current  Energy (kcal/day): 6638-3134  Weight Used for Protein Requirements: Current (1.0-1.2)  Protein (g/day): 72-86  Method Used for Fluid Requirements: 1 ml/kcal  Fluid (ml/day): 7126-4286    Nutrition Diagnosis:   Inadequate oral intake related to early satiety, inadequate protein-energy intake as evidenced by poor intake prior to admission, intake 51-75%, intake 26-50%    Nutrition Interventions:   Food and/or Nutrient Delivery: Continue current diet, Start oral nutrition supplement  Nutrition Education/Counseling: Education not indicated, No recommendations at this time  Coordination of Nutrition Care: Continue to monitor while inpatient  Plan of Care discussed with: patient    Goals:     Goals: Meet at least 75% of estimated needs, by next RD assessment       Nutrition Monitoring and Evaluation:   Behavioral-Environmental Outcomes: None identified  Food/Nutrient Intake Outcomes: Diet advancement/tolerance, Food and nutrient intake, Supplement intake  Physical Signs/Symptoms Outcomes: Biochemical data, GI status, Weight, Nutrition focused physical findings, Meal time behavior    Discharge Planning:    Continue current diet, Continue oral nutrition supplement    Sanjuana Edgar MA, RDN, LD   Contact: [phone]

## 2022-10-21 NOTE — ED TRIAGE NOTES
Pt into ED via EMS for evaluation of SOB. Pt reports she was seen yesterday and dx with [facility] but did not receive prescriptions. Pt reports heart is beating fast; hx of Afib; denies changes in medications.

## 2022-10-21 NOTE — PROGRESS NOTES
INTERIM UPDATE - 0003 EST on 10/21/2022    [facility] ER Physician requests admission for a Patient who was \"recently\" discharged from [facility] Telemetry Unit for Acute on Chronic Diastolic CHF and Hypertensive Urgency on 9/28/2022 (~23 days ago) and who sent to [facility] on 10/19/2022 and reports that she was diagnosed with Pneumonia, but [facility] shows that Patient Left Against Medical Advice (AMA). Patient was noted to have been in Atrial Fibrillation with RVR and required 2 pushes of IV Diltiazem (30 mg and 40 mg) to rate-control her Atrial Fibrillation. Patient reports having taken her rate-controlling medications at home today. [facility] ER Physician also reports \"wet\" lung sounds on Patient without peripheral edema. Patient reportedly received Hemodialysis yesterday. Plan: Will place Patient on Observation on [facility] Telemetry Unit for management of Atrial Fibrillation with RVR. Routing refill request to provider for review/approval because:  Last BP elevated

## 2022-10-21 NOTE — PROGRESS NOTES
Patient requesting anxiety medication and medication to help sleep. Patient states she has been awake all night. Patient requesting melatontin. MD paged. Okay to give dose of Melatonin now. Will continue to monitor. Call bell within reach.

## 2022-10-21 NOTE — PROGRESS NOTES
Reason for Admission:   Atrial fibrillation with RVR ([facility] [I48.91]  Elevated troponin [R77.8]  Dyspnea [R06.00]    PCP: Law Miller MD               RUR Score:     22%             Resources/supports as identified by patient/family:       Top Challenges facing patient (as identified by patient/family and CM): Finances/Medication cost?     No concern  Transportation      drives self  Support system or lack thereof? No concerns  Living arrangements? No concerns   Self-care/ADLs/Cognition? No concerns, independent         Current Advanced Directive/Advance Care Plan:   no    Healthcare Decision Maker:   Primary Decision Maker: Leena Bravo Daughter - [phone]                          Plan for utilizing home health:    no                      Likelihood of readmission:   HIGH    Transition of Care Plan:                    Initial assessment completed with patient. Cognitive status of patient: oriented to time, place, person and situation. Face sheet information confirmed:  yes. The patient designates daughter Lita Azul to participate in her discharge plan and to receive any needed information. This patient lives in a single family home, 5 steps to enter. Patient is able to navigate steps as needed. Prior to hospitalization, patient was considered to be independent with ADLs/IADLS : yes . I    Patient has a current ACP document on file: no    The patient and daughter will be available to transport patient home upon discharge. The patient already has none reported, medical equipment available in the home. Patient is not currently active with home health. Patient has not stayed in a skilled nursing facility or rehab. Was  stay within last 60 days : no. This patient is on dialysis :yes    If yes, hemodialysis patient and receives treatment on Tuesday/Thursday/Saturday at [facility]  [phone]  Chair time is 0500.  Pt is transported to/from dialysis by drives herself. Currently, the discharge plan is Home. The patient states that she can obtain her medications from the pharmacy, and take her medications as directed. Patient's current insurance is Payor: [employer]ICARE / Plan: Ana Coy [id number] HMO / Product Type: Managed Care Medicare /  and Medicaid of [facility] Management Interventions  PCP Verified by CM: Yes (two weeks ago)  Mode of Transport at Discharge: Other (see comment) (daughter)  Transition of Care Consult (CM Consult): Discharge Planning  Physical Therapy Consult: No  Occupational Therapy Consult: No  Support Systems: Child(nicola)  Confirm Follow Up Transport: Family  Discharge Location  Patient Expects to be Discharged to[de-identified] Home with family assistance    Readmission Assessment  Number of days since last admission?: 8-30 days  Previous disposition: Home with Family  Who is being interviewed?: Patient  What was the patient's/caregiver's perception as to why they think they needed to return back to the hospital?: Other (Comment) (SOB)  Did you visit your Primary Care Physician after you left the hospital, before you returned this time?: Yes  Did you see a specialist, such as Cardiac, Pulmonary, Orthopedic Physician, etc. after you left the hospital?: Yes  Who advised the patient to return to the hospital?: Self-referral  Does the patient report anything that got in the way of taking their medications?: No  In our efforts to provide the best possible care to you and others like you, can you think of anything that we could have done to help you after you left the hospital the first time, so that you might not have needed to return so soon?: Teach back instructions regarding management of illness      will continue to monitor and assist with transition of care needs.     CORDELL KahnN, RN  Care Management

## 2022-10-21 NOTE — PROGRESS NOTES
Consult Note  Consult requested by: Dr. Myrna Mattson is a 65 y.o. female BLACK/ who is being seen on consult for ESRD management. Chief Complaint   Patient presents with    Shortness of Breath     Admission diagnosis: short of breath     72-year-old female with past medical history of hypertension, ESRD admitted with palpitation, following for ESRD management  Impression & Plan:   IMPRESSION:   ESRD, TTS schedule  Access left arm AV fistula  Dyspnea,  Tachycardia, symptomatic   History of paroxysmal A. fib , on Eliquis in the past, self discontinued  Congestive heart failure  Anemia  Hypertension   PLAN:   I offered her extra dialysis treatment today. Agree with imaging study to rule out any PE. Will arrange dialysis for 2 hours prior to take at least 1.5 to 2 L fluid off. Plan for dialysis tomorrow for her regular schedule. Adjust medication per ESRD status. HPI: 72-year-old female with past medical history of hypertension, congestive heart failure, paroxysmal atrial fibrillation's came to the ER with short of breath. She had dialysis yesterday, she did not finish her full treatment because she was not feeling well. She admitted she was brought to her target weight after dialysis treatment. She went to the [facility] emergency room earlier for productive cough, she was diagnosed with pneumonia and received IV antibiotics. During this visit in ER she was afebrile, tachycardic to 150, hypertensive, requiring oxygen supplement no difficulty in oxygenation with oxygen supplement. But she was hypoxic to 84% on room air. She did not have any leukocytosis or severe anemia. I saw her this morning in the stepdown unit. She appears in mild distress. She admits her cough is not improving. She feels that it is her pneumonia which causing her respiratory discomfort. I also educate about fluid overload and importance of extra treatment to relieve such symptoms.   I offered her extra dialysis treatment initially she was reluctant but later on agreed. Past Medical History:   Diagnosis Date    Adenomatous polyp     CHF (congestive heart failure) ([facility] [address]     Chronic kidney disease     TTHS    Dialysis patient ([facility] [address]     tues, thurs, sat    ESRD (end stage renal disease) ([facility] [address]     Family hx of colon cancer     Mother    GI bleed due to NSAIDs     Heart failure ([facility] [address]     History of blood transfusion     Hypertension     LUIS (obstructive sleep apnea)     no cpap      Past Surgical History:   Procedure Laterality Date    COLONOSCOPY N/A 9/27/2019    COLONOSCOPY with polyp bx performed by Rosangela Brownlee MD at [address] [place] [address]      HX COLONOSCOPY  09/21/2016    colonoscopy, Dr. Willa Starr, [facility]  05/2020    HX OTHER SURGICAL       ARTERIOVENOUS FISTULA INSERTION LEFT ARM     HX TOTAL ABDOMINAL HYSTERECTOMY      HX TOTAL COLECTOMY  10/11/2016    LAPAROSCOPIC COLECTOMY PARTIAL RIGHT FOR CECAL POLYP, [facility]    HX TUBAL LIGATION      VASCULAR SURGERY PROCEDURE UNLIST      left arm graft       Social History     Socioeconomic History    Marital status:      Spouse name: Not on file    Number of children: Not on file    Years of education: Not on file    Highest education level: Not on file   Occupational History    Not on file   Tobacco Use    Smoking status: Former    Smokeless tobacco: Never   Substance and Sexual Activity    Alcohol use: No    Drug use: No    Sexual activity: Yes     Partners: Male   Other Topics Concern    Not on file   Social History Narrative    Not on file     Social Determinants of Health     Financial Resource Strain: Not on file   Food Insecurity: Not on file   Transportation Needs: Not on file   Physical Activity: Not on file   Stress: Not on file   Social Connections: Not on file   Intimate Partner Violence: Not on file   Housing Stability: Not on file       Family History Problem Relation Age of Onset    Colon Cancer Mother     Colon Polyps Mother      No Known Allergies     Home Medications:     Prior to Admission Medications   Prescriptions Last Dose Informant Patient Reported? Taking? albuterol (PROVENTIL HFA, VENTOLIN HFA, PROAIR HFA) 90 mcg/actuation inhaler Unknown  No No   Sig: Take 2 Puffs by inhalation every six (6) hours as needed for Shortness of Breath or Cough. cloNIDine HCL (CATAPRES) 0.2 mg tablet 10/20/2022  Yes Yes   Sig: Take 0.2 mg by mouth three (3) times daily. cyclobenzaprine (FLEXERIL) 10 mg tablet Unknown  No No   Sig: Take 1 Tab by mouth three (3) times daily as needed for Muscle Spasm(s). dilTIAZem ER (Cardizem CD) 180 mg capsule 10/20/2022  No Yes   Sig: Take 1 Capsule by mouth daily. fluticasone propion-salmeteroL (ADVAIR/WIXELA) 250-50 mcg/dose diskus inhaler 10/20/2022  Yes Yes   Sig: Take 1 Puff by inhalation two (2) times a day. fluticasone propionate (FLONASE) 50 mcg/actuation nasal spray 10/20/2022  No Yes   Si Sprays by Both Nostrils route daily. guaiFENesin ER (MUCINEX) 600 mg ER tablet 10/20/2022  No Yes   Sig: Take 1 Tablet by mouth two (2) times a day.   hydrOXYzine HCL (ATARAX) 50 mg tablet Unknown  Yes No   Sig: Take 50 mg by mouth two (2) times daily as needed for Anxiety. lisinopriL (PRINIVIL, ZESTRIL) 20 mg tablet 10/20/2022  Yes Yes   Sig: Take 20 mg by mouth daily. Patient unsure of dosage   meclizine (ANTIVERT) 25 mg tablet Unknown  Yes No   Sig: Take 25 mg by mouth three (3) times daily as needed. Indications: prevention of motion sickness   sertraline (ZOLOFT) 50 mg tablet 10/20/2022  Yes Yes   Sig: Take 50 mg by mouth daily. Indications: major depressive disorder   sevelamer carbonate (RENVELA) 800 mg tab tab 10/20/2022  Yes Yes   Sig: Take 3,200 mg by mouth three (3) times daily (with meals).    triamcinolone acetonide (KENALOG) 0.5 % ointment Not Taking  Yes No   Sig: Apply  to affected area three (3) times daily.    Patient not taking: Reported on 10/21/2022      Facility-Administered Medications: None       Current Facility-Administered Medications   Medication Dose Route Frequency    metoprolol (LOPRESSOR) injection 5 mg  5 mg IntraVENous Q20MIN PRN    sodium chloride (NS) flush 5-40 mL  5-40 mL IntraVENous Q8H    sodium chloride (NS) flush 5-40 mL  5-40 mL IntraVENous PRN    acetaminophen (TYLENOL) tablet 650 mg  650 mg Oral Q6H PRN    Or    acetaminophen (TYLENOL) suppository 650 mg  650 mg Rectal Q6H PRN    polyethylene glycol (MIRALAX) packet 17 g  17 g Oral DAILY PRN    ondansetron (ZOFRAN ODT) tablet 4 mg  4 mg Oral Q8H PRN    Or    ondansetron (ZOFRAN) injection 4 mg  4 mg IntraVENous Q6H PRN    albuterol-ipratropium (DUO-NEB) 2.5 MG-0.5 MG/3 ML  3 mL Nebulization Q6H PRN    melatonin tablet 5 mg  5 mg Oral QHS PRN    aspirin chewable tablet 81 mg  81 mg Oral DAILY    cloNIDine HCL (CATAPRES) tablet 0.2 mg  0.2 mg Oral TID    dilTIAZem ER (CARDIZEM CD) capsule 180 mg  180 mg Oral DAILY    fluticasone propionate (FLONASE) 50 mcg/actuation nasal spray 2 Spray  2 Spray Both Nostrils DAILY    guaiFENesin ER (MUCINEX) tablet 600 mg  600 mg Oral BID    hydrOXYzine HCL (ATARAX) tablet 50 mg  50 mg Oral BID PRN    lisinopriL (PRINIVIL, ZESTRIL) tablet 20 mg  20 mg Oral DAILY    sertraline (ZOLOFT) tablet 50 mg  50 mg Oral DAILY    sevelamer carbonate (RENVELA) tab 3,200 mg  3,200 mg Oral TID WITH MEALS    cyclobenzaprine (FLEXERIL) tablet 10 mg  10 mg Oral TID PRN    arformoterol 15 mcg/budesonide 0.5 mg neb solution   Nebulization BID RT    nitroglycerin (NITROBID) 2 % ointment 1 Inch  1 Inch Topical Q6H PRN    hydrALAZINE (APRESOLINE) 20 mg/mL injection 10 mg  10 mg IntraVENous Q6H PRN    labetaloL (NORMODYNE;TRANDATE) 20 mg/4 mL (5 mg/mL) injection 10 mg  10 mg IntraVENous Q2H PRN    iopamidoL (ISOVUE-370) 76 % injection  mL   mL IntraVENous RAD ONCE    iopamidoL (ISOVUE-370) 76 % injection  mL  mL IntraVENous RAD ONCE    heparin (porcine) 25,000 units in 0.45% saline 250 ml infusion  12-25 Units/kg/hr IntraVENous TITRATE       Review of Systems:     Complete 10-point review of systems were obtained and discussed in length  with the patient. Complete review of systems was negative/unremarkable  except as mentioned in HPI section. Data Review:    Labs: Results:       Chemistry Recent Labs     10/21/22  1127 10/20/22  2220   GLU 90 94    143   K 4.4 4.4    101   CO2 32 29   BUN 59* 50*   CREA 8.44* 7.37*   CA 9.3 9.1   AGAP 9 13   BUCR 7* 7*   *  --    TP 6.7  --    ALB 2.9*  --    GLOB 3.8  --    AGRAT 0.8  --       CBC w/Diff Recent Labs     10/21/22  0400 10/20/22  2220   WBC 6.2 6.1   RBC 3.55* 3.81*   HGB 9.7* 10.6*   HCT 32.4* 34.3*    223   GRANS 59 65   LYMPH 22 18*   EOS 2 2      Coagulation Recent Labs     10/21/22  1127 10/21/22  0400   APTT 56.7* 154.4*       Iron/Ferritin No results for input(s): IRON in the last 72 hours. No lab exists for component: TIBCCALC   BNP No results for input(s): BNPP in the last 72 hours. Cardiac Enzymes No results for input(s): CPK, CKND1, ANDREW in the last 72 hours. No lab exists for component: CKRMB, TROIP   Liver Enzymes Recent Labs     10/21/22  1127   TP 6.7   ALB 2.9*   *      Thyroid Studies Lab Results   Component Value Date/Time    T4, Total 8.0 02/16/2012 08:00 PM    TSH 0.05 (L) 10/21/2022 11:27 AM         EKG: unchanged from previous tracings.     Physical Assessment:   Visit Vitals  BP (!) 186/109 (BP 1 Location: Right upper arm, BP Patient Position: At rest)   Pulse 97   Temp 98.2 °F (36.8 °C)   Resp 20   Ht 5' 2\" (1.575 m)   Wt 72.1 kg (159 lb)   SpO2 99%   Breastfeeding Unknown   BMI 29.08 kg/m²     Weight change:     Intake/Output Summary (Last 24 hours) at 10/21/2022 1617  Last data filed at 10/21/2022 1419  Gross per 24 hour   Intake 240.09 ml   Output --   Net 240.09 ml     Physical Exam:   General: comfortable, no acute distress   HEENT sclera anicteric, supple neck, no thyromegaly  CVS: S1S2 heard,  no rub  RS: + air entry b/l,   Abd: Soft, Non tender,   Neuro: non focal, awake, alert , CN II-XII are grossly intact  Extrm: NO edema, no cyanosis, clubbing   Skin: no visible  Rash  Musculoskeletal: No gross joints or bone deformities     Procedures/imaging: see electronic medical records for all procedures, Xrays and details which were not copied into this note but were reviewed prior to creation of Pratima Jefferson MD  October 21, 2022  [facility]  Office [phone]

## 2022-10-21 NOTE — ED PROVIDER NOTES
EMERGENCY DEPARTMENT HISTORY AND PHYSICAL EXAM    I have evaluated the patient at 9:51 PM      Date: (Not on file)  Patient Name: Valerie Pacheco    History of Presenting Illness     No chief complaint on file. History Provided By: Patient  Location/Duration/Severity/Modifying factors   Patient is a 60-year-old female with history of CHF, ESRD on hemodialysis Tuesday Thursday Saturday, hypertension, atrial fibrillation presenting to the emergency department for evaluation of shortness of breath and palpitations. Patient states has been ongoing now for the past 2 days. She was evaluated here and ultimately discharged after being treated with DuoNeb treatment. States that she was still feeling short of breath and then went to [facility] emergency department and was diagnosed with pneumonia, however, was discharged without any antibiotics. States that tonight that she was still having shortness of breath but was also experiencing palpitations especially with exertion. Felt better with rest.  She does have a productive wet cough. She denies any chest pain, abdominal pain, NVD. She did complete her entire dialysis session earlier today. No fevers or chills. Patient takes carvedilol for her A. fib and took it this morning. PCP: Radha Conti MD    Current Outpatient Medications   Medication Sig Dispense Refill    albuterol (PROVENTIL HFA, VENTOLIN HFA, PROAIR HFA) 90 mcg/actuation inhaler Take 2 Puffs by inhalation every six (6) hours as needed for Shortness of Breath or Cough. 18 g 0    guaiFENesin ER (MUCINEX) 600 mg ER tablet Take 1 Tablet by mouth two (2) times a day. 20 Tablet 0    dilTIAZem ER (Cardizem CD) 180 mg capsule Take 1 Capsule by mouth daily. 30 Capsule 0    cloNIDine HCL (CATAPRES) 0.2 mg tablet Take 0.2 mg by mouth three (3) times daily. sertraline (ZOLOFT) 50 mg tablet Take 50 mg by mouth daily.  Indications: major depressive disorder      meclizine (ANTIVERT) 25 mg tablet Take 25 mg by mouth three (3) times daily as needed. Indications: prevention of motion sickness      triamcinolone acetonide (KENALOG) 0.5 % ointment Apply  to affected area three (3) times daily. fluticasone propionate (FLONASE) 50 mcg/actuation nasal spray 2 Sprays by Both Nostrils route daily. 16 g 0    fluticasone propion-salmeteroL (ADVAIR/WIXELA) 250-50 mcg/dose diskus inhaler Take 1 Puff by inhalation two (2) times a day.      hydrOXYzine HCL (ATARAX) 50 mg tablet Take 50 mg by mouth two (2) times daily as needed for Anxiety. sevelamer carbonate (RENVELA) 800 mg tab tab Take 3,200 mg by mouth three (3) times daily (with meals). zolpidem (AMBIEN) 5 mg tablet Take 5 mg by mouth nightly as needed. cyclobenzaprine (FLEXERIL) 10 mg tablet Take 1 Tab by mouth three (3) times daily as needed for Muscle Spasm(s). 14 Tab 0    carvedilol (COREG) 25 mg tablet Take 25 mg by mouth two (2) times daily (with meals).  Indications: high blood pressure         Past History     Past Medical History:  Past Medical History:   Diagnosis Date    Adenomatous polyp     CHF (congestive heart failure) ([facility] [address]     Chronic kidney disease     [facility]    Dialysis patient ([facility] [address]     tues, thurs, sat    ESRD (end stage renal disease) ([facility] [address]     Family hx of colon cancer     Mother    GI bleed due to NSAIDs     Heart failure ([facility] [address]     History of blood transfusion     Hypertension     LUIS (obstructive sleep apnea)     no cpap       Past Surgical History:  Past Surgical History:   Procedure Laterality Date    COLONOSCOPY N/A 9/27/2019    COLONOSCOPY with polyp bx performed by Salinas Pineda MD at [facility]      HX COLONOSCOPY  09/21/2016    colonoscopy, Dr. Vanessa Lo, [address] [address]  05/2020    HX OTHER SURGICAL       ARTERIOVENOUS FISTULA INSERTION LEFT ARM     HX TOTAL ABDOMINAL HYSTERECTOMY      HX TOTAL COLECTOMY  10/11/2016    LAPAROSCOPIC COLECTOMY PARTIAL RIGHT FOR CECAL POLYP, DR. PAEZ, [facility]    HX TUBAL LIGATION      VASCULAR SURGERY PROCEDURE UNLIST      left arm graft       Family History:  Family History   Problem Relation Age of Onset    Colon Cancer Mother     Colon Polyps Mother        Social History:  Social History     Tobacco Use    Smoking status: Former    Smokeless tobacco: Never   Substance Use Topics    Alcohol use: No    Drug use: No       Allergies:  No Known Allergies      Review of Systems       Review of Systems   Constitutional:  Negative for activity change, chills, diaphoresis, fatigue and fever. Eyes:  Negative for photophobia, pain and visual disturbance. Respiratory:  Positive for shortness of breath. Negative for cough, chest tightness, wheezing and stridor. Cardiovascular:  Positive for palpitations. Negative for chest pain. Gastrointestinal:  Negative for abdominal distention, abdominal pain, constipation, diarrhea, nausea and vomiting. Genitourinary:  Negative for difficulty urinating, dysuria and hematuria. Musculoskeletal:  Negative for back pain, joint swelling and myalgias. Skin:  Negative for rash and wound. Neurological:  Negative for dizziness, seizures, weakness, light-headedness, numbness and headaches. Psychiatric/Behavioral:  Negative for agitation. The patient is not nervous/anxious. Physical Exam   There were no vitals taken for this visit. Physical Exam  Constitutional:       General: She is not in acute distress. Appearance: She is not toxic-appearing. HENT:      Head: Normocephalic and atraumatic. Mouth/Throat:      Mouth: Mucous membranes are moist.   Eyes:      Extraocular Movements: Extraocular movements intact. Pupils: Pupils are equal, round, and reactive to light. Cardiovascular:      Rate and Rhythm: Tachycardia present. Rhythm irregular. Heart sounds: Normal heart sounds. No murmur heard. No friction rub. No gallop.    Pulmonary: Effort: Pulmonary effort is normal.      Breath sounds: Rales present. Abdominal:      General: There is no distension. Palpations: Abdomen is soft. There is no mass. Tenderness: There is no abdominal tenderness. There is no guarding. Hernia: No hernia is present. Musculoskeletal:         General: No swelling, tenderness or deformity. Cervical back: Normal range of motion and neck supple. Skin:     General: Skin is warm and dry. Findings: No rash. Neurological:      General: No focal deficit present. Mental Status: She is alert and oriented to person, place, and time. Cranial Nerves: No cranial nerve deficit. Sensory: No sensory deficit. Motor: No weakness. Psychiatric:         Mood and Affect: Mood normal.         Diagnostic Study Results     Labs -  No results found for this or any previous visit (from the past 12 hour(s)). Radiologic Studies -   XR CHEST PORT    (Results Pending)         Medical Decision Making   I am the first provider for this patient. I reviewed the vital signs, available nursing notes, past medical history, past surgical history, family history and social history. Vital Signs-Reviewed the patient's vital signs. EKG: A fib with RVR    Records Reviewed: Nursing Notes, Old Medical Records, Previous electrocardiograms, Previous Radiology Studies, and Previous Laboratory Studies (Time of Review: 9:51 PM)    ED Course: Progress Notes, Reevaluation, and Consults:         Provider Notes (Medical Decision Making):   MDM  Number of Diagnoses or Management Options  Atrial fibrillation with RVR ([facility] [address]  ESRD (end stage renal disease) ([facility] [address]  NSTEMI (non-ST elevated myocardial infarction) [facility])  Diagnosis management comments: 78-year-old female presenting to the emergency department for evaluation of shortness of breath and palpitations. On arrival she is found to be in A. fib with RVR with pulse rate ranging from 140-1 60.   Vital signs stable. She is 97% on room air. However, she is currently on 2 L for comfort. She does not appear fluid overloaded. Patient will be treated with Cardizem bolus. Will obtain screening lab work, cardiac enzymes and chest x-ray. We will continue to monitor and reassess patient. 0006:  Patient was treated initially with 20 mg Cardizem bolus and followed by 30 mg bolus now with control of her heart rate in the low 100s. Her troponin is elevated at 1400. Patient given aspirin and started on heparin. Likely from demand ischemia from RVR. Chest x-ray shows no evidence of pneumonia. However, patient treated empirically with community-acquired pneumonia coverage. Case discussed with Dr. Caleb Ho who accepts patient for admission. Procedures    Critical Care Time:       Diagnosis     Clinical Impression: No diagnosis found. Disposition: admitted, telemetry    Follow-up Information    None          Patient's Medications   Start Taking    No medications on file   Continue Taking    ALBUTEROL (PROVENTIL HFA, VENTOLIN HFA, PROAIR HFA) 90 MCG/ACTUATION INHALER    Take 2 Puffs by inhalation every six (6) hours as needed for Shortness of Breath or Cough. CARVEDILOL (COREG) 25 MG TABLET    Take 25 mg by mouth two (2) times daily (with meals). Indications: high blood pressure    CLONIDINE HCL (CATAPRES) 0.2 MG TABLET    Take 0.2 mg by mouth three (3) times daily. CYCLOBENZAPRINE (FLEXERIL) 10 MG TABLET    Take 1 Tab by mouth three (3) times daily as needed for Muscle Spasm(s). DILTIAZEM ER (CARDIZEM CD) 180 MG CAPSULE    Take 1 Capsule by mouth daily. FLUTICASONE PROPION-SALMETEROL (ADVAIR/WIXELA) 250-50 MCG/DOSE DISKUS INHALER    Take 1 Puff by inhalation two (2) times a day. FLUTICASONE PROPIONATE (FLONASE) 50 MCG/ACTUATION NASAL SPRAY    2 Sprays by Both Nostrils route daily. GUAIFENESIN ER (MUCINEX) 600 MG ER TABLET    Take 1 Tablet by mouth two (2) times a day.     HYDROXYZINE HCL (ATARAX) 50 MG TABLET    Take 50 mg by mouth two (2) times daily as needed for Anxiety. MECLIZINE (ANTIVERT) 25 MG TABLET    Take 25 mg by mouth three (3) times daily as needed. Indications: prevention of motion sickness    SERTRALINE (ZOLOFT) 50 MG TABLET    Take 50 mg by mouth daily. Indications: major depressive disorder    SEVELAMER CARBONATE (RENVELA) 800 MG TAB TAB    Take 3,200 mg by mouth three (3) times daily (with meals). TRIAMCINOLONE ACETONIDE (KENALOG) 0.5 % OINTMENT    Apply  to affected area three (3) times daily. ZOLPIDEM (AMBIEN) 5 MG TABLET    Take 5 mg by mouth nightly as needed. These Medications have changed    No medications on file   Stop Taking    No medications on file     Disclaimer: Sections of this note are dictated using utilizing voice recognition software. Minor typographical errors may be present. If questions arise, please do not hesitate to contact me or call our department.

## 2022-10-21 NOTE — PROGRESS NOTES
Latest Reference Range & Units 10/21/22 11:27   aPTT 23.0 - 36.4 SEC 56.7 (H)     ApTT nontherapeutic. Rate change to 12units/kg/hr. No signs/symptoms of bleeding noted. Next apTT [id number]. Will continue to monitor. Call bell within reach. 1530- Orders received. Patient may go off unit without cardiac monitoring.

## 2022-10-21 NOTE — CONSULTS
Cardiology Initial Patient Referral Note    Cardiology referral request from Dr. Caleb Ho for evaluation and management/treatment of afib RVR, elevated troponin    Date of  Admission: 10/20/2022 10:04 PM   Primary Care Physician:  Devonte Pradhan MD    Attending Cardiologist: Dr. Silvina Cuevas:     -Shortness of breath, productive cough, two ER visits 10/18 and 10/19 prior to current presentation. Reports given IV abx for pneumonia but not discharged on PO abx.  -Tachycardia, sinus by telemetry.  -Hx paroxysmal afib. Has been Rx Eliquis for anticoagulation, however self-discontinued approximately 1 month ago due to problems with bleeding from her AV graft. -HTN, uncontrolled, did not have any of her medications yesterday.  -Elevated troponin, flat ~1400, likely demand in setting of above  -Cardiac CTA 01/2022 with nonobstructive CAD with < 25% stenosis in LAD, LM, RCA and LCx  -Echo 5/29/2022 with LVEF 60-65%, moderate LVH, diastolic dysfunction present, severely dilated LA, normal RV size and function, no significant valvular disease  -ESRD, on HD T/R/S. Reports full run of HD on 10/20. Followed by [facility].  -Anemia, mild, monitor. Primary cardiologist is Dr. Royal Mendoza? Plan:     -Continued on Clonidine, Lisinopril, Cardizem. BP uncontrolled. May need to discontinue Clonidine if compliance is an issue.    -Echo pending as ordered by primary team, will review results as able. -Telemetry reviewed, rhythm currently sinus tachycardia with rates ~110. No sustained afib on telemetry.    -Consider evaluation for PE given tachycardia and reported hypoxia, defer to primary team.  Pt admits to self-discontinuing DOAC 1 month ago.   -Pt had cardiac CTA earlier this year revealing nonobstructive CAD with < 25% stenosis, do not anticipate ischemia evaluation.  -Continue to monitor H/H closely.  -Discussed with Dr. Queenie Riggs, nephrology, assistance appreciated in advance, volume management per nephrology team.    ----------------------------------------  Patient has underlying sinus tachycardia approximate 110 bpm.  Given hypoxia and tachycardia CTA has been performed. Results pending. I saw, examined, and evaluated this patient and performed the substantive portion of the encounter for > 50% of the time including extensive history, physical exam, and medical decision making as discussed with patient and next-of-kin as needed. I personally reviewed the patient's labs, tests, vitals, orders, medications, updated history, and other providers assessments as well. I personally agree with the findings as stated and the plan as documented. Kaylee Snyder MD           History of Present Illness: This is a 65 y.o. female admitted for Atrial fibrillation with RVR ([facility] [address] [I48.91]  Elevated troponin [R77.8]  Dyspnea [R06.00]. Patient complains of: vomiting, palpitations      Indra Godfrey is a 65 y.o. female who presented to the hospital due to episode of projectile vomiting around 20:00 last night followed by onset of palpitations/fast HR. She reports she had eaten some soup earlier in the day and had her normal run of HD yesterday; she had not taken any of her HTN medications yesterday prior to presentation and was about to make some steamed shrimp when her symptoms began. Pt states she had been to [facility] and [place] [id number] ER over the prior couple days due to productive cough and progressive shortness of breath with exertion over prior 4-5 weeks. She reports she was diagnosed with pneumonia and given dose of IV antibiotics but was not discharged on any PO antibiotics. She states that she intermittently feels hot/cold at times, on a chronic basis.     Cardiac risk factors: hypertension, post-menopausal      Review of Symptoms:  Except as stated above include:  Constitutional:  negative  Respiratory:  As per HPI  Cardiovascular:  + palpitations, no chest pain  Gastrointestinal: As per HPI  Genitourinary: negative  Musculoskeletal:  Negative  Neurological:  Negative  Dermatological:  Negative  Endocrinological: Negative  Psychological:  Negative       Past Medical History:     Past Medical History:   Diagnosis Date    Adenomatous polyp     CHF (congestive heart failure) ([facility]     Chronic kidney disease     TT    Dialysis patient ([facility]     tues, thurs, sat    ESRD (end stage renal disease) ([facility]     Family hx of colon cancer     Mother    GI bleed due to NSAIDs     Heart failure ([facility]     History of blood transfusion     Hypertension     LUIS (obstructive sleep apnea)     no cpap         Social History:     Social History     Socioeconomic History    Marital status:    Tobacco Use    Smoking status: Former    Smokeless tobacco: Never   Substance and Sexual Activity    Alcohol use: No    Drug use: No    Sexual activity: Yes     Partners: Male        Family History:     Family History   Problem Relation Age of Onset    Colon Cancer Mother     Colon Polyps Mother         Medications:   No Known Allergies     Current Facility-Administered Medications   Medication Dose Route Frequency    metoprolol (LOPRESSOR) injection 5 mg  5 mg IntraVENous Q20MIN PRN    sodium chloride (NS) flush 5-40 mL  5-40 mL IntraVENous Q8H    sodium chloride (NS) flush 5-40 mL  5-40 mL IntraVENous PRN    acetaminophen (TYLENOL) tablet 650 mg  650 mg Oral Q6H PRN    Or    acetaminophen (TYLENOL) suppository 650 mg  650 mg Rectal Q6H PRN    polyethylene glycol (MIRALAX) packet 17 g  17 g Oral DAILY PRN    ondansetron (ZOFRAN ODT) tablet 4 mg  4 mg Oral Q8H PRN    Or    ondansetron (ZOFRAN) injection 4 mg  4 mg IntraVENous Q6H PRN    albuterol-ipratropium (DUO-NEB) 2.5 MG-0.5 MG/3 ML  3 mL Nebulization Q6H PRN    melatonin tablet 5 mg  5 mg Oral QHS PRN    aspirin chewable tablet 81 mg  81 mg Oral DAILY    cloNIDine HCL (CATAPRES) tablet 0.2 mg  0.2 mg Oral TID    dilTIAZem ER (CARDIZEM CD) capsule 180 mg  180 mg Oral DAILY    fluticasone propionate (FLONASE) 50 mcg/actuation nasal spray 2 Spray  2 Spray Both Nostrils DAILY    guaiFENesin ER (MUCINEX) tablet 600 mg  600 mg Oral BID    hydrOXYzine HCL (ATARAX) tablet 50 mg  50 mg Oral BID PRN    lisinopriL (PRINIVIL, ZESTRIL) tablet 20 mg  20 mg Oral DAILY    sertraline (ZOLOFT) tablet 50 mg  50 mg Oral DAILY    sevelamer carbonate (RENVELA) tab 3,200 mg  3,200 mg Oral TID WITH MEALS    cyclobenzaprine (FLEXERIL) tablet 10 mg  10 mg Oral TID PRN    arformoterol 15 mcg/budesonide 0.5 mg neb solution   Nebulization BID RT    nitroglycerin (NITROBID) 2 % ointment 1 Inch  1 Inch Topical Q6H PRN    hydrALAZINE (APRESOLINE) 20 mg/mL injection 10 mg  10 mg IntraVENous Q6H PRN    labetaloL (NORMODYNE;TRANDATE) 20 mg/4 mL (5 mg/mL) injection 10 mg  10 mg IntraVENous Q2H PRN    heparin (porcine) 25,000 units in 0.45% saline 250 ml infusion  12-25 Units/kg/hr IntraVENous TITRATE         Physical Exam:   Visit Vitals  BP (!) 189/118 (BP 1 Location: Right upper arm, BP Patient Position: At rest)   Pulse (!) 102   Temp 98.7 °F (37.1 °C)   Resp 20   Ht 5' 2\" (1.575 m)   Wt 72.1 kg (159 lb)   SpO2 95%   Breastfeeding Unknown   BMI 29.08 kg/m²       TELE:  sinus tachycardia    BP Readings from Last 3 Encounters:   10/21/22 (!) 189/118   10/18/22 (!) 131/120   10/07/22 (!) 165/95     Pulse Readings from Last 3 Encounters:   10/21/22 (!) 102   10/18/22 99   10/07/22 94     Wt Readings from Last 3 Encounters:   10/20/22 72.1 kg (159 lb)   10/18/22 74.8 kg (165 lb)   10/07/22 74.8 kg (165 lb)       General:  alert, cooperative, no distress, appears stated age  Neck:  supple  Lungs:  clear to auscultation bilaterally  Heart:  tachycardic regular rhythm  Abdomen:  abdomen is soft without significant tenderness, masses, organomegaly or guarding  Extremities:  atraumatic, no edema  Skin: Warm and dry.    Neuro: alert, oriented x3, affect appropriate, no focal neurological deficits, moves all extremities well, no involuntary movements  Psych: non focal     Data Review:     Recent Labs     10/21/22  0400 10/20/22  2220   WBC 6.2 6.1   HGB 9.7* 10.6*   HCT 32.4* 34.3*    223     Recent Labs     10/20/22  2220      K 4.4      CO2 29   GLU 94   BUN 50*   CREA 7.37*   CA 9.1   MG 2.9*       Last Lipid:    Lab Results   Component Value Date/Time    Cholesterol, total 260 (H) 02/07/2013 04:21 PM    HDL Cholesterol 68 (H) 02/07/2013 04:21 PM    LDL, calculated 157.8 02/07/2013 04:21 PM    Triglyceride 171 (H) 02/07/2013 04:21 PM    CHOL/HDL Ratio 3.8 02/07/2013 04:21 PM       Cardiographics:         05/28/22    ECHO ADULT COMPLETE 05/29/2022 5/29/2022    Interpretation Summary    Left Ventricle: Normal left ventricular systolic function with a visually estimated EF of 60 - 65%. Left ventricle size is normal. Moderately increased wall thickness. Normal wall motion. Global longitudinal strain is mildly reduced (-52.2%) Diastolic dysfunction present with normal LV EF. Left Atrium: Left atrium is severely dilated. Normal right ventricular size and function    Insufficient TR to estimate pulmonary artery pressure    Signed by: Jeanna Turner MD on 5/29/2022  9:32 AM            XR Results (most recent):  Results from Hospital Encounter encounter on 10/18/22    XR CHEST PORT    Narrative  EXAM: XR CHEST PORT    CLINICAL INDICATION/HISTORY: 62 years Female. sob. Additional History: None    TECHNIQUE: Frontal view of the chest    COMPARISON: Chest radiograph 9/27/2022    FINDINGS:    Cardiac silhouette is enlarged but appears similar to prior. Prominent  tortuosity thoracic aorta, without definite interval change. Streaky and patchy opacities within the bilateral perihilar regions and right  greater than left lung bases, overall appear improved compared to prior. Aortic  calcifications noted. Mild pulmonary vascular congestion, improved from prior. No definite evidence of pleural effusion or pneumothorax.     No acute osseous abnormality appreciated. Impression  1. Similar cardiac silhouette enlargement. Interval improved pulmonary vascular  congestion. 2.  Patchy opacities in the bilateral perihilar regions and bilateral lung bases  which are overall improved compared to prior, favor atelectasis but infiltrates  or pulmonary edema are possible.         Signed By: Misti Whitaker PA-C     October 21, 2022

## 2022-10-21 NOTE — PROGRESS NOTES
Received pre HD report from  primary RN. Pt in bed, A+O x2, lethargic, on O2 via NC @ 2L, no s/s of distress noted. Accessed AVF Left upper arm w/15G needle per protocol. Tx initiated at 1838. AVF flowing with ease. For hemodynamic stability UF goal 2500 ml. Offered assistance with repositioning every 2 hours. Vascular access visible at all times throughout entire duration of treatment and line connections remain intact throughout entire duration of treatment. Tx completed at 2040, tolerated well 2L removed. De-accessed per protocol. Clot time 5 minutes for arterial, and 5 minutes for venous. Unit nurse given report.                       ACUTE HEMODIALYSIS FLOW SHEET      HEMODIALYSIS ORDERS: Physician:  Jasbir Nichols     Dialyzer: Revaclear   Duration: 2   BFR: 350  DFR: 600   Dialysate:  Temp 36-37*C   K+  2    Ca+ 2.5   Na 138  Bicarb 35   Wt Readings from Last 1 Encounters:        Patient Chart [x]   Unable to Obtain []  Dry weight/UF Goal:2500   Heparin []  Bolus    Units    [] Hourly    Units    [x]None       Pre BP  158/108    Pulse: 88 Respirations: 18 Temp: 97.4 [x] Tempoal  [] Ax  [] Esoph   Labs: []  Pre  []  Post:   [x] N/A   Additional Orders (medications, blood products, hypotension management): [] Yes   [x] No          GRAFT/FISTULA ACCESS:   []N/A     []Right     [x]Left     [x]UE     []LE   []AVG   [x]AVF       [x]Medical Aseptic Prep Utilized   [x]No S/S infection  []Redness  []Drainage [] Cultured  [] Swelling  [] Pain  Bruit:   [x] Strong    [] Weak       Thrill :   [x] Strong    [] Weak     Needle Gauge: 15   Length: 1 inch   If access problem,  notified: []Yes     [x]N/A                GENERAL ASSESSMENT:    LUNGS:  Resp Rate 18   [] Clear  [] Coarse  [] Crackles  [x] Wheezing  [] Diminished                                                           [] RLL   [] LLL  [] RUL   [] STONEY            Respirations:  [x]Easy  []Labored  [x]N/A  Cough:  []Productive  []Dry  []N/A Therapy:  []RA   [] Ventilated   [] Intubated   [] Trach            O2 Device:  [x] NC   [] NRB  [] Trach Mask  [] BiPaP  Flow:  2 l/min                                                    CARDIAC: [x] Regular      [] Irregular   [] Rhythm:          [] Monitored   [] Bedside   [] Remotely monitored       EDEMA: [] None   [x]Generalized  [] Pitting [] 1+   [] 2 +   [] 3+    [] 4+        SKIN:   [] Hot     [] Cold    [x] Warm   [] Dry    [] Diaphoretic                 [] Flushed  [] Jaundiced  [] Cyanotic  [] Pale      LOC:    [x] Alert      [x]Oriented:    [x] Person     [] Place   []Time               [] Confused  [x] Lethargic  [] Medicated  [] Non-responsive  [] Non-Verbal     GI / ABDOMEN:                     [] Flat    [] Distended    [x] Soft    [] Firm   []  Obese                   [] Diarrhea   [] FMS [] Bowel Sounds  [] Nausea  [] Vomiting                   [] NGT  [] OGT  [] PEG  [] Tube Feedings @     mL/hr     / URINE ASSESSMENT:                   [] Voiding    [] Oliguria  [x] Anuria                     []  Garcia   [] Incontinent  []  Incontinent Brief   []  PureWick     PAIN:  [x] 0 []1  []2   []3   []4   []5   []6   []7   []8   []9   []10                MOBILITY:  [x] Bed    [] Stretcher      All Vitals and Treatment Details on Attached [address]: [facility]          Room # 368/01    [] Routine         [x] 1st Time Acute/Chronic   [] Urgent      [] Stat              [x] Acute Room   []  Bedside    [] ICU/CCU     [] ER     Isolation Precautions:  [x] Dialysis    There are currently no Active Isolations     ALLERGIES:     No Known Allergies     Code Status:  Full Code     Hepatitis Status      Lab Results   Component Value Date/Time    Hepatitis B surface Ag <0.10 09/27/2022 07:08 AM    Hepatitis B surface Ab 272.55 07/05/2021 02:58 PM        Current Labs:      Lab Results   Component Value Date/Time    WBC 6.2 10/21/2022 04:00 AM    Hemoglobin, POC 12.6 09/27/2019 07:30 AM    HGB 9.7 (L) 10/21/2022 04:00 AM    Hematocrit, POC 37 09/27/2019 07:30 AM    HCT 32.4 (L) 10/21/2022 04:00 AM    PLATELET 529 60/81/5929 04:00 AM    MCV 91.3 10/21/2022 04:00 AM     Lab Results   Component Value Date/Time    Sodium 141 10/21/2022 11:27 AM    Potassium 4.4 10/21/2022 11:27 AM    Chloride 100 10/21/2022 11:27 AM    CO2 32 10/21/2022 11:27 AM    Anion gap 9 10/21/2022 11:27 AM    Glucose 90 10/21/2022 11:27 AM    BUN 59 (H) 10/21/2022 11:27 AM    Creatinine 8.44 (H) 10/21/2022 11:27 AM    BUN/Creatinine ratio 7 (L) 10/21/2022 11:27 AM    GFR est AA 6 (L) 09/28/2022 01:29 AM    GFR est non-AA 5 (L) 09/28/2022 01:29 AM    Calcium 9.3 10/21/2022 11:27 AM          DIET:  DIET ADULT ORAL NUTRITION SUPPLEMENT  DIET ADULT  DIET ADULT ORAL NUTRITION SUPPLEMENT     PRIMARY NURSE REPORT:   Pre Dialysis:  , RN    Time:      EDUCATION:    [x] Patient           Knowledge Basis: []None []Minimal [] Substantial [x] Unknown  Barriers to learning  []None  [] Intubated/Trached/Ventilated  [x] Sedated/Paralyzed   [x] Access Care     [] S&S of infection  [] Fluid Management  [] K+   [x] Procedural    [] Medications   [] Tx Options   [] Transplant   [] Diet      Teaching Tools:  [x] Explain  [] Demo  [] Handouts [] Video  Patient response: [x] Verbalized understanding   [] Requires follow up        [x] Time Out/Safety Check    [x] Extracorporeal Circuit Tested for integrity       RO/HEMODIALYSIS MACHINE SAFETY CHECKS - Before each treatment:        [address] [place] [address]                                     [x] Unit Machine # 5 with centralized RO                                  [] Portable Machine #1/RO serial # [id number]                                  [] Portable Machine #2/RO serial # [id number]                                  [] Portable Machine #4/RO serial # [id number]                                  [] Portable Machine #3/RO serial # [id number] Alarm Test:  Pass time 6068         [x] RO/Machine Log Complete    Machine Temp    36-37*C             Dialysate: pH  7.4    Conductivity: Meter 14.0    HD Machine  13.9    TCD: 13.7  Dialyzer Lot # [id number]    Blood Tubing Lot # [id number]    Saline Lot #[id number]     CHLORINE TESTING-Before each treatment and every 4 hours    Total Chlorine: [x] less than 0.1 ppm  Initial Time Check: 1615     4 Hr/2nd Check Time: 2015   (if greater than 0.1 ppm from Primary then every 30 minutes from Secondary)     TREATMENT INITIATION - with Dialysis Precautions:   [x] All Connections Secured              [x] Saline Line Double Clamped   [x] Venous Parameters Set               [x] Arterial Parameters Set    [x] Prime Given 250ml NSS              [x]Air Foam Detector Engaged                   Medication    Dose    Volume Route      Time       Jessica Nurse      HD  EUNICE Khan, RN      HD         HD                  Post Assessment  Dialyzer Cleared:   [] Good  [x] Fair  [] Poor  Blood processed:  38.6 L  UF Removed:  2500 Ml    Post BP: 149/99  Pulse: 96  Respirations: 182   Temp: 96.7  [x] Temporal  [] Ax  [] Esophageal   Lungs: [] Clear                [x] No change from initial assessment   Post Tx Vascular Access: [] N/A  AVF/AVG: Bleeding stopped with  Arterial Pressure for 5 min   Venous Pressure for 5 min      Cardiac:  [x] Regular   [] Irregular   Rhythm:  [] Monitored   [] Not Monitored    CVC Catheter: [x] N/A  Locking solution: Heparin 1:1000 U  Arterial port   ml   Venous port   ml   Edema:  [x] None  [] Generalized                     Skin:[x] Warm  [] Dry [] Diaphoretic               [] Flushed  [] Pale [] Cyanotic Pain:  [x]0  []1-2  []3-4  []5-6   []7-8  []9-10           Post Treatment Note:   See above note             POST TREATMENT PRIMARY NURSE HANDOFF REPORT:   Post Dialysis: BILL Adan RN        Time:  2055         Abbreviations: AVG-arterial venous graft, AVF-arterial venous fistula, IJ-Internal Jugular, Subcl-Subclavian, Fem-Femoral, Tx-treatment, AP/HR-apical heart rate, VSS- Vital Signs Stable, CVC- Central Venous Catheter, DFR-dialysate flow rate, BFR-blood flow rate, AP-arterial pressure, -venous pressure, UF-ultrafiltrate, TMP-transmembrane pressure, Jani-Venous, Art-Arterial, RO-Reverse Osmosis

## 2022-10-21 NOTE — H&P
History and Physical    Patient: Tio Narayan MRN: [id number]  SSN: [id number]    YOB: 1960  Age: 65 y.o. Sex: female      Subjective:      Tio Narayan is a 65 y.o. female who presents to [facility] ER with complaint of Shortness of Breath and Palpitations. Patient reports that she is having abdominal pain 10/10 that are \"stabbing\" and lasted for ~1 hour. Patient reports that she is having chest tightness and shortness of breath with dyspnea on exertion that started on Tuesday and gradually improved since that time with lingering exertional chest tightness. Patient also report nausea, chills, and cough productive of white phlegm. Patient does report that yesterday she was having chest even without deep breathing, but states that she did not have that problem today. Patient denies fevers, chills, vomiting, diarrhea, dysuria, chest pain, pain with inspiration, abdominal pain, LUTS, BLE edema, orthopnea, shortness of breath, dyspnea on exertion, and weight gain. In [facility] ER, Patient is noted to have Temperature 98.9°F, Heart Rate 154 bpm, Respiration Rate 23 bpm, Blood Pressure 159/110 mm Hg to 179/100 mm Hg, SpO2 95% on 2 L/min O2 (Nursing Note indicates that Patient's SpO2 was 84% while on Room Air and attempting to rest), WBC 6.1, Hgb 10.6, Neut% 65%, Neut# 4.0, BUN 50, Creatinine 7.37 (ESRD on HD), eGFR 6, Magnesium 2.9 mg/dL, and Troponin 1,463 ng/L. Patient received PO Aspirin 325 mg, IV Diltiazem 30 mg and IV Diltiazem 20 mg, IV Ondansetron 4 mg, Heparin 4,000 units, IV Ceftriaxone 1 gram, IV Azithromycin 500 mg, and IV Heparin gtt were initiated in [facility] ER.     Patient is admitted to [facility] Telemetry Unit for management of Acute Respiratory Failure, Atrial Fibrillation with RVR, and Elevated Troponin (likely due to Demand Ischemia),    Past Medical History:   Diagnosis Date    Adenomatous polyp     [facility]     Chronic kidney disease     TTHS    Dialysis patient ([facility]     jourdan molina, sat    [facility] stage [facility]) [facility]     Family hx of colon cancer     Mother    GI bleed due to NSAIDs     [facility] failure ([facility]     History of blood transfusion     Hypertension     LUIS (obstructive sleep apnea)     no cpap     Past Surgical History:   Procedure Laterality Date    COLONOSCOPY N/A 9/27/2019    COLONOSCOPY with polyp bx performed by Melba Vera MD at [address] [facility]      HX COLONOSCOPY  09/21/2016    colonoscopy, Dr. Wendy Cuba, [facility]  05/2020    HX OTHER SURGICAL       ARTERIOVENOUS FISTULA INSERTION LEFT ARM     HX TOTAL ABDOMINAL HYSTERECTOMY      HX TOTAL COLECTOMY  10/11/2016    LAPAROSCOPIC COLECTOMY PARTIAL RIGHT FOR CECAL POLYP, DR. PAEZ, [facility]    HX TUBAL LIGATION      VASCULAR SURGERY PROCEDURE UNLIST      left arm graft      Family History   Problem Relation Age of Onset    Colon Cancer Mother     Colon Polyps Mother      Social History     Tobacco Use    Smoking status: Former    Smokeless tobacco: Never   Substance Use Topics    Alcohol use: No      Prior to Admission medications    Medication Sig Start Date End Date Taking? Authorizing Provider   lisinopriL (PRINIVIL, ZESTRIL) 20 mg tablet Take 20 mg by mouth daily. Patient unsure of dosage   Yes Provider, Historical   guaiFENesin ER (MUCINEX) 600 mg ER tablet Take 1 Tablet by mouth two (2) times a day. 10/18/22  Yes Antonio Cedeno MD   dilTIAZem ER (Cardizem CD) 180 mg capsule Take 1 Capsule by mouth daily. 9/28/22  Yes Danuta Bowers MD   cloNIDine HCL (CATAPRES) 0.2 mg tablet Take 0.2 mg by mouth three (3) times daily. 7/15/22  Yes Provider, Historical   sertraline (ZOLOFT) 50 mg tablet Take 50 mg by mouth daily. Indications: major depressive disorder 8/15/22  Yes Provider, Historical   fluticasone propionate (FLONASE) 50 mcg/actuation nasal spray 2 Sprays by Both Nostrils route daily.  8/29/22  Yes Amanda Pitt MD   fluticasone propion-salmeteroL (ADVAIR/WIXELA) 250-50 mcg/dose diskus inhaler Take 1 Puff by inhalation two (2) times a day. 4/14/22  Yes Provider, Historical   sevelamer carbonate (RENVELA) 800 mg tab tab Take 3,200 mg by mouth three (3) times daily (with meals). 4/14/22  Yes Provider, Historical   albuterol (PROVENTIL HFA, VENTOLIN HFA, PROAIR HFA) 90 mcg/actuation inhaler Take 2 Puffs by inhalation every six (6) hours as needed for Shortness of Breath or Cough. 10/18/22   Elvira Newell MD   meclizine (ANTIVERT) 25 mg tablet Take 25 mg by mouth three (3) times daily as needed. Indications: prevention of motion sickness 9/22/22   Provider, Historical   triamcinolone acetonide (KENALOG) 0.5 % ointment Apply  to affected area three (3) times daily. Patient not taking: Reported on 10/21/2022 6/28/22   Provider, Historical   hydrOXYzine HCL (ATARAX) 50 mg tablet Take 50 mg by mouth two (2) times daily as needed for Anxiety. 5/27/22   Provider, Historical   cyclobenzaprine (FLEXERIL) 10 mg tablet Take 1 Tab by mouth three (3) times daily as needed for Muscle Spasm(s). 4/7/21   Kp Wharton MD        No Known Allergies    Review of Systems:  (-) Fevers  (-) Chills  (+) Cough  (+) Increased Sputum Production  (+) Shortness of Breath  (+) Dyspnea on Exertion  (~) Chest Pain  (+) Abdominal Pain  (+) Nausea  (-) Vomiting  (-) Diarrhea  (-) Dysuria  All other systems have been reviewed and are negative        Objective:     Vitals:    10/21/22 0609 10/21/22 0723 10/21/22 0740 10/21/22 1152   BP: (!) 184/110 (!) 189/118     Pulse: 99 (!) 102     Resp: 18 20     Temp:  98.7 °F (37.1 °C)     SpO2: 95% 95% 100%    Weight:       Height:    5' 2\" (1.575 m)        Physical Exam:  General:  Older adult female lying in bed in no acute distress  HEENT:  Atraumatic, normocephalic; Pupils equally round and reactive to light with accommodation; Extraocular muscles intact;  Moist Oropharynx without erythema, edema, or exudates; (+) NC in place and running at 2 L/min O2 via NC  Chest:  No pectus carinatum; No pectus excavatum  Cardiovascular:  (+) Tachycardic rate, regular rhythm without rubs, gallops, or murmurs  Respiratory:  Clear to Auscultation Bilaterally without wheezes, rales, or rhonchi; normal effort of breathing  Abdominal:  Soft, non-tense, non-tender abdomen; BS present without guarding, rebound, or masses  :  Deferred  Extremities:  Pulses 2+ x4 without edema, clubbing, or cyanosis  Musculoskeletal:  Strength 5/5 and symmetrical in BUE and BLE  Integument:  No rash on face, forearms, or legs  Neurological:  Alert & Ostensibly Oriented x4/4; No gross deficits of Visual Acuity, Eye Movement, Jaw Opening, Facial Expression, Hearing, Phonation, or Head Movement;  No gross deficits of Tongue Movement or Slurring of Speech  Psychiatric:  Affect is appropriate; Language is present and fluent; Behavior is appropriate      Laboratory Studies:  CMP:   Lab Results   Component Value Date/Time     10/21/2022 11:27 AM    K 4.4 10/21/2022 11:27 AM     10/21/2022 11:27 AM    CO2 32 10/21/2022 11:27 AM    AGAP 9 10/21/2022 11:27 AM    GLU 90 10/21/2022 11:27 AM    BUN 59 (H) 10/21/2022 11:27 AM    CREA 8.44 (H) 10/21/2022 11:27 AM    CA 9.3 10/21/2022 11:27 AM    MG 2.9 (H) 10/20/2022 10:20 PM    ALB 2.9 (L) 10/21/2022 11:27 AM    TP 6.7 10/21/2022 11:27 AM    GLOB 3.8 10/21/2022 11:27 AM    AGRAT 0.8 10/21/2022 11:27 AM    ALT 41 10/21/2022 11:27 AM     CBC:   Lab Results   Component Value Date/Time    WBC 6.2 10/21/2022 04:00 AM    HGB 9.7 (L) 10/21/2022 04:00 AM    HCT 32.4 (L) 10/21/2022 04:00 AM     10/21/2022 04:00 AM     All Cardiac Markers in the last 24 hours: No results found for: CPK, CK, CKMMB, CKMB, RCK3, CKMBT, CKNDX, CKND1, ANDREW, TROPT, TROIQ, NHAN, TROPT, TNIPOC, BNP, BNPP  Recent Glucose Results:   Lab Results   Component Value Date/Time    GLU 90 10/21/2022 11:27 AM    GLU 94 10/20/2022 10:20 PM     ABG: No results found for: PH, PHI, PCO2, PCO2I, PO2, PO2I, HCO3, HCO3I, FIO2, FIO2I  COAGS:   Lab Results   Component Value Date/Time    APTT 56.7 (H) 10/21/2022 11:27 AM        Images Reviewed:  XR CHEST PORT    Result Date: 10/21/2022  EXAM: XR CHEST PORT CLINICAL INDICATION/HISTORY: 65 years Female. sob. Additional History: None TECHNIQUE: Frontal view of the chest COMPARISON: 10/18/2022 FINDINGS: Cardiac silhouette is moderately enlarged, similar in size to prior. Prominence and tortuosity of the thoracic aorta, also similar to prior. Prominent interstitial markings in the perihilar regions and lung bases. Mild to moderate pulmonary vascular congestion. Additional streaky opacities in the bilateral lower lung zones, favor atelectasis. No definite evidence of pleural effusion or pneumothorax. Vascular stent graft at the left upper extremity. No acute osseous abnormality appreciated. Cardiac silhouette enlargement with mild to moderate pulmonary vascular congestion. Prominent interstitial markings favor pulmonary interstitial edema although bronchitis is also possible. Suspected bibasilar subsegmental atelectasis. Assessment:     Hospital Problems  Date Reviewed: 3/9/2022            Codes Class Noted POA    Elevated troponin ICD-10-CM: R77.8  ICD-9-CM: 790.6  10/21/2022 Unknown        Atrial fibrillation with RVR (HCC) ICD-10-CM: I48.91  ICD-9-CM: 427.31  5/28/2022 Unknown           Plan:     Acute Respiratory Failure   Pulse Oximetry and Therapeutic Oxygen (currently 2 L/min O2 via NC). Paroxysmal Atrial Fibrillation with RVR and Elevated Troponin (thought due to Demand Ischemia)  Telemetry and Diltiazem. Chest Pain and Shortness of Breath concerning for possible ACS  Telemetry, serial Troponins, ASA, ACEI, Beta-Blocker, STATIN, HbA1c, Lipid Panel, TSH, Echocardiogram in AM, and Consult Cardiological services. Hypertension  Continue home Clonidine and Lisinopril.     ESRD on HD  Continue home Sevelamer carbonate. Depression  Continue home Sertraline. DVT Prophylaxis  Therapeutic anticoagulation (and, therefore, DVT chemoprophylaxis) has been achieved with IV Heparin gtt.      Signed By: Amarilis Ignacio DO     October 21, 2022

## 2022-10-21 NOTE — PROGRESS NOTES
Assumed care of patient from [facility] Michael (off going nurse). Patient alert and oriented. No apparent distress noted. Patient offers no concerns and can verbalize needs. Assessment to follow. Call bell within reach. Bed in lowest, locked position.

## 2022-10-21 NOTE — PROGRESS NOTES
Dr Danish Milner paged . Informed of patient's elevated blood pressure 213/130 with map of 154. New orders to be added by Dr Danish Milner.

## 2022-10-21 NOTE — PROGRESS NOTES
INTERVENTION:  HEMODYNAMIC STABILIZATION  MAINTAIN BP WNL WHILE ON HD. INTERVENTION:  FLUID MANAGEMENT  WILL ATTEMPT 2500 ML TOTAL FLUID REMOVAL AS TOLERATED. INTERVENTION:  METABOLIC/ELECTROLYTE MANAGEMENT  2.0 POTASSIUM 2.5 CALCIUM DIALYSATE USED WITH HD TODAY. INTERVENTION:  HEMODIALYSIS ACCESS SITE MANAGEMENT  LEFT UPPER ARM AVF ACCESSED WITH 15G NEEDLE USING ASEPTIC TECHNIQUE. GOAL:  SIGNS AND SYMPTOMS OF LISTED POTENTIAL PROBLEMS WILL BE ABSENT OR MANAGEABLE. OUTCOME:  PROGRESSING. HD PLANNED FOR 2 HOURS TODAY.

## 2022-10-21 NOTE — PROGRESS NOTES
Problem: Falls - Risk of  Goal: *Absence of Falls  Description: Document Beatriz Skill Fall Risk and appropriate interventions in the flowsheet.   Outcome: Progressing Towards Goal  Note: Fall Risk Interventions:            Medication Interventions: Bed/chair exit alarm                   Problem: Patient Education: Go to Patient Education Activity  Goal: Patient/Family Education  Outcome: Progressing Towards Goal     Problem: Nutrition Deficit  Goal: *Optimize nutritional status  Outcome: Progressing Towards Goal

## 2022-10-22 ENCOUNTER — APPOINTMENT (OUTPATIENT)
Dept: NON INVASIVE DIAGNOSTICS | Age: 62
DRG: 308 | End: 2022-10-22
Attending: INTERNAL MEDICINE
Payer: MEDICARE

## 2022-10-22 LAB
ALBUMIN SERPL-MCNC: 3 G/DL (ref 3.4–5)
ALBUMIN/GLOB SERPL: 0.7 (ref 0.8–1.7)
ALP SERPL-CCNC: 131 U/L (ref 45–117)
ALT SERPL-CCNC: 35 U/L (ref 13–56)
ANION GAP SERPL CALC-SCNC: 9 MMOL/L (ref 3–18)
AST SERPL-CCNC: 31 U/L (ref 10–38)
BASOPHILS # BLD: 0.1 K/UL (ref 0–0.1)
BASOPHILS NFR BLD: 1 % (ref 0–2)
BILIRUB SERPL-MCNC: 0.5 MG/DL (ref 0.2–1)
BUN SERPL-MCNC: 40 MG/DL (ref 7–18)
BUN/CREAT SERPL: 6 (ref 12–20)
CALCIUM SERPL-MCNC: 9 MG/DL (ref 8.5–10.1)
CHLORIDE SERPL-SCNC: 100 MMOL/L (ref 100–111)
CO2 SERPL-SCNC: 29 MMOL/L (ref 21–32)
CREAT SERPL-MCNC: 6.44 MG/DL (ref 0.6–1.3)
DIFFERENTIAL METHOD BLD: ABNORMAL
EOSINOPHIL # BLD: 0.3 K/UL (ref 0–0.4)
EOSINOPHIL NFR BLD: 6 % (ref 0–5)
ERYTHROCYTE [DISTWIDTH] IN BLOOD BY AUTOMATED COUNT: 17.7 % (ref 11.6–14.5)
GLOBULIN SER CALC-MCNC: 4.2 G/DL (ref 2–4)
GLUCOSE SERPL-MCNC: 74 MG/DL (ref 74–99)
HBV SURFACE AG SER QL: <0.1 INDEX
HBV SURFACE AG SER QL: NEGATIVE
HCT VFR BLD AUTO: 33.3 % (ref 35–45)
HGB BLD-MCNC: 10.1 G/DL (ref 12–16)
IMM GRANULOCYTES # BLD AUTO: 0 K/UL (ref 0–0.04)
IMM GRANULOCYTES NFR BLD AUTO: 0 % (ref 0–0.5)
INR PPP: 1 (ref 0.8–1.2)
LYMPHOCYTES # BLD: 1.6 K/UL (ref 0.9–3.6)
LYMPHOCYTES NFR BLD: 29 % (ref 21–52)
MCH RBC QN AUTO: 28 PG (ref 24–34)
MCHC RBC AUTO-ENTMCNC: 30.3 G/DL (ref 31–37)
MCV RBC AUTO: 92.2 FL (ref 78–100)
MONOCYTES # BLD: 0.7 K/UL (ref 0.05–1.2)
MONOCYTES NFR BLD: 12 % (ref 3–10)
NEUTS SEG # BLD: 2.9 K/UL (ref 1.8–8)
NEUTS SEG NFR BLD: 52 % (ref 40–73)
NRBC # BLD: 0 K/UL (ref 0–0.01)
NRBC BLD-RTO: 0 PER 100 WBC
PLATELET # BLD AUTO: 215 K/UL (ref 135–420)
PMV BLD AUTO: 13 FL (ref 9.2–11.8)
POTASSIUM SERPL-SCNC: 4.8 MMOL/L (ref 3.5–5.5)
PROT SERPL-MCNC: 7.2 G/DL (ref 6.4–8.2)
PROTHROMBIN TIME: 14 SEC (ref 11.5–15.2)
RBC # BLD AUTO: 3.61 M/UL (ref 4.2–5.3)
SODIUM SERPL-SCNC: 138 MMOL/L (ref 136–145)
WBC # BLD AUTO: 5.6 K/UL (ref 4.6–13.2)

## 2022-10-22 PROCEDURE — 74011000250 HC RX REV CODE- 250: Performed by: FAMILY MEDICINE

## 2022-10-22 PROCEDURE — 99232 SBSQ HOSP IP/OBS MODERATE 35: CPT | Performed by: FAMILY MEDICINE

## 2022-10-22 PROCEDURE — 65270000046 HC RM TELEMETRY

## 2022-10-22 PROCEDURE — 36415 COLL VENOUS BLD VENIPUNCTURE: CPT

## 2022-10-22 PROCEDURE — 99232 SBSQ HOSP IP/OBS MODERATE 35: CPT | Performed by: INTERNAL MEDICINE

## 2022-10-22 PROCEDURE — 74011000250 HC RX REV CODE- 250: Performed by: INTERNAL MEDICINE

## 2022-10-22 PROCEDURE — 90935 HEMODIALYSIS ONE EVALUATION: CPT

## 2022-10-22 PROCEDURE — 85610 PROTHROMBIN TIME: CPT

## 2022-10-22 PROCEDURE — 77010033678 HC OXYGEN DAILY

## 2022-10-22 PROCEDURE — 74011250637 HC RX REV CODE- 250/637: Performed by: INTERNAL MEDICINE

## 2022-10-22 PROCEDURE — 85025 COMPLETE CBC W/AUTO DIFF WBC: CPT

## 2022-10-22 PROCEDURE — 74011250636 HC RX REV CODE- 250/636: Performed by: STUDENT IN AN ORGANIZED HEALTH CARE EDUCATION/TRAINING PROGRAM

## 2022-10-22 PROCEDURE — 94640 AIRWAY INHALATION TREATMENT: CPT

## 2022-10-22 PROCEDURE — 94762 N-INVAS EAR/PLS OXIMTRY CONT: CPT

## 2022-10-22 PROCEDURE — 80053 COMPREHEN METABOLIC PANEL: CPT

## 2022-10-22 RX ORDER — LIDOCAINE HYDROCHLORIDE 20 MG/ML
15 SOLUTION OROPHARYNGEAL
Status: DISCONTINUED | OUTPATIENT
Start: 2022-10-22 | End: 2022-10-23 | Stop reason: HOSPADM

## 2022-10-22 RX ADMIN — SERTRALINE 50 MG: 50 TABLET, FILM COATED ORAL at 08:24

## 2022-10-22 RX ADMIN — SODIUM CHLORIDE, PRESERVATIVE FREE 10 ML: 5 INJECTION INTRAVENOUS at 23:01

## 2022-10-22 RX ADMIN — NITROGLYCERIN 1 INCH: 20 OINTMENT TOPICAL at 03:57

## 2022-10-22 RX ADMIN — ARFORMOTEROL TARTRATE: 15 SOLUTION RESPIRATORY (INHALATION) at 07:41

## 2022-10-22 RX ADMIN — LIDOCAINE HYDROCHLORIDE 15 ML: 20 SOLUTION ORAL; TOPICAL at 19:35

## 2022-10-22 RX ADMIN — SEVELAMER CARBONATE 3200 MG: 800 TABLET, FILM COATED ORAL at 12:26

## 2022-10-22 RX ADMIN — FLUTICASONE PROPIONATE 2 SPRAY: 50 SPRAY, METERED NASAL at 08:27

## 2022-10-22 RX ADMIN — SODIUM CHLORIDE, PRESERVATIVE FREE 10 ML: 5 INJECTION INTRAVENOUS at 05:35

## 2022-10-22 RX ADMIN — CYCLOBENZAPRINE 10 MG: 10 TABLET, FILM COATED ORAL at 08:34

## 2022-10-22 RX ADMIN — CLONIDINE HYDROCHLORIDE 0.2 MG: 0.1 TABLET ORAL at 22:58

## 2022-10-22 RX ADMIN — SEVELAMER CARBONATE 3200 MG: 800 TABLET, FILM COATED ORAL at 08:23

## 2022-10-22 RX ADMIN — SEVELAMER CARBONATE 3200 MG: 800 TABLET, FILM COATED ORAL at 18:32

## 2022-10-22 RX ADMIN — SODIUM CHLORIDE, PRESERVATIVE FREE 10 ML: 5 INJECTION INTRAVENOUS at 13:24

## 2022-10-22 RX ADMIN — CLONIDINE HYDROCHLORIDE 0.2 MG: 0.1 TABLET ORAL at 18:32

## 2022-10-22 RX ADMIN — GUAIFENESIN 600 MG: 600 TABLET, EXTENDED RELEASE ORAL at 18:32

## 2022-10-22 RX ADMIN — BACITRACIN ZINC, NEOMYCIN, POLYMYXIN B: 400; 3.5; 5 OINTMENT TOPICAL at 12:27

## 2022-10-22 RX ADMIN — HEPARIN SODIUM 12 UNITS/KG/HR: 10000 INJECTION, SOLUTION INTRAVENOUS at 07:26

## 2022-10-22 RX ADMIN — GUAIFENESIN 600 MG: 600 TABLET, EXTENDED RELEASE ORAL at 08:24

## 2022-10-22 RX ADMIN — BACITRACIN ZINC, NEOMYCIN, POLYMYXIN B: 400; 3.5; 5 OINTMENT TOPICAL at 18:33

## 2022-10-22 RX ADMIN — CLONIDINE HYDROCHLORIDE 0.2 MG: 0.1 TABLET ORAL at 11:15

## 2022-10-22 RX ADMIN — ASPIRIN 81 MG 81 MG: 81 TABLET ORAL at 08:25

## 2022-10-22 NOTE — PROGRESS NOTES
Bedside and Verbal shift change report given to NEGRITA Chavez (oncoming nurse) by Enrique Glasgow RN   (offgoing nurse). Report included the following information SBAR, Kardex, Procedure Summary, Intake/Output, MAR, and Recent Results. Patient currently in dialysis.

## 2022-10-22 NOTE — PROGRESS NOTES
[facility]  Progress Note    Patient: Shanti Martinez Age: 65 y.o. : 1960 MR#: [id number] SSN: [id number]  Date: 10/22/2022     Subjective/24-hour events:     Feeling well overall, no new issues overnight. Shortness of breath markedly improved, remains afebrile. Blood pressures running high overall. Heart rates have been slightly tachycardic but patient in sinus rhythm on telemetry monitoring. No chest pain reported acutely. Assessment:   Acute respiratory failure with hypoxia  Suspected volume overload  Elevated troponin, ACS ruled out  ESRD, HD dependent  Hypertension  Paroxysmal atrial fibrillation by history  Obesity, BMI 32.78      Plan:   CTA suboptimal but no obvious evidence for PE. Will discontinue heparin. Hemodialysis per nephrology. Plan for additional session today per usual schedule. Cardiology recommendations appreciated. No acute intervention required but needs better blood pressure and heart rate control. Adjust medications as necessary. Patient took her self off of Eliquis several weeks ago and this will need to be addressed going forward. Will attempt to contact her cardiologist, Dr. Estelita Riedel, for recommendations prior to discharge. Overall status improved medically. Anticipate discharge once she demonstrates continued stability with regard to respiratory status and blood pressures improved. Therapy services: None. Equipment: None. Anticipated disposition: Home.     Case discussed with:  [x]Patient  []Family  [x]Nursing  []Case Management  DVT Prophylaxis:  []Lovenox  []Hep SQ  []SCDs  []Coumadin   []On Heparin gtt    Objective:   VS: Visit Vitals  BP (!) 167/117 (BP 1 Location: Right upper arm, BP Patient Position: At rest)   Pulse 100   Temp 98.8 °F (37.1 °C)   Resp 20   Ht 5' 2\" (1.575 m)   Wt 81.3 kg (179 lb 3.7 oz)   SpO2 97%   Breastfeeding Unknown   BMI 32.78 kg/m²      Tmax/24hrs: Temp (24hrs), Av.9 °F (36.6 °C), Min:96.7 °F (35.9 °C), Max:98.8 °F (37.1 °C)    Intake/Output Summary (Last 24 hours) at 10/22/2022 1323  Last data filed at 10/21/2022 2039  Gross per 24 hour   Intake 43.53 ml   Output 2500 ml   Net -2456.47 ml       General:  In NAD. Nontoxic-appearing. Cardiovascular:  RRR. Pulmonary:  Lungs clear bilaterally, no wheezes. No accessory muscle use. GI:  Abdomen soft, NTTP. Extremities:  Warm, no edema or ischemia. Neuro:  Awake and alert. Moves extremities spontaneously, motor grossly nonfocal.    Labs:    Recent Results (from the past 24 hour(s))   HEP B SURFACE AG    Collection Time: 10/21/22  5:50 PM   Result Value Ref Range    Hepatitis B surface Ag <0.10 <1.00 Index    Hep B surface Ag Interp. Negative NEG     PTT    Collection Time: 10/21/22  9:59 PM   Result Value Ref Range    aPTT 84.7 (H) 23.0 - 36.4 SEC   CBC WITH AUTOMATED DIFF    Collection Time: 10/22/22  3:00 AM   Result Value Ref Range    WBC 5.6 4.6 - 13.2 K/uL    RBC 3.61 (L) 4.20 - 5.30 M/uL    HGB 10.1 (L) 12.0 - 16.0 g/dL    HCT 33.3 (L) 35.0 - 45.0 %    MCV 92.2 78.0 - 100.0 FL    MCH 28.0 24.0 - 34.0 PG    MCHC 30.3 (L) 31.0 - 37.0 g/dL    RDW 17.7 (H) 11.6 - 14.5 %    PLATELET 386 867 - 058 K/uL    MPV 13.0 (H) 9.2 - 11.8 FL    NRBC 0.0 0  WBC    ABSOLUTE NRBC 0.00 0.00 - 0.01 K/uL    NEUTROPHILS 52 40 - 73 %    LYMPHOCYTES 29 21 - 52 %    MONOCYTES 12 (H) 3 - 10 %    EOSINOPHILS 6 (H) 0 - 5 %    BASOPHILS 1 0 - 2 %    IMMATURE GRANULOCYTES 0 0.0 - 0.5 %    ABS. NEUTROPHILS 2.9 1.8 - 8.0 K/UL    ABS. LYMPHOCYTES 1.6 0.9 - 3.6 K/UL    ABS. MONOCYTES 0.7 0.05 - 1.2 K/UL    ABS. EOSINOPHILS 0.3 0.0 - 0.4 K/UL    ABS. BASOPHILS 0.1 0.0 - 0.1 K/UL    ABS. IMM.  GRANS. 0.0 0.00 - 0.04 K/UL    DF AUTOMATED     METABOLIC PANEL, COMPREHENSIVE    Collection Time: 10/22/22  3:00 AM   Result Value Ref Range    Sodium 138 136 - 145 mmol/L    Potassium 4.8 3.5 - 5.5 mmol/L    Chloride 100 100 - 111 mmol/L    CO2 29 21 - 32 mmol/L    Anion gap 9 3.0 - 18 mmol/L    Glucose 74 74 - 99 mg/dL    BUN 40 (H) 7.0 - 18 MG/DL    Creatinine 6.44 (H) 0.6 - 1.3 MG/DL    BUN/Creatinine ratio 6 (L) 12 - 20      eGFR 7 (L) >60 ml/min/1.73m2    Calcium 9.0 8.5 - 10.1 MG/DL    Bilirubin, total 0.5 0.2 - 1.0 MG/DL    ALT (SGPT) 35 13 - 56 U/L    AST (SGOT) 31 10 - 38 U/L    Alk.  phosphatase 131 (H) 45 - 117 U/L    Protein, total 7.2 6.4 - 8.2 g/dL    Albumin 3.0 (L) 3.4 - 5.0 g/dL    Globulin 4.2 (H) 2.0 - 4.0 g/dL    A-G Ratio 0.7 (L) 0.8 - 1.7         Signed By: Christopher Humphries MD     October 22, 2022

## 2022-10-22 NOTE — PROGRESS NOTES
1946hrs:  Verbal shift change report given to NEGRITA Chavez (oncoming nurse) by Helena Conrad RN (offgoing nurse). Report included the following information SBAR, Kardex, ED Summary, Procedure Summary, Intake/Output, MAR, Recent Results, and Cardiac Rhythm ST . Pt is currently off the unit at Dialysis. 2055hrs:  Verbal report received from Milagro Howard Dialysis RN. Pt's /99, pt is on O2, 2.5L pulled off, and pt was restless during the procedure. 2059hrs:  Pt returned from Dialysis via stretcher with (1) transport personnel. 2100hrs:  Contacted by Sadia Stanley. Advised that there is an issue with the Surface Antigen testing machine and they had to contact someone, but results may not be available until later this weekend and dialysis may need to do an \"extended cleaning\" on the tubing. Cem Mistry Dialysis RN to advise. 2108hrs:  Pt in bed, asleep. She can easily be aroused and is  A&Ox4. Pt's HR is 145-150; /124; O2 100%/2L NC.    2113hrs:  PRN IV Lopressor, 5mg, admin'd as MD ordered. 2145hrs:  Pt's HR is 88, /98; O2 97%/ 2L NC.    0041hrs:  Pt c/o 8/10 pain to Right Shoulder, Sharp/Aching, she believes is from the way she was sitting in dialysis. Pt requested PRN PO Flexeril. 0050hrs:  Returned to pt's room with PRN PO Flexeril; however, pt is sound asleep/snoring. Medication to be returned to [facility]. 0357hrs:  Pt's /126. PRN Nitro Ointment admin'd as MD ordered. 0630hrs:  Pt assisted to the restroom. Pt requested and provided with bath packs for partial bathing. Pt's linen, gown, and bed pad changed. 0648hrs:  Pt's /87.      0715hrs:  Bedside and Verbal shift change report given to NEGRITA Baum (oncoming nurse) by Freeman Aviles RN (offgoing nurse). Report included the following information SBAR, Kardex, ED Summary, Procedure Summary, Intake/Output, MAR, Recent Results, and Cardiac Rhythm AFIB .     0730hrs:  MRSA swab completed and taken to the lab via transport personnel.

## 2022-10-22 NOTE — DIALYSIS
[facility]        ACUTE HEMODIALYSIS FLOW SHEET    LUNGS:  Rate  SaO2% [] N/A    [x] Clear  [] Coarse  [] Crackles  [] Wheezing        [] Diminished     Location : []RLL   []LLL    []RUL  []STONEY     Cough: []Productive  []Dry  [x]N/A   Respirations:  [x]Easy  []Labored     Therapy:   []RA  [x]NC 2 l/min    Mask: []NRB []Venti       O2%                  []Ventilator  []Intubated  [] Trach  [] BiPaP     CARDIAC: [x]Regular      [] Irregular   [] Pericardial Rub  [] JVD        []  Monitored  [] Bedside  [] Remotely monitored [] N/A  Rhythm:      EDEMA: [] None  [x]Generalized  [] Pitting [] 1    [] 2    [] 3    [] 4                 [] Facial  [] Pedal  []  UE  [] LE     SKIN:   [x] Warm  [] Hot     [] Cold   [x] Dry     [] Pale   [] Diaphoretic                  [] Flushed  [] Jaundiced  [] Cyanotic  [] Rash  [] Weeping     LOC:    [x] Alert      [x]Oriented:    [x] Person     [x] Place  [x]Time               [] Confused  [] Lethargic  [] Medicated  [] Non-responsive     GI / ABDOMEN:  [] Flat    [] Distended    [x] Soft    [] Firm   []  Obese                             [] Diarrhea  [x] Bowel Sounds  [] Nausea  [] Vomiting       / URINE ASSESSMENT:[] Voiding   [x] Oliguria  [] Anuria   []  Garcia     [] Incontinent    []  Incontinent Brief      []  Bathroom Privileges       PAIN: [x] 0 []1  []2   []3   []4   []5   []6   []7   []8   []9   []10              Scale 0-10  Action/Follow Up:      MOBILITY:  [] Amb    [] Amb/Assist    [x] Bed    [] Wheelchair  [] Stretcher

## 2022-10-22 NOTE — DIALYSIS
ACUTE HEMODIALYSIS FLOW SHEET    HEMODIALYSIS ORDERS: Physician: Dr. Bekah Hoffmann: Eloisa   Duration: 3.5 hr   BFR: 350   DFR: 600   Dialysate:  Temp 36-37*C   K+  2    Ca+ 2.5   Na 138   Bicarb 35   Wt Readings from Last 1 Encounters:   10/22/22 81.3 kg (179 lb 3.7 oz)    Patient Chart [x]   Unable to Obtain []  Dry weight/UF Goal: 3000 ml    Heparin []  Bolus    Units    [] Hourly    Units    [x]None       Pre BP: 169/109  Pulse: 73  Respirations: 18 Temp: 97.6 temporal  [] Oral  [] Ax  [] Esoph   Labs: []  Pre  []  Post:   [x] N/A   Additional Orders (medications, blood products, hypotension management): [] Yes   [x] No     [x]  [facility] Consent Verified     CATHETER ACCESS:  [x]N/A                       GRAFT/FISTULA ACCESS:     []Right     [x]Left     [x]UE     []LE   []AVG   [x]AVF       [x]Medical Aseptic Prep Utilized   [x]No S/S infection  []Redness  []Drainage [] Cultured  [] Swelling  [] Pain  Bruit:   [x] Strong    [] Weak       Thrill :   [x] Strong    [] Weak     Needle Gauge: 15   Length: 1 inch   If access problem,  notified: []Yes     [x]N/A          Hospital: [facility]          Room # 368/01    [x] Routine         [] 1st Time Acute/Chronic   [] Urgent      [] Stat            [x] Acute Room   []  Bedside    [] ICU/CCU     [] ER     Isolation Precautions:  [x] Dialysis    There are currently no Active Isolations     ALLERGIES:     No Known Allergies     Code Status:  Full Code     Hepatitis Status      Lab Results   Component Value Date/Time    Hepatitis B surface Ag <0.10 10/21/2022 05:50 PM    Hepatitis B surface Ab 272.55 07/05/2021 02:58 PM        Current Labs:      Lab Results   Component Value Date/Time    WBC 5.6 10/22/2022 03:00 AM    Hemoglobin, POC 12.6 09/27/2019 07:30 AM    HGB 10.1 (L) 10/22/2022 03:00 AM    Hematocrit, POC 37 09/27/2019 07:30 AM    HCT 33.3 (L) 10/22/2022 03:00 AM    PLATELET 850 67/64/5721 03:00 AM    MCV 92.2 10/22/2022 03:00 AM     Lab Results Component Value Date/Time    Sodium 138 10/22/2022 03:00 AM    Potassium 4.8 10/22/2022 03:00 AM    Chloride 100 10/22/2022 03:00 AM    CO2 29 10/22/2022 03:00 AM    Anion gap 9 10/22/2022 03:00 AM    Glucose 74 10/22/2022 03:00 AM    BUN 40 (H) 10/22/2022 03:00 AM    Creatinine 6.44 (H) 10/22/2022 03:00 AM    BUN/Creatinine ratio 6 (L) 10/22/2022 03:00 AM    GFR est AA 6 (L) 09/28/2022 01:29 AM    GFR est non-AA 5 (L) 09/28/2022 01:29 AM    Calcium 9.0 10/22/2022 03:00 AM          DIET:  DIET ADULT ORAL NUTRITION SUPPLEMENT  DIET ADULT  DIET ADULT ORAL NUTRITION SUPPLEMENT     PRIMARY NURSE REPORT:   Pre Dialysis: Sarika Camejo RN    Time: 1028      EDUCATION:    [x] Patient           Knowledge Basis: []None []Minimal [x] Substantial [] Unknown  Barriers to learning  [x]None  [] Intubated/Trached/Ventilated  [] Sedated/Paralyzed   [x] Access Care     [] S&S of infection  [] Fluid Management  [] K+   [x] Procedural    [] Medications   [] Tx Options   [] Transplant   [] Diet      Teaching Tools:  [x] Explain  [] Demo  [] Handouts [] Video  Patient response: [x] Verbalized understanding   [] Requires follow up        [x] Time Out/Safety Check    [x] Extracorporeal Circuit Tested for integrity       RO/HEMODIALYSIS MACHINE SAFETY CHECKS - Before each treatment:        [facility]                                    [x] Unit Machine # 7 with centralized RO                                  [] Portable Machine #1/RO serial # [id number]                                  [] Portable Machine #2/RO serial # [id number]                                  [] Portable Machine #4/RO serial # [id number]                                  [] Portable Machine #10/RO serial # [id number]                                                                                                       Alarm Test:  Pass time 1612            [x] RO/Machine Log Complete    Machine Temp    36-37*C             Dialysate: pH  7.4    Conductivity: Meter 14.0    HD Machine  7     TCD: 13.7  Dialyzer Lot # [id number]     Blood Tubing Lot # [id number]     Saline Lot # [id number]     CHLORINE TESTING-Before each treatment and every 4 hours    Total Chlorine: [x] less than 0.1 ppm  Initial Time Check: 1200     4 Hr/2nd Check Time: N/A   (if greater than 0.1 ppm from Primary then every 30 minutes from Secondary)     TREATMENT INITIATION - with Dialysis Precautions:   [x] All Connections Secured              [x] Saline Line Double Clamped   [x] Venous Parameters Set               [x] Arterial Parameters Set    [x] Prime Given 250ml NSS              [x]Air Foam Detector Engaged        Treatment Initiation Note:  SEE HD RN NOTE    During Treatment Notes:  8720  Face & Vascular access visible with art and kira line connections intact. Pt tolerating dialysis. 1537  Face & Vascular access visible with art and kira line connections intact. Pt tolerating dialysis. 1545  Face & Vascular access visible with art and kira line connections intact. Pt tolerating dialysis. 1600  Face & Vascular access visible with art and kira line connections intact. Pt tolerating dialysis. 1615  Face & Vascular access visible with art and kira line connections intact. Pt tolerating dialysis. 1630  Face & Vascular access visible with art and kira line connections intact. Pt tolerating dialysis. 1645  Face & Vascular access visible with art and kira line connections intact. Pt tolerating dialysis. 1700  Face & Vascular access visible with art and kira line connections intact. Pt tolerating dialysis. 1715  Face & Vascular access visible with art and kira line connections intact. Pt tolerating dialysis. 1730  Face & Vascular access visible with art and kira line connections intact. Pt tolerating dialysis. 1745  Face & Vascular access visible with art and kira line connections intact. Pt tolerating dialysis. 1801  Dialysis treatment complete.            Post Assessment  Dialyzer Cleared:   [x] Good  [] Fair [] Poor  Blood processed:  41.2 L  UF Removed:  3000 Ml    Post BP: 172/115  Pulse: 93  Respirations: 18   Temp: 97.3 temporal  [] Oral  [] Ax  [] Esophageal   Lungs: [] Clear                [x] No change from initial assessment   Post Tx Vascular Access:   AVF/AVG: Bleeding stopped with  Arterial Pressure for 5 min   Venous Pressure for 5 min      Cardiac:  [x] Regular   [] Irregular   Rhythm:  [] Monitored   [x] Not Monitored    CVC Catheter: [x] N/A     Edema:  [x] None  [] Generalized                     Skin:[] Warm  [x] Dry [] Diaphoretic               [] Flushed  [] Pale [] Cyanotic Pain:  [x]0  []1-2  []3-4  []5-6   []7-8  []9-10         Post Treatment Note:   SEE HD RN NOTE     POST TREATMENT PRIMARY NURSE HANDOFF REPORT:   Post Dialysis: Sarika Camejo RN        Time:  2870       Abbreviations: AVG-arterial venous graft, AVF-arterial venous fistula, IJ-Internal Jugular, Subcl-Subclavian, Fem-Femoral, Tx-treatment, AP/HR-apical heart rate, VSS- Vital Signs Stable, CVC- Central Venous Catheter, DFR-dialysate flow rate, BFR-blood flow rate, AP-arterial pressure, -venous pressure, UF-ultrafiltrate, TMP-transmembrane pressure, Jani-Venous, Art-Arterial, RO-Reverse Osmosis

## 2022-10-22 NOTE — PROGRESS NOTES
Problem: Falls - Risk of  Goal: *Absence of Falls  Description: Document Dar[place] Fall Risk and appropriate interventions in the flowsheet. Outcome: Progressing Towards Goal  Note: Fall Risk Interventions:            Medication Interventions: Assess postural VS orthostatic hypotension, Patient to call before getting OOB, Teach patient to arise slowly    Elimination Interventions: Call light in reach, Patient to call for help with toileting needs              Problem: Patient Education: Go to Patient Education Activity  Goal: Patient/Family Education  Outcome: Progressing Towards Goal     Problem: Nutrition Deficit  Goal: *Optimize nutritional status  Outcome: Progressing Towards Goal     Problem: Nutrition Deficit  Goal: *Optimize nutritional status  Outcome: Progressing Towards Goal     Problem: Chronic Renal Failure  Goal: *Fluid and electrolytes stabilized  Outcome: Progressing Towards Goal     Problem: Chronic Renal Failure  Goal: *Fluid and electrolytes stabilized  Outcome: Progressing Towards Goal     Problem: Patient Education: Go to Patient Education Activity  Goal: Patient/Family Education  Outcome: Progressing Towards Goal     Problem: Pain  Goal: *Control of Pain  Outcome: Progressing Towards Goal  Goal: *PALLIATIVE CARE:  Alleviation of Pain  Outcome: Progressing Towards Goal     Problem: Patient Education: Go to Patient Education Activity  Goal: Patient/Family Education  Outcome: Progressing Towards Goal     Problem: Impaired Skin Integrity/Pressure Injury Treatment  Goal: *Improvement of Existing Pressure Injury  Outcome: Progressing Towards Goal  Goal: *Prevention of pressure injury  Description: Document Christopher Scale and appropriate interventions in the flowsheet. Outcome: Progressing Towards Goal  Note: Pressure Injury Interventions:             Activity Interventions: Assess need for specialty bed, Increase time out of bed, Pressure redistribution bed/mattress(bed type)    Mobility Interventions: Assess need for specialty bed, Float heels, Pressure redistribution bed/mattress (bed type), HOB 30 degrees or less    Nutrition Interventions: Document food/fluid/supplement intake                     Problem: Impaired Skin Integrity/Pressure Injury Treatment  Goal: *Improvement of Existing Pressure Injury  Outcome: Progressing Towards Goal  Goal: *Prevention of pressure injury  Description: Document Christopher Scale and appropriate interventions in the flowsheet. Outcome: Progressing Towards Goal  Note: Pressure Injury Interventions:             Activity Interventions: Assess need for specialty bed, Increase time out of bed, Pressure redistribution bed/mattress(bed type)    Mobility Interventions: Assess need for specialty bed, Float heels, Pressure redistribution bed/mattress (bed type), HOB 30 degrees or less    Nutrition Interventions: Document food/fluid/supplement intake                     Problem: Patient Education: Go to Patient Education Activity  Goal: Patient/Family Education  Outcome: Progressing Towards Goal     Problem: Fluid Volume - Risk of, Imbalanced  Goal: *Balanced intake and output  Outcome: Progressing Towards Goal     Problem: Patient Education: Go to Patient Education Activity  Goal: Patient/Family Education  Outcome: Progressing Towards Goal

## 2022-10-22 NOTE — PROGRESS NOTES
60-year-old female with past medical history of hypertension, ESRD admitted with palpitation, following for ESRD management  Impression & Plan:   IMPRESSION:   ESRD, TTS schedule  Access left arm AV fistula  Dyspnea,  Tachycardia, symptomatic   History of paroxysmal A. fib , on Eliquis in the past, self discontinued  Congestive heart failure  Anemia  Hypertension   PLAN:   HD today  Adjust medication per ESRD status. .    Past Medical History:   Diagnosis Date    Adenomatous polyp     CHF (congestive heart failure) ([facility] [address]     Chronic kidney disease     TT    Dialysis patient ([facility] [address]     tues, thurs, sat    ESRD (end stage renal disease) ([facility] [address]     Family hx of colon cancer     Mother    GI bleed due to NSAIDs     Heart failure ([facility] [address]     History of blood transfusion     Hypertension     LUIS (obstructive sleep apnea)     no cpap      Past Surgical History:   Procedure Laterality Date    COLONOSCOPY N/A 9/27/2019    COLONOSCOPY with polyp bx performed by Alexander Sauceda MD at [address] [place] [address]      HX COLONOSCOPY  09/21/2016    colonoscopy, Dr. Bo Bee, [address] [id number]  05/2020    HX OTHER SURGICAL       ARTERIOVENOUS FISTULA INSERTION LEFT ARM     HX TOTAL ABDOMINAL HYSTERECTOMY      HX TOTAL COLECTOMY  10/11/2016    LAPAROSCOPIC COLECTOMY PARTIAL RIGHT FOR CECAL POLYP, DR. PAEZ[facility]    HX TUBAL LIGATION      VASCULAR SURGERY PROCEDURE UNLIST      left arm graft       Social History     Socioeconomic History    Marital status:      Spouse name: Not on file    Number of children: Not on file    Years of education: Not on file    Highest education level: Not on file   Occupational History    Not on file   Tobacco Use    Smoking status: Former    Smokeless tobacco: Never   Substance and Sexual Activity    Alcohol use: No    Drug use: No    Sexual activity: Yes     Partners: Male   Other Topics Concern    Not on file   Social History Narrative    Not on file     Social Determinants of Health     Financial Resource Strain: Not on file   Food Insecurity: Not on file   Transportation Needs: Not on file   Physical Activity: Not on file   Stress: Not on file   Social Connections: Not on file   Intimate Partner Violence: Not on file   Housing Stability: Not on file       Family History   Problem Relation Age of Onset    Colon Cancer Mother     Colon Polyps Mother      No Known Allergies     Home Medications:     Prior to Admission Medications   Prescriptions Last Dose Informant Patient Reported? Taking? albuterol (PROVENTIL HFA, VENTOLIN HFA, PROAIR HFA) 90 mcg/actuation inhaler Unknown  No No   Sig: Take 2 Puffs by inhalation every six (6) hours as needed for Shortness of Breath or Cough. cloNIDine HCL (CATAPRES) 0.2 mg tablet 10/20/2022  Yes Yes   Sig: Take 0.2 mg by mouth three (3) times daily. cyclobenzaprine (FLEXERIL) 10 mg tablet Unknown  No No   Sig: Take 1 Tab by mouth three (3) times daily as needed for Muscle Spasm(s). dilTIAZem ER (Cardizem CD) 180 mg capsule 10/20/2022  No Yes   Sig: Take 1 Capsule by mouth daily. fluticasone propion-salmeteroL (ADVAIR/WIXELA) 250-50 mcg/dose diskus inhaler 10/20/2022  Yes Yes   Sig: Take 1 Puff by inhalation two (2) times a day. fluticasone propionate (FLONASE) 50 mcg/actuation nasal spray 10/20/2022  No Yes   Si Sprays by Both Nostrils route daily. guaiFENesin ER (MUCINEX) 600 mg ER tablet 10/20/2022  No Yes   Sig: Take 1 Tablet by mouth two (2) times a day.   hydrOXYzine HCL (ATARAX) 50 mg tablet Unknown  Yes No   Sig: Take 50 mg by mouth two (2) times daily as needed for Anxiety. lisinopriL (PRINIVIL, ZESTRIL) 20 mg tablet 10/20/2022  Yes Yes   Sig: Take 20 mg by mouth daily. Patient unsure of dosage   meclizine (ANTIVERT) 25 mg tablet Unknown  Yes No   Sig: Take 25 mg by mouth three (3) times daily as needed.  Indications: prevention of motion sickness   sertraline (ZOLOFT) 50 mg tablet 10/20/2022  Yes Yes   Sig: Take 50 mg by mouth daily. Indications: major depressive disorder   sevelamer carbonate (RENVELA) 800 mg tab tab 10/20/2022  Yes Yes   Sig: Take 3,200 mg by mouth three (3) times daily (with meals). triamcinolone acetonide (KENALOG) 0.5 % ointment Not Taking  Yes No   Sig: Apply  to affected area three (3) times daily.    Patient not taking: Reported on 10/21/2022      Facility-Administered Medications: None       Current Facility-Administered Medications   Medication Dose Route Frequency    neomycin-bacitracin-polymyxin (NEOSPORIN) ointment   Topical BID    lidocaine (XYLOCAINE) 2 % viscous solution 15 mL  15 mL Mouth/Throat Q3H PRN    metoprolol (LOPRESSOR) injection 5 mg  5 mg IntraVENous Q20MIN PRN    sodium chloride (NS) flush 5-40 mL  5-40 mL IntraVENous Q8H    sodium chloride (NS) flush 5-40 mL  5-40 mL IntraVENous PRN    acetaminophen (TYLENOL) tablet 650 mg  650 mg Oral Q6H PRN    Or    acetaminophen (TYLENOL) suppository 650 mg  650 mg Rectal Q6H PRN    polyethylene glycol (MIRALAX) packet 17 g  17 g Oral DAILY PRN    ondansetron (ZOFRAN ODT) tablet 4 mg  4 mg Oral Q8H PRN    Or    ondansetron (ZOFRAN) injection 4 mg  4 mg IntraVENous Q6H PRN    albuterol-ipratropium (DUO-NEB) 2.5 MG-0.5 MG/3 ML  3 mL Nebulization Q6H PRN    melatonin tablet 5 mg  5 mg Oral QHS PRN    aspirin chewable tablet 81 mg  81 mg Oral DAILY    cloNIDine HCL (CATAPRES) tablet 0.2 mg  0.2 mg Oral TID    dilTIAZem ER (CARDIZEM CD) capsule 180 mg  180 mg Oral DAILY    fluticasone propionate (FLONASE) 50 mcg/actuation nasal spray 2 Spray  2 Spray Both Nostrils DAILY    guaiFENesin ER (MUCINEX) tablet 600 mg  600 mg Oral BID    hydrOXYzine HCL (ATARAX) tablet 50 mg  50 mg Oral BID PRN    lisinopriL (PRINIVIL, ZESTRIL) tablet 20 mg  20 mg Oral DAILY    sertraline (ZOLOFT) tablet 50 mg  50 mg Oral DAILY    sevelamer carbonate (RENVELA) tab 3,200 mg  3,200 mg Oral TID WITH MEALS    cyclobenzaprine (FLEXERIL) tablet 10 mg  10 mg Oral TID PRN    arformoterol 15 mcg/budesonide 0.5 mg neb solution   Nebulization BID RT    nitroglycerin (NITROBID) 2 % ointment 1 Inch  1 Inch Topical Q6H PRN    hydrALAZINE (APRESOLINE) 20 mg/mL injection 10 mg  10 mg IntraVENous Q6H PRN    labetaloL (NORMODYNE;TRANDATE) 20 mg/4 mL (5 mg/mL) injection 10 mg  10 mg IntraVENous Q2H PRN    0.9% sodium chloride infusion 250 mL  250 mL IntraVENous DIALYSIS PRN       Review of Systems:     Complete 10-point review of systems were obtained and discussed in length  with the patient. Complete review of systems was negative/unremarkable  except as mentioned in HPI section. Data Review:    Labs: Results:       Chemistry Recent Labs     10/22/22  0300 10/21/22  1127 10/20/22  2220   GLU 74 90 94    141 143   K 4.8 4.4 4.4    100 101   CO2 29 32 29   BUN 40* 59* 50*   CREA 6.44* 8.44* 7.37*   CA 9.0 9.3 9.1   AGAP 9 9 13   BUCR 6* 7* 7*   * 149*  --    TP 7.2 6.7  --    ALB 3.0* 2.9*  --    GLOB 4.2* 3.8  --    AGRAT 0.7* 0.8  --         CBC w/Diff Recent Labs     10/22/22  0300 10/21/22  0400 10/20/22  2220   WBC 5.6 6.2 6.1   RBC 3.61* 3.55* 3.81*   HGB 10.1* 9.7* 10.6*   HCT 33.3* 32.4* 34.3*    215 223   GRANS 52 59 65   LYMPH 29 22 18*   EOS 6* 2 2        Coagulation Recent Labs     10/21/22  2159 10/21/22  1127   APTT 84.7* 56.7*         Iron/Ferritin No results for input(s): IRON in the last 72 hours. No lab exists for component: TIBCCALC   BNP No results for input(s): BNPP in the last 72 hours. Cardiac Enzymes No results for input(s): CPK, CKND1, ANDREW in the last 72 hours. No lab exists for component: CKRMB, TROIP   Liver Enzymes Recent Labs     10/22/22  0300   TP 7.2   ALB 3.0*   *        Thyroid Studies Lab Results   Component Value Date/Time    T4, Total 8.0 02/16/2012 08:00 PM    TSH 0.05 (L) 10/21/2022 11:27 AM         EKG: unchanged from previous tracings.     Physical Assessment:   Visit Vitals  BP (!) 167/117   Pulse 100   Temp 98.8 °F (37.1 °C)   Resp 23   Ht 5' 2\" (1.575 m)   Wt 81.3 kg (179 lb 3.7 oz)   SpO2 97%   Breastfeeding Unknown   BMI 32.78 kg/m²     Weight change:     Intake/Output Summary (Last 24 hours) at 10/22/2022 1144  Last data filed at 10/21/2022 2039  Gross per 24 hour   Intake 43.53 ml   Output 2500 ml   Net -2456.47 ml       Physical Exam:   General: comfortable, no acute distress   HEENT sclera anicteric, supple neck, no thyromegaly  CVS: S1S2 heard,  no rub  RS: + air entry b/l,   Abd: Soft, Non tender,   Neuro: non focal, awake, alert , CN II-XII are grossly intact  Extrm: NO edema, no cyanosis, clubbing   Skin: no visible  Rash  Musculoskeletal: No gross joints or bone deformities     Procedures/imaging: see electronic medical records for all procedures, Xrays and details which were not copied into this note but were reviewed prior to creation of Torrey Montejo MD  October 22, 2022  [facility]  Office [phone]

## 2022-10-22 NOTE — PROGRESS NOTES
Physician Progress Note      Willa Uriarte  CSN #:                  [id number]  :                       1960  ADMIT DATE:       10/20/2022 10:04 PM  DISCH DATE:  RESPONDING  PROVIDER #:        Ho Pedroza MD          QUERY TEXT:    Patient admitted and noted to have paroxysmal atrial fibrillation in cardiology consult note on 10/21/2022 and is maintained on Eliquis. If possible, please document in progress notes and discharge summary if you are evaluating and/or treating any of the following: The medical record reflects the following:  Risk Factors: female with hypertension, CHF    Clinical Indicators:  10/21/2022, consult, Riley Davis PA-C:  \"Hx paroxysmal afib. Has been Rx Eliquis for anticoagulation, however self-discontinued approximately 1 month ago due to problems with bleeding from her AV graft. \"    Treatment: heparin IV    Thank you,  JODY Harris RN, CDS  [email or web address]  Options provided:  -- Secondary hypercoagulable state in a patient with atrial fibrillation  -- Other - I will add my own diagnosis  -- Disagree - Not applicable / Not valid  -- Disagree - Clinically unable to determine / Unknown  -- Refer to Clinical Documentation Reviewer    PROVIDER RESPONSE TEXT:    This patient has secondary hypercoagulable state in a patient with atrial fibrillation. Query created by: Adelaida Barger on 10/21/2022 11:33 AM      QUERY TEXT:    Patient admitted with atrial fibrillation. Noted documentation of NSTEMI in ED Provider Notes on 10/20 and likely demand ischemia in cardiology consult note on 10/21/2022. In order to support the diagnosis of type II MI, please refer to 4th universal definition of MI below and include additional clinical indicators in your documentation. ? Or please document if the diagnosis of type II MI has been ruled out after study. ? The medical record reflects the following:  ??  Risk Factors: paroxysmal atrial fibrillation, hypertension, self-discontinued Eliquis for anticoagulation, ESRD, CHF    ?? Clinical Indicators:  10/20/2022, ED Provider notes: \"Atrial fibrillation with RVR (HCC)  ESRD (end stage renal disease) ([facility] [address]  NSTEMI (non-ST elevated myocardial infarction) ([facility])\"  10/21/2022, consult, Roseanna Rocha, PALawrenceC:  \"Hx paroxysmal afib. Has been Rx Eliquis for anticoagulation, however self-discontinued approximately 1 month ago due to problems with bleeding from her AV graft. Elevated troponin, flat   1400, likely demand in setting of above. \"  troponin:  1,463 ---> 1,342    ?? Treatment: heparin IV    Fourth Universal Definition of Myocardial Infarction:  Clearly separates MI from myocardial injury. Patients with elevated blood troponin levels but without clinical evidence of ischemia are said to have had a myocardial injury. ? To have a myocardial infarction requires both an elevated troponin blood test along with at least one of the following:  - Symptoms of acute myocardial ischemia (Types 1 - 5 MI)  - Clinical evidence of ischemia, as evidenced in an electrocardiogram (EKG) showing new ischemic changes (Type 1, Type 2, Type 3, or Type 4a MI)  - Development of pathological Q waves (Types 1 - 5 MI)  - Imaging evidence of new loss of viable myocardium or new regional wall motion abnormality in a pattern consistent with an ischemic etiology (Types 1 - 5 MI)    Thank you,  JODY Harris RN, CDS  [email or web address]  Options provided:  -- MI type II due to atrial fibrillation present as evidenced by, Please document indicators of myocardial infarction. -- NSTEMI due to atrial fibrillation present as evidenced by, Please document indicators of myocardial infarction.   -- NSTEMI ruled out after study and demand ischemia confirmed  -- NSTEMI ruled out after study and non-ischemic myocardial injury due to atrial fibrillation confirmed  -- Other - I will add my own diagnosis  -- Disagree - Not applicable / Not valid  -- Disagree - Clinically unable to determine / Unknown  -- Refer to Clinical Documentation Reviewer    PROVIDER RESPONSE TEXT:    NSTEMI was ruled out after study and demand ischemia confirmed. Query created by:  Silva Goltz on 10/21/2022 11:45 AM      Electronically signed by:  Corrinne Brackett MD 10/22/2022 1:31 PM

## 2022-10-22 NOTE — PROGRESS NOTES
[facility] - Progress Note  Admit Date: 10/20/2022    Assessment:     -Shortness of breath, productive cough, two ER visits 10/18 and 10/19 prior to current presentation. Reports given IV abx for pneumonia but not discharged on PO abx.  -Tachycardia, sinus by telemetry. -CT negative for PE 10/21/22  -Hx paroxysmal afib. Has been Rx Eliquis for anticoagulation, however self-discontinued approximately 1 month ago due to problems with bleeding from her AV graft. -HTN, uncontrolled, did not have any of her medications yesterday.  -Elevated troponin, flat ~1400, likely demand in setting of above  -Cardiac CTA 01/2022 with nonobstructive CAD with < 25% stenosis in LAD, LM, RCA and LCx  -Echo 5/29/2022 with LVEF 60-65%, moderate LVH, diastolic dysfunction present, severely dilated LA, normal RV size and function, no significant valvular disease  -ESRD, on HD T/R/S. Reports full run of HD on 10/20. Followed by [facility].  -Anemia, mild, monitor. Primary cardiologist is Dr. Blanton Holter:     No PE by CT scan yesterday noted. Heart rate (sinus tachycardia) improving. Aggressive fluid removal with HD, followup echo. I will defer [address] to primary cardiologist, Dr. Magno Schaefer, might benefit long term for consideration of Watchman. Subjective:     No new complaints.     Objective:      Patient Vitals for the past 8 hrs:   Temp Pulse Resp BP SpO2   10/22/22 1116 -- -- -- (!) 167/117 --   10/22/22 0743 -- -- -- -- 98 %   10/22/22 0648 -- 92 23 (!) 146/87 100 %   10/22/22 0351 98.5 °F (36.9 °C) 72 28 (!) 162/126 93 %         Patient Vitals for the past 96 hrs:   Weight   10/22/22 1114 81.3 kg (179 lb 3.7 oz)   10/20/22 2155 72.1 kg (159 lb)                    Intake/Output Summary (Last 24 hours) at 10/22/2022 1125  Last data filed at 10/21/2022 2039  Gross per 24 hour   Intake 43.53 ml   Output 2500 ml   Net -2456.47 ml       Physical Exam:  General:  alert, cooperative, no distress, appears stated age  Neck:  nontender  Lungs:  clear to auscultation bilaterally  Heart:  regular rate and rhythm, S1, S2 normal, no murmur, click, rub or gallop  Abdomen:  abdomen is soft without significant tenderness, masses, organomegaly or guarding  Extremities:  extremities normal, atraumatic, no cyanosis or edema    Data Review:     Labs: Results:       Chemistry Recent Labs     10/22/22  0300 10/21/22  1127 10/20/22  2220   GLU 74 90 94    141 143   K 4.8 4.4 4.4    100 101   CO2 29 32 29   BUN 40* 59* 50*   CREA 6.44* 8.44* 7.37*   CA 9.0 9.3 9.1   MG  --   --  2.9*   AGAP 9 9 13   BUCR 6* 7* 7*   * 149*  --    TP 7.2 6.7  --    ALB 3.0* 2.9*  --    GLOB 4.2* 3.8  --    AGRAT 0.7* 0.8  --       CBC w/Diff Recent Labs     10/22/22  0300 10/21/22  0400 10/20/22  2220   WBC 5.6 6.2 6.1   RBC 3.61* 3.55* 3.81*   HGB 10.1* 9.7* 10.6*   HCT 33.3* 32.4* 34.3*    215 223   GRANS 52 59 65   LYMPH 29 22 18*   EOS 6* 2 2      Cardiac Enzymes No results found for: CPK, CK, CKMMB, CKMB, RCK3, CKMBT, CKNDX, CKND1, ANDREW, TROPT, TROIQ, NHAN, TROPT, TNIPOC, BNP, BNPP   Coagulation Recent Labs     10/21/22  2159 10/21/22  1127   APTT 84.7* 56.7*       Lipid Panel Lab Results   Component Value Date/Time    Cholesterol, total 156 10/21/2022 11:27 AM    HDL Cholesterol 73 (H) 10/21/2022 11:27 AM    LDL, calculated 64 10/21/2022 11:27 AM    VLDL, calculated 19 10/21/2022 11:27 AM    Triglyceride 95 10/21/2022 11:27 AM    CHOL/HDL Ratio 2.1 10/21/2022 11:27 AM      BNP No results found for: BNP, BNPP, XBNPT   Liver Enzymes Recent Labs     10/22/22  0300   TP 7.2   ALB 3.0*   *      Digoxin    Thyroid Studies Lab Results   Component Value Date/Time    T4, Total 8.0 02/16/2012 08:00 PM    TSH 0.05 (L) 10/21/2022 11:27 AM          Signed By: Yue Gray MD     October 22, 2022

## 2022-10-23 ENCOUNTER — APPOINTMENT (OUTPATIENT)
Dept: NON INVASIVE DIAGNOSTICS | Age: 62
DRG: 308 | End: 2022-10-23
Attending: INTERNAL MEDICINE
Payer: MEDICARE

## 2022-10-23 VITALS
WEIGHT: 179 LBS | DIASTOLIC BLOOD PRESSURE: 113 MMHG | HEART RATE: 104 BPM | SYSTOLIC BLOOD PRESSURE: 162 MMHG | OXYGEN SATURATION: 97 % | BODY MASS INDEX: 32.94 KG/M2 | HEIGHT: 62 IN | TEMPERATURE: 98.6 F | RESPIRATION RATE: 20 BRPM

## 2022-10-23 LAB
ALBUMIN SERPL-MCNC: 3.1 G/DL (ref 3.4–5)
ALBUMIN/GLOB SERPL: 0.6 (ref 0.8–1.7)
ALP SERPL-CCNC: 144 U/L (ref 45–117)
ALT SERPL-CCNC: 32 U/L (ref 13–56)
ANION GAP SERPL CALC-SCNC: 7 MMOL/L (ref 3–18)
AST SERPL-CCNC: 22 U/L (ref 10–38)
BACTERIA SPEC CULT: NORMAL
BACTERIA SPEC CULT: NORMAL
BILIRUB SERPL-MCNC: 0.7 MG/DL (ref 0.2–1)
BUN SERPL-MCNC: 41 MG/DL (ref 7–18)
BUN/CREAT SERPL: 7 (ref 12–20)
CALCIUM SERPL-MCNC: 10.4 MG/DL (ref 8.5–10.1)
CHLORIDE SERPL-SCNC: 98 MMOL/L (ref 100–111)
CO2 SERPL-SCNC: 30 MMOL/L (ref 21–32)
CREAT SERPL-MCNC: 5.88 MG/DL (ref 0.6–1.3)
ECHO AO ROOT DIAM: 3.5 CM
ECHO AO ROOT INDEX: 1.92 CM/M2
ECHO AV PEAK GRADIENT: 13 MMHG
ECHO AV PEAK VELOCITY: 1.8 M/S
ECHO EST RA PRESSURE: 3 MMHG
ECHO LA VOL 2C: 74 ML (ref 22–52)
ECHO LA VOL 4C: 82 ML (ref 22–52)
ECHO LA VOLUME AREA LENGTH: 82 ML
ECHO LA VOLUME INDEX A2C: 41 ML/M2 (ref 16–34)
ECHO LA VOLUME INDEX A4C: 45 ML/M2 (ref 16–34)
ECHO LA VOLUME INDEX AREA LENGTH: 45 ML/M2 (ref 16–34)
ECHO LV E' LATERAL VELOCITY: 3 CM/S
ECHO LV E' SEPTAL VELOCITY: 5 CM/S
ECHO LV FRACTIONAL SHORTENING: 25 % (ref 28–44)
ECHO LV INTERNAL DIMENSION DIASTOLE INDEX: 2.42 CM/M2
ECHO LV INTERNAL DIMENSION DIASTOLIC: 4.4 CM (ref 3.9–5.3)
ECHO LV INTERNAL DIMENSION SYSTOLIC INDEX: 1.81 CM/M2
ECHO LV INTERNAL DIMENSION SYSTOLIC: 3.3 CM
ECHO LV IVSD: 2.3 CM (ref 0.6–0.9)
ECHO LV MASS 2D: 496.7 G (ref 67–162)
ECHO LV MASS INDEX 2D: 272.9 G/M2 (ref 43–95)
ECHO LV POSTERIOR WALL DIASTOLIC: 2.1 CM (ref 0.6–0.9)
ECHO LV RELATIVE WALL THICKNESS RATIO: 0.95
ECHO LVOT AREA: 2.8 CM2
ECHO LVOT DIAM: 1.9 CM
ECHO MV A VELOCITY: 0.8 M/S
ECHO MV E DECELERATION TIME (DT): 338.9 MS
ECHO MV E VELOCITY: 0.63 M/S
ECHO MV E/A RATIO: 0.79
ECHO MV E/E' LATERAL: 21
ECHO MV E/E' RATIO (AVERAGED): 16.8
ECHO MV E/E' SEPTAL: 12.6
ECHO PVEIN A DURATION: 141.6 MS
ECHO PVEIN A VELOCITY: 0.3 M/S
ECHO RIGHT VENTRICULAR SYSTOLIC PRESSURE (RVSP): 13 MMHG
ECHO RV TAPSE: 2.1 CM (ref 1.7–?)
ECHO TV REGURGITANT MAX VELOCITY: 1.56 M/S
ECHO TV REGURGITANT PEAK GRADIENT: 10 MMHG
GLOBULIN SER CALC-MCNC: 4.8 G/DL (ref 2–4)
GLUCOSE SERPL-MCNC: 79 MG/DL (ref 74–99)
POTASSIUM SERPL-SCNC: 4.5 MMOL/L (ref 3.5–5.5)
PROT SERPL-MCNC: 7.9 G/DL (ref 6.4–8.2)
SERVICE CMNT-IMP: NORMAL
SODIUM SERPL-SCNC: 135 MMOL/L (ref 136–145)

## 2022-10-23 PROCEDURE — 99232 SBSQ HOSP IP/OBS MODERATE 35: CPT | Performed by: INTERNAL MEDICINE

## 2022-10-23 PROCEDURE — 80053 COMPREHEN METABOLIC PANEL: CPT

## 2022-10-23 PROCEDURE — 36415 COLL VENOUS BLD VENIPUNCTURE: CPT

## 2022-10-23 PROCEDURE — 2709999900 HC NON-CHARGEABLE SUPPLY

## 2022-10-23 PROCEDURE — 74011250637 HC RX REV CODE- 250/637: Performed by: INTERNAL MEDICINE

## 2022-10-23 PROCEDURE — 74011000250 HC RX REV CODE- 250: Performed by: INTERNAL MEDICINE

## 2022-10-23 PROCEDURE — 99239 HOSP IP/OBS DSCHRG MGMT >30: CPT | Performed by: FAMILY MEDICINE

## 2022-10-23 PROCEDURE — 77010033678 HC OXYGEN DAILY

## 2022-10-23 PROCEDURE — 74011250637 HC RX REV CODE- 250/637: Performed by: FAMILY MEDICINE

## 2022-10-23 PROCEDURE — 94762 N-INVAS EAR/PLS OXIMTRY CONT: CPT

## 2022-10-23 PROCEDURE — 94640 AIRWAY INHALATION TREATMENT: CPT

## 2022-10-23 PROCEDURE — 93306 TTE W/DOPPLER COMPLETE: CPT

## 2022-10-23 RX ORDER — LISINOPRIL 40 MG/1
40 TABLET ORAL DAILY
Qty: 30 TABLET | Refills: 0 | Status: SHIPPED | OUTPATIENT
Start: 2022-10-23

## 2022-10-23 RX ORDER — LIDOCAINE HYDROCHLORIDE 20 MG/ML
15 SOLUTION OROPHARYNGEAL
Qty: 100 ML | Refills: 0 | Status: SHIPPED | OUTPATIENT
Start: 2022-10-23

## 2022-10-23 RX ORDER — LISINOPRIL 20 MG/1
40 TABLET ORAL DAILY
Status: DISCONTINUED | OUTPATIENT
Start: 2022-10-23 | End: 2022-10-23 | Stop reason: HOSPADM

## 2022-10-23 RX ORDER — CHOLECALCIFEROL (VITAMIN D3) 125 MCG
5 CAPSULE ORAL
Qty: 30 TABLET | Refills: 0 | Status: SHIPPED | OUTPATIENT
Start: 2022-10-23

## 2022-10-23 RX ADMIN — DILTIAZEM HYDROCHLORIDE 180 MG: 180 CAPSULE, COATED, EXTENDED RELEASE ORAL at 08:46

## 2022-10-23 RX ADMIN — FLUTICASONE PROPIONATE 2 SPRAY: 50 SPRAY, METERED NASAL at 10:26

## 2022-10-23 RX ADMIN — CLONIDINE HYDROCHLORIDE 0.2 MG: 0.1 TABLET ORAL at 08:47

## 2022-10-23 RX ADMIN — SEVELAMER CARBONATE 3200 MG: 800 TABLET, FILM COATED ORAL at 13:10

## 2022-10-23 RX ADMIN — LISINOPRIL 40 MG: 20 TABLET ORAL at 10:25

## 2022-10-23 RX ADMIN — ASPIRIN 81 MG 81 MG: 81 TABLET ORAL at 08:47

## 2022-10-23 RX ADMIN — SEVELAMER CARBONATE 3200 MG: 800 TABLET, FILM COATED ORAL at 08:46

## 2022-10-23 RX ADMIN — SODIUM CHLORIDE, PRESERVATIVE FREE 10 ML: 5 INJECTION INTRAVENOUS at 14:00

## 2022-10-23 RX ADMIN — SERTRALINE 50 MG: 50 TABLET, FILM COATED ORAL at 08:46

## 2022-10-23 RX ADMIN — ARFORMOTEROL TARTRATE: 15 SOLUTION RESPIRATORY (INHALATION) at 08:49

## 2022-10-23 RX ADMIN — GUAIFENESIN 600 MG: 600 TABLET, EXTENDED RELEASE ORAL at 08:46

## 2022-10-23 RX ADMIN — SODIUM CHLORIDE, PRESERVATIVE FREE 10 ML: 5 INJECTION INTRAVENOUS at 06:45

## 2022-10-23 NOTE — PROGRESS NOTES
10/23/2022      RE: Jason Morris      To Whom it May Concern: This is to certify that Luciano Ferrell has been hospitalized and under my care from 10/21/2022 through 10/23/2022. Patient is cleared to return to work on Monday, 10/24/2022, without restriction. Please feel free to contact my office if you have any questions or concerns. Thank you for your assistance in this matter.     Sincerely,        Saeed Godfrey MD

## 2022-10-23 NOTE — ROUTINE PROCESS
Bedside shift change report given to [place], RN (oncoming nurse) by Harvinder Shaikh RN (offgoing nurse). Report included the following information SBAR, Kardex, Intake/Output, and MAR.

## 2022-10-23 NOTE — PROGRESS NOTES
Report received from Dez evgeny, [address] [place] [address]. Patient resting in bed with eyes closed. No signs of distress noted.

## 2022-10-23 NOTE — DISCHARGE INSTRUCTIONS
DISCHARGE SUMMARY from Nurse    PATIENT INSTRUCTIONS:    After general anesthesia or intravenous sedation, for 24 hours or while taking prescription Narcotics:  Limit your activities  Do not drive and operate hazardous machinery  Do not make important personal or business decisions  Do  not drink alcoholic beverages  If you have not urinated within 8 hours after discharge, please contact your surgeon on call. Report the following to your surgeon:  Excessive pain, swelling, redness or odor of or around the surgical area  Temperature over 100.5  Nausea and vomiting lasting longer than 4 hours or if unable to take medications  Any signs of decreased circulation or nerve impairment to extremity: change in color, persistent  numbness, tingling, coldness or increase pain  Any questions    What to do at Home:  Recommended activity: as physician advised    If you experience any of the following symptoms listed under Warning Signs of a Heart Attack and Signs and Symptoms of a Stroke , please follow up with your primary care physician and/or call 911. *  Please give a list of your current medications to your Primary Care Provider. *  Please update this list whenever your medications are discontinued, doses are      changed, or new medications (including over-the-counter products) are added. *  Please carry medication information at all times in case of emergency situations. These are general instructions for a healthy lifestyle:    No smoking/ No tobacco products/ Avoid exposure to second hand smoke  Surgeon General's Warning:  Quitting smoking now greatly reduces serious risk to your health.     Obesity, smoking, and sedentary lifestyle greatly increases your risk for illness    A healthy diet, regular physical exercise & weight monitoring are important for maintaining a healthy lifestyle    You may be retaining fluid if you have a history of heart failure or if you experience any of the following symptoms: Weight gain of 3 pounds or more overnight or 5 pounds in a week, increased swelling in our hands or feet or shortness of breath while lying flat in bed. Please call your doctor as soon as you notice any of these symptoms; do not wait until your next office visit. The discharge information has been reviewed with the patient. The patient verbalized understanding. Discharge medications reviewed with the patient and appropriate educational materials and side effects teaching were provided.     Patient armband removed and shredded      ___________________________________________________________________________________________________________________________________

## 2022-10-23 NOTE — PROGRESS NOTES
[facility] - Progress Note  Admit Date: 10/20/2022    Assessment:     -Shortness of breath, productive cough, two ER visits 10/18 and 10/19 prior to current presentation. Reports given IV abx for pneumonia but not discharged on PO abx.  -Tachycardia, sinus by telemetry. -CT negative for PE 10/21/22  -Hx paroxysmal afib. Has been Rx Eliquis for anticoagulation, however self-discontinued approximately 1 month ago due to problems with bleeding from her AV graft. -HTN, uncontrolled, did not have any of her medications yesterday.  -Elevated troponin, flat ~1400, likely demand in setting of above  -Cardiac CTA 01/2022 with nonobstructive CAD with < 25% stenosis in LAD, LM, RCA and LCx  -Echo 5/29/2022 with LVEF 60-65%, moderate LVH, diastolic dysfunction present, severely dilated LA, normal RV size and function, no significant valvular disease  -ESRD, on HD T/R/S. Reports full run of HD on 10/20. Followed by [facility].  -Anemia, mild, monitor. Primary cardiologist is Dr. Chas Martinez:     Heart rate (sinus tachycardia) improving, no evidence A.fib/flutter. Doing well after dialysis yesterday. Discussed with Dr. Ant Garvin, she can discharge as rhythm stable. I will defer [address] to primary cardiologist, Dr. Jose Miguel Martinez, might benefit long term for consideration of Watchman. Subjective:     No new complaints. Heart rate sinus by telemetry, 90's.     Objective:      Patient Vitals for the past 8 hrs:   Temp Pulse Resp BP SpO2   10/23/22 0955 99 °F (37.2 °C) 91 17 (!) 155/102 99 %   10/23/22 0855 -- -- -- -- 100 %   10/23/22 0841 -- (!) 107 16 (!) 174/124 100 %   10/23/22 0439 98.9 °F (37.2 °C) 95 20 (!) 153/106 98 %         Patient Vitals for the past 96 hrs:   Weight   10/22/22 1114 81.3 kg (179 lb 3.7 oz)   10/20/22 2155 72.1 kg (159 lb)                    Intake/Output Summary (Last 24 hours) at 10/23/2022 1018  Last data filed at 10/22/2022 1745  Gross per 24 hour   Intake -- Output 2000 ml   Net -2000 ml       Physical Exam:  General:  alert, cooperative, no distress, appears stated age  Neck:  nontender  Lungs:  clear to auscultation bilaterally  Heart:  regular rate and rhythm, S1, S2 normal, no murmur, click, rub or gallop  Abdomen:  abdomen is soft without significant tenderness, masses, organomegaly or guarding  Extremities:  extremities normal, atraumatic, no cyanosis or edema    Data Review:     Labs: Results:       Chemistry Recent Labs     10/23/22  0443 10/22/22  0300 10/21/22  1127 10/20/22  2220   GLU 79 74 90 94   * 138 141 143   K 4.5 4.8 4.4 4.4   CL 98* 100 100 101   CO2 30 29 32 29   BUN 41* 40* 59* 50*   CREA 5.88* 6.44* 8.44* 7.37*   CA 10.4* 9.0 9.3 9.1   MG  --   --   --  2.9*   AGAP 7 9 9 13   BUCR 7* 6* 7* 7*   * 131* 149*  --    TP 7.9 7.2 6.7  --    ALB 3.1* 3.0* 2.9*  --    GLOB 4.8* 4.2* 3.8  --    AGRAT 0.6* 0.7* 0.8  --       CBC w/Diff Recent Labs     10/22/22  0300 10/21/22  0400 10/20/22  2220   WBC 5.6 6.2 6.1   RBC 3.61* 3.55* 3.81*   HGB 10.1* 9.7* 10.6*   HCT 33.3* 32.4* 34.3*    215 223   GRANS 52 59 65   LYMPH 29 22 18*   EOS 6* 2 2      Cardiac Enzymes No results found for: CPK, CK, CKMMB, CKMB, RCK3, CKMBT, CKNDX, CKND1, ANDREW, TROPT, TROIQ, NHAN, TROPT, TNIPOC, BNP, BNPP   Coagulation Recent Labs     10/22/22  1235 10/21/22  2159 10/21/22  1127   PTP 14.0  --   --    INR 1.0  --   --    APTT  --  84.7* 56.7*       Lipid Panel Lab Results   Component Value Date/Time    Cholesterol, total 156 10/21/2022 11:27 AM    HDL Cholesterol 73 (H) 10/21/2022 11:27 AM    LDL, calculated 64 10/21/2022 11:27 AM    VLDL, calculated 19 10/21/2022 11:27 AM    Triglyceride 95 10/21/2022 11:27 AM    CHOL/HDL Ratio 2.1 10/21/2022 11:27 AM      BNP No results found for: BNP, BNPP, XBNPT   Liver Enzymes Recent Labs     10/23/22  0443   TP 7.9   ALB 3.1*   *      Digoxin    Thyroid Studies Lab Results   Component Value Date/Time    T4, Total 8.0 02/16/2012 08:00 PM    TSH 0.05 (L) 10/21/2022 11:27 AM          Signed By: Amaris Collins MD     October 23, 2022

## 2022-10-23 NOTE — ROUTINE PROCESS
I have reviewed discharge instructions with the patient. The patient verbalized understanding. Discharge instructions signed by patient, questions answered. IVs and armbands removed. Transportation notified that patient is ready to go.

## 2022-10-23 NOTE — ROUTINE PROCESS
Dr. Amanuel Collins paged and notified of patient's concerns about medications on discharge instructions, changes explained to patient, patient signed discharge instructions stating she understands changes.

## 2022-10-23 NOTE — PROGRESS NOTES
D/C order noted for today. Orders reviewed. No needs identified at this time.          BRENDA Alonso RN  Care Management  Pager: [phone]

## 2022-10-24 LAB
HBV SURFACE AB SER QL IA: POSITIVE
HBV SURFACE AB SERPL IA-ACNC: >1000 MIU/ML
HEP BS AB COMMENT,HBSAC: NORMAL

## 2022-11-14 NOTE — DISCHARGE SUMMARY
Discharge Summary    Patient: Diane Herrera MRN: [id number]  CSN: [id number]    YOB: 1960  Age: 65 y.o. Sex: female    DOA: 10/20/2022 LOS:  LOS: 2 days   Discharge Date: 10/23/2022     Admission Diagnoses: Atrial fibrillation with RVR ([address] [I48.91]  Elevated troponin [R77.8]  Dyspnea [R06.00]    Discharge Diagnoses:    Acute respiratory failure with hypoxia  Suspected volume overload  Elevated troponin, ACS ruled out  ESRD, HD dependent  Hypertension  Paroxysmal atrial fibrillation by history  Obesity, BMI 32.78    Discharge Condition: Stable    PHYSICAL EXAM  Visit Vitals  BP (!) 162/113   Pulse (!) 104   Temp 98.6 °F (37 °C)   Resp 20   Ht 5' 2\" (1.575 m)   Wt 81.2 kg (179 lb)   SpO2 97%   Breastfeeding Unknown   BMI 32.74 kg/m²       General: In NAD. Nontoxic-appearing. HEENT: NCAT. Sclerae anicteric, EOMI. OP clear. Lungs:  Clear, no wheezes. No accessory muscle use. Heart:  RRR. Abdomen: Soft, NT/ND. Extremities: Warm, no edema or ischemia. Psych:   Mood normal.  Neurologic:  Awake and alert, moves extremities spontaneously. Motor grossly nonfocal.    Hospital Course:   See admission H&P for full details of HPI. Patient was referred for hospital admission after presenting to the emergency department complaining of shortness of breath and palpitations. Troponin was also noted to be elevated but flat, felt likely to be due to demand ischemia. Patient has a history of paroxysmal atrial fibrillation but was running primarily in a sinus rhythm with tachycardia and rates low 100s. CTA was suboptimal but was without obvious evidence for PE and heparin was discontinued. Respiratory status continued to improve and most of her respiratory symptomatology was felt to be secondary to volume overload.   She tolerated hemodialysis without difficulty and showed demonstrable improvement with volume removal.  Patient has continued to improve and is felt to be medically stable for discharge home with current cardiac regimen as documented. I personally reached out to patient's primary cardiologist, Dr. Junior Christianson, and made him aware of her admission to our facility. His office will be making arrangements for patient to be seen in follow-up once she is discharged. Patient is aware of the importance of following up as advised. Consults:   Cardiology  Nephrology    Significant Diagnostic Studies/Procedures:   2D echo: Interpretation Summary    Result status: Final result       Left Ventricle: Low normal left ventricular systolic function with a visually estimated EF of 50 - 55%. Left ventricle size is normal. Severely increased wall thickness. See diagram for wall motion findings. Grade II diastolic dysfunction. Aortic Valve: Thickened cusp. Mild sclerosis of the aortic valve cusps without stenosis. Mitral Valve: Thickened leaflet. Moderate annular calcification at the posterior leaflet of the mitral valve. Pericardium: Trivial pericardial effusion present without any evidence of tamponade. Severe LVH noted. Current EF is low normal.     Comparison Study Information    Prior Study    There is a prior study available for comparison. Prior study date: 5/29/2022. As compared to the previous study, there are no significant changes. The new EF is low normal.         CTA chest:     COMPARISON: Chest x-ray 10/20. FINDINGS:  Opacification of the pulmonary arteries is adequate. Study is severely degraded  by patient motion artifact through the lower lobes with obscuration of  vasculature. No filling defects are appreciated within the main, left, right, lobar or  visualized segmental pulmonary arteries to suggest embolism. Main pulmonary artery is significantly enlarged, measuring up to 3.9 cm  diameter. Heart size is significantly globally enlarged. Trace only pericardial fluid. Mildly enlarged ascending thoracic aorta 4.1 cm diameter. No evidence of  dissection.      No appreciably enlarged axillary lymph nodes. Mildly enlarged precarinal lymph  nodes up to 12 mm nonspecific. No pneumothorax. No overt pulmonary edema by imaging. Subsegmental atelectasis lingular segment left upper lobe and minimally in the  medial basal left lower lobe and medial segment right middle lobe. No focal  consolidation. Central airway is patent. No significant debris. Degenerative change commensurate with age. Probable Schmorl's nodes within lower  thoracic vertebral body inferior endplates. IMPRESSION  1. No CT evidence for central pulmonary embolism. Significant motion artifact  degrades image quality and lower lobe vessels suboptimally assessed. 2. Cardiomegaly without effusion. Enlarged central pulmonary may reflect  pulmonary hypertension.  -Mildly enlarged thoracic aorta with no evidence of dissection. 3. Scattered areas subsegmental atelectasis. No consolidation, pneumothorax or  effusion. Discharge Medications:    Discharge Medication List as of 10/23/2022 12:36 PM        START taking these medications    Details   neomycin-bacitracin-polymyxin (NEOSPORIN) 3.5mg-400 unit- 5,000 unit/gram ointment Apply to affected areas on lower extremities twice daily as directed., Normal, Disp-60 g, R-1      melatonin 5 mg tablet Take 1 Tablet by mouth nightly as needed (Insomnia). , Normal, Disp-30 Tablet, R-0      lidocaine (XYLOCAINE) 2 % solution Take 15 mL by mouth every three (3) hours as needed for Pain., Normal, Disp-100 mL, R-0           CONTINUE these medications which have CHANGED    Details   lisinopriL (PRINIVIL, ZESTRIL) 40 mg tablet Take 1 Tablet by mouth daily.  Patient unsure of dosage, Normal, Disp-30 Tablet, R-0           CONTINUE these medications which have NOT CHANGED    Details   guaiFENesin ER (MUCINEX) 600 mg ER tablet Take 1 Tablet by mouth two (2) times a day., Normal, Disp-20 Tablet, R-0      dilTIAZem ER (Cardizem CD) 180 mg capsule Take 1 Capsule by mouth daily., Normal, Disp-30 Capsule, R-0      cloNIDine HCL (CATAPRES) 0.2 mg tablet Take 0.2 mg by mouth three (3) times daily. , Historical Med      sertraline (ZOLOFT) 50 mg tablet Take 50 mg by mouth daily. Indications: major depressive disorder, Historical Med      fluticasone propionate (FLONASE) 50 mcg/actuation nasal spray 2 Sprays by Both Nostrils route daily. , Normal, Disp-16 g, R-0      fluticasone propion-salmeteroL (ADVAIR/WIXELA) 250-50 mcg/dose diskus inhaler Take 1 Puff by inhalation two (2) times a day., Historical Med      sevelamer carbonate (RENVELA) 800 mg tab tab Take 3,200 mg by mouth three (3) times daily (with meals). , Historical Med      albuterol (PROVENTIL HFA, VENTOLIN HFA, PROAIR HFA) 90 mcg/actuation inhaler Take 2 Puffs by inhalation every six (6) hours as needed for Shortness of Breath or Cough., Normal, Disp-18 g, R-0      hydrOXYzine HCL (ATARAX) 50 mg tablet Take 50 mg by mouth two (2) times daily as needed for Anxiety. , Historical Med      cyclobenzaprine (FLEXERIL) 10 mg tablet Take 1 Tab by mouth three (3) times daily as needed for Muscle Spasm(s). , Normal, Disp-14 Tab, R-0           STOP taking these medications       meclizine (ANTIVERT) 25 mg tablet Comments:   Reason for Stopping:         triamcinolone acetonide (KENALOG) 0.5 % ointment Comments:   Reason for Stopping:                 Activity: As tolerated. Diet: Cardiac/renal.    Disposition: Home. Follow-up: with PCP in 1 week, cardiology as directed. .    Minutes spent on discharge: >30 minutes spent coordinating this discharge. Baljeet Marino MD  [address] [place], [place] [address]ists    Disclaimer: Sections of this note are dictated using utilizing voice recognition software. Minor typographical errors may be present. If questions arise, please do not hesitate to contact me or call our department.

## 2022-11-20 PROBLEM — R77.8 ELEVATED TROPONIN: Status: RESOLVED | Noted: 2022-10-21 | Resolved: 2022-11-20

## 2022-11-22 ENCOUNTER — TRANSCRIBE ORDER (OUTPATIENT)
Dept: SCHEDULING | Age: 62
End: 2022-11-22

## 2022-11-22 DIAGNOSIS — Z12.31 VISIT FOR SCREENING MAMMOGRAM: Primary | ICD-10-CM

## 2022-11-27 ENCOUNTER — APPOINTMENT (OUTPATIENT)
Dept: GENERAL RADIOLOGY | Age: 62
End: 2022-11-27
Attending: EMERGENCY MEDICINE
Payer: MEDICARE

## 2022-11-27 ENCOUNTER — HOSPITAL ENCOUNTER (EMERGENCY)
Age: 62
Discharge: HOME | End: 2022-11-27
Attending: EMERGENCY MEDICINE
Payer: MEDICARE

## 2022-11-27 VITALS
DIASTOLIC BLOOD PRESSURE: 116 MMHG | HEIGHT: 62 IN | BODY MASS INDEX: 32.2 KG/M2 | RESPIRATION RATE: 18 BRPM | TEMPERATURE: 98.6 F | WEIGHT: 175 LBS | SYSTOLIC BLOOD PRESSURE: 164 MMHG | OXYGEN SATURATION: 98 % | HEART RATE: 81 BPM

## 2022-11-27 DIAGNOSIS — J20.9 ACUTE BRONCHITIS, UNSPECIFIED ORGANISM: Primary | ICD-10-CM

## 2022-11-27 PROCEDURE — 99283 EMERGENCY DEPT VISIT LOW MDM: CPT

## 2022-11-27 PROCEDURE — 71046 X-RAY EXAM CHEST 2 VIEWS: CPT

## 2022-11-27 PROCEDURE — 74011250637 HC RX REV CODE- 250/637: Performed by: EMERGENCY MEDICINE

## 2022-11-27 RX ORDER — GUAIFENESIN 100 MG/5ML
200 SOLUTION ORAL
Qty: 150 ML | Refills: 0 | Status: SHIPPED | OUTPATIENT
Start: 2022-11-27

## 2022-11-27 RX ORDER — GUAIFENESIN 100 MG/5ML
200 SOLUTION ORAL
Status: COMPLETED | OUTPATIENT
Start: 2022-11-27 | End: 2022-11-27

## 2022-11-27 RX ORDER — AMOXICILLIN 250 MG/1
500 CAPSULE ORAL
Status: COMPLETED | OUTPATIENT
Start: 2022-11-27 | End: 2022-11-27

## 2022-11-27 RX ORDER — AMOXICILLIN 500 MG/1
500 TABLET, FILM COATED ORAL 3 TIMES DAILY
Qty: 30 TABLET | Refills: 0 | Status: SHIPPED | OUTPATIENT
Start: 2022-11-27

## 2022-11-27 RX ADMIN — GUAIFENESIN 200 MG: 200 SOLUTION ORAL at 05:53

## 2022-11-27 RX ADMIN — AMOXICILLIN 500 MG: 250 CAPSULE ORAL at 05:53

## 2022-11-27 NOTE — ED NOTES
Patient up for discharge. Discharge instructions reviewed with patient and patient verbalized understanding of. Education taught to: patient  Patient education was completed: yes  Pain assessment on discharge was 0. Condition stable. Patient discharged to home. Patient was discharged ambulatory. Valuables with patient  Current Discharge Medication List        START taking these medications    Details   guaiFENesin (ROBITUSSIN) 100 mg/5 mL liquid Take 10 mL by mouth three (3) times daily as needed for Cough. Qty: 150 mL, Refills: 0  Start date: 11/27/2022      amoxicillin (AMOXIL) 500 mg tablet Take 1 Tablet by mouth three (3) times daily. Qty: 30 Tablet, Refills: 0  Start date: 11/27/2022           CONTINUE these medications which have NOT CHANGED    Details   guaiFENesin ER (MUCINEX) 600 mg ER tablet Take 1 Tablet by mouth two (2) times a day.   Qty: 20 Tablet, Refills: 0

## 2022-11-27 NOTE — ED TRIAGE NOTES
A&O female with productive cough and congestion x 2 days. Reports sore throat and burning in chest from the coughing.

## 2022-11-27 NOTE — ED PROVIDER NOTES
Cough    Nasal Congestion     61-year-old female who presents to ER with complaint having a sudden onset of nausea and productive cough x 2 days. No fever chills, chest pain headache. Past Medical History:   Diagnosis Date    Adenomatous polyp     CHF (congestive heart failure) ([facility]     Chronic kidney disease     TT    Dialysis patient ([facility]     tues, thurs, sat    ESRD (end stage renal disease) ([facility]     Family hx of colon cancer     Mother    GI bleed due to NSAIDs     Heart failure ([facility]     History of blood transfusion     Hypertension     LUIS (obstructive sleep apnea)     no cpap       Past Surgical History:   Procedure Laterality Date    COLONOSCOPY N/A 9/27/2019    COLONOSCOPY with polyp bx performed by Wilber Jenkins MD at [address] [place] [address]      HX COLONOSCOPY  09/21/2016    colonoscopy, Dr. Rex Person, [address] [id number]  05/2020    HX OTHER SURGICAL       ARTERIOVENOUS FISTULA INSERTION LEFT ARM     HX TOTAL ABDOMINAL HYSTERECTOMY      HX TOTAL COLECTOMY  10/11/2016    LAPAROSCOPIC COLECTOMY PARTIAL RIGHT FOR CECAL POLYP, DR. PAEZ, [facility]    HX TUBAL LIGATION      VASCULAR SURGERY PROCEDURE UNLIST      left arm graft         Family History:   Problem Relation Age of Onset    Colon Cancer Mother     Colon Polyps Mother        Social History     Socioeconomic History    Marital status:      Spouse name: Not on file    Number of children: Not on file    Years of education: Not on file    Highest education level: Not on file   Occupational History    Not on file   Tobacco Use    Smoking status: Former    Smokeless tobacco: Never   Substance and Sexual Activity    Alcohol use: No    Drug use: No    Sexual activity: Yes     Partners: Male   Other Topics Concern    Not on file   Social History Narrative    Not on file     Social Determinants of Health     Financial Resource Strain: Not on file   Food Insecurity: Not on file Transportation Needs: Not on file   Physical Activity: Not on file   Stress: Not on file   Social Connections: Not on file   Intimate Partner Violence: Not on file   Housing Stability: Not on file         ALLERGIES: Patient has no known allergies. Review of Systems   Constitutional: Negative. HENT: Negative. Eyes: Negative. Respiratory:  Positive for cough. Cardiovascular: Negative. Gastrointestinal: Negative. Endocrine: Negative. Genitourinary: Negative. Musculoskeletal: Negative. Skin: Negative. Allergic/Immunologic: Negative. Neurological: Negative. Hematological: Negative. Psychiatric/Behavioral: Negative. All other systems reviewed and are negative. Vitals:    11/27/22 0440   BP: (!) 164/116   Pulse: 81   Resp: 18   Temp: 98.6 °F (37 °C)   SpO2: 98%   Weight: 79.4 kg (175 lb)   Height: 5' 2\" (1.575 m)            Physical Exam  Vitals and nursing note reviewed. Constitutional:       General: She is not in acute distress. Appearance: She is well-developed. She is not diaphoretic. HENT:      Head: Normocephalic. Right Ear: External ear normal.      Left Ear: External ear normal.      Mouth/Throat:      Pharynx: No oropharyngeal exudate. Eyes:      General: No scleral icterus. Right eye: No discharge. Left eye: No discharge. Conjunctiva/sclera: Conjunctivae normal.      Pupils: Pupils are equal, round, and reactive to light. Neck:      Thyroid: No thyromegaly. Vascular: No JVD. Trachea: No tracheal deviation. Cardiovascular:      Rate and Rhythm: Normal rate and regular rhythm. Heart sounds: Normal heart sounds. No murmur heard. No friction rub. No gallop. Pulmonary:      Effort: Pulmonary effort is normal. No respiratory distress. Breath sounds: Normal breath sounds. No stridor. No wheezing or rales. Chest:      Chest wall: No tenderness.    Abdominal:      General: Bowel sounds are normal. There is no distension. Palpations: Abdomen is soft. There is no mass. Tenderness: There is no abdominal tenderness. There is no guarding or rebound. Musculoskeletal:         General: No tenderness. Normal range of motion. Cervical back: Normal range of motion and neck supple. Lymphadenopathy:      Cervical: No cervical adenopathy. Skin:     General: Skin is warm and dry. Coloration: Skin is not pale. Findings: No erythema or rash. Neurological:      Mental Status: She is alert and oriented to person, place, and time. Cranial Nerves: No cranial nerve deficit. Motor: No abnormal muscle tone. Coordination: Coordination normal.      Deep Tendon Reflexes: Reflexes normal.        MDM     Amount and/or Complexity of Data Reviewed  Clinical lab tests: ordered and reviewed  Tests in the radiology section of CPT®: ordered and reviewed    Risk of Complications, Morbidity, and/or Mortality  Diagnostic procedures: high  Management options: moderate    CxR: interpreted by me    Procedures    Dif Dx: bronchitis, URI, pneumonia    Dx: acute bronchitis    Disp: D/C home. F/U PCP in 3 to 4 days. Return to ER prn. Rx: amoxicillin, robitussin. Dictation disclaimer:  Please note that this dictation was completed with [employer], the [employer] voice recognition software. Quite often unanticipated grammatical, syntax, homophones, and other interpretive errors are inadvertently transcribed by the computer software. Please disregard these errors. Please excuse any errors that have escaped final proofreading.

## 2022-11-29 ENCOUNTER — HOSPITAL ENCOUNTER (EMERGENCY)
Age: 62
Discharge: HOME | End: 2022-11-29
Attending: EMERGENCY MEDICINE | Admitting: EMERGENCY MEDICINE
Payer: MEDICARE

## 2022-11-29 VITALS
DIASTOLIC BLOOD PRESSURE: 104 MMHG | TEMPERATURE: 98.1 F | HEART RATE: 87 BPM | OXYGEN SATURATION: 100 % | SYSTOLIC BLOOD PRESSURE: 152 MMHG | RESPIRATION RATE: 16 BRPM

## 2022-11-29 DIAGNOSIS — J02.9 SORE THROAT: Primary | ICD-10-CM

## 2022-11-29 PROCEDURE — 99283 EMERGENCY DEPT VISIT LOW MDM: CPT | Performed by: EMERGENCY MEDICINE

## 2022-11-29 RX ORDER — ALBUTEROL SULFATE 90 UG/1
2 AEROSOL, METERED RESPIRATORY (INHALATION)
Qty: 18 G | Refills: 0 | Status: SHIPPED | OUTPATIENT
Start: 2022-11-29

## 2022-11-29 RX ORDER — PREDNISONE 50 MG/1
50 TABLET ORAL DAILY
Qty: 3 TABLET | Refills: 0 | Status: SHIPPED | OUTPATIENT
Start: 2022-11-29 | End: 2022-12-02

## 2022-11-29 RX ORDER — BENZONATATE 100 MG/1
100 CAPSULE ORAL
Qty: 30 CAPSULE | Refills: 0 | Status: SHIPPED | OUTPATIENT
Start: 2022-11-29 | End: 2022-12-06

## 2022-11-29 RX ORDER — GLYCERIN 0.25 %
DROPS OPHTHALMIC (EYE)
Qty: 118 ML | Refills: 0 | Status: SHIPPED | OUTPATIENT
Start: 2022-11-29

## 2022-11-29 RX ORDER — ACETAMINOPHEN 325 MG/1
650 TABLET ORAL
Qty: 20 TABLET | Refills: 0 | Status: SHIPPED | OUTPATIENT
Start: 2022-11-29

## 2022-11-29 NOTE — ED PROVIDER NOTES
EMERGENCY DEPARTMENT HISTORY AND PHYSICAL EXAM    Date: 11/29/2022  Patient Name: Deng Torres    History of Presenting Illness     Chief Complaint   Patient presents with    Cough    Sore Throat         History Provided By: Patient    Chief Complaint: Cough, sore throat  Duration: Couple days  Timing: Constant  Location: Throat  Quality: Sore  Severity: Moderate  Modifying Factors: Worse after coughing nonstop for the past couple days  Associated Symptoms: none       Additional History (Context): Deng Torres is a 65 y.o. female with a history of hypertension and end-stage renal disease, CHF who presents today for issues listed above. Patient reports she was seen evaluated the emergency department 2 days ago and was diagnosed with bronchitis. Reports she was sent home with antibiotics and guaifenesin however was not sent home with anything for her sore throat. Patient has not tried anything additional at home for her sore throat. Denies any dental concerns. Denies any facial swelling or difficulties opening her mouth. Denies any measurable fevers, chills, nausea, vomiting, chest pain or shortness of breath. PCP: Clyde Pascual MD    Current Outpatient Medications   Medication Sig Dispense Refill    acetaminophen (TYLENOL) 325 mg tablet Take 2 Tablets by mouth every four (4) hours as needed for Pain. 20 Tablet 0    predniSONE (DELTASONE) 50 mg tablet Take 1 Tablet by mouth daily for 3 days. 3 Tablet 0    phenoL-glycerin (Chloraseptic Max Sore Throat) 1.5-33 % spry Spray to affected area 4 times a day as needed for discomfort 118 mL 0    albuterol (ProAir HFA) 90 mcg/actuation inhaler Take 2 Puffs by inhalation every four (4) hours as needed for Wheezing. 18 g 0    benzonatate (Tessalon Perles) 100 mg capsule Take 1 Capsule by mouth three (3) times daily as needed for Cough for up to 7 days.  30 Capsule 0    guaiFENesin (ROBITUSSIN) 100 mg/5 mL liquid Take 10 mL by mouth three (3) times daily as needed for Cough. 150 mL 0    amoxicillin (AMOXIL) 500 mg tablet Take 1 Tablet by mouth three (3) times daily. 30 Tablet 0    lisinopriL (PRINIVIL, ZESTRIL) 40 mg tablet Take 1 Tablet by mouth daily. Patient unsure of dosage 30 Tablet 0    neomycin-bacitracin-polymyxin (NEOSPORIN) 3.5mg-400 unit- 5,000 unit/gram ointment Apply to affected areas on lower extremities twice daily as directed. 60 g 1    melatonin 5 mg tablet Take 1 Tablet by mouth nightly as needed (Insomnia). 30 Tablet 0    lidocaine (XYLOCAINE) 2 % solution Take 15 mL by mouth every three (3) hours as needed for Pain. 100 mL 0    albuterol (PROVENTIL HFA, VENTOLIN HFA, PROAIR HFA) 90 mcg/actuation inhaler Take 2 Puffs by inhalation every six (6) hours as needed for Shortness of Breath or Cough. 18 g 0    guaiFENesin ER (MUCINEX) 600 mg ER tablet Take 1 Tablet by mouth two (2) times a day. 20 Tablet 0    dilTIAZem ER (Cardizem CD) 180 mg capsule Take 1 Capsule by mouth daily. 30 Capsule 0    cloNIDine HCL (CATAPRES) 0.2 mg tablet Take 0.2 mg by mouth three (3) times daily. sertraline (ZOLOFT) 50 mg tablet Take 50 mg by mouth daily. Indications: major depressive disorder      fluticasone propionate (FLONASE) 50 mcg/actuation nasal spray 2 Sprays by Both Nostrils route daily. 16 g 0    fluticasone propion-salmeteroL (ADVAIR/WIXELA) 250-50 mcg/dose diskus inhaler Take 1 Puff by inhalation two (2) times a day.      hydrOXYzine HCL (ATARAX) 50 mg tablet Take 50 mg by mouth two (2) times daily as needed for Anxiety. sevelamer carbonate (RENVELA) 800 mg tab tab Take 3,200 mg by mouth three (3) times daily (with meals). cyclobenzaprine (FLEXERIL) 10 mg tablet Take 1 Tab by mouth three (3) times daily as needed for Muscle Spasm(s).  14 Tab 0       Past History     Past Medical History:  Past Medical History:   Diagnosis Date    Adenomatous polyp     CHF (congestive heart failure) ([address]     Chronic kidney disease     [facility]    Dialysis patient ([facility] jourdan molina, sat    ESRD (end stage renal disease) ([facility]     Family hx of colon cancer     Mother    GI bleed due to NSAIDs     Heart failure ([facility]     History of blood transfusion     Hypertension     LUIS (obstructive sleep apnea)     no cpap       Past Surgical History:  Past Surgical History:   Procedure Laterality Date    COLONOSCOPY N/A 9/27/2019    COLONOSCOPY with polyp bx performed by Claudia Flores MD at [address] [facility]      HX COLONOSCOPY  09/21/2016    colonoscopy, Dr. Mahsa Segundo, [facility]  05/2020    HX OTHER SURGICAL       ARTERIOVENOUS FISTULA INSERTION LEFT ARM     HX TOTAL ABDOMINAL HYSTERECTOMY      HX TOTAL COLECTOMY  10/11/2016    LAPAROSCOPIC COLECTOMY PARTIAL RIGHT FOR CECAL POLYP, DR. PAEZ, [facility]    HX TUBAL LIGATION      VASCULAR SURGERY PROCEDURE UNLIST      left arm graft       Family History:  Family History   Problem Relation Age of Onset    Colon Cancer Mother     Colon Polyps Mother        Social History:  Social History     Tobacco Use    Smoking status: Former    Smokeless tobacco: Never   Substance Use Topics    Alcohol use: No    Drug use: No       Allergies:  No Known Allergies      Review of Systems   Review of Systems   Constitutional:  Negative for chills and fever. HENT:  Positive for sore throat. Negative for congestion and rhinorrhea. Respiratory:  Positive for cough. Negative for shortness of breath. Cardiovascular:  Negative for chest pain. Gastrointestinal:  Negative for abdominal pain, blood in stool, constipation, diarrhea, nausea and vomiting. Genitourinary:  Negative for dysuria, frequency and hematuria. Musculoskeletal:  Negative for back pain and myalgias. Skin:  Negative for rash and wound. Neurological:  Negative for dizziness and headaches. All other systems reviewed and are negative.   All Other Systems Negative  Physical Exam     Vitals:    11/29/22 1528   BP: (!) 152/104   Pulse: 87   Resp: 16   Temp: 98.1 °F (36.7 °C)   SpO2: 100%     Physical Exam  Vitals and nursing note reviewed. Constitutional:       General: She is not in acute distress. Appearance: She is well-developed. She is not diaphoretic. HENT:      Head: Normocephalic and atraumatic. Mouth/Throat:      Pharynx: Oropharynx is clear. Uvula midline. Tonsils: No tonsillar exudate. 0 on the right. 0 on the left. Comments: No facial swelling or trismus   Eyes:      Conjunctiva/sclera: Conjunctivae normal.   Cardiovascular:      Rate and Rhythm: Normal rate and regular rhythm. Heart sounds: Normal heart sounds. Pulmonary:      Effort: Pulmonary effort is normal. No respiratory distress. Breath sounds: Normal breath sounds. Chest:      Chest wall: No tenderness. Abdominal:      General: Bowel sounds are normal. There is no distension. Palpations: Abdomen is soft. Tenderness: There is no abdominal tenderness. There is no guarding or rebound. Musculoskeletal:         General: No deformity. Cervical back: Normal range of motion and neck supple. Skin:     General: Skin is warm and dry. Neurological:      Mental Status: She is alert and oriented to person, place, and time. Deep Tendon Reflexes: Reflexes are normal and symmetric. Diagnostic Study Results     Labs -   No results found for this or any previous visit (from the past 12 hour(s)). Radiologic Studies -   No orders to display     CT Results  (Last 48 hours)      None          CXR Results  (Last 48 hours)      None              Medical Decision Making   I am the first provider for this patient. I reviewed the vital signs, available nursing notes, past medical history, past surgical history, family history and social history. Vital Signs-Reviewed the patient's vital signs.       Records Reviewed: Nursing Notes and Old Medical Records     Procedures: None   Procedures    Provider Notes (Medical Decision Making):     Differential Diagnosis:  influenza, mononucleosis, acute bronchitis, URI, streptococcal pharyngitis, pneumonia, asthma exacerbation, allergic rhinitis, seasonal allergies, COVID    Plan: Physical exam overall reassuring. Vital signs overall reassuring. No emergent intervention at this time. Will discharge home with something for her sore throat. We will also refill her albuterol. We will also discharge home with antitussives. Have encouraged close primary care follow-up. Return precautions have been given. MED RECONCILIATION:  No current facility-administered medications for this encounter. Current Outpatient Medications   Medication Sig    acetaminophen (TYLENOL) 325 mg tablet Take 2 Tablets by mouth every four (4) hours as needed for Pain. predniSONE (DELTASONE) 50 mg tablet Take 1 Tablet by mouth daily for 3 days. phenoL-glycerin (Chloraseptic Max Sore Throat) 1.5-33 % spry Spray to affected area 4 times a day as needed for discomfort    albuterol (ProAir HFA) 90 mcg/actuation inhaler Take 2 Puffs by inhalation every four (4) hours as needed for Wheezing. benzonatate (Tessalon Perles) 100 mg capsule Take 1 Capsule by mouth three (3) times daily as needed for Cough for up to 7 days. guaiFENesin (ROBITUSSIN) 100 mg/5 mL liquid Take 10 mL by mouth three (3) times daily as needed for Cough. amoxicillin (AMOXIL) 500 mg tablet Take 1 Tablet by mouth three (3) times daily. lisinopriL (PRINIVIL, ZESTRIL) 40 mg tablet Take 1 Tablet by mouth daily. Patient unsure of dosage    neomycin-bacitracin-polymyxin (NEOSPORIN) 3.5mg-400 unit- 5,000 unit/gram ointment Apply to affected areas on lower extremities twice daily as directed. melatonin 5 mg tablet Take 1 Tablet by mouth nightly as needed (Insomnia). lidocaine (XYLOCAINE) 2 % solution Take 15 mL by mouth every three (3) hours as needed for Pain.     albuterol (PROVENTIL HFA, VENTOLIN HFA, PROAIR HFA) 90 mcg/actuation inhaler Take 2 Puffs by inhalation every six (6) hours as needed for Shortness of Breath or Cough. guaiFENesin ER (MUCINEX) 600 mg ER tablet Take 1 Tablet by mouth two (2) times a day. dilTIAZem ER (Cardizem CD) 180 mg capsule Take 1 Capsule by mouth daily. cloNIDine HCL (CATAPRES) 0.2 mg tablet Take 0.2 mg by mouth three (3) times daily. sertraline (ZOLOFT) 50 mg tablet Take 50 mg by mouth daily. Indications: major depressive disorder    fluticasone propionate (FLONASE) 50 mcg/actuation nasal spray 2 Sprays by Both Nostrils route daily. fluticasone propion-salmeteroL (ADVAIR/WIXELA) 250-50 mcg/dose diskus inhaler Take 1 Puff by inhalation two (2) times a day.    hydrOXYzine HCL (ATARAX) 50 mg tablet Take 50 mg by mouth two (2) times daily as needed for Anxiety. sevelamer carbonate (RENVELA) 800 mg tab tab Take 3,200 mg by mouth three (3) times daily (with meals). cyclobenzaprine (FLEXERIL) 10 mg tablet Take 1 Tab by mouth three (3) times daily as needed for Muscle Spasm(s). Disposition:  Home     DISCHARGE NOTE:   Pt has been reexamined. Patient has no new complaints, changes, or physical findings. Care plan outlined and precautions discussed. Results of workup were reviewed with the patient. All medications were reviewed with the patient. All of pt's questions and concerns were addressed. Patient was instructed and agrees to follow up with PCP as well as to return to the ED upon further deterioration. Patient is ready to go home.     Follow-up Information       Follow up With Specialties Details [facility] [place] EMERGENCY DEPT Emergency Medicine  As needed [address] Karen Morgan MD Family Medicine Schedule an appointment as soon as possible for a visit   [id number] [facility]  [phone]              Current Discharge Medication List        START taking these medications Details   acetaminophen (TYLENOL) 325 mg tablet Take 2 Tablets by mouth every four (4) hours as needed for Pain. Qty: 20 Tablet, Refills: 0  Start date: 11/29/2022      predniSONE (DELTASONE) 50 mg tablet Take 1 Tablet by mouth daily for 3 days. Qty: 3 Tablet, Refills: 0  Start date: 11/29/2022, End date: 12/2/2022      phenoL-glycerin (Chloraseptic Max Sore Throat) 1.5-33 % spry Spray to affected area 4 times a day as needed for discomfort  Qty: 118 mL, Refills: 0  Start date: 11/29/2022      !! albuterol (ProAir HFA) 90 mcg/actuation inhaler Take 2 Puffs by inhalation every four (4) hours as needed for Wheezing. Qty: 18 g, Refills: 0  Start date: 11/29/2022      benzonatate (Tessalon Perles) 100 mg capsule Take 1 Capsule by mouth three (3) times daily as needed for Cough for up to 7 days. Qty: 30 Capsule, Refills: 0  Start date: 11/29/2022, End date: 12/6/2022       !! - Potential duplicate medications found. Please discuss with provider. CONTINUE these medications which have NOT CHANGED    Details   !! albuterol (PROVENTIL HFA, VENTOLIN HFA, PROAIR HFA) 90 mcg/actuation inhaler Take 2 Puffs by inhalation every six (6) hours as needed for Shortness of Breath or Cough. Qty: 18 g, Refills: 0       !! - Potential duplicate medications found. Please discuss with provider. Diagnosis     Clinical Impression:   1. Sore throat          \"Please note that this dictation was completed with [employer], the [employer] voice recognition software. Quite often unanticipated grammatical, syntax, homophones, and other interpretive errors are inadvertently transcribed by the computer software. Please disregard these errors. Please excuse any errors that have escaped final proofreading. \"

## 2022-11-29 NOTE — ED TRIAGE NOTES
Seen last night for cough and diagnosed with bronchitis. Was given antibiotics but was not given anything for pain. She states she has been coughing so much her throat is raw. Alar Island Pedicle Flap Text: The defect edges were debeveled with a #15 scalpel blade.  Given the location of the defect, shape of the defect and the proximity to the alar rim an island pedicle advancement flap was deemed most appropriate.  Using a sterile surgical marker, an appropriate advancement flap was drawn incorporating the defect, outlining the appropriate donor tissue and placing the expected incisions within the nasal ala running parallel to the alar rim. The area thus outlined was incised with a #15 scalpel blade.  The skin margins were undermined minimally to an appropriate distance in all directions around the primary defect and laterally outward around the island pedicle utilizing iris scissors.  There was minimal undermining beneath the pedicle flap.

## 2022-11-29 NOTE — ED NOTES
Sujey Zavala is a 65 y.o. female that was discharged in stable condition. The patients diagnosis, condition and treatment were explained to  patient and aftercare instructions were given. The patient verbalized understanding. Patient armband removed and shredded.

## 2022-12-20 ENCOUNTER — TRANSCRIBE ORDER (OUTPATIENT)
Dept: SCHEDULING | Age: 62
End: 2022-12-20

## 2022-12-20 DIAGNOSIS — R92.8 ABNORMAL MAMMOGRAM: Primary | ICD-10-CM

## 2023-01-20 ENCOUNTER — HOSPITAL ENCOUNTER (EMERGENCY)
Age: 63
Discharge: HOME HEALTH | End: 2023-01-21
Attending: EMERGENCY MEDICINE
Payer: MEDICARE

## 2023-01-20 ENCOUNTER — APPOINTMENT (OUTPATIENT)
Dept: GENERAL RADIOLOGY | Age: 63
End: 2023-01-20
Attending: EMERGENCY MEDICINE
Payer: MEDICARE

## 2023-01-20 VITALS
SYSTOLIC BLOOD PRESSURE: 151 MMHG | TEMPERATURE: 97.9 F | BODY MASS INDEX: 32.2 KG/M2 | HEART RATE: 84 BPM | OXYGEN SATURATION: 97 % | RESPIRATION RATE: 17 BRPM | WEIGHT: 175 LBS | HEIGHT: 62 IN | DIASTOLIC BLOOD PRESSURE: 99 MMHG

## 2023-01-20 DIAGNOSIS — N18.6 END STAGE RENAL DISEASE ON DIALYSIS (HCC): ICD-10-CM

## 2023-01-20 DIAGNOSIS — Z99.2 END STAGE RENAL DISEASE ON DIALYSIS (HCC): ICD-10-CM

## 2023-01-20 DIAGNOSIS — M25.551 RIGHT HIP PAIN: Primary | ICD-10-CM

## 2023-01-20 DIAGNOSIS — M54.31 RIGHT SIDED SCIATICA: ICD-10-CM

## 2023-01-20 PROCEDURE — 74011250636 HC RX REV CODE- 250/636: Performed by: EMERGENCY MEDICINE

## 2023-01-20 PROCEDURE — 73502 X-RAY EXAM HIP UNI 2-3 VIEWS: CPT

## 2023-01-20 PROCEDURE — 96376 TX/PRO/DX INJ SAME DRUG ADON: CPT

## 2023-01-20 PROCEDURE — 99284 EMERGENCY DEPT VISIT MOD MDM: CPT

## 2023-01-20 PROCEDURE — 96374 THER/PROPH/DIAG INJ IV PUSH: CPT

## 2023-01-20 PROCEDURE — 96375 TX/PRO/DX INJ NEW DRUG ADDON: CPT

## 2023-01-20 RX ORDER — HYDROMORPHONE HYDROCHLORIDE 1 MG/ML
1 INJECTION, SOLUTION INTRAMUSCULAR; INTRAVENOUS; SUBCUTANEOUS ONCE
Status: COMPLETED | OUTPATIENT
Start: 2023-01-20 | End: 2023-01-20

## 2023-01-20 RX ORDER — DEXAMETHASONE SODIUM PHOSPHATE 4 MG/ML
4 INJECTION, SOLUTION INTRA-ARTICULAR; INTRALESIONAL; INTRAMUSCULAR; INTRAVENOUS; SOFT TISSUE
Status: COMPLETED | OUTPATIENT
Start: 2023-01-20 | End: 2023-01-20

## 2023-01-20 RX ORDER — HYDROMORPHONE HYDROCHLORIDE 1 MG/ML
0.5 INJECTION, SOLUTION INTRAMUSCULAR; INTRAVENOUS; SUBCUTANEOUS ONCE
Status: COMPLETED | OUTPATIENT
Start: 2023-01-20 | End: 2023-01-20

## 2023-01-20 RX ORDER — OXYCODONE AND ACETAMINOPHEN 5; 325 MG/1; MG/1
1 TABLET ORAL
Qty: 10 TABLET | Refills: 0 | Status: SHIPPED | OUTPATIENT
Start: 2023-01-20 | End: 2023-01-23

## 2023-01-20 RX ORDER — ONDANSETRON 2 MG/ML
4 INJECTION INTRAMUSCULAR; INTRAVENOUS ONCE
Status: COMPLETED | OUTPATIENT
Start: 2023-01-20 | End: 2023-01-20

## 2023-01-20 RX ADMIN — DEXAMETHASONE SODIUM PHOSPHATE 4 MG: 4 INJECTION, SOLUTION INTRAMUSCULAR; INTRAVENOUS at 22:46

## 2023-01-20 RX ADMIN — HYDROMORPHONE HYDROCHLORIDE 1 MG: 1 INJECTION, SOLUTION INTRAMUSCULAR; INTRAVENOUS; SUBCUTANEOUS at 21:00

## 2023-01-20 RX ADMIN — ONDANSETRON 4 MG: 2 INJECTION INTRAMUSCULAR; INTRAVENOUS at 21:00

## 2023-01-20 RX ADMIN — HYDROMORPHONE HYDROCHLORIDE 0.5 MG: 1 INJECTION, SOLUTION INTRAMUSCULAR; INTRAVENOUS; SUBCUTANEOUS at 22:46

## 2023-01-21 ENCOUNTER — APPOINTMENT (OUTPATIENT)
Dept: CT IMAGING | Age: 63
End: 2023-01-21
Attending: EMERGENCY MEDICINE
Payer: MEDICARE

## 2023-01-21 PROCEDURE — 73700 CT LOWER EXTREMITY W/O DYE: CPT

## 2023-01-21 PROCEDURE — 74011250636 HC RX REV CODE- 250/636: Performed by: EMERGENCY MEDICINE

## 2023-01-21 PROCEDURE — 74011000250 HC RX REV CODE- 250: Performed by: EMERGENCY MEDICINE

## 2023-01-21 RX ORDER — LIDOCAINE 4 G/100G
1 PATCH TOPICAL EVERY 24 HOURS
Status: DISCONTINUED | OUTPATIENT
Start: 2023-01-21 | End: 2023-01-21 | Stop reason: HOSPADM

## 2023-01-21 RX ORDER — MORPHINE SULFATE 4 MG/ML
4 INJECTION INTRAVENOUS ONCE
Status: COMPLETED | OUTPATIENT
Start: 2023-01-21 | End: 2023-01-21

## 2023-01-21 RX ORDER — HYDROMORPHONE HYDROCHLORIDE 1 MG/ML
1 INJECTION, SOLUTION INTRAMUSCULAR; INTRAVENOUS; SUBCUTANEOUS ONCE
Status: COMPLETED | OUTPATIENT
Start: 2023-01-21 | End: 2023-01-21

## 2023-01-21 RX ORDER — LORAZEPAM 2 MG/ML
1 INJECTION INTRAMUSCULAR
Status: COMPLETED | OUTPATIENT
Start: 2023-01-21 | End: 2023-01-21

## 2023-01-21 RX ADMIN — MORPHINE SULFATE 4 MG: 4 INJECTION, SOLUTION INTRAMUSCULAR; INTRAVENOUS at 00:28

## 2023-01-21 RX ADMIN — LORAZEPAM 1 MG: 2 INJECTION, SOLUTION INTRAMUSCULAR; INTRAVENOUS at 01:27

## 2023-01-21 RX ADMIN — HYDROMORPHONE HYDROCHLORIDE 1 MG: 1 INJECTION, SOLUTION INTRAMUSCULAR; INTRAVENOUS; SUBCUTANEOUS at 00:07

## 2023-01-21 NOTE — ED TRIAGE NOTES
Pt presents to ED from home c/o 9/10 chronic R hip pain. Pt states she fell out of bed three years ago and has had an aching pain intermittently in her R hip and shoulder since the fall that is normally resolved w/ tylenol.

## 2023-01-21 NOTE — ED PROVIDER NOTES
EMERGENCY DEPARTMENT HISTORY AND PHYSICAL EXAM      Date: 1/20/2023  Patient Name: Deandre Lozano      History of Presenting Illness     Chief Complaint   Patient presents with    Hip Pain       Location/Duration/Severity/Modifying factors   Chief Complaint   Patient presents with    Hip Pain       HPI:  Deandre Lozano is a 65 y.o. female with history as listed presents with a concern of moderate to severe pain at the right hip that started last night. The patient stated the pain has worsened. She massaged the hip and went to bed. Later she states she was not able to walk secondary to the pain and was crawling around the house. The patient denies fever or recent injury. She did report a fall 3 years ago off her bed. The patient denies any new numbness, tingling, or weakness. Pain is worse with moving the right hip. The patient has end-stage renal disease. She has dialysis on Tuesday, Thursday, and Saturdays. She reports compliance with her dialysis schedule. Her dialysis fistula is at the left upper extremity. Associated Symptoms:see ROS      There are no other complaints, changes, or physical findings at this time. PCP: Yuni Medina MD    Current Facility-Administered Medications   Medication Dose Route Frequency Provider Last Rate Last Admin    [START ON 1/21/2023] magic mouthwash (FIRST-MOUTHWASH BLM) oral suspension 10 mL  10 mL Oral AC&HS Grzegorz Gardner MD         Current Outpatient Medications   Medication Sig Dispense Refill    oxyCODONE-acetaminophen (Percocet) 5-325 mg per tablet Take 1 Tablet by mouth every eight (8) hours as needed for Pain for up to 3 days. Max Daily Amount: 3 Tablets. 10 Tablet 0    acetaminophen (TYLENOL) 325 mg tablet Take 2 Tablets by mouth every four (4) hours as needed for Pain.  20 Tablet 0    phenoL-glycerin (Chloraseptic Max Sore Throat) 1.5-33 % spry Spray to affected area 4 times a day as needed for discomfort 118 mL 0    albuterol (ProAir HFA) 90 mcg/actuation inhaler Take 2 Puffs by inhalation every four (4) hours as needed for Wheezing. 18 g 0    guaiFENesin (ROBITUSSIN) 100 mg/5 mL liquid Take 10 mL by mouth three (3) times daily as needed for Cough. 150 mL 0    amoxicillin (AMOXIL) 500 mg tablet Take 1 Tablet by mouth three (3) times daily. 30 Tablet 0    lisinopriL (PRINIVIL, ZESTRIL) 40 mg tablet Take 1 Tablet by mouth daily. Patient unsure of dosage 30 Tablet 0    neomycin-bacitracin-polymyxin (NEOSPORIN) 3.5mg-400 unit- 5,000 unit/gram ointment Apply to affected areas on lower extremities twice daily as directed. 60 g 1    melatonin 5 mg tablet Take 1 Tablet by mouth nightly as needed (Insomnia). 30 Tablet 0    lidocaine (XYLOCAINE) 2 % solution Take 15 mL by mouth every three (3) hours as needed for Pain. 100 mL 0    albuterol (PROVENTIL HFA, VENTOLIN HFA, PROAIR HFA) 90 mcg/actuation inhaler Take 2 Puffs by inhalation every six (6) hours as needed for Shortness of Breath or Cough. 18 g 0    guaiFENesin ER (MUCINEX) 600 mg ER tablet Take 1 Tablet by mouth two (2) times a day. 20 Tablet 0    dilTIAZem ER (Cardizem CD) 180 mg capsule Take 1 Capsule by mouth daily. 30 Capsule 0    cloNIDine HCL (CATAPRES) 0.2 mg tablet Take 0.2 mg by mouth three (3) times daily. sertraline (ZOLOFT) 50 mg tablet Take 50 mg by mouth daily. Indications: major depressive disorder      fluticasone propionate (FLONASE) 50 mcg/actuation nasal spray 2 Sprays by Both Nostrils route daily. 16 g 0    fluticasone propion-salmeteroL (ADVAIR/WIXELA) 250-50 mcg/dose diskus inhaler Take 1 Puff by inhalation two (2) times a day.      hydrOXYzine HCL (ATARAX) 50 mg tablet Take 50 mg by mouth two (2) times daily as needed for Anxiety. sevelamer carbonate (RENVELA) 800 mg tab tab Take 3,200 mg by mouth three (3) times daily (with meals). cyclobenzaprine (FLEXERIL) 10 mg tablet Take 1 Tab by mouth three (3) times daily as needed for Muscle Spasm(s).  14 Tab 0       Past History     Past Medical History:  Past Medical History:   Diagnosis Date    Adenomatous polyp     CHF (congestive heart failure) ([facility]     Chronic kidney disease     TT    Dialysis patient ([facility]     tues, thurs, sat    ESRD (end stage renal disease) ([facility]     Family hx of colon cancer     Mother    GI bleed due to NSAIDs     Heart failure ([facility]     History of blood transfusion     Hypertension     LUIS (obstructive sleep apnea)     no cpap       Past Surgical History:  Past Surgical History:   Procedure Laterality Date    COLONOSCOPY N/A 9/27/2019    COLONOSCOPY with polyp bx performed by Jaziel Muniz MD at [facility]      HX COLONOSCOPY  09/21/2016    colonoscopy, Dr[facility] Bill Martin, [facility]  05/2020    HX OTHER SURGICAL       ARTERIOVENOUS FISTULA INSERTION LEFT ARM     HX TOTAL ABDOMINAL HYSTERECTOMY      HX TOTAL COLECTOMY  10/11/2016    LAPAROSCOPIC COLECTOMY PARTIAL RIGHT FOR CECAL POLYP, DR. PAEZ, [facility]    HX TUBAL LIGATION      VASCULAR SURGERY PROCEDURE UNLIST      left arm graft       Family History:  Family History   Problem Relation Age of Onset    Colon Cancer Mother     Colon Polyps Mother        Social History:  Social History     Tobacco Use    Smoking status: Former    Smokeless tobacco: Never   Substance Use Topics    Alcohol use: No    Drug use: No       Allergies:  No Known Allergies      Review of Systems     Review of Systems   All other systems reviewed and are negative. Physical Exam     Physical Exam  Vitals and nursing note reviewed. Constitutional:       General: She is awake. She is not in acute distress. Appearance: Normal appearance. She is well-developed and well-groomed. She is not ill-appearing, toxic-appearing or diaphoretic. HENT:      Head: Normocephalic and atraumatic.       Right Ear: External ear normal.      Left Ear: External ear normal.      Mouth/Throat:      Mouth: Mucous membranes are moist.      Pharynx: Oropharynx is clear. Uvula midline. No pharyngeal swelling, oropharyngeal exudate, posterior oropharyngeal erythema or uvula swelling. Eyes:      General: Lids are normal. Vision grossly intact. No scleral icterus. Right eye: No discharge. Left eye: No discharge. Extraocular Movements: Extraocular movements intact. Conjunctiva/sclera: Conjunctivae normal.      Pupils: Pupils are equal, round, and reactive to light. Neck:      Trachea: Trachea and phonation normal.   Cardiovascular:      Rate and Rhythm: Normal rate and regular rhythm. Pulses: Normal pulses. Heart sounds: Normal heart sounds, S1 normal and S2 normal. No murmur heard. No friction rub. No gallop. Pulmonary:      Effort: Pulmonary effort is normal. No respiratory distress. Breath sounds: Normal breath sounds and air entry. No stridor. No wheezing, rhonchi or rales. Chest:      Chest wall: No tenderness. Abdominal:      General: Bowel sounds are normal. There is no distension. Palpations: Abdomen is soft. There is no mass. Tenderness: There is no abdominal tenderness. There is no guarding. Genitourinary:     Vagina: No vaginal discharge. Musculoskeletal:         General: No swelling, deformity or signs of injury. Normal range of motion. Right shoulder: Normal.      Left shoulder: Normal.      Right upper arm: Normal.      Left upper arm: Normal.      Right elbow: Normal.      Left elbow: Normal.      Right forearm: Normal.      Left forearm: Normal.      Right wrist: Normal. Normal pulse. Left wrist: Normal. Normal pulse. Right hand: Normal. Normal capillary refill. Left hand: Normal. Normal capillary refill. Arms:       Cervical back: Full passive range of motion without pain, normal range of motion and neck supple. No rigidity or tenderness. Right hip: Tenderness and bony tenderness present.  No deformity, lacerations or crepitus. Normal range of motion. Normal strength. Left hip: Normal.      Right lower leg: Normal. No edema. Left lower leg: Normal. No edema. Right ankle: Normal.      Left ankle: Normal.        Legs:       Comments: Left upper arm dialysis fistula has a positive thrill and bruit with no erythema or tenderness. At the right lateral hip there is reproducible tenderness extending to the right upper lateral buttocks. There is no erythema, crepitance, swelling, or increased warmth. There is no obvious deformity. Skin:     General: Skin is warm and dry. Capillary Refill: Capillary refill takes less than 2 seconds. Coloration: Skin is not jaundiced or pale. Findings: No bruising, erythema, lesion or rash. Neurological:      General: No focal deficit present. Mental Status: She is alert and oriented to person, place, and time. Mental status is at baseline. GCS: GCS eye subscore is 4. GCS verbal subscore is 5. GCS motor subscore is 6. Cranial Nerves: Cranial nerves 2-12 are intact. No cranial nerve deficit or facial asymmetry. Sensory: Sensation is intact. No sensory deficit. Motor: Motor function is intact. No weakness. Coordination: Coordination is intact. Coordination normal.   Psychiatric:         Attention and Perception: Attention and perception normal.         Mood and Affect: Mood normal. Affect is tearful. Speech: Speech normal.         Behavior: Behavior normal. Behavior is cooperative. Thought Content: Thought content normal.         Cognition and Memory: Cognition and memory normal.         Judgment: Judgment normal.       Lab and Diagnostic Study Results     Labs -  No results found for this or any previous visit (from the past 24 hour(s)).       Radiologic Studies -   XR HIP RT W OR WO PELV 2-3 VWS    (Results Pending)     My review of the right hip x-ray demonstrated no evidence of acute    Procedures and Critical Care Performed by: Denisse Sousa MD    Procedures         Denisse Sousa MD    Medical Decision Making and ED Course   - I am the first and primary provider for this patient AND AM THE PRIMARY PROVIDER OF RECORD. - I reviewed the vital signs, available nursing notes, past medical history, past surgical history, family history and social history. - Initial assessment performed. The patients presenting problems have been discussed, and the staff are in agreement with the care plan formulated and outlined with them. I have encouraged them to ask questions as they arise throughout their visit. Vital Signs-Reviewed the patient's vital signs. Patient Vitals for the past 12 hrs:   Temp Pulse Resp BP SpO2   01/20/23 2014 -- 70 20 125/68 --   01/20/23 1959 97.9 °F (36.6 °C) 68 20 127/81 99 %   01/20/23 1959 -- 68 23 103/66 96 %         Provider Notes (Medical Decision Making):     MDM  Number of Diagnoses or Management Options  End stage renal disease on dialysis [facility])  Right hip pain  Right sided sciatica  Diagnosis management comments: Patient presented to the emergency department with reproducible right hip pain. The x-ray did not demonstrated evidence of acute fracture or dislocation. The patient was treated in the emergency department with Dilaudid and Decadron for better pain control. The physical exam is consistent and concerning for right-sided sciatica. Patient will have a brief prescription for Percocet. She is to continue with her dialysis as scheduled. The patient is encouraged to follow-up with her primary care provider next 1 to 3 days. She is to return immediately to the emergency department for any worsening or concerning symptoms. At the time of disposition she is stable and in no acute distress or obvious discomfort.            ED Course:          ------------------------------------------------------------------------------------------------------------        Consultations: Consultations:       Disposition         Discharged      Diagnosis     Clinical Impression:   1. Right hip pain    2. End stage renal disease on dialysis ([facility]    3.  Right sided sciatica        Attestations:    Humberto Allen MD

## 2023-01-21 NOTE — ED NOTES
Ambulation Trial:  Pt states that she can't place any weight on her R hip. Aubrey/carlita in pain when she attempts to bear weight. MD aware.

## 2023-01-21 NOTE — DISCHARGE INSTRUCTIONS
1.  Continue home medications as prescribed. 2.  Return to the emergency department for any worsening or concerning symptoms. 3.  Make an appointment follow-up with your primary care provider. 4.  Consider sleeping with a pillow between your knees.   5.  Make an appointment follow-up with the orthopedic surgeon

## 2023-01-25 ENCOUNTER — APPOINTMENT (OUTPATIENT)
Dept: GENERAL RADIOLOGY | Age: 63
End: 2023-01-25
Attending: EMERGENCY MEDICINE
Payer: MEDICARE

## 2023-01-25 ENCOUNTER — HOSPITAL ENCOUNTER (EMERGENCY)
Age: 63
Discharge: HOME | End: 2023-01-25
Attending: EMERGENCY MEDICINE
Payer: MEDICARE

## 2023-01-25 VITALS
BODY MASS INDEX: 28.71 KG/M2 | WEIGHT: 156 LBS | OXYGEN SATURATION: 100 % | HEIGHT: 62 IN | TEMPERATURE: 97 F | HEART RATE: 76 BPM | RESPIRATION RATE: 16 BRPM | SYSTOLIC BLOOD PRESSURE: 162 MMHG | DIASTOLIC BLOOD PRESSURE: 72 MMHG

## 2023-01-25 DIAGNOSIS — S16.1XXA STRAIN OF NECK MUSCLE, INITIAL ENCOUNTER: Primary | ICD-10-CM

## 2023-01-25 PROCEDURE — 72040 X-RAY EXAM NECK SPINE 2-3 VW: CPT

## 2023-01-25 PROCEDURE — 99283 EMERGENCY DEPT VISIT LOW MDM: CPT

## 2023-01-25 RX ORDER — IBUPROFEN 600 MG/1
600 TABLET ORAL
Qty: 30 TABLET | Refills: 0 | Status: SHIPPED | OUTPATIENT
Start: 2023-01-25
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
## 2023-01-30 DIAGNOSIS — R92.8 ABNORMAL MAMMOGRAM: Primary | ICD-10-CM

## 2023-01-31 DIAGNOSIS — Z12.31 VISIT FOR SCREENING MAMMOGRAM: Primary | ICD-10-CM

## 2023-02-02 ENCOUNTER — TRANSCRIBE ORDERS (OUTPATIENT)
Facility: HOSPITAL | Age: 63
End: 2023-02-02

## 2023-02-02 DIAGNOSIS — R92.8 ABNORMAL MAMMOGRAM: Primary | ICD-10-CM

## 2023-02-03 DIAGNOSIS — R92.8 ABNORMAL MAMMOGRAM: Primary | ICD-10-CM

## 2023-02-04 ENCOUNTER — HOSPITAL ENCOUNTER (EMERGENCY)
Age: 63
Discharge: HOME | End: 2023-02-04
Attending: STUDENT IN AN ORGANIZED HEALTH CARE EDUCATION/TRAINING PROGRAM
Payer: MEDICARE

## 2023-02-04 ENCOUNTER — APPOINTMENT (OUTPATIENT)
Dept: GENERAL RADIOLOGY | Age: 63
End: 2023-02-04
Attending: STUDENT IN AN ORGANIZED HEALTH CARE EDUCATION/TRAINING PROGRAM
Payer: MEDICARE

## 2023-02-04 VITALS
RESPIRATION RATE: 20 BRPM | OXYGEN SATURATION: 97 % | HEART RATE: 92 BPM | DIASTOLIC BLOOD PRESSURE: 85 MMHG | SYSTOLIC BLOOD PRESSURE: 146 MMHG | TEMPERATURE: 97.5 F

## 2023-02-04 DIAGNOSIS — M25.511 PAIN IN JOINT OF RIGHT SHOULDER: Primary | ICD-10-CM

## 2023-02-04 DIAGNOSIS — Z12.31 VISIT FOR SCREENING MAMMOGRAM: Primary | ICD-10-CM

## 2023-02-04 PROCEDURE — 74011250637 HC RX REV CODE- 250/637: Performed by: STUDENT IN AN ORGANIZED HEALTH CARE EDUCATION/TRAINING PROGRAM

## 2023-02-04 PROCEDURE — 99284 EMERGENCY DEPT VISIT MOD MDM: CPT

## 2023-02-04 PROCEDURE — 93005 ELECTROCARDIOGRAM TRACING: CPT

## 2023-02-04 PROCEDURE — 73030 X-RAY EXAM OF SHOULDER: CPT

## 2023-02-04 RX ORDER — HYDROCODONE BITARTRATE AND ACETAMINOPHEN 5; 325 MG/1; MG/1
1 TABLET ORAL
Qty: 10 TABLET | Refills: 0 | Status: SHIPPED | OUTPATIENT
Start: 2023-02-04 | End: 2023-02-07

## 2023-02-04 RX ORDER — OXYCODONE AND ACETAMINOPHEN 5; 325 MG/1; MG/1
1 TABLET ORAL
Status: COMPLETED | OUTPATIENT
Start: 2023-02-04 | End: 2023-02-04

## 2023-02-04 RX ADMIN — OXYCODONE HYDROCHLORIDE AND ACETAMINOPHEN 1 TABLET: 5; 325 TABLET ORAL at 17:56
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
## 2023-02-05 LAB
ATRIAL RATE: 100 BPM
CALCULATED P AXIS, ECG09: 17 DEGREES
CALCULATED R AXIS, ECG10: -46 DEGREES
CALCULATED T AXIS, ECG11: 87 DEGREES
DIAGNOSIS, 93000: NORMAL
P-R INTERVAL, ECG05: 170 MS
Q-T INTERVAL, ECG07: 378 MS
QRS DURATION, ECG06: 92 MS
QTC CALCULATION (BEZET), ECG08: 487 MS
VENTRICULAR RATE, ECG03: 100 BPM

## 2023-02-17 ENCOUNTER — HOSPITAL ENCOUNTER (EMERGENCY)
Facility: HOSPITAL | Age: 63
Discharge: HOME | End: 2023-02-18
Attending: EMERGENCY MEDICINE
Payer: MEDICAID

## 2023-02-17 ENCOUNTER — APPOINTMENT (OUTPATIENT)
Facility: HOSPITAL | Age: 63
End: 2023-02-17
Payer: MEDICAID

## 2023-02-17 VITALS
WEIGHT: 156 LBS | DIASTOLIC BLOOD PRESSURE: 84 MMHG | OXYGEN SATURATION: 96 % | SYSTOLIC BLOOD PRESSURE: 119 MMHG | BODY MASS INDEX: 28.71 KG/M2 | HEIGHT: 62 IN | RESPIRATION RATE: 18 BRPM | TEMPERATURE: 98.7 F | HEART RATE: 109 BPM

## 2023-02-17 DIAGNOSIS — M79.601 RIGHT ARM PAIN: Primary | ICD-10-CM

## 2023-02-17 LAB
ALBUMIN SERPL-MCNC: 3.5 G/DL (ref 3.4–5)
ALBUMIN/GLOB SERPL: 0.9 (ref 0.8–1.7)
ALP SERPL-CCNC: 129 U/L (ref 45–117)
ALT SERPL-CCNC: 26 U/L (ref 13–56)
ANION GAP SERPL CALC-SCNC: 7 MMOL/L (ref 3–18)
AST SERPL-CCNC: 32 U/L (ref 10–38)
BILIRUB SERPL-MCNC: 0.2 MG/DL (ref 0.2–1)
BUN SERPL-MCNC: 32 MG/DL (ref 7–18)
BUN/CREAT SERPL: 5 (ref 12–20)
CALCIUM SERPL-MCNC: 10.4 MG/DL (ref 8.5–10.1)
CHLORIDE SERPL-SCNC: 105 MMOL/L (ref 100–111)
CO2 SERPL-SCNC: 29 MMOL/L (ref 21–32)
CREAT SERPL-MCNC: 6.16 MG/DL (ref 0.6–1.3)
EKG ATRIAL RATE: 102 BPM
EKG DIAGNOSIS: NORMAL
EKG P AXIS: 46 DEGREES
EKG P-R INTERVAL: 150 MS
EKG Q-T INTERVAL: 380 MS
EKG QRS DURATION: 92 MS
EKG QTC CALCULATION (BAZETT): 495 MS
EKG R AXIS: -8 DEGREES
EKG T AXIS: 78 DEGREES
EKG VENTRICULAR RATE: 102 BPM
ERYTHROCYTE [DISTWIDTH] IN BLOOD BY AUTOMATED COUNT: 17.8 % (ref 11.6–14.5)
GLOBULIN SER CALC-MCNC: 3.8 G/DL (ref 2–4)
GLUCOSE SERPL-MCNC: 101 MG/DL (ref 74–99)
HCT VFR BLD AUTO: 25.3 % (ref 35–45)
HGB BLD-MCNC: 7.7 G/DL (ref 12–16)
LIPASE SERPL-CCNC: 158 U/L (ref 73–393)
MCH RBC QN AUTO: 30.1 PG (ref 24–34)
MCHC RBC AUTO-ENTMCNC: 30.4 G/DL (ref 31–37)
MCV RBC AUTO: 98.8 FL (ref 78–100)
NRBC # BLD: 0 K/UL (ref 0–0.01)
NRBC BLD-RTO: 0 PER 100 WBC
PLATELET # BLD AUTO: 339 K/UL (ref 135–420)
PMV BLD AUTO: 11.4 FL (ref 9.2–11.8)
POTASSIUM SERPL-SCNC: 4.4 MMOL/L (ref 3.5–5.5)
PROT SERPL-MCNC: 7.3 G/DL (ref 6.4–8.2)
RBC # BLD AUTO: 2.56 M/UL (ref 4.2–5.3)
SODIUM SERPL-SCNC: 141 MMOL/L (ref 136–145)
TROPONIN I SERPL HS-MCNC: 53 NG/L (ref 0–54)
TROPONIN I SERPL HS-MCNC: 55 NG/L (ref 0–54)
WBC # BLD AUTO: 7.9 K/UL (ref 4.6–13.2)

## 2023-02-17 PROCEDURE — 96375 TX/PRO/DX INJ NEW DRUG ADDON: CPT

## 2023-02-17 PROCEDURE — 73060 X-RAY EXAM OF HUMERUS: CPT

## 2023-02-17 PROCEDURE — 85027 COMPLETE CBC AUTOMATED: CPT

## 2023-02-17 PROCEDURE — 6360000002 HC RX W HCPCS

## 2023-02-17 PROCEDURE — 71045 X-RAY EXAM CHEST 1 VIEW: CPT

## 2023-02-17 PROCEDURE — 80053 COMPREHEN METABOLIC PANEL: CPT

## 2023-02-17 PROCEDURE — 73030 X-RAY EXAM OF SHOULDER: CPT

## 2023-02-17 PROCEDURE — 96374 THER/PROPH/DIAG INJ IV PUSH: CPT

## 2023-02-17 PROCEDURE — 83690 ASSAY OF LIPASE: CPT

## 2023-02-17 PROCEDURE — 6370000000 HC RX 637 (ALT 250 FOR IP)

## 2023-02-17 PROCEDURE — 73070 X-RAY EXAM OF ELBOW: CPT

## 2023-02-17 PROCEDURE — 93005 ELECTROCARDIOGRAM TRACING: CPT | Performed by: EMERGENCY MEDICINE

## 2023-02-17 PROCEDURE — 94640 AIRWAY INHALATION TREATMENT: CPT

## 2023-02-17 PROCEDURE — 99285 EMERGENCY DEPT VISIT HI MDM: CPT

## 2023-02-17 PROCEDURE — 84484 ASSAY OF TROPONIN QUANT: CPT

## 2023-02-17 RX ORDER — OXYCODONE HYDROCHLORIDE AND ACETAMINOPHEN 5; 325 MG/1; MG/1
1 TABLET ORAL
Status: COMPLETED | OUTPATIENT
Start: 2023-02-17 | End: 2023-02-17

## 2023-02-17 RX ORDER — ONDANSETRON 2 MG/ML
4 INJECTION INTRAMUSCULAR; INTRAVENOUS
Status: COMPLETED | OUTPATIENT
Start: 2023-02-17 | End: 2023-02-17

## 2023-02-17 RX ORDER — IPRATROPIUM BROMIDE AND ALBUTEROL SULFATE 2.5; .5 MG/3ML; MG/3ML
1 SOLUTION RESPIRATORY (INHALATION)
Status: COMPLETED | OUTPATIENT
Start: 2023-02-17 | End: 2023-02-17

## 2023-02-17 RX ORDER — KETOROLAC TROMETHAMINE 15 MG/ML
15 INJECTION, SOLUTION INTRAMUSCULAR; INTRAVENOUS ONCE
Status: COMPLETED | OUTPATIENT
Start: 2023-02-17 | End: 2023-02-17

## 2023-02-17 RX ADMIN — ONDANSETRON 4 MG: 2 INJECTION INTRAMUSCULAR; INTRAVENOUS at 09:55

## 2023-02-17 RX ADMIN — IPRATROPIUM BROMIDE AND ALBUTEROL SULFATE 1 AMPULE: .5; 2.5 SOLUTION RESPIRATORY (INHALATION) at 10:32

## 2023-02-17 RX ADMIN — KETOROLAC TROMETHAMINE 15 MG: 15 INJECTION, SOLUTION INTRAMUSCULAR; INTRAVENOUS at 09:56

## 2023-02-17 RX ADMIN — OXYCODONE HYDROCHLORIDE AND ACETAMINOPHEN 1 TABLET: 5; 325 TABLET ORAL at 10:32

## 2023-02-17 ASSESSMENT — PAIN SCALES - GENERAL: PAINLEVEL_OUTOF10: 9

## 2023-02-17 ASSESSMENT — ENCOUNTER SYMPTOMS
SHORTNESS OF BREATH: 1
BLOOD IN STOOL: 0
CHEST TIGHTNESS: 1
STRIDOR: 1
CONSTIPATION: 0
DIARRHEA: 1
WHEEZING: 0
ABDOMINAL PAIN: 1
NAUSEA: 1
VOMITING: 0
COUGH: 0

## 2023-02-17 ASSESSMENT — PAIN - FUNCTIONAL ASSESSMENT: PAIN_FUNCTIONAL_ASSESSMENT: 0-10
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
## 2023-02-17 NOTE — DISCHARGE INSTRUCTIONS
Please return to the ED if arm becomes more swollen, decreased sensation, increasing pain, or for any other reasons of concern. Otherwise, no restrictions for work from a medical perspective. 80

## 2023-02-20 ENCOUNTER — APPOINTMENT (OUTPATIENT)
Facility: HOSPITAL | Age: 63
End: 2023-02-20
Payer: MEDICAID

## 2023-02-20 ENCOUNTER — HOSPITAL ENCOUNTER (EMERGENCY)
Facility: HOSPITAL | Age: 63
Discharge: HOME | End: 2023-02-21
Attending: EMERGENCY MEDICINE | Admitting: EMERGENCY MEDICINE
Payer: MEDICAID

## 2023-02-20 DIAGNOSIS — M79.601 RIGHT ARM PAIN: ICD-10-CM

## 2023-02-20 DIAGNOSIS — R06.09 DOE (DYSPNEA ON EXERTION): ICD-10-CM

## 2023-02-20 DIAGNOSIS — D64.9 ANEMIA, UNSPECIFIED TYPE: Primary | ICD-10-CM

## 2023-02-20 DIAGNOSIS — M54.12 CERVICAL RADICULOPATHY: ICD-10-CM

## 2023-02-20 LAB
ALBUMIN SERPL-MCNC: 2.8 G/DL (ref 3.4–5)
ALBUMIN/GLOB SERPL: 0.8 (ref 0.8–1.7)
ALP SERPL-CCNC: 102 U/L (ref 45–117)
ALT SERPL-CCNC: 19 U/L (ref 13–56)
ANION GAP SERPL CALC-SCNC: 9 MMOL/L (ref 3–18)
AST SERPL-CCNC: 20 U/L (ref 10–38)
BASOPHILS # BLD: 0.1 K/UL (ref 0–0.1)
BASOPHILS NFR BLD: 1 % (ref 0–2)
BILIRUB SERPL-MCNC: 0.3 MG/DL (ref 0.2–1)
BUN SERPL-MCNC: 59 MG/DL (ref 7–18)
BUN/CREAT SERPL: 7 (ref 12–20)
CALCIUM SERPL-MCNC: 8.9 MG/DL (ref 8.5–10.1)
CHLORIDE SERPL-SCNC: 107 MMOL/L (ref 100–111)
CO2 SERPL-SCNC: 23 MMOL/L (ref 21–32)
CREAT SERPL-MCNC: 8.57 MG/DL (ref 0.6–1.3)
DIFFERENTIAL METHOD BLD: ABNORMAL
EOSINOPHIL # BLD: 0.3 K/UL (ref 0–0.4)
EOSINOPHIL NFR BLD: 4 % (ref 0–5)
ERYTHROCYTE [DISTWIDTH] IN BLOOD BY AUTOMATED COUNT: 17.9 % (ref 11.6–14.5)
GLOBULIN SER CALC-MCNC: 3.7 G/DL (ref 2–4)
GLUCOSE SERPL-MCNC: 98 MG/DL (ref 74–99)
HCT VFR BLD AUTO: 20.2 % (ref 35–45)
HCT VFR BLD AUTO: 21.9 % (ref 35–45)
HGB BLD-MCNC: 6.2 G/DL (ref 12–16)
HGB BLD-MCNC: 6.8 G/DL (ref 12–16)
HISTORY CHECK: NORMAL
IMM GRANULOCYTES # BLD AUTO: 0 K/UL (ref 0–0.04)
IMM GRANULOCYTES NFR BLD AUTO: 1 % (ref 0–0.5)
LYMPHOCYTES # BLD: 1 K/UL (ref 0.9–3.6)
LYMPHOCYTES NFR BLD: 14 % (ref 21–52)
MAGNESIUM SERPL-MCNC: 3 MG/DL (ref 1.6–2.6)
MCH RBC QN AUTO: 30.2 PG (ref 24–34)
MCHC RBC AUTO-ENTMCNC: 31.1 G/DL (ref 31–37)
MCV RBC AUTO: 97.3 FL (ref 78–100)
MONOCYTES # BLD: 0.8 K/UL (ref 0.05–1.2)
MONOCYTES NFR BLD: 11 % (ref 3–10)
NEUTS SEG # BLD: 5 K/UL (ref 1.8–8)
NEUTS SEG NFR BLD: 69 % (ref 40–73)
NRBC # BLD: 0 K/UL (ref 0–0.01)
NRBC BLD-RTO: 0 PER 100 WBC
NT PRO BNP: ABNORMAL PG/ML (ref 0–900)
PLATELET # BLD AUTO: 215 K/UL (ref 135–420)
PMV BLD AUTO: 12 FL (ref 9.2–11.8)
POTASSIUM SERPL-SCNC: 4.6 MMOL/L (ref 3.5–5.5)
PROT SERPL-MCNC: 6.5 G/DL (ref 6.4–8.2)
RBC # BLD AUTO: 2.25 M/UL (ref 4.2–5.3)
SODIUM SERPL-SCNC: 139 MMOL/L (ref 136–145)
TROPONIN I SERPL HS-MCNC: 40 NG/L (ref 0–54)
WBC # BLD AUTO: 7.3 K/UL (ref 4.6–13.2)

## 2023-02-20 PROCEDURE — 96374 THER/PROPH/DIAG INJ IV PUSH: CPT

## 2023-02-20 PROCEDURE — 93005 ELECTROCARDIOGRAM TRACING: CPT | Performed by: PHYSICIAN ASSISTANT

## 2023-02-20 PROCEDURE — 36430 TRANSFUSION BLD/BLD COMPNT: CPT

## 2023-02-20 PROCEDURE — 86850 RBC ANTIBODY SCREEN: CPT

## 2023-02-20 PROCEDURE — 80053 COMPREHEN METABOLIC PANEL: CPT

## 2023-02-20 PROCEDURE — 6360000002 HC RX W HCPCS: Performed by: PHYSICIAN ASSISTANT

## 2023-02-20 PROCEDURE — 6370000000 HC RX 637 (ALT 250 FOR IP): Performed by: PHYSICIAN ASSISTANT

## 2023-02-20 PROCEDURE — 86920 COMPATIBILITY TEST SPIN: CPT

## 2023-02-20 PROCEDURE — 94640 AIRWAY INHALATION TREATMENT: CPT

## 2023-02-20 PROCEDURE — 85025 COMPLETE CBC W/AUTO DIFF WBC: CPT

## 2023-02-20 PROCEDURE — 83880 ASSAY OF NATRIURETIC PEPTIDE: CPT

## 2023-02-20 PROCEDURE — 99285 EMERGENCY DEPT VISIT HI MDM: CPT

## 2023-02-20 PROCEDURE — 85014 HEMATOCRIT: CPT

## 2023-02-20 PROCEDURE — P9016 RBC LEUKOCYTES REDUCED: HCPCS

## 2023-02-20 PROCEDURE — 71046 X-RAY EXAM CHEST 2 VIEWS: CPT

## 2023-02-20 PROCEDURE — 84484 ASSAY OF TROPONIN QUANT: CPT

## 2023-02-20 PROCEDURE — 83735 ASSAY OF MAGNESIUM: CPT

## 2023-02-20 RX ORDER — SODIUM CHLORIDE 9 MG/ML
INJECTION, SOLUTION INTRAVENOUS PRN
Status: DISCONTINUED | OUTPATIENT
Start: 2023-02-20 | End: 2023-02-21 | Stop reason: HOSPADM

## 2023-02-20 RX ORDER — IPRATROPIUM BROMIDE AND ALBUTEROL SULFATE 2.5; .5 MG/3ML; MG/3ML
1 SOLUTION RESPIRATORY (INHALATION)
Status: COMPLETED | OUTPATIENT
Start: 2023-02-20 | End: 2023-02-20

## 2023-02-20 RX ORDER — ONDANSETRON 2 MG/ML
4 INJECTION INTRAMUSCULAR; INTRAVENOUS
Status: COMPLETED | OUTPATIENT
Start: 2023-02-20 | End: 2023-02-20

## 2023-02-20 RX ORDER — OXYCODONE HYDROCHLORIDE AND ACETAMINOPHEN 5; 325 MG/1; MG/1
1 TABLET ORAL
Status: COMPLETED | OUTPATIENT
Start: 2023-02-20 | End: 2023-02-20

## 2023-02-20 RX ADMIN — OXYCODONE HYDROCHLORIDE AND ACETAMINOPHEN 1 TABLET: 5; 325 TABLET ORAL at 20:35

## 2023-02-20 RX ADMIN — IPRATROPIUM BROMIDE AND ALBUTEROL SULFATE 1 AMPULE: .5; 2.5 SOLUTION RESPIRATORY (INHALATION) at 18:36

## 2023-02-20 RX ADMIN — ONDANSETRON 4 MG: 2 INJECTION INTRAMUSCULAR; INTRAVENOUS at 20:35

## 2023-02-20 ASSESSMENT — PAIN - FUNCTIONAL ASSESSMENT: PAIN_FUNCTIONAL_ASSESSMENT: 0-10

## 2023-02-20 ASSESSMENT — PAIN DESCRIPTION - DESCRIPTORS: DESCRIPTORS: ACHING

## 2023-02-20 ASSESSMENT — PAIN DESCRIPTION - ORIENTATION: ORIENTATION: RIGHT

## 2023-02-20 ASSESSMENT — PAIN DESCRIPTION - LOCATION: LOCATION: ARM

## 2023-02-20 ASSESSMENT — PAIN SCALES - GENERAL: PAINLEVEL_OUTOF10: 9

## 2023-02-20 NOTE — Clinical Note
Marylene Ohs was seen and treated in our emergency department on 2/20/2023. She may return to work on 02/23/2023. If you have any questions or concerns, please don't hesitate to call.       Nancy Smith MD

## 2023-02-20 NOTE — Clinical Note
Theodore Steven was seen and treated in our emergency department on 2/20/2023. She may return to work on 02/23/2023. If you have any questions or concerns, please don't hesitate to call.       Lisandra Barajas MD

## 2023-02-21 VITALS
OXYGEN SATURATION: 89 % | HEART RATE: 76 BPM | TEMPERATURE: 98.3 F | SYSTOLIC BLOOD PRESSURE: 140 MMHG | WEIGHT: 156 LBS | BODY MASS INDEX: 28.71 KG/M2 | HEIGHT: 62 IN | RESPIRATION RATE: 20 BRPM | DIASTOLIC BLOOD PRESSURE: 97 MMHG

## 2023-02-21 LAB
BASOPHILS # BLD: 0.1 K/UL (ref 0–0.1)
BASOPHILS NFR BLD: 1 % (ref 0–2)
DIFFERENTIAL METHOD BLD: ABNORMAL
EKG ATRIAL RATE: 66 BPM
EKG DIAGNOSIS: NORMAL
EKG P AXIS: 45 DEGREES
EKG P-R INTERVAL: 150 MS
EKG Q-T INTERVAL: 456 MS
EKG QRS DURATION: 100 MS
EKG QTC CALCULATION (BAZETT): 478 MS
EKG R AXIS: -10 DEGREES
EKG T AXIS: 53 DEGREES
EKG VENTRICULAR RATE: 66 BPM
EOSINOPHIL # BLD: 0.5 K/UL (ref 0–0.4)
EOSINOPHIL NFR BLD: 6 % (ref 0–5)
ERYTHROCYTE [DISTWIDTH] IN BLOOD BY AUTOMATED COUNT: 19.9 % (ref 11.6–14.5)
HCT VFR BLD AUTO: 23.9 % (ref 35–45)
HGB BLD-MCNC: 7.4 G/DL (ref 12–16)
IMM GRANULOCYTES # BLD AUTO: 0 K/UL (ref 0–0.04)
IMM GRANULOCYTES NFR BLD AUTO: 0 % (ref 0–0.5)
LYMPHOCYTES # BLD: 1.5 K/UL (ref 0.9–3.6)
LYMPHOCYTES NFR BLD: 21 % (ref 21–52)
MCH RBC QN AUTO: 28.7 PG (ref 24–34)
MCHC RBC AUTO-ENTMCNC: 31 G/DL (ref 31–37)
MCV RBC AUTO: 92.6 FL (ref 78–100)
MONOCYTES # BLD: 0.8 K/UL (ref 0.05–1.2)
MONOCYTES NFR BLD: 11 % (ref 3–10)
NEUTS SEG # BLD: 4.3 K/UL (ref 1.8–8)
NEUTS SEG NFR BLD: 60 % (ref 40–73)
NRBC # BLD: 0 K/UL (ref 0–0.01)
NRBC BLD-RTO: 0 PER 100 WBC
PLATELET # BLD AUTO: 218 K/UL (ref 135–420)
PMV BLD AUTO: 11.4 FL (ref 9.2–11.8)
RBC # BLD AUTO: 2.58 M/UL (ref 4.2–5.3)
WBC # BLD AUTO: 7.2 K/UL (ref 4.6–13.2)

## 2023-02-21 PROCEDURE — 94640 AIRWAY INHALATION TREATMENT: CPT

## 2023-02-21 PROCEDURE — 85025 COMPLETE CBC W/AUTO DIFF WBC: CPT

## 2023-02-21 PROCEDURE — 6370000000 HC RX 637 (ALT 250 FOR IP): Performed by: EMERGENCY MEDICINE

## 2023-02-21 RX ORDER — IPRATROPIUM BROMIDE AND ALBUTEROL SULFATE 2.5; .5 MG/3ML; MG/3ML
1 SOLUTION RESPIRATORY (INHALATION)
Status: COMPLETED | OUTPATIENT
Start: 2023-02-21 | End: 2023-02-21

## 2023-02-21 RX ORDER — OXYCODONE HYDROCHLORIDE AND ACETAMINOPHEN 5; 325 MG/1; MG/1
1 TABLET ORAL EVERY 6 HOURS PRN
Qty: 8 TABLET | Refills: 0 | Status: SHIPPED | OUTPATIENT
Start: 2023-02-21 | End: 2023-02-25

## 2023-02-21 RX ORDER — PREDNISONE 20 MG/1
60 TABLET ORAL
Status: COMPLETED | OUTPATIENT
Start: 2023-02-21 | End: 2023-02-21

## 2023-02-21 RX ORDER — PREDNISONE 20 MG/1
60 TABLET ORAL DAILY
Qty: 12 TABLET | Refills: 0 | Status: SHIPPED | OUTPATIENT
Start: 2023-02-22 | End: 2023-02-26

## 2023-02-21 RX ORDER — OXYCODONE HYDROCHLORIDE AND ACETAMINOPHEN 5; 325 MG/1; MG/1
1 TABLET ORAL
Status: COMPLETED | OUTPATIENT
Start: 2023-02-21 | End: 2023-02-21

## 2023-02-21 RX ORDER — ALBUTEROL SULFATE 90 UG/1
2 AEROSOL, METERED RESPIRATORY (INHALATION) 4 TIMES DAILY PRN
Qty: 54 G | Refills: 1 | Status: SHIPPED | OUTPATIENT
Start: 2023-02-21

## 2023-02-21 RX ORDER — ONDANSETRON 4 MG/1
4 TABLET, ORALLY DISINTEGRATING ORAL
Status: COMPLETED | OUTPATIENT
Start: 2023-02-21 | End: 2023-02-21

## 2023-02-21 RX ADMIN — PREDNISONE 60 MG: 20 TABLET ORAL at 03:48

## 2023-02-21 RX ADMIN — OXYCODONE HYDROCHLORIDE AND ACETAMINOPHEN 1 TABLET: 5; 325 TABLET ORAL at 00:52

## 2023-02-21 RX ADMIN — IPRATROPIUM BROMIDE AND ALBUTEROL SULFATE 1 AMPULE: .5; 2.5 SOLUTION RESPIRATORY (INHALATION) at 03:48

## 2023-02-21 RX ADMIN — ONDANSETRON 4 MG: 4 TABLET, ORALLY DISINTEGRATING ORAL at 00:52
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
## 2023-02-21 NOTE — ED NOTES
Patient is in bed , resting with eyes closed , respirations even and unlabored . Patient is alert and oriented x 4. Will continue to monitor patient.         Luis Nguyen RN  02/21/23 8363

## 2023-02-21 NOTE — ED NOTES
Patient is discharged at this time , patient has been given discharge instructions. D/C paperwork and been explained and questions have been answered at this time. IV removed at this time.        Nick Vergara RN  02/21/23 6580

## 2023-02-21 NOTE — ED NOTES
Patient is in bed , resting with eyes closed , respirations even and unlabored . Patient is alert and oriented x 4. Will continue to monitor patient.         Paris Eisenmenger, RN  02/21/23 4739

## 2023-02-21 NOTE — ED NOTES
Patient is in bed , resting with eyes open. Patient is alert and oriented. Patient is stable on the monitor. Will continue to monitor patient.       Paco Murillo RN  02/20/23 8396

## 2023-02-21 NOTE — ED NOTES
Patient is in bed , resting with eyes closed , respirations even and unlabored . Patient is alert and oriented x 4. Will continue to monitor patient.         Eveline Allen RN  02/21/23 4580

## 2023-02-22 ENCOUNTER — OFFICE VISIT (OUTPATIENT)
Age: 63
End: 2023-02-22
Payer: MEDICAID

## 2023-02-22 VITALS — BODY MASS INDEX: 28.05 KG/M2 | WEIGHT: 152.4 LBS | TEMPERATURE: 97.5 F | HEIGHT: 62 IN

## 2023-02-22 DIAGNOSIS — M43.12 ANTEROLISTHESIS OF CERVICAL SPINE: Primary | ICD-10-CM

## 2023-02-22 DIAGNOSIS — M54.12 CERVICAL RADICULOPATHY: ICD-10-CM

## 2023-02-22 LAB
ABO + RH BLD: NORMAL
BLD PROD TYP BPU: NORMAL
BLOOD BANK CMNT PATIENT-IMP: NORMAL
BLOOD BANK DISPENSE STATUS: NORMAL
BLOOD GROUP ANTIBODIES SERPL: NORMAL
BPU ID: NORMAL
CROSSMATCH RESULT: NORMAL
SPECIMEN EXP DATE BLD: NORMAL
UNIT DIVISION: 0

## 2023-02-22 PROCEDURE — 99204 OFFICE O/P NEW MOD 45 MIN: CPT | Performed by: PHYSICIAN ASSISTANT

## 2023-02-22 RX ORDER — IBUPROFEN 600 MG/1
600 TABLET ORAL EVERY 6 HOURS PRN
COMMUNITY
Start: 2023-01-25

## 2023-02-22 RX ORDER — FEXOFENADINE HYDROCHLORIDE 60 MG/1
1 TABLET, FILM COATED ORAL
COMMUNITY
Start: 2021-05-20

## 2023-02-22 RX ORDER — CYCLOBENZAPRINE HCL 10 MG
TABLET ORAL
COMMUNITY
Start: 2023-01-26

## 2023-02-22 RX ORDER — ALPRAZOLAM 0.5 MG/1
1 TABLET ORAL
COMMUNITY
Start: 2018-07-19

## 2023-02-22 RX ORDER — CLONIDINE HYDROCHLORIDE 0.3 MG/1
1 TABLET ORAL
COMMUNITY
Start: 2016-08-04

## 2023-02-22 RX ORDER — MECLIZINE HCL 12.5 MG/1
1 TABLET ORAL
COMMUNITY
Start: 2018-07-19

## 2023-02-22 RX ORDER — GLYCERIN 0.25 %
DROPS OPHTHALMIC (EYE)
COMMUNITY
Start: 2022-11-29

## 2023-02-22 RX ORDER — ACETAMINOPHEN 325 MG/1
650 TABLET ORAL EVERY 4 HOURS PRN
COMMUNITY
Start: 2022-11-29

## 2023-02-22 RX ORDER — DILTIAZEM HYDROCHLORIDE 180 MG/1
180 CAPSULE, EXTENDED RELEASE ORAL DAILY
COMMUNITY
Start: 2022-09-28

## 2023-02-22 RX ORDER — CHOLECALCIFEROL (VITAMIN D3) 125 MCG
5 CAPSULE ORAL
COMMUNITY
Start: 2022-10-23

## 2023-02-22 RX ORDER — OMEPRAZOLE 20 MG/1
2 TABLET, DELAYED RELEASE ORAL
COMMUNITY
Start: 2017-08-31

## 2023-02-22 RX ORDER — NIFEDIPINE 90 MG/1
1 TABLET, FILM COATED, EXTENDED RELEASE ORAL
COMMUNITY
Start: 2018-07-21

## 2023-02-22 RX ORDER — CARVEDILOL 12.5 MG/1
1 TABLET ORAL
COMMUNITY
Start: 2021-05-20

## 2023-02-22 RX ORDER — LISINOPRIL 40 MG/1
40 TABLET ORAL DAILY
COMMUNITY
Start: 2022-10-23

## 2023-02-22 RX ORDER — TRIAMCINOLONE ACETONIDE 5 MG/G
OINTMENT TOPICAL
COMMUNITY
Start: 2023-01-25

## 2023-02-22 RX ORDER — SEVELAMER CARBONATE 800 MG/1
800 TABLET, FILM COATED ORAL
COMMUNITY
Start: 2022-04-14

## 2023-02-22 RX ORDER — FOLIC ACID/VIT B COMPLEX AND C 0.8 MG
1 TABLET ORAL DAILY
COMMUNITY
Start: 2022-11-17 | End: 2023-11-17

## 2023-02-22 RX ORDER — LORAZEPAM 1 MG/1
1-2 TABLET ORAL ONCE
Qty: 3 TABLET | Refills: 0 | Status: SHIPPED | OUTPATIENT
Start: 2023-02-22 | End: 2023-02-22

## 2023-02-22 RX ORDER — GABAPENTIN 100 MG/1
100 CAPSULE ORAL 3 TIMES DAILY
Qty: 90 CAPSULE | Refills: 0 | Status: SHIPPED | OUTPATIENT
Start: 2023-02-22 | End: 2023-03-24

## 2023-02-22 RX ORDER — HYDROXYZINE PAMOATE 50 MG/1
50 CAPSULE ORAL NIGHTLY PRN
COMMUNITY

## 2023-02-22 RX ORDER — FOLIC ACID/VIT B COMPLEX AND C 0.8 MG
TABLET ORAL
COMMUNITY
Start: 2022-11-17
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
## 2023-02-24 ENCOUNTER — TELEPHONE (OUTPATIENT)
Age: 63
End: 2023-02-24

## 2023-02-24 DIAGNOSIS — M43.12 ANTEROLISTHESIS OF CERVICAL SPINE: Primary | ICD-10-CM

## 2023-02-24 DIAGNOSIS — M54.12 CERVICAL RADICULOPATHY: ICD-10-CM

## 2023-02-24 RX ORDER — GABAPENTIN 300 MG/1
300 CAPSULE ORAL 3 TIMES DAILY
Qty: 90 CAPSULE | Refills: 0 | Status: SHIPPED | OUTPATIENT
Start: 2023-02-24 | End: 2023-03-26

## 2023-02-24 RX ORDER — BACLOFEN 10 MG/1
10 TABLET ORAL 3 TIMES DAILY
Qty: 90 TABLET | Refills: 1 | Status: SHIPPED | OUTPATIENT
Start: 2023-02-24

## 2023-02-24 NOTE — TELEPHONE ENCOUNTER
Pt called and stated that the prescription ordered by Sara Perez , for Gabapentin is not working to help with her pain. She requests a new medicine. She said she might need a \"high volume muscle relaxer\". Please send the new prescription to [address] [facility].  [phone]  [phone]

## 2023-02-28 ENCOUNTER — TELEPHONE (OUTPATIENT)
Age: 63
End: 2023-02-28

## 2023-02-28 DIAGNOSIS — F41.9 ANXIETY: Primary | ICD-10-CM

## 2023-02-28 NOTE — TELEPHONE ENCOUNTER
[facility] scheduling is requesting we call in new medication for anxiety for patients upcoming MRI. There is no MRI appointment with sedation available until May, if we can provide medication the patient can be scheduled in the next couple of days. Patient agrees with taking prescribed medication for procedure.

## 2023-03-01 RX ORDER — LORAZEPAM 1 MG/1
TABLET ORAL
Qty: 2 TABLET | Refills: 0 | Status: SHIPPED | OUTPATIENT
Start: 2023-03-01 | End: 2023-03-01

## 2023-03-01 NOTE — TELEPHONE ENCOUNTER
Patient states she is scheduled to have her MRI of the cervical spine tomorrow (3/2/23) at 5:00pm, and asking if an order can be entered so  she can be put under anesthesia. Patient can be reached at [phone].

## 2023-03-01 NOTE — TELEPHONE ENCOUNTER
Ativan rx done and sent to pharmacy [facility] Text: The defect edges were debeveled with a #15 scalpel blade.  Given the location of the defect, shape of the defect a keystone flap was deemed most appropriate.  Using a sterile surgical marker, an appropriate keystone flap was drawn incorporating the defect, outlining the appropriate donor tissue and placing the expected incisions within the relaxed skin tension lines where possible. The area thus outlined was incised deep to adipose tissue with a #15 scalpel blade.  The skin margins were undermined to an appropriate distance in all directions around the primary defect and laterally outward around the flap utilizing iris scissors.

## 2023-03-02 ENCOUNTER — TELEPHONE (OUTPATIENT)
Age: 63
End: 2023-03-02

## 2023-03-02 NOTE — TELEPHONE ENCOUNTER
Patient keeps sending our calls to her  and her mailbox is full. Please let patient know about my previous response.

## 2023-03-02 NOTE — TELEPHONE ENCOUNTER
We do not offer MRI under sedation. She has to fail MRI with ativan first. Then it would have to be scheduled when they have anesthesia to do MRI first available would be in May. She has rx for ativan already. One was sent on 2/22 and another was sent in by Pauline Hussein yesterday, have her follow up with spine asap.  No need to be seen by elp/smm

## 2023-03-02 NOTE — TELEPHONE ENCOUNTER
Patient called stating that she needs a new order for an MRI with Anesthesia sent to [facility] patient states states to call her back \"ASAP\"       [phone]

## 2023-03-02 NOTE — TELEPHONE ENCOUNTER
Attempted to reach patient. Mailbox was full. If patient returns call please advise of message below. Also, schedule her with the [facility].

## 2023-03-02 NOTE — TELEPHONE ENCOUNTER
Patient called once again and is aware of [facility] message, but is still asking for a call back. Patient said that she has been having problems with her home tel#. Patient is requesting a call back at  [facility]. [phone].

## 2023-03-09 ENCOUNTER — TELEPHONE (OUTPATIENT)
Age: 63
End: 2023-03-09
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
## 2023-03-09 NOTE — TELEPHONE ENCOUNTER
[address] faxed for additional info for approval of MRI Cerv Spine    This exam requires submission of additional documentation of conservative tratment. Documentation of reevaluation of patients candidacy for surgery or steroid injection and imaging reports.

## (undated) DEVICE — KIT CLN UP BON SECOURS MARYV

## (undated) DEVICE — PAD,NON-ADHERENT,3X8,STERILE,LF,1/PK: Brand: MEDLINE

## (undated) DEVICE — BANDAGE,ELASTIC,ESMARK,STERILE,4"X9',LF: Brand: MEDLINE

## (undated) DEVICE — CATHETER SUCT TR FL TIP 14FR W/ O CTRL

## (undated) DEVICE — SOLUTION IV 1000ML 0.9% SOD CHL

## (undated) DEVICE — BANDAGE,GAUZE,CONFORMING,1"X75",STRL,LF: Brand: MEDLINE

## (undated) DEVICE — BLADE OPHTH MINI BEAV SHRP --

## (undated) DEVICE — (D)GLOVE EXAM LG NITRL NS -- DISC BY MFR NO SUB

## (undated) DEVICE — BANDAGE COBAN 4 IN COMPR W4INXL5YD FOAM COHESIVE QUIK STK SELF ADH SFT

## (undated) DEVICE — STOCKINETTE,IMPERVIOUS,12X48,STERILE: Brand: MEDLINE

## (undated) DEVICE — SUTURE CHROMIC GUT SZ 5-0 L18IN ABSRB BRN L16MM PS-3 3/8 1636G

## (undated) DEVICE — GAUZE,SPONGE,4"X4",16PLY,STRL,LF,10/TRAY: Brand: MEDLINE

## (undated) DEVICE — STERILE POLYISOPRENE POWDER-FREE SURGICAL GLOVES: Brand: PROTEXIS

## (undated) DEVICE — BNDG CMPR ELAS KNT VEL STD 3IN -- MEDICHOICE

## (undated) DEVICE — SOLUTION IRRIG 1000ML H2O STRL BLT

## (undated) DEVICE — SYRINGE MED 20ML STD CLR PLAS LUERLOCK TIP N CTRL DISP

## (undated) DEVICE — CANNULA PERF L2IN BLNT TIP 2MM VES CLR RADPQ BODY FEM LUER

## (undated) DEVICE — CATHETER THOR 36FR DIA10.7MM POLYVI CHL TRCR TIP STR SFT

## (undated) DEVICE — GAUZE SPONGES,16 PLY: Brand: CURITY

## (undated) DEVICE — FLEX ADVANTAGE 3000CC: Brand: FLEX ADVANTAGE

## (undated) DEVICE — ZIMMER® STERILE DISPOSABLE TOURNIQUET CUFF WITH PROTECTIVE SLEEVE AND PLC, DUAL PORT, SINGLE BLADDER, 18 IN. (46 CM)

## (undated) DEVICE — AIRLIFE™ NASAL OXYGEN CANNULA CURVED, NONFLARED TIP WITH 14 FOOT (4.3 M) CRUSH-RESISTANT TUBING, OVER-THE-EAR STYLE: Brand: AIRLIFE™

## (undated) DEVICE — MEDI-VAC SUCTION HIGH CAPACITY: Brand: CARDINAL HEALTH

## (undated) DEVICE — SYR 10ML LUER LOK 1/5ML GRAD --

## (undated) DEVICE — DERMACEA GAUZE ROLL: Brand: DERMACEA

## (undated) DEVICE — FORCEPS BX L240CM JAW DIA2.8MM L CAP W/ NDL MIC MESH TOOTH

## (undated) DEVICE — PADDING CAST W4INXL4YD ST COT COHESIVE HND TEARABLE SPEC

## (undated) DEVICE — MEDI-VAC NON-CONDUCTIVE SUCTION TUBING: Brand: CARDINAL HEALTH

## (undated) DEVICE — PADDING CAST COHESIVE 4 YDX3 IN HND TEARABLE COTTON SPEC 100

## (undated) DEVICE — FLUFF AND POLYMER UNDERPAD,EXTRA HEAVY: Brand: WINGS

## (undated) DEVICE — THREE-QUARTER SHEET: Brand: CONVERTORS

## (undated) DEVICE — PREP SKN CHLRAPRP APL 26ML STR --

## (undated) DEVICE — ENDOSCOPY PUMP TUBING/ CAP SET: Brand: ERBE

## (undated) DEVICE — CANNULA ORIG TL CLR W FOAM CUSHIONS AND 14FT SUPL TB 3 CHN

## (undated) DEVICE — BNDG ELAS HK LOOP 4X5YD NS -- MATRIX

## (undated) DEVICE — INTENDED FOR TISSUE SEPARATION, AND OTHER PROCEDURES THAT REQUIRE A SHARP SURGICAL BLADE TO PUNCTURE OR CUT.: Brand: BARD-PARKER ®  SAFETY SCALPED

## (undated) DEVICE — SYRINGE MED 25GA 3ML L5/8IN SUBQ PLAS W/ DETACH NDL SFTY

## (undated) DEVICE — PACK PROCEDURE SURG MAJ W/ BASIN LF

## (undated) DEVICE — INTENDED FOR TISSUE SEPARATION, AND OTHER PROCEDURES THAT REQUIRE A SHARP SURGICAL BLADE TO PUNCTURE OR CUT.: Brand: BARD-PARKER SAFETY BLADES SIZE 10, STERILE

## (undated) DEVICE — SYR LR LCK 1ML GRAD NSAF 30ML --

## (undated) DEVICE — GOWN ISOL IMPERV UNIV, DISP, OPEN BACK, BLUE --

## (undated) DEVICE — BIPOLAR FORCEPS CORD: Brand: VALLEYLAB

## (undated) DEVICE — DRESSING,GAUZE,XEROFORM,CURAD,1"X8",ST: Brand: CURAD

## (undated) DEVICE — GOWN,SIRUS,FABRNF,2XL,18/CS: Brand: MEDLINE

## (undated) DEVICE — SYR 50ML SLIP TIP NSAF LF STRL --

## (undated) DEVICE — Z DISCONTINUED USE 2272124 DRAPE SURG XL N INVASIVE 2 LAYR DISP